# Patient Record
Sex: FEMALE | Race: WHITE | NOT HISPANIC OR LATINO | Employment: OTHER | URBAN - METROPOLITAN AREA
[De-identification: names, ages, dates, MRNs, and addresses within clinical notes are randomized per-mention and may not be internally consistent; named-entity substitution may affect disease eponyms.]

---

## 2017-10-29 ENCOUNTER — HOSPITAL ENCOUNTER (EMERGENCY)
Facility: HOSPITAL | Age: 52
Discharge: HOME | End: 2017-10-29
Attending: EMERGENCY MEDICINE | Admitting: EMERGENCY MEDICINE

## 2017-10-29 VITALS
SYSTOLIC BLOOD PRESSURE: 174 MMHG | HEART RATE: 94 BPM | WEIGHT: 30 LBS | TEMPERATURE: 98.4 F | RESPIRATION RATE: 20 BRPM | BODY MASS INDEX: 5.32 KG/M2 | DIASTOLIC BLOOD PRESSURE: 85 MMHG | OXYGEN SATURATION: 95 % | HEIGHT: 63 IN

## 2017-10-29 DIAGNOSIS — I89.0 LYMPHEDEMA: Primary | ICD-10-CM

## 2017-10-29 DIAGNOSIS — I10 UNCONTROLLED HYPERTENSION: ICD-10-CM

## 2017-10-29 DIAGNOSIS — R23.8 SKIN BULLA: ICD-10-CM

## 2017-10-29 LAB
ALBUMIN SERPL-MCNC: 4.4 G/DL (ref 3.2–4.8)
ALBUMIN/GLOB SERPL: 1.3 G/DL (ref 1.5–2.5)
ALP SERPL-CCNC: 74 U/L (ref 25–100)
ALT SERPL W P-5'-P-CCNC: 27 U/L (ref 7–40)
ANION GAP SERPL CALCULATED.3IONS-SCNC: 4 MMOL/L (ref 3–11)
AST SERPL-CCNC: 24 U/L (ref 0–33)
BASOPHILS # BLD AUTO: 0.03 10*3/MM3 (ref 0–0.2)
BASOPHILS NFR BLD AUTO: 0.4 % (ref 0–1)
BILIRUB SERPL-MCNC: 0.6 MG/DL (ref 0.3–1.2)
BUN BLD-MCNC: 16 MG/DL (ref 9–23)
BUN/CREAT SERPL: 17.8 (ref 7–25)
CALCIUM SPEC-SCNC: 9.1 MG/DL (ref 8.7–10.4)
CHLORIDE SERPL-SCNC: 103 MMOL/L (ref 99–109)
CO2 SERPL-SCNC: 30 MMOL/L (ref 20–31)
CREAT BLD-MCNC: 0.9 MG/DL (ref 0.6–1.3)
D-LACTATE SERPL-SCNC: 1.2 MMOL/L (ref 0.5–2)
DEPRECATED RDW RBC AUTO: 55.5 FL (ref 37–54)
EOSINOPHIL # BLD AUTO: 0.1 10*3/MM3 (ref 0–0.3)
EOSINOPHIL NFR BLD AUTO: 1.3 % (ref 0–3)
ERYTHROCYTE [DISTWIDTH] IN BLOOD BY AUTOMATED COUNT: 16.1 % (ref 11.3–14.5)
GFR SERPL CREATININE-BSD FRML MDRD: 66 ML/MIN/1.73
GLOBULIN UR ELPH-MCNC: 3.5 GM/DL
GLUCOSE BLD-MCNC: 108 MG/DL (ref 70–100)
HCT VFR BLD AUTO: 43.7 % (ref 34.5–44)
HGB BLD-MCNC: 13.7 G/DL (ref 11.5–15.5)
IMM GRANULOCYTES # BLD: 0.01 10*3/MM3 (ref 0–0.03)
IMM GRANULOCYTES NFR BLD: 0.1 % (ref 0–0.6)
LYMPHOCYTES # BLD AUTO: 1.63 10*3/MM3 (ref 0.6–4.8)
LYMPHOCYTES NFR BLD AUTO: 21.9 % (ref 24–44)
MCH RBC QN AUTO: 29.1 PG (ref 27–31)
MCHC RBC AUTO-ENTMCNC: 31.4 G/DL (ref 32–36)
MCV RBC AUTO: 93 FL (ref 80–99)
MONOCYTES # BLD AUTO: 0.66 10*3/MM3 (ref 0–1)
MONOCYTES NFR BLD AUTO: 8.9 % (ref 0–12)
NEUTROPHILS # BLD AUTO: 5.01 10*3/MM3 (ref 1.5–8.3)
NEUTROPHILS NFR BLD AUTO: 67.4 % (ref 41–71)
PLATELET # BLD AUTO: 203 10*3/MM3 (ref 150–450)
PMV BLD AUTO: 12.6 FL (ref 6–12)
POTASSIUM BLD-SCNC: 4 MMOL/L (ref 3.5–5.5)
PROCALCITONIN SERPL-MCNC: <0.05 NG/ML
PROT SERPL-MCNC: 7.9 G/DL (ref 5.7–8.2)
RBC # BLD AUTO: 4.7 10*6/MM3 (ref 3.89–5.14)
SODIUM BLD-SCNC: 137 MMOL/L (ref 132–146)
WBC NRBC COR # BLD: 7.44 10*3/MM3 (ref 3.5–10.8)

## 2017-10-29 PROCEDURE — 83605 ASSAY OF LACTIC ACID: CPT | Performed by: EMERGENCY MEDICINE

## 2017-10-29 PROCEDURE — 87040 BLOOD CULTURE FOR BACTERIA: CPT | Performed by: EMERGENCY MEDICINE

## 2017-10-29 PROCEDURE — 80053 COMPREHEN METABOLIC PANEL: CPT | Performed by: EMERGENCY MEDICINE

## 2017-10-29 PROCEDURE — 84145 PROCALCITONIN (PCT): CPT | Performed by: EMERGENCY MEDICINE

## 2017-10-29 PROCEDURE — 85025 COMPLETE CBC W/AUTO DIFF WBC: CPT | Performed by: EMERGENCY MEDICINE

## 2017-10-29 PROCEDURE — 99283 EMERGENCY DEPT VISIT LOW MDM: CPT

## 2017-10-29 RX ORDER — MUPIROCIN CALCIUM 20 MG/G
CREAM TOPICAL 3 TIMES DAILY
Qty: 30 G | Refills: 0 | Status: SHIPPED | OUTPATIENT
Start: 2017-10-29 | End: 2022-10-22

## 2017-10-29 RX ORDER — ALBUTEROL SULFATE 2.5 MG/3ML
2.5 SOLUTION RESPIRATORY (INHALATION) EVERY 4 HOURS PRN
COMMUNITY

## 2017-10-29 RX ORDER — MUPIROCIN CALCIUM 20 MG/G
CREAM TOPICAL ONCE
Status: COMPLETED | OUTPATIENT
Start: 2017-10-29 | End: 2017-10-29

## 2017-10-29 RX ORDER — HYDROCHLOROTHIAZIDE 25 MG/1
25 TABLET ORAL DAILY
COMMUNITY
End: 2022-10-27 | Stop reason: HOSPADM

## 2017-10-29 RX ORDER — LOSARTAN POTASSIUM 50 MG/1
50 TABLET ORAL DAILY
COMMUNITY
End: 2022-10-22

## 2017-10-29 RX ADMIN — MUPIROCIN: 2 CREAM TOPICAL at 23:00

## 2017-11-03 LAB
BACTERIA SPEC AEROBE CULT: NORMAL
BACTERIA SPEC AEROBE CULT: NORMAL

## 2018-09-01 ENCOUNTER — HOSPITAL ENCOUNTER (EMERGENCY)
Facility: HOSPITAL | Age: 53
Discharge: HOME | End: 2018-09-01
Attending: EMERGENCY MEDICINE | Admitting: NURSE PRACTITIONER

## 2018-09-01 VITALS
TEMPERATURE: 98.9 F | SYSTOLIC BLOOD PRESSURE: 176 MMHG | HEIGHT: 62 IN | BODY MASS INDEX: 53.92 KG/M2 | RESPIRATION RATE: 22 BRPM | OXYGEN SATURATION: 95 % | WEIGHT: 293 LBS | DIASTOLIC BLOOD PRESSURE: 93 MMHG | HEART RATE: 87 BPM

## 2018-09-01 DIAGNOSIS — I10 UNCONTROLLED HYPERTENSION: ICD-10-CM

## 2018-09-01 DIAGNOSIS — I89.0 LYMPHEDEMA OF BOTH LOWER EXTREMITIES: ICD-10-CM

## 2018-09-01 DIAGNOSIS — L03.116 CELLULITIS OF LEFT LOWER EXTREMITY: Primary | ICD-10-CM

## 2018-09-01 LAB
ALBUMIN SERPL-MCNC: 4.38 G/DL (ref 3.2–4.8)
ALBUMIN/GLOB SERPL: 1.4 G/DL (ref 1.5–2.5)
ALP SERPL-CCNC: 69 U/L (ref 25–100)
ALT SERPL W P-5'-P-CCNC: 35 U/L (ref 7–40)
ANION GAP SERPL CALCULATED.3IONS-SCNC: 5 MMOL/L (ref 3–11)
AST SERPL-CCNC: 32 U/L (ref 0–33)
BASOPHILS # BLD AUTO: 0.03 10*3/MM3 (ref 0–0.2)
BASOPHILS NFR BLD AUTO: 0.4 % (ref 0–1)
BILIRUB SERPL-MCNC: 0.6 MG/DL (ref 0.3–1.2)
BILIRUB UR QL STRIP: NEGATIVE
BUN BLD-MCNC: 13 MG/DL (ref 9–23)
BUN/CREAT SERPL: 18.8 (ref 7–25)
CALCIUM SPEC-SCNC: 9.4 MG/DL (ref 8.7–10.4)
CHLORIDE SERPL-SCNC: 103 MMOL/L (ref 99–109)
CLARITY UR: CLEAR
CO2 SERPL-SCNC: 29 MMOL/L (ref 20–31)
COLOR UR: YELLOW
CREAT BLD-MCNC: 0.69 MG/DL (ref 0.6–1.3)
D-LACTATE SERPL-SCNC: 1.9 MMOL/L (ref 0.5–2)
DEPRECATED RDW RBC AUTO: 54.2 FL (ref 37–54)
EOSINOPHIL # BLD AUTO: 0.13 10*3/MM3 (ref 0–0.3)
EOSINOPHIL NFR BLD AUTO: 1.9 % (ref 0–3)
ERYTHROCYTE [DISTWIDTH] IN BLOOD BY AUTOMATED COUNT: 16.1 % (ref 11.3–14.5)
GFR SERPL CREATININE-BSD FRML MDRD: 90 ML/MIN/1.73
GLOBULIN UR ELPH-MCNC: 3.1 GM/DL
GLUCOSE BLD-MCNC: 132 MG/DL (ref 70–100)
GLUCOSE UR STRIP-MCNC: NEGATIVE MG/DL
HCT VFR BLD AUTO: 40.5 % (ref 34.5–44)
HGB BLD-MCNC: 12.8 G/DL (ref 11.5–15.5)
HGB UR QL STRIP.AUTO: NEGATIVE
IMM GRANULOCYTES # BLD: 0.02 10*3/MM3 (ref 0–0.03)
IMM GRANULOCYTES NFR BLD: 0.3 % (ref 0–0.6)
KETONES UR QL STRIP: NEGATIVE
LEUKOCYTE ESTERASE UR QL STRIP.AUTO: NEGATIVE
LYMPHOCYTES # BLD AUTO: 1.4 10*3/MM3 (ref 0.6–4.8)
LYMPHOCYTES NFR BLD AUTO: 20.2 % (ref 24–44)
MCH RBC QN AUTO: 29.3 PG (ref 27–31)
MCHC RBC AUTO-ENTMCNC: 31.6 G/DL (ref 32–36)
MCV RBC AUTO: 92.7 FL (ref 80–99)
MONOCYTES # BLD AUTO: 0.67 10*3/MM3 (ref 0–1)
MONOCYTES NFR BLD AUTO: 9.7 % (ref 0–12)
NEUTROPHILS # BLD AUTO: 4.71 10*3/MM3 (ref 1.5–8.3)
NEUTROPHILS NFR BLD AUTO: 67.8 % (ref 41–71)
NITRITE UR QL STRIP: NEGATIVE
PH UR STRIP.AUTO: <=5 [PH] (ref 5–8)
PLATELET # BLD AUTO: 187 10*3/MM3 (ref 150–450)
PMV BLD AUTO: 12.5 FL (ref 6–12)
POTASSIUM BLD-SCNC: 4.1 MMOL/L (ref 3.5–5.5)
PROCALCITONIN SERPL-MCNC: <0.05 NG/ML
PROT SERPL-MCNC: 7.5 G/DL (ref 5.7–8.2)
PROT UR QL STRIP: NEGATIVE
RBC # BLD AUTO: 4.37 10*6/MM3 (ref 3.89–5.14)
SODIUM BLD-SCNC: 137 MMOL/L (ref 132–146)
SP GR UR STRIP: 1.01 (ref 1–1.03)
UROBILINOGEN UR QL STRIP: NORMAL
WBC NRBC COR # BLD: 6.94 10*3/MM3 (ref 3.5–10.8)

## 2018-09-01 PROCEDURE — 87040 BLOOD CULTURE FOR BACTERIA: CPT | Performed by: NURSE PRACTITIONER

## 2018-09-01 PROCEDURE — 81003 URINALYSIS AUTO W/O SCOPE: CPT | Performed by: EMERGENCY MEDICINE

## 2018-09-01 PROCEDURE — 80053 COMPREHEN METABOLIC PANEL: CPT | Performed by: NURSE PRACTITIONER

## 2018-09-01 PROCEDURE — 85025 COMPLETE CBC W/AUTO DIFF WBC: CPT | Performed by: NURSE PRACTITIONER

## 2018-09-01 PROCEDURE — 83605 ASSAY OF LACTIC ACID: CPT | Performed by: NURSE PRACTITIONER

## 2018-09-01 PROCEDURE — 99283 EMERGENCY DEPT VISIT LOW MDM: CPT

## 2018-09-01 PROCEDURE — 84145 PROCALCITONIN (PCT): CPT | Performed by: NURSE PRACTITIONER

## 2018-09-01 RX ORDER — SODIUM CHLORIDE 0.9 % (FLUSH) 0.9 %
10 SYRINGE (ML) INJECTION AS NEEDED
Status: DISCONTINUED | OUTPATIENT
Start: 2018-09-01 | End: 2018-09-01 | Stop reason: HOSPADM

## 2018-09-01 RX ORDER — CLONIDINE HYDROCHLORIDE 0.1 MG/1
0.1 TABLET ORAL ONCE
Status: COMPLETED | OUTPATIENT
Start: 2018-09-01 | End: 2018-09-01

## 2018-09-01 RX ORDER — CEPHALEXIN 500 MG/1
500 CAPSULE ORAL 4 TIMES DAILY
Qty: 40 CAPSULE | Refills: 0 | Status: ON HOLD | OUTPATIENT
Start: 2018-09-01 | End: 2021-07-05

## 2018-09-01 RX ADMIN — CLONIDINE HYDROCHLORIDE 0.1 MG: 0.1 TABLET ORAL at 19:27

## 2018-09-01 NOTE — ED PROVIDER NOTES
Subjective   Kerry Leal is a 53 y.o.female who presents to the ED with complaints of leg swelling. The patient states that she has had intermittent left leg swelling, pain, and erythema for the past year. She says over the past month her swelling and redness has worsened and she says it is the worse it has ever been currently. She was given antibiotics, Bactrim, 6 months ago for cellulitis and lymphedema, but she says that it has not helped. She says that she has not taken it every day. Whenever her redness goes down she stops. She was told to return to the ED if warmth and redness flares up in her leg. She currently says her left leg She has had shortness of breath, but this is normal for her at baseline secondary to having asthma. She also endorses having numbness in her arms and legs. She denies having any chest pain, fever, or chills. She went to  about a year ago to see infectious disease, they did a CT scan and did not find anything on her leg, but they did find a nodule on her lung. She denies any history of DVT or PE. She has no recent immobilization, travel, or immobilizations. Her last US was done about 4-5 months ago. Her past medical history is significant for anxiety and being a borderline diabetic. There are no other acute complaints at this time.              History provided by:  Patient  Lower Extremity Issue   Location:  Leg  Leg location:  L lower leg  Pain details:     Radiates to:  Does not radiate    Severity:  Mild    Onset quality:  Gradual    Duration:  12 months    Timing:  Intermittent    Progression:  Waxing and waning  Chronicity:  Chronic  Dislocation: no    Foreign body present:  No foreign bodies  Relieved by:  Nothing  Worsened by:  Nothing  Ineffective treatments: Bactrim.  Associated symptoms: numbness and swelling    Associated symptoms: no fever    Risk factors: obesity        Review of Systems   Constitutional: Negative for chills and fever.   Respiratory: Positive for  shortness of breath (chronic).    Cardiovascular: Positive for leg swelling. Negative for chest pain.   Skin: Positive for color change.   Neurological: Positive for numbness.   All other systems reviewed and are negative.      Past Medical History:   Diagnosis Date   • Anxiety    • Asthma    • Heart murmur    • Hypertension        Allergies   Allergen Reactions   • Benadryl [Diphenhydramine Hcl]    • Clindamycin/Lincomycin    • Diclofenac Swelling   • Doxycycline    • Fluticasone    • Fluvoxamine    • Lisinopril    • Montelukast Swelling   • Penicillins    • Smz-Tmp Ds [Sulfamethoxazole-Trimethoprim]    • Sulfa Antibiotics    • Symbicort [Budesonide-Formoterol Fumarate]        Past Surgical History:   Procedure Laterality Date   • APPENDECTOMY     •  SECTION     • HYSTERECTOMY     • NECK SURGERY         History reviewed. No pertinent family history.    Social History     Social History   • Marital status:      Social History Main Topics   • Smoking status: Never Smoker   • Smokeless tobacco: Never Used   • Alcohol use No   • Drug use: No   • Sexual activity: Defer     Other Topics Concern   • Not on file         Objective   Physical Exam   Constitutional: She is oriented to person, place, and time. She appears well-developed and well-nourished. No distress.   HENT:   Head: Normocephalic and atraumatic.   Mouth/Throat: Oropharynx is clear and moist.   Eyes: Conjunctivae and EOM are normal.   Neck: Normal range of motion.   Cardiovascular: Normal rate, regular rhythm and intact distal pulses.    Pulmonary/Chest: Effort normal and breath sounds normal. No respiratory distress.   Musculoskeletal: She exhibits edema and tenderness.   Lt lower extremity:  Known lymphema, swelling is greater on the LLE.  Pt has erythema that extends from ankle to mid shin and extends to the calf.  Extremity is warm to palpation.  Extremity is tender to palpation.  +pulses are intact.    Neurological: She is alert and  oriented to person, place, and time.   Skin: Skin is warm and dry.   Lt lower extremity:  Known lymphema, swelling is greater on the LLE.  Pt has erythema that extends from ankle to mid shin and extends to the calf.  Extremity is warm to palpation.  Extremity is tender to palpation.  +pulses are intact.      Psychiatric: She has a normal mood and affect.   Nursing note and vitals reviewed.      Procedures         ED Course  ED Course as of Sep 03 0515   Sat Sep 01, 2018   1953 Dr. Dickson is at the bedside evaluating the patient. She was treated with Bactrim six months ago for cellulitis which she says has been a problem off and on for about a year. She sees an infectious disease doctor at  but has been unable to schedule an appointment. Advised the importance of elevation for her lymphedema and starting Kefflex for cellulitis. She will call on Tuesday to schedule an appointment with infectious disease and promises to follow up with primary care as soon as possible.  [AS]      ED Course User Index  [AS] Carmela Ovalle     No results found for this or any previous visit (from the past 24 hour(s)).  Note: In addition to lab results from this visit, the labs listed above may include labs taken at another facility or during a different encounter within the last 24 hours. Please correlate lab times with ED admission and discharge times for further clarification of the services performed during this visit.    No orders to display     Vitals:    09/01/18 1900 09/01/18 1915 09/01/18 1945 09/01/18 2014   BP: (!) 179/107 (!) 195/104 176/93    BP Location:       Patient Position:       Pulse:    87   Resp:       Temp:       TempSrc:       SpO2:       Weight:       Height:         Medications   CloNIDine (CATAPRES) tablet 0.1 mg (0.1 mg Oral Given 9/1/18 1927)     ECG/EMG Results (last 24 hours)     ** No results found for the last 24 hours. **                    [facility]    Final diagnoses:   Cellulitis of left lower extremity    Lymphedema of both lower extremities   Uncontrolled hypertension       Documentation assistance provided by jani Bowens.  Information recorded by the killianibgaetano was done at my direction and has been verified and validated by me.     Álvaro Bowens  09/01/18 1654       Yola Sung APRN  09/03/18 0514

## 2018-09-02 NOTE — DISCHARGE INSTRUCTIONS
Add Keflex to your Bactrim dosing.  Elevate lower extremities as much as possible.  Follow up with [facility]- Infectious disease.  Follow up with Primary Care Physician.

## 2018-09-06 LAB
BACTERIA SPEC AEROBE CULT: NORMAL
BACTERIA SPEC AEROBE CULT: NORMAL

## 2019-09-25 ENCOUNTER — APPOINTMENT (OUTPATIENT)
Dept: CARDIOLOGY | Facility: HOSPITAL | Age: 54
End: 2019-09-25

## 2019-09-25 ENCOUNTER — HOSPITAL ENCOUNTER (EMERGENCY)
Facility: HOSPITAL | Age: 54
Discharge: HOME | End: 2019-09-25
Attending: EMERGENCY MEDICINE | Admitting: EMERGENCY MEDICINE

## 2019-09-25 VITALS
TEMPERATURE: 98 F | HEIGHT: 63 IN | OXYGEN SATURATION: 94 % | DIASTOLIC BLOOD PRESSURE: 79 MMHG | WEIGHT: 293 LBS | BODY MASS INDEX: 51.91 KG/M2 | RESPIRATION RATE: 16 BRPM | HEART RATE: 92 BPM | SYSTOLIC BLOOD PRESSURE: 186 MMHG

## 2019-09-25 DIAGNOSIS — I10 ESSENTIAL HYPERTENSION: Primary | ICD-10-CM

## 2019-09-25 DIAGNOSIS — L03.116 LEFT LEG CELLULITIS: ICD-10-CM

## 2019-09-25 DIAGNOSIS — R60.0 BILATERAL LOWER EXTREMITY EDEMA: ICD-10-CM

## 2019-09-25 DIAGNOSIS — R03.0 ELEVATED BLOOD PRESSURE READING: ICD-10-CM

## 2019-09-25 LAB
BH CV LOWER VASCULAR LEFT COMMON FEMORAL AUGMENT: NORMAL
BH CV LOWER VASCULAR LEFT COMMON FEMORAL COMPRESS: NORMAL
BH CV LOWER VASCULAR LEFT COMMON FEMORAL PHASIC: NORMAL
BH CV LOWER VASCULAR LEFT COMMON FEMORAL SPONT: NORMAL
BH CV LOWER VASCULAR LEFT DISTAL FEMORAL AUGMENT: NORMAL
BH CV LOWER VASCULAR LEFT DISTAL FEMORAL COMPRESS: NORMAL
BH CV LOWER VASCULAR LEFT DISTAL FEMORAL PHASIC: NORMAL
BH CV LOWER VASCULAR LEFT DISTAL FEMORAL SPONT: NORMAL
BH CV LOWER VASCULAR LEFT GASTRONEMIUS COMPRESS: NORMAL
BH CV LOWER VASCULAR LEFT GREATER SAPH AK COMPRESS: NORMAL
BH CV LOWER VASCULAR LEFT GREATER SAPH BK COMPRESS: NORMAL
BH CV LOWER VASCULAR LEFT LESSER SAPH COMPRESS: NORMAL
BH CV LOWER VASCULAR LEFT MID FEMORAL AUGMENT: NORMAL
BH CV LOWER VASCULAR LEFT MID FEMORAL COMPRESS: NORMAL
BH CV LOWER VASCULAR LEFT MID FEMORAL PHASIC: NORMAL
BH CV LOWER VASCULAR LEFT MID FEMORAL SPONT: NORMAL
BH CV LOWER VASCULAR LEFT PERONEAL AUGMENT: NORMAL
BH CV LOWER VASCULAR LEFT PERONEAL COMPRESS: NORMAL
BH CV LOWER VASCULAR LEFT POPLITEAL AUGMENT: NORMAL
BH CV LOWER VASCULAR LEFT POPLITEAL COMPRESS: NORMAL
BH CV LOWER VASCULAR LEFT POPLITEAL PHASIC: NORMAL
BH CV LOWER VASCULAR LEFT POPLITEAL SPONT: NORMAL
BH CV LOWER VASCULAR LEFT POSTERIOR TIBIAL AUGMENT: NORMAL
BH CV LOWER VASCULAR LEFT POSTERIOR TIBIAL COMPRESS: NORMAL
BH CV LOWER VASCULAR LEFT PROFUNDA FEMORAL AUGMENT: NORMAL
BH CV LOWER VASCULAR LEFT PROFUNDA FEMORAL PHASIC: NORMAL
BH CV LOWER VASCULAR LEFT PROFUNDA FEMORAL SPONT: NORMAL
BH CV LOWER VASCULAR LEFT PROXIMAL FEMORAL AUGMENT: NORMAL
BH CV LOWER VASCULAR LEFT PROXIMAL FEMORAL COMPRESS: NORMAL
BH CV LOWER VASCULAR LEFT PROXIMAL FEMORAL PHASIC: NORMAL
BH CV LOWER VASCULAR LEFT PROXIMAL FEMORAL SPONT: NORMAL
BH CV LOWER VASCULAR LEFT SAPHENOFEMORAL JUNCTION AUGMENT: NORMAL
BH CV LOWER VASCULAR LEFT SAPHENOFEMORAL JUNCTION COMPRESS: NORMAL
BH CV LOWER VASCULAR LEFT SAPHENOFEMORAL JUNCTION PHASIC: NORMAL
BH CV LOWER VASCULAR LEFT SAPHENOFEMORAL JUNCTION SPONT: NORMAL
BH CV LOWER VASCULAR RIGHT COMMON FEMORAL AUGMENT: NORMAL
BH CV LOWER VASCULAR RIGHT COMMON FEMORAL COMPRESS: NORMAL
BH CV LOWER VASCULAR RIGHT COMMON FEMORAL PHASIC: NORMAL
BH CV LOWER VASCULAR RIGHT COMMON FEMORAL SPONT: NORMAL
HOLD SPECIMEN: NORMAL
HOLD SPECIMEN: NORMAL
WHOLE BLOOD HOLD SPECIMEN: NORMAL
WHOLE BLOOD HOLD SPECIMEN: NORMAL

## 2019-09-25 PROCEDURE — 99283 EMERGENCY DEPT VISIT LOW MDM: CPT

## 2019-09-25 PROCEDURE — 93971 EXTREMITY STUDY: CPT

## 2019-09-25 RX ORDER — SODIUM CHLORIDE 0.9 % (FLUSH) 0.9 %
10 SYRINGE (ML) INJECTION AS NEEDED
Status: DISCONTINUED | OUTPATIENT
Start: 2019-09-25 | End: 2019-09-25 | Stop reason: HOSPADM

## 2019-09-25 NOTE — DISCHARGE INSTRUCTIONS
Please call to schedule a follow-up appointment with your infectious disease physician at  within the next week.    Also call to schedule a follow-up appointment with Dr. Crockett by Friday or Monday.    Continue your Bactrim as prescribed by Dr. Crockett through the full 14 day treatment course.     Immediately return to the emergency department for any new or worsening symptoms.     Please review the medications you are supposed to be taking according to prior physician recommendations. I have not changed your home medications during this visit. If your discharge instructions indicate that I have changed your home medications, this is not the case, and you should disregard. If you have any questions about the medication you should be taking at home, please call your physician.

## 2019-09-25 NOTE — ED PROVIDER NOTES
Subjective   This patient is a very nice 54-year-old female with an unfortunate medical history of morbid obesity and what she describes as 1+ year of left lower extremity infection.  She tells me that she [facility].  She is a company by her significant other who states that over the last year, she has had a waxing and waning redness, swelling and infection in the left lower extremity.  She has a long-standing history of bilateral lower extremity lymphedema and leg swelling.  Approximately 2 weeks ago, she noticed redness in the left lower extremity after injuring her left leg on a car door.  She ultimately went to see Dr. Crockett and was placed on Bactrim.  Patient tells me that she believes the redness may be a little worse than when she started Bactrim but she is unsure.  She was evidently told to come to the emergency department if she had any concerns after seeing Dr. Crockett.  Patient denies any nausea, vomiting, abscess formation, drainage.  She is able to ambulate without great difficulty.  She tells me [facility]  She comes in with her significant other who confirms the aforementioned story.  Patient was also placed on a antifungal medication for what she describes as a toe fungus.  She tells me she has been taking Bactrim twice daily as prescribed without any compliance issues.  Denies chest pain, fevers, rash or other concerns.  She tells me her left lower extremity is always affected in the calf area.  Denies any history of DVT.  Denies any deep laceration.  Her only recent injury involved a car door within the last couple of weeks.  In summary, we have a 54-year-old female with 1 year of left lower extremity redness and infections who comes in with another case who is already on antibiotics and is here more or less for second opinion who has not seen infectious disease and  approximately 1 year.    Past medical history: asthma, heart murmur, and hypertension.    Past family history: diabetes mellitus and heart disease, mom; hypertension, mom and dad        History provided by:  Patient  Leg Swelling   Location:  Bilateral, left worse than right  Severity:  Severe  Onset quality:  Gradual  Timing:  Intermittent  Progression:  Waxing and waning  Chronicity:  Recurrent  Associated symptoms: no chest pain, no diarrhea, no ear pain, no fatigue, no fever, no headaches, no myalgias, no nausea, no shortness of breath and no vomiting        Review of Systems   Constitutional: Negative for chills, fatigue, fever and unexpected weight change.        Redness to the left lower extremity.   HENT: Negative for dental problem, ear pain, hearing loss and sinus pressure.    Eyes: Negative for pain and visual disturbance.   Respiratory: Negative for chest tightness and shortness of breath.    Cardiovascular: Positive for leg swelling. Negative for chest pain and palpitations.   Gastrointestinal: Negative for diarrhea, nausea and vomiting.   Genitourinary: Negative for difficulty urinating, dyspareunia, hematuria and urgency.   Musculoskeletal: Negative for myalgias, neck pain and neck stiffness.   Skin: Positive for color change.   Neurological: Negative for seizures, syncope, speech difficulty, light-headedness and headaches.   Psychiatric/Behavioral: Negative for confusion.   All other systems reviewed and are negative.      Past Medical History:   Diagnosis Date   • Anxiety    • Asthma    • Heart murmur    • Hypertension        Allergies   Allergen Reactions   • Benadryl [Diphenhydramine Hcl]    • Clindamycin/Lincomycin    • Diclofenac Swelling   • Doxycycline    • Fluticasone    • Fluvoxamine    • Lisinopril    • Montelukast Swelling   • Penicillins    • Smz-Tmp Ds [Sulfamethoxazole-Trimethoprim]    • Sulfa Antibiotics    • Symbicort [Budesonide-Formoterol Fumarate]        Past Surgical History:    Procedure Laterality Date   • APPENDECTOMY     •  SECTION     • HYSTERECTOMY     • NECK SURGERY         History reviewed. No pertinent family history.    Social History     Socioeconomic History   • Marital status:      Spouse name: Not on file   • Number of children: Not on file   • Years of education: Not on file   • Highest education level: Not on file   Tobacco Use   • Smoking status: Never Smoker   • Smokeless tobacco: Never Used   Substance and Sexual Activity   • Alcohol use: No   • Drug use: No   • Sexual activity: Defer         Objective   Physical Exam   Constitutional: She is oriented to person, place, and time. She appears well-developed. No distress.   HENT:   Head: Normocephalic and atraumatic.   Right Ear: External ear normal.   Left Ear: External ear normal.   Nose: Nose normal.   Mouth/Throat: Oropharynx is clear and moist.   Poor dentition   Eyes: EOM are normal. Pupils are equal, round, and reactive to light.   Neck: Normal range of motion. Neck supple. No JVD present.   Cardiovascular: Normal rate, regular rhythm and normal heart sounds. Exam reveals no gallop and no friction rub.   Pulmonary/Chest: Effort normal and breath sounds normal. She has no wheezes. She has no rales. She exhibits no tenderness.   Abdominal: Soft. Bowel sounds are normal. She exhibits no distension and no mass. There is no tenderness. There is no rebound and no guarding.   No signs of acute abdomen.  No pain at McBurney's point.  No pulsatile abdominal mass.  Morbid obesity   Musculoskeletal: Normal range of motion. She exhibits edema.   2-3+ edema in bilateral lower extremities.   Lymphadenopathy:     She has no cervical adenopathy.   Neurological: She is alert and oriented to person, place, and time. No cranial nerve deficit or sensory deficit. She exhibits normal muscle tone.   4/5 strength bilaterally with flexion and extension of fingers, wrist, elbows, knees and hips as well as plantar and  dorsiflexion of the foot.   Skin: Skin is warm and dry. No erythema. No pallor.   Redness of the left lower extremity in the area of the calf involving most of the anterior calf and approximately 50 to 60% of the posterior calf.  No fluctuance or signs of abscess.  No laceration or drainage.  No palpable cords.   Psychiatric: She has a normal mood and affect. Her behavior is normal. Judgment and thought content normal.   Nursing note and vitals reviewed.      Procedures         ED Course  ED Course as of Sep 26 2112   Wed Sep 25, 2019   1257 I had a good conversation with the patient and her significant other.  We discussed the plan for left lower extremity duplex.  Patient is pending this study.  If negative, as anticipated, I think the best and most prudent course of care for the patient would be outpatient infectious disease follow-up.  Patient has a history of care at the [facility] infectious disease clinic and is comfortable following back up with them.  She is still in the middle of her Bactrim course.  At this point, it is difficult to ascertain whether or not her left lower extremity is worsening or not.  She has no nausea, vomiting, fevers, or chills.  She has had redness and what she describes as waxing and waning left lower extremity infection for the last year.  Patient is comfortable with the plan as discussed.  Differential diagnosis and medical decision making understood fully.  Final disposition and plan following completion of work-up.  [MICHA]   1440 Blood pressure now down to 149/76 as of 1330 p.m.  [MICHA]   1516 Left lower extremity duplex is unremarkable.  Repeat blood pressure 149/76.  Patient resting comfortably.  We have already discussed, at great length, the plan for outpatient follow-up for this ongoing chronic left lower extremity cellulitis.  She currently has antibiotics in the form of Bactrim and feels comfortable continuing these.  I think that is a reasonable and most  appropriate next step.  She has no signs of sepsis, no crepitance in the lower extremity.  No concern for necrotizing fasciitis.  All this has been discussed with the patient and she knows what to look for.  We will have the nurse Apoorva the areas of redness with a sharpie so that we can track it.  Patient should follow-up with the [facility] infectious disease within the next week.  Continue Bactrim as prescribed.  Follow-up with her primary care physician Friday or Monday.  Return immediately for new or changing concerns.  Patient is agreeable to the plan and without question or complaint will be discharged home accordingly.  [MICHA]   1520 Dr. Lai is at bedside re-examining the patient, discussing all results, and updating them on the plan.   [PK]   1527 I reexamined the patient.  Her and her  are very happy that she does not have a DVT.  We will apoorva out the patient's leg as discussed and patient will be discharged home with impression that includes left lower extremely cellulitis, bilateral lower extremity edema.  [MICHA]      ED Course User Index  [MICHA] Deni Lai MD  [PK] Manuel Boudreaux     No results found for this or any previous visit (from the past 24 hour(s)).  Note: In addition to lab results from this visit, the labs listed above may include labs taken at another facility or during a different encounter within the last 24 hours. Please correlate lab times with ED admission and discharge times for further clarification of the services performed during this visit.    No orders to display     Vitals:    09/25/19 1242 09/25/19 1243 09/25/19 1330 09/25/19 1608   BP: (!) 178/101  149/76 (!) 186/79   BP Location:    Right arm   Patient Position:    Sitting   Pulse:  104 90 92   Resp:    16   Temp:    98 °F (36.7 °C)   TempSrc:    Oral   SpO2:  95% 92% 94%   Weight:       Height:         Medications - No data to display  ECG/EMG Results (last 24 hours)     ** No results found for the  last 24 hours. **        No orders to display                     MDM    Final diagnoses:   Essential hypertension   Elevated blood pressure reading   Left leg cellulitis   Bilateral lower extremity edema       Documentation assistance provided by jani Boudreaux.  Information recorded by the scribe was done at my direction and has been verified and validated by me.     Manuel Boudreaux  09/25/19 5073       Deni Lai MD  09/26/19 6190

## 2020-11-19 ENCOUNTER — TRANSCRIBE ORDERS (OUTPATIENT)
Dept: ADMINISTRATIVE | Facility: HOSPITAL | Age: 55
End: 2020-11-19

## 2020-11-19 DIAGNOSIS — Z12.31 VISIT FOR SCREENING MAMMOGRAM: Primary | ICD-10-CM

## 2021-02-18 ENCOUNTER — APPOINTMENT (OUTPATIENT)
Dept: MAMMOGRAPHY | Facility: HOSPITAL | Age: 56
End: 2021-02-18

## 2021-02-27 ENCOUNTER — APPOINTMENT (OUTPATIENT)
Dept: MAMMOGRAPHY | Facility: HOSPITAL | Age: 56
End: 2021-02-27

## 2021-07-05 ENCOUNTER — APPOINTMENT (OUTPATIENT)
Dept: GENERAL RADIOLOGY | Facility: HOSPITAL | Age: 56
End: 2021-07-05

## 2021-07-05 ENCOUNTER — APPOINTMENT (OUTPATIENT)
Dept: CT IMAGING | Facility: HOSPITAL | Age: 56
End: 2021-07-05

## 2021-07-05 ENCOUNTER — HOSPITAL ENCOUNTER (INPATIENT)
Facility: HOSPITAL | Age: 56
LOS: 1 days | Discharge: HOME | End: 2021-07-06
Attending: EMERGENCY MEDICINE | Admitting: HOSPITALIST

## 2021-07-05 DIAGNOSIS — L03.116 CELLULITIS OF LEFT LOWER EXTREMITY: Primary | ICD-10-CM

## 2021-07-05 DIAGNOSIS — R09.02 HYPOXIA: ICD-10-CM

## 2021-07-05 DIAGNOSIS — Z87.09 HISTORY OF ASTHMA: ICD-10-CM

## 2021-07-05 DIAGNOSIS — E66.01 MORBID OBESITY (HCC): ICD-10-CM

## 2021-07-05 DIAGNOSIS — Z86.79 HISTORY OF HYPERTENSION: ICD-10-CM

## 2021-07-05 DIAGNOSIS — I89.0 CHRONIC ACQUIRED LYMPHEDEMA: ICD-10-CM

## 2021-07-05 PROBLEM — R79.89 ELEVATED D-DIMER: Status: ACTIVE | Noted: 2021-07-05

## 2021-07-05 PROBLEM — R73.03 PREDIABETES: Status: ACTIVE | Noted: 2021-07-05

## 2021-07-05 PROBLEM — I10 ESSENTIAL HYPERTENSION: Status: ACTIVE | Noted: 2021-07-05

## 2021-07-05 LAB
ALBUMIN SERPL-MCNC: 4.1 G/DL (ref 3.5–5.2)
ALBUMIN/GLOB SERPL: 1.1 G/DL
ALP SERPL-CCNC: 76 U/L (ref 39–117)
ALT SERPL W P-5'-P-CCNC: 21 U/L (ref 1–33)
ANION GAP SERPL CALCULATED.3IONS-SCNC: 10 MMOL/L (ref 5–15)
AST SERPL-CCNC: 22 U/L (ref 1–32)
BASOPHILS # BLD AUTO: 0.05 10*3/MM3 (ref 0–0.2)
BASOPHILS NFR BLD AUTO: 0.7 % (ref 0–1.5)
BILIRUB SERPL-MCNC: 0.6 MG/DL (ref 0–1.2)
BUN SERPL-MCNC: 18 MG/DL (ref 6–20)
BUN/CREAT SERPL: 18.6 (ref 7–25)
CALCIUM SPEC-SCNC: 9.5 MG/DL (ref 8.6–10.5)
CHLORIDE SERPL-SCNC: 102 MMOL/L (ref 98–107)
CO2 SERPL-SCNC: 27 MMOL/L (ref 22–29)
CREAT SERPL-MCNC: 0.97 MG/DL (ref 0.57–1)
CRP SERPL-MCNC: 2.79 MG/DL (ref 0–0.5)
D DIMER PPP FEU-MCNC: 1.41 MCGFEU/ML (ref 0–0.56)
D-LACTATE SERPL-SCNC: 2 MMOL/L (ref 0.5–2)
DEPRECATED RDW RBC AUTO: 51.7 FL (ref 37–54)
EOSINOPHIL # BLD AUTO: 0.09 10*3/MM3 (ref 0–0.4)
EOSINOPHIL NFR BLD AUTO: 1.2 % (ref 0.3–6.2)
ERYTHROCYTE [DISTWIDTH] IN BLOOD BY AUTOMATED COUNT: 15.7 % (ref 12.3–15.4)
FLUAV SUBTYP SPEC NAA+PROBE: NOT DETECTED
FLUBV RNA ISLT QL NAA+PROBE: NOT DETECTED
GFR SERPL CREATININE-BSD FRML MDRD: 60 ML/MIN/1.73
GLOBULIN UR ELPH-MCNC: 3.8 GM/DL
GLUCOSE SERPL-MCNC: 147 MG/DL (ref 65–99)
HCT VFR BLD AUTO: 42.7 % (ref 34–46.6)
HGB BLD-MCNC: 13.7 G/DL (ref 12–15.9)
HOLD SPECIMEN: NORMAL
IMM GRANULOCYTES # BLD AUTO: 0.03 10*3/MM3 (ref 0–0.05)
IMM GRANULOCYTES NFR BLD AUTO: 0.4 % (ref 0–0.5)
LDH SERPL-CCNC: 244 U/L (ref 135–214)
LYMPHOCYTES # BLD AUTO: 1.33 10*3/MM3 (ref 0.7–3.1)
LYMPHOCYTES NFR BLD AUTO: 17.7 % (ref 19.6–45.3)
MCH RBC QN AUTO: 29.1 PG (ref 26.6–33)
MCHC RBC AUTO-ENTMCNC: 32.1 G/DL (ref 31.5–35.7)
MCV RBC AUTO: 90.7 FL (ref 79–97)
MONOCYTES # BLD AUTO: 0.71 10*3/MM3 (ref 0.1–0.9)
MONOCYTES NFR BLD AUTO: 9.4 % (ref 5–12)
NEUTROPHILS NFR BLD AUTO: 5.32 10*3/MM3 (ref 1.7–7)
NEUTROPHILS NFR BLD AUTO: 70.6 % (ref 42.7–76)
NRBC BLD AUTO-RTO: 0 /100 WBC (ref 0–0.2)
NT-PROBNP SERPL-MCNC: 200.3 PG/ML (ref 0–900)
PLATELET # BLD AUTO: 211 10*3/MM3 (ref 140–450)
PMV BLD AUTO: 14 FL (ref 6–12)
POTASSIUM SERPL-SCNC: 4.3 MMOL/L (ref 3.5–5.2)
PROT SERPL-MCNC: 7.9 G/DL (ref 6–8.5)
RBC # BLD AUTO: 4.71 10*6/MM3 (ref 3.77–5.28)
SARS-COV-2 RNA PNL SPEC NAA+PROBE: NOT DETECTED
SODIUM SERPL-SCNC: 139 MMOL/L (ref 136–145)
WBC # BLD AUTO: 7.53 10*3/MM3 (ref 3.4–10.8)
WHOLE BLOOD HOLD SPECIMEN: NORMAL

## 2021-07-05 PROCEDURE — 99223 1ST HOSP IP/OBS HIGH 75: CPT | Performed by: INTERNAL MEDICINE

## 2021-07-05 PROCEDURE — C9803 HOPD COVID-19 SPEC COLLECT: HCPCS | Performed by: PHYSICIAN ASSISTANT

## 2021-07-05 PROCEDURE — 87040 BLOOD CULTURE FOR BACTERIA: CPT | Performed by: EMERGENCY MEDICINE

## 2021-07-05 PROCEDURE — 71275 CT ANGIOGRAPHY CHEST: CPT

## 2021-07-05 PROCEDURE — 86140 C-REACTIVE PROTEIN: CPT | Performed by: PHYSICIAN ASSISTANT

## 2021-07-05 PROCEDURE — 0 IOPAMIDOL PER 1 ML: Performed by: EMERGENCY MEDICINE

## 2021-07-05 PROCEDURE — 25010000002 VANCOMYCIN 10 G RECONSTITUTED SOLUTION: Performed by: PHYSICIAN ASSISTANT

## 2021-07-05 PROCEDURE — 73701 CT LOWER EXTREMITY W/DYE: CPT

## 2021-07-05 PROCEDURE — 83615 LACTATE (LD) (LDH) ENZYME: CPT | Performed by: PHYSICIAN ASSISTANT

## 2021-07-05 PROCEDURE — 83880 ASSAY OF NATRIURETIC PEPTIDE: CPT | Performed by: PHYSICIAN ASSISTANT

## 2021-07-05 PROCEDURE — 87636 SARSCOV2 & INF A&B AMP PRB: CPT | Performed by: PHYSICIAN ASSISTANT

## 2021-07-05 PROCEDURE — 80053 COMPREHEN METABOLIC PANEL: CPT | Performed by: EMERGENCY MEDICINE

## 2021-07-05 PROCEDURE — 85025 COMPLETE CBC W/AUTO DIFF WBC: CPT | Performed by: EMERGENCY MEDICINE

## 2021-07-05 PROCEDURE — 99284 EMERGENCY DEPT VISIT MOD MDM: CPT

## 2021-07-05 PROCEDURE — 93005 ELECTROCARDIOGRAM TRACING: CPT | Performed by: PHYSICIAN ASSISTANT

## 2021-07-05 PROCEDURE — 83605 ASSAY OF LACTIC ACID: CPT | Performed by: EMERGENCY MEDICINE

## 2021-07-05 PROCEDURE — 85379 FIBRIN DEGRADATION QUANT: CPT | Performed by: PHYSICIAN ASSISTANT

## 2021-07-05 RX ORDER — ACETAMINOPHEN 650 MG/1
650 SUPPOSITORY RECTAL EVERY 4 HOURS PRN
Status: DISCONTINUED | OUTPATIENT
Start: 2021-07-05 | End: 2021-07-06 | Stop reason: HOSPADM

## 2021-07-05 RX ORDER — LOSARTAN POTASSIUM 50 MG/1
50 TABLET ORAL DAILY
Status: DISCONTINUED | OUTPATIENT
Start: 2021-07-06 | End: 2021-07-06 | Stop reason: HOSPADM

## 2021-07-05 RX ORDER — SODIUM CHLORIDE 0.9 % (FLUSH) 0.9 %
10 SYRINGE (ML) INJECTION AS NEEDED
Status: DISCONTINUED | OUTPATIENT
Start: 2021-07-05 | End: 2021-07-06 | Stop reason: HOSPADM

## 2021-07-05 RX ORDER — CEFTRIAXONE SODIUM 1 G/50ML
1 INJECTION, SOLUTION INTRAVENOUS EVERY 24 HOURS
Status: DISCONTINUED | OUTPATIENT
Start: 2021-07-06 | End: 2021-07-06 | Stop reason: HOSPADM

## 2021-07-05 RX ORDER — ACETAMINOPHEN 325 MG/1
650 TABLET ORAL EVERY 4 HOURS PRN
Status: DISCONTINUED | OUTPATIENT
Start: 2021-07-05 | End: 2021-07-06 | Stop reason: HOSPADM

## 2021-07-05 RX ORDER — CEFTRIAXONE SODIUM 1 G/50ML
1 INJECTION, SOLUTION INTRAVENOUS ONCE
Status: COMPLETED | OUTPATIENT
Start: 2021-07-05 | End: 2021-07-06

## 2021-07-05 RX ORDER — FUROSEMIDE 10 MG/ML
40 INJECTION INTRAMUSCULAR; INTRAVENOUS ONCE
Status: COMPLETED | OUTPATIENT
Start: 2021-07-06 | End: 2021-07-06

## 2021-07-05 RX ORDER — SODIUM CHLORIDE 0.9 % (FLUSH) 0.9 %
10 SYRINGE (ML) INJECTION EVERY 12 HOURS SCHEDULED
Status: DISCONTINUED | OUTPATIENT
Start: 2021-07-06 | End: 2021-07-06 | Stop reason: HOSPADM

## 2021-07-05 RX ORDER — HEPARIN SODIUM 5000 [USP'U]/ML
5000 INJECTION, SOLUTION INTRAVENOUS; SUBCUTANEOUS EVERY 12 HOURS SCHEDULED
Status: DISCONTINUED | OUTPATIENT
Start: 2021-07-06 | End: 2021-07-06 | Stop reason: HOSPADM

## 2021-07-05 RX ORDER — ACETAMINOPHEN 160 MG/5ML
650 SOLUTION ORAL EVERY 4 HOURS PRN
Status: DISCONTINUED | OUTPATIENT
Start: 2021-07-05 | End: 2021-07-06 | Stop reason: HOSPADM

## 2021-07-05 RX ADMIN — VANCOMYCIN HYDROCHLORIDE 3000 MG: 100 INJECTION, POWDER, LYOPHILIZED, FOR SOLUTION INTRAVENOUS at 20:51

## 2021-07-05 RX ADMIN — IOPAMIDOL 115 ML: 755 INJECTION, SOLUTION INTRAVENOUS at 20:47

## 2021-07-05 NOTE — ED PROVIDER NOTES
Subjective   This is a 56-year-old female that presents to the ER with recurrent left lower extremity cellulitis.  She reports symptoms of chronic lymphedema and has had increasing redness, warmth, and lichenification with some oozing from the left leg over the last 6 months.  Symptoms have worsened over the last week.  She reports tightness and heaviness.  Her O2 sat was 91% upon arrival.  She denies any significant shortness of breath, but O2 was placed in triage.  Patient denies any chest pain.  She denies any fever or chills.  She denies any nausea/vomiting.  She reports foul odor coming from the leg and also reports some white drainage from the umbilicus.  There is no induration, erythema, or warmth to the pannus.  Past medical history is significant for hypertension, anxiety, asthma, chronic lymphedema, recurrent left lower extremity cellulitis, morbid obesity with BMI of 59.  Patient's last admission for similar symptoms was in September, 2018.  Patient denies history of diabetes mellitus or previous history of MRSA.  Patient takes HCTZ 25 mg by mouth daily.  She has never had Unna wraps for chronic lymphedema.      History provided by:  Patient  Leg Pain  Location:  Leg  Time since incident:  6 months  Injury: no    Leg location:  L lower leg  Pain details:     Quality: Tightness and heaviness.    Radiates to:  Does not radiate    Timing:  Constant    Progression:  Worsening  Chronicity:  Recurrent  Dislocation: no    Relieved by:  Nothing  Worsened by:  Nothing  Ineffective treatments: Patient's  has been cleaning the legs with Dove.  Associated symptoms: swelling    Associated symptoms: no fatigue and no fever        Review of Systems   Constitutional: Negative.  Negative for chills, diaphoresis, fatigue and fever.   HENT: Negative.    Respiratory: Negative.  Negative for cough and shortness of breath.         O2 sat 91% on arrival.   Cardiovascular: Positive for leg swelling. Negative for chest  pain and palpitations.        Chronic lymphedema to bilateral lower extremities.   Gastrointestinal: Negative.  Negative for abdominal pain, diarrhea and vomiting.   Genitourinary: Negative.    Skin: Positive for color change (confluent erythema to left lower extremity with thickness and lichenification to the skin.) and wound (Open wound to lateral left lower extremity.).   All other systems reviewed and are negative.      Past Medical History:   Diagnosis Date   • Anxiety    • Asthma    • Heart murmur    • Hypertension        Allergies   Allergen Reactions   • Benadryl [Diphenhydramine Hcl] Other (See Comments)     UNSURE   • Clindamycin/Lincomycin Other (See Comments)     UNSURE   • Diclofenac Swelling   • Doxycycline Other (See Comments)     UNSURE   • Fluticasone Other (See Comments)     NOSE BLEEDS   • Fluvoxamine Other (See Comments)     UNSURE   • Lisinopril Dizziness   • Montelukast Swelling   • Penicillins Other (See Comments)     MOTHER TOLD HER SHE WAS ALLERGIC   • Smz-Tmp Ds [Sulfamethoxazole-Trimethoprim] GI Intolerance   • Sulfa Antibiotics GI Intolerance   • Symbicort [Budesonide-Formoterol Fumarate] Other (See Comments)     UNSURE       Past Surgical History:   Procedure Laterality Date   • APPENDECTOMY     •  SECTION     • HYSTERECTOMY     • NECK SURGERY         History reviewed. No pertinent family history.    Social History     Socioeconomic History   • Marital status:      Spouse name: Not on file   • Number of children: Not on file   • Years of education: Not on file   • Highest education level: Not on file   Tobacco Use   • Smoking status: Never Smoker   • Smokeless tobacco: Never Used   Substance and Sexual Activity   • Alcohol use: No   • Drug use: No   • Sexual activity: Defer           Objective   Physical Exam  Vitals and nursing note reviewed.   Constitutional:       Appearance: Normal appearance.   HENT:      Head: Normocephalic and atraumatic.      Right Ear: Tympanic  membrane normal.      Left Ear: Tympanic membrane normal.      Nose: Nose normal.      Mouth/Throat:      Mouth: Mucous membranes are moist.      Pharynx: Oropharynx is clear.   Eyes:      Extraocular Movements: Extraocular movements intact.      Conjunctiva/sclera: Conjunctivae normal.      Pupils: Pupils are equal, round, and reactive to light.   Cardiovascular:      Rate and Rhythm: Normal rate and regular rhythm.  No extrasystoles are present.     Pulses: Normal pulses.           Dorsalis pedis pulses are 2+ on the right side and 2+ on the left side.        Posterior tibial pulses are 2+ on the right side and 2+ on the left side.      Heart sounds: Normal heart sounds.      Comments: Symptoms of chronic lymphedema with lichenification to both lower extremities and scaling with dryness.  Left lower extremity has confluent erythema with warmth.  There is a circular open wound with serous drainage on the left lateral lower extremity.  There are strong bilateral DP and PT pulses.  There is no redness or warmth to the left foot.  Left lower extremity appears consistent with cellulitis.  Pulmonary:      Effort: Pulmonary effort is normal. No tachypnea or accessory muscle usage.      Breath sounds: Normal breath sounds. No decreased air movement. No wheezing, rhonchi or rales.      Comments: No tachypnea or retractions.  No wheezes, rhonchi, or rales.  Abdominal:      General: Bowel sounds are normal.      Palpations: Abdomen is soft.      Tenderness: There is no abdominal tenderness. There is no right CVA tenderness, left CVA tenderness, guarding or rebound.      Comments: Central obesity.  Large pannus but no induration, erythema, or warmth.  Patient does have minimal white discharge in the umbilicus which is most likely consistent with candidiasis.   Musculoskeletal:         General: Normal range of motion.      Cervical back: Normal range of motion and neck supple.      Right lower le+ Edema present.      Left  lower le+ Edema present.   Skin:     General: Skin is warm and dry.      Findings: Erythema present.      Comments: Chronic lymphedema to bilateral lower extremities.  There is lichenification and thickening of the skin with dryness and fissures noted.  There is a small circular open wound to the left lateral lower extremity with serous drainage.  There is confluent erythema with warmth to the left lower extremity.  Exam is consistent with cellulitis.   Neurological:      General: No focal deficit present.      Mental Status: She is alert.      Cranial Nerves: Cranial nerves are intact.      Sensory: Sensation is intact.      Motor: Motor function is intact.      Coordination: Coordination is intact.      Comments: Overall functional decline.  No focal deficits.         Procedures           ED Course  ED Course as of 2140   Mon  EKG shows normal sinus rhythm.  No acute ST-T wave changes consistent with ischemia.  CBC and chemistries were within normal limits.  D-dimer is elevated at 1.41.  BNP is 200.  LDH is 244 and CRP is 2.79.  Lactic acid is 2.0.  Blood cultures x2 sets are in process.  CT angiogram of the chest with contrast reveals arterial opacification somewhat suboptimal.  Given this limitation there is no convincing evidence of PE.  The heart appears enlarged and there may be some pulmonary vascular congestion and potential mild edema.  CT of the left lower extremity with contrast reveals nonspecific superficial and subcutaneous edema.  No focal abscess and no focal osseous irregularity.  COVID-19 and influenza testing are negative.  Initiated vancomycin and Rocephin.  I paged hospitalist to discuss admission for left lower extremity cellulitis and chronic lymphedema.    [FC]      ED Course User Index  [FC] Bonnie Wesley, MARIA G           Recent Results (from the past 24 hour(s))   Comprehensive Metabolic Panel    Collection Time: 21  4:33 PM    Specimen: Blood   Result  Value Ref Range    Glucose 147 (H) 65 - 99 mg/dL    BUN 18 6 - 20 mg/dL    Creatinine 0.97 0.57 - 1.00 mg/dL    Sodium 139 136 - 145 mmol/L    Potassium 4.3 3.5 - 5.2 mmol/L    Chloride 102 98 - 107 mmol/L    CO2 27.0 22.0 - 29.0 mmol/L    Calcium 9.5 8.6 - 10.5 mg/dL    Total Protein 7.9 6.0 - 8.5 g/dL    Albumin 4.10 3.50 - 5.20 g/dL    ALT (SGPT) 21 1 - 33 U/L    AST (SGOT) 22 1 - 32 U/L    Alkaline Phosphatase 76 39 - 117 U/L    Total Bilirubin 0.6 0.0 - 1.2 mg/dL    eGFR Non African Amer 60 (L) >60 mL/min/1.73    Globulin 3.8 gm/dL    A/G Ratio 1.1 g/dL    BUN/Creatinine Ratio 18.6 7.0 - 25.0    Anion Gap 10.0 5.0 - 15.0 mmol/L   Lactic Acid, Plasma    Collection Time: 07/05/21  4:33 PM    Specimen: Blood   Result Value Ref Range    Lactate 2.0 0.5 - 2.0 mmol/L   CBC Auto Differential    Collection Time: 07/05/21  4:33 PM    Specimen: Blood   Result Value Ref Range    WBC 7.53 3.40 - 10.80 10*3/mm3    RBC 4.71 3.77 - 5.28 10*6/mm3    Hemoglobin 13.7 12.0 - 15.9 g/dL    Hematocrit 42.7 34.0 - 46.6 %    MCV 90.7 79.0 - 97.0 fL    MCH 29.1 26.6 - 33.0 pg    MCHC 32.1 31.5 - 35.7 g/dL    RDW 15.7 (H) 12.3 - 15.4 %    RDW-SD 51.7 37.0 - 54.0 fl    MPV 14.0 (H) 6.0 - 12.0 fL    Platelets 211 140 - 450 10*3/mm3    Neutrophil % 70.6 42.7 - 76.0 %    Lymphocyte % 17.7 (L) 19.6 - 45.3 %    Monocyte % 9.4 5.0 - 12.0 %    Eosinophil % 1.2 0.3 - 6.2 %    Basophil % 0.7 0.0 - 1.5 %    Immature Grans % 0.4 0.0 - 0.5 %    Neutrophils, Absolute 5.32 1.70 - 7.00 10*3/mm3    Lymphocytes, Absolute 1.33 0.70 - 3.10 10*3/mm3    Monocytes, Absolute 0.71 0.10 - 0.90 10*3/mm3    Eosinophils, Absolute 0.09 0.00 - 0.40 10*3/mm3    Basophils, Absolute 0.05 0.00 - 0.20 10*3/mm3    Immature Grans, Absolute 0.03 0.00 - 0.05 10*3/mm3    nRBC 0.0 0.0 - 0.2 /100 WBC   Green Top (Gel)    Collection Time: 07/05/21  4:33 PM   Result Value Ref Range    Extra Tube Hold for add-ons.    Lavender Top    Collection Time: 07/05/21  4:33 PM   Result Value  Ref Range    Extra Tube hold for add-on    Gold Top - SST    Collection Time: 07/05/21  4:33 PM   Result Value Ref Range    Extra Tube Hold for add-ons.    Gray Top    Collection Time: 07/05/21  4:33 PM   Result Value Ref Range    Extra Tube Hold for add-ons.    Lactate Dehydrogenase    Collection Time: 07/05/21  4:33 PM    Specimen: Blood   Result Value Ref Range     (H) 135 - 214 U/L   C-reactive Protein    Collection Time: 07/05/21  4:33 PM    Specimen: Blood   Result Value Ref Range    C-Reactive Protein 2.79 (H) 0.00 - 0.50 mg/dL   COVID-19 and FLU A/B PCR - Swab, Nasopharynx    Collection Time: 07/05/21  8:07 PM    Specimen: Nasopharynx; Swab   Result Value Ref Range    COVID19 Not Detected Not Detected - Ref. Range    Influenza A PCR Not Detected Not Detected    Influenza B PCR Not Detected Not Detected   BNP    Collection Time: 07/05/21  8:08 PM    Specimen: Blood   Result Value Ref Range    proBNP 200.3 0.0 - 900.0 pg/mL   D-dimer, Quantitative    Collection Time: 07/05/21  8:08 PM    Specimen: Blood   Result Value Ref Range    D-Dimer, Quantitative 1.41 (H) 0.00 - 0.56 MCGFEU/mL     Note: In addition to lab results from this visit, the labs listed above may include labs taken at another facility or during a different encounter within the last 24 hours. Please correlate lab times with ED admission and discharge times for further clarification of the services performed during this visit.    CT Angiogram Chest   Final Result   1.  Arterial opacification is somewhat suboptimal. Given this limitation there is no convincing evidence of pulmonary embolism.    2. The heart appears enlarged and there may be some pulmonary vascular congestion and potential mild edema. If there is concern for a cardiogenic etiology for the patient's symptoms, consider follow-up echocardiogram.      Signer Name: Anamaria Ruff MD    Signed: 7/5/2021 9:01 PM    Workstation Name: [id number]     [facility]       CT Lower Extremity Left With Contrast   Final Result   There is nonspecific superficial and subcutaneous edema. No focal abscess. No focal osseous irregularity.      Signer Name: Anamaria Ruff MD    Signed: 7/5/2021 9:04 PM    Workstation Name: [id number]     [facility]        Vitals:    07/05/21 1900 07/05/21 1925 07/05/21 2000 07/05/21 2053   BP: 156/75 (!) 191/84 178/87    BP Location:       Patient Position:       Pulse:  101  95   Resp:  20  18   Temp:  98.5 °F (36.9 °C)     TempSrc:  Oral     SpO2: 93% 95%  94%   Weight:       Height:         Medications   sodium chloride 0.9 % flush 10 mL (has no administration in time range)   vancomycin 3000 mg/500 mL 0.9% NS IVPB ([facility]) (3,000 mg Intravenous New Bag 7/5/21 2051)   cefTRIAXone (ROCEPHIN) IVPB 1 g (has no administration in time range)   iopamidol (ISOVUE-370) 76 % injection 150 mL (115 mL Intravenous Given 7/5/21 2047)     ECG/EMG Results (last 24 hours)     ** No results found for the last 24 hours. **        ECG 12 Lead                                             MDM    Final diagnoses:   Cellulitis of left lower extremity   Chronic acquired lymphedema   Hypoxia   Morbid obesity (CMS/HCC)   History of hypertension   History of asthma       ED Disposition  ED Disposition     ED Disposition Condition Comment    Decision to Admit            No follow-up provider specified.       Medication List      No changes were made to your prescriptions during this visit.          Bonnie Wesley PA-C  07/05/21 0344

## 2021-07-06 ENCOUNTER — APPOINTMENT (OUTPATIENT)
Dept: CARDIOLOGY | Facility: HOSPITAL | Age: 56
End: 2021-07-06

## 2021-07-06 VITALS
WEIGHT: 293 LBS | DIASTOLIC BLOOD PRESSURE: 81 MMHG | HEART RATE: 76 BPM | BODY MASS INDEX: 51.91 KG/M2 | OXYGEN SATURATION: 93 % | SYSTOLIC BLOOD PRESSURE: 156 MMHG | TEMPERATURE: 97.7 F | RESPIRATION RATE: 17 BRPM | HEIGHT: 63 IN

## 2021-07-06 LAB
ANION GAP SERPL CALCULATED.3IONS-SCNC: 12 MMOL/L (ref 5–15)
ASCENDING AORTA: 4 CM
BASOPHILS # BLD AUTO: 0.03 10*3/MM3 (ref 0–0.2)
BASOPHILS NFR BLD AUTO: 0.4 % (ref 0–1.5)
BH CV ECHO MEAS - AO MAX PG (FULL): 16.9 MMHG
BH CV ECHO MEAS - AO MAX PG: 22.4 MMHG
BH CV ECHO MEAS - AO MEAN PG (FULL): 9.6 MMHG
BH CV ECHO MEAS - AO MEAN PG: 12.3 MMHG
BH CV ECHO MEAS - AO ROOT AREA (BSA CORRECTED): 1.3
BH CV ECHO MEAS - AO ROOT AREA: 7.8 CM^2
BH CV ECHO MEAS - AO ROOT DIAM: 3.1 CM
BH CV ECHO MEAS - AO V2 MAX: 236.7 CM/SEC
BH CV ECHO MEAS - AO V2 MEAN: 166 CM/SEC
BH CV ECHO MEAS - AO V2 VTI: 43.5 CM
BH CV ECHO MEAS - ASC AORTA: 4 CM
BH CV ECHO MEAS - AVA(I,A): 2 CM^2
BH CV ECHO MEAS - AVA(I,D): 2 CM^2
BH CV ECHO MEAS - AVA(V,A): 2 CM^2
BH CV ECHO MEAS - AVA(V,D): 2 CM^2
BH CV ECHO MEAS - BSA(HAYCOCK): 2.7 M^2
BH CV ECHO MEAS - BSA: 2.4 M^2
BH CV ECHO MEAS - BZI_BMI: 59.3 KILOGRAMS/M^2
BH CV ECHO MEAS - BZI_METRIC_HEIGHT: 160 CM
BH CV ECHO MEAS - BZI_METRIC_WEIGHT: 152 KG
BH CV ECHO MEAS - EDV(CUBED): 189.6 ML
BH CV ECHO MEAS - EDV(TEICH): 163 ML
BH CV ECHO MEAS - EF(CUBED): 66.8 %
BH CV ECHO MEAS - EF(TEICH): 57.6 %
BH CV ECHO MEAS - ESV(CUBED): 63 ML
BH CV ECHO MEAS - ESV(TEICH): 69.1 ML
BH CV ECHO MEAS - FS: 30.7 %
BH CV ECHO MEAS - IVS/LVPW: 1
BH CV ECHO MEAS - IVSD: 1.2 CM
BH CV ECHO MEAS - LA DIMENSION: 3.9 CM
BH CV ECHO MEAS - LA/AO: 1.2
BH CV ECHO MEAS - LAD MAJOR: 5.4 CM
BH CV ECHO MEAS - LAT PEAK E' VEL: 8 CM/SEC
BH CV ECHO MEAS - LATERAL E/E' RATIO: 15.2
BH CV ECHO MEAS - LV MASS(C)D: 306.4 GRAMS
BH CV ECHO MEAS - LV MASS(C)DI: 127.3 GRAMS/M^2
BH CV ECHO MEAS - LV MAX PG: 5.5 MMHG
BH CV ECHO MEAS - LV MEAN PG: 2.7 MMHG
BH CV ECHO MEAS - LV V1 MAX: 117.6 CM/SEC
BH CV ECHO MEAS - LV V1 MEAN: 75.7 CM/SEC
BH CV ECHO MEAS - LV V1 VTI: 22.5 CM
BH CV ECHO MEAS - LVIDD: 5.7 CM
BH CV ECHO MEAS - LVIDS: 4 CM
BH CV ECHO MEAS - LVOT AREA (M): 3.8 CM^2
BH CV ECHO MEAS - LVOT AREA: 3.9 CM^2
BH CV ECHO MEAS - LVOT DIAM: 2.2 CM
BH CV ECHO MEAS - LVPWD: 1.2 CM
BH CV ECHO MEAS - MED PEAK E' VEL: 7 CM/SEC
BH CV ECHO MEAS - MEDIAL E/E' RATIO: 17.3
BH CV ECHO MEAS - MV A MAX VEL: 88.4 CM/SEC
BH CV ECHO MEAS - MV DEC SLOPE: 722.5 CM/SEC^2
BH CV ECHO MEAS - MV DEC TIME: 0.22 SEC
BH CV ECHO MEAS - MV E MAX VEL: 123.4 CM/SEC
BH CV ECHO MEAS - MV E/A: 1.4
BH CV ECHO MEAS - MV P1/2T MAX VEL: 143.3 CM/SEC
BH CV ECHO MEAS - MV P1/2T: 58.1 MSEC
BH CV ECHO MEAS - MVA P1/2T LCG: 1.5 CM^2
BH CV ECHO MEAS - MVA(P1/2T): 3.8 CM^2
BH CV ECHO MEAS - PA ACC SLOPE: 1483 CM/SEC^2
BH CV ECHO MEAS - PA ACC TIME: 0.06 SEC
BH CV ECHO MEAS - PA MAX PG: 7.6 MMHG
BH CV ECHO MEAS - PA PR(ACCEL): 49.9 MMHG
BH CV ECHO MEAS - PA V2 MAX: 137.8 CM/SEC
BH CV ECHO MEAS - RAP SYSTOLE: 3 MMHG
BH CV ECHO MEAS - RVSP: 19 MMHG
BH CV ECHO MEAS - SI(AO): 140.7 ML/M^2
BH CV ECHO MEAS - SI(CUBED): 52.6 ML/M^2
BH CV ECHO MEAS - SI(LVOT): 36.7 ML/M^2
BH CV ECHO MEAS - SI(TEICH): 39 ML/M^2
BH CV ECHO MEAS - SV(AO): 338.8 ML
BH CV ECHO MEAS - SV(CUBED): 126.6 ML
BH CV ECHO MEAS - SV(LVOT): 88.4 ML
BH CV ECHO MEAS - SV(TEICH): 93.8 ML
BH CV ECHO MEAS - TAPSE (>1.6): 2.3 CM
BH CV ECHO MEAS - TR MAX PG: 16 MMHG
BH CV ECHO MEAS - TR MAX VEL: 202 CM/SEC
BH CV ECHO MEASUREMENTS AVERAGE E/E' RATIO: 16.45
BH CV LOWER VASCULAR LEFT COMMON FEMORAL AUGMENT: NORMAL
BH CV LOWER VASCULAR LEFT COMMON FEMORAL COMPETENT: NORMAL
BH CV LOWER VASCULAR LEFT COMMON FEMORAL COMPRESS: NORMAL
BH CV LOWER VASCULAR LEFT COMMON FEMORAL PHASIC: NORMAL
BH CV LOWER VASCULAR LEFT COMMON FEMORAL SPONT: NORMAL
BH CV LOWER VASCULAR LEFT DISTAL FEMORAL AUGMENT: NORMAL
BH CV LOWER VASCULAR LEFT DISTAL FEMORAL COMPETENT: NORMAL
BH CV LOWER VASCULAR LEFT DISTAL FEMORAL COMPRESS: NORMAL
BH CV LOWER VASCULAR LEFT DISTAL FEMORAL PHASIC: NORMAL
BH CV LOWER VASCULAR LEFT DISTAL FEMORAL SPONT: NORMAL
BH CV LOWER VASCULAR LEFT GASTRONEMIUS COMPRESS: NORMAL
BH CV LOWER VASCULAR LEFT GREATER SAPH AK COMPRESS: NORMAL
BH CV LOWER VASCULAR LEFT GREATER SAPH BK COMPRESS: NORMAL
BH CV LOWER VASCULAR LEFT LESSER SAPH COMPRESS: NORMAL
BH CV LOWER VASCULAR LEFT MID FEMORAL AUGMENT: NORMAL
BH CV LOWER VASCULAR LEFT MID FEMORAL COMPETENT: NORMAL
BH CV LOWER VASCULAR LEFT MID FEMORAL COMPRESS: NORMAL
BH CV LOWER VASCULAR LEFT MID FEMORAL PHASIC: NORMAL
BH CV LOWER VASCULAR LEFT MID FEMORAL SPONT: NORMAL
BH CV LOWER VASCULAR LEFT PERONEAL AUGMENT: NORMAL
BH CV LOWER VASCULAR LEFT PERONEAL COMPRESS: NORMAL
BH CV LOWER VASCULAR LEFT POPLITEAL AUGMENT: NORMAL
BH CV LOWER VASCULAR LEFT POPLITEAL COMPETENT: NORMAL
BH CV LOWER VASCULAR LEFT POPLITEAL COMPRESS: NORMAL
BH CV LOWER VASCULAR LEFT POPLITEAL PHASIC: NORMAL
BH CV LOWER VASCULAR LEFT POPLITEAL SPONT: NORMAL
BH CV LOWER VASCULAR LEFT POSTERIOR TIBIAL AUGMENT: NORMAL
BH CV LOWER VASCULAR LEFT POSTERIOR TIBIAL COMPRESS: NORMAL
BH CV LOWER VASCULAR LEFT PROFUNDA FEMORAL AUGMENT: NORMAL
BH CV LOWER VASCULAR LEFT PROFUNDA FEMORAL COMPETENT: NORMAL
BH CV LOWER VASCULAR LEFT PROFUNDA FEMORAL COMPRESS: NORMAL
BH CV LOWER VASCULAR LEFT PROFUNDA FEMORAL PHASIC: NORMAL
BH CV LOWER VASCULAR LEFT PROFUNDA FEMORAL SPONT: NORMAL
BH CV LOWER VASCULAR LEFT PROXIMAL FEMORAL AUGMENT: NORMAL
BH CV LOWER VASCULAR LEFT PROXIMAL FEMORAL COMPETENT: NORMAL
BH CV LOWER VASCULAR LEFT PROXIMAL FEMORAL COMPRESS: NORMAL
BH CV LOWER VASCULAR LEFT PROXIMAL FEMORAL PHASIC: NORMAL
BH CV LOWER VASCULAR LEFT PROXIMAL FEMORAL SPONT: NORMAL
BH CV LOWER VASCULAR LEFT SAPHENOFEMORAL JUNCTION AUGMENT: NORMAL
BH CV LOWER VASCULAR LEFT SAPHENOFEMORAL JUNCTION COMPETENT: NORMAL
BH CV LOWER VASCULAR LEFT SAPHENOFEMORAL JUNCTION COMPRESS: NORMAL
BH CV LOWER VASCULAR LEFT SAPHENOFEMORAL JUNCTION PHASIC: NORMAL
BH CV LOWER VASCULAR LEFT SAPHENOFEMORAL JUNCTION SPONT: NORMAL
BH CV LOWER VASCULAR RIGHT COMMON FEMORAL AUGMENT: NORMAL
BH CV LOWER VASCULAR RIGHT COMMON FEMORAL COMPETENT: NORMAL
BH CV LOWER VASCULAR RIGHT COMMON FEMORAL COMPRESS: NORMAL
BH CV LOWER VASCULAR RIGHT COMMON FEMORAL PHASIC: NORMAL
BH CV LOWER VASCULAR RIGHT COMMON FEMORAL SPONT: NORMAL
BH CV XLRA - RV BASE: 3.6 CM
BH CV XLRA - RV LENGTH: 7.9 CM
BH CV XLRA - RV MID: 2.9 CM
BUN SERPL-MCNC: 17 MG/DL (ref 6–20)
BUN/CREAT SERPL: 24.3 (ref 7–25)
CALCIUM SPEC-SCNC: 9.1 MG/DL (ref 8.6–10.5)
CHLORIDE SERPL-SCNC: 101 MMOL/L (ref 98–107)
CK SERPL-CCNC: 155 U/L (ref 20–180)
CO2 SERPL-SCNC: 26 MMOL/L (ref 22–29)
CREAT SERPL-MCNC: 0.7 MG/DL (ref 0.57–1)
DEPRECATED RDW RBC AUTO: 53.9 FL (ref 37–54)
EOSINOPHIL # BLD AUTO: 0.08 10*3/MM3 (ref 0–0.4)
EOSINOPHIL NFR BLD AUTO: 1.2 % (ref 0.3–6.2)
ERYTHROCYTE [DISTWIDTH] IN BLOOD BY AUTOMATED COUNT: 15.8 % (ref 12.3–15.4)
GFR SERPL CREATININE-BSD FRML MDRD: 87 ML/MIN/1.73
GLUCOSE BLDC GLUCOMTR-MCNC: 142 MG/DL (ref 70–130)
GLUCOSE BLDC GLUCOMTR-MCNC: 145 MG/DL (ref 70–130)
GLUCOSE BLDC GLUCOMTR-MCNC: 153 MG/DL (ref 70–130)
GLUCOSE SERPL-MCNC: 141 MG/DL (ref 65–99)
HBA1C MFR BLD: 6.8 % (ref 4.8–5.6)
HCT VFR BLD AUTO: 41.4 % (ref 34–46.6)
HGB BLD-MCNC: 13.1 G/DL (ref 12–15.9)
IMM GRANULOCYTES # BLD AUTO: 0.03 10*3/MM3 (ref 0–0.05)
IMM GRANULOCYTES NFR BLD AUTO: 0.4 % (ref 0–0.5)
LEFT ATRIUM VOLUME INDEX: 35.3 ML/M^2
LEFT ATRIUM VOLUME: 85 ML
LYMPHOCYTES # BLD AUTO: 1.53 10*3/MM3 (ref 0.7–3.1)
LYMPHOCYTES NFR BLD AUTO: 22.4 % (ref 19.6–45.3)
MAXIMAL PREDICTED HEART RATE: 166 BPM
MAXIMAL PREDICTED HEART RATE: 166 BPM
MCH RBC QN AUTO: 29.4 PG (ref 26.6–33)
MCHC RBC AUTO-ENTMCNC: 31.6 G/DL (ref 31.5–35.7)
MCV RBC AUTO: 93 FL (ref 79–97)
MONOCYTES # BLD AUTO: 0.86 10*3/MM3 (ref 0.1–0.9)
MONOCYTES NFR BLD AUTO: 12.6 % (ref 5–12)
NEUTROPHILS NFR BLD AUTO: 4.3 10*3/MM3 (ref 1.7–7)
NEUTROPHILS NFR BLD AUTO: 63 % (ref 42.7–76)
NRBC BLD AUTO-RTO: 0 /100 WBC (ref 0–0.2)
PLATELET # BLD AUTO: 177 10*3/MM3 (ref 140–450)
PMV BLD AUTO: 13.7 FL (ref 6–12)
POTASSIUM SERPL-SCNC: 3.7 MMOL/L (ref 3.5–5.2)
QT INTERVAL: 362 MS
QTC INTERVAL: 452 MS
RBC # BLD AUTO: 4.45 10*6/MM3 (ref 3.77–5.28)
SODIUM SERPL-SCNC: 139 MMOL/L (ref 136–145)
STRESS TARGET HR: 141 BPM
STRESS TARGET HR: 141 BPM
WBC # BLD AUTO: 6.83 10*3/MM3 (ref 3.4–10.8)

## 2021-07-06 PROCEDURE — 63710000001 INSULIN LISPRO (HUMAN) PER 5 UNITS: Performed by: HOSPITALIST

## 2021-07-06 PROCEDURE — 80048 BASIC METABOLIC PNL TOTAL CA: CPT | Performed by: NURSE PRACTITIONER

## 2021-07-06 PROCEDURE — 25010000002 CEFTRIAXONE PER 250 MG: Performed by: PHYSICIAN ASSISTANT

## 2021-07-06 PROCEDURE — 82550 ASSAY OF CK (CPK): CPT

## 2021-07-06 PROCEDURE — 93971 EXTREMITY STUDY: CPT | Performed by: INTERNAL MEDICINE

## 2021-07-06 PROCEDURE — 25010000002 FUROSEMIDE PER 20 MG: Performed by: NURSE PRACTITIONER

## 2021-07-06 PROCEDURE — 83036 HEMOGLOBIN GLYCOSYLATED A1C: CPT | Performed by: NURSE PRACTITIONER

## 2021-07-06 PROCEDURE — 25010000002 DAPTOMYCIN PER 1 MG

## 2021-07-06 PROCEDURE — 93306 TTE W/DOPPLER COMPLETE: CPT | Performed by: INTERNAL MEDICINE

## 2021-07-06 PROCEDURE — 93306 TTE W/DOPPLER COMPLETE: CPT

## 2021-07-06 PROCEDURE — 85025 COMPLETE CBC W/AUTO DIFF WBC: CPT | Performed by: NURSE PRACTITIONER

## 2021-07-06 PROCEDURE — 99239 HOSP IP/OBS DSCHRG MGMT >30: CPT | Performed by: HOSPITALIST

## 2021-07-06 PROCEDURE — 25010000002 HEPARIN (PORCINE) PER 1000 UNITS: Performed by: NURSE PRACTITIONER

## 2021-07-06 PROCEDURE — 82962 GLUCOSE BLOOD TEST: CPT

## 2021-07-06 PROCEDURE — 93971 EXTREMITY STUDY: CPT

## 2021-07-06 RX ORDER — NICOTINE POLACRILEX 4 MG
15 LOZENGE BUCCAL
Status: DISCONTINUED | OUTPATIENT
Start: 2021-07-06 | End: 2021-07-06 | Stop reason: HOSPADM

## 2021-07-06 RX ORDER — DEXTROSE MONOHYDRATE 25 G/50ML
25 INJECTION, SOLUTION INTRAVENOUS
Status: DISCONTINUED | OUTPATIENT
Start: 2021-07-06 | End: 2021-07-06 | Stop reason: HOSPADM

## 2021-07-06 RX ADMIN — HEPARIN SODIUM 5000 UNITS: 5000 INJECTION INTRAVENOUS; SUBCUTANEOUS at 09:54

## 2021-07-06 RX ADMIN — SODIUM CHLORIDE, PRESERVATIVE FREE 10 ML: 5 INJECTION INTRAVENOUS at 00:15

## 2021-07-06 RX ADMIN — LOSARTAN POTASSIUM 50 MG: 50 TABLET, FILM COATED ORAL at 08:01

## 2021-07-06 RX ADMIN — DAPTOMYCIN 350 MG: 500 INJECTION, POWDER, LYOPHILIZED, FOR SOLUTION INTRAVENOUS at 04:01

## 2021-07-06 RX ADMIN — CEFTRIAXONE SODIUM 1 G: 1 INJECTION, SOLUTION INTRAVENOUS at 00:10

## 2021-07-06 RX ADMIN — Medication 10 ML: at 04:51

## 2021-07-06 RX ADMIN — HEPARIN SODIUM 5000 UNITS: 5000 INJECTION INTRAVENOUS; SUBCUTANEOUS at 00:09

## 2021-07-06 RX ADMIN — METOPROLOL TARTRATE 25 MG: 25 TABLET, FILM COATED ORAL at 08:01

## 2021-07-06 RX ADMIN — SODIUM CHLORIDE, PRESERVATIVE FREE 10 ML: 5 INJECTION INTRAVENOUS at 08:03

## 2021-07-06 RX ADMIN — FUROSEMIDE 40 MG: 10 INJECTION, SOLUTION INTRAMUSCULAR; INTRAVENOUS at 00:09

## 2021-07-06 NOTE — NURSING NOTE
WOC consulted for: LLE compression wrpas    Assessment and Care provided: Patient presents with LLE cellulitis with chronic lymphedema.  A small skin tear observed on her posterior left calf.  Xeroform and an optifoam dressing applied to the wound. Her bilateral LE are both edematous.  LLE>RLE.  Her skin otherwise is intact on both legs.    Recommendation(s): Will consult  PT wound care for evaluation for need of compression wrap therapy.    *Maintain good skin care, keep dry, turn q 2 hr, keep heels elevated and offloaded with heel boots.    *Apply z-guard to bottom and bony prominences daily and as needed with incontinence episodes.  *Follow C.A.R.E protocol if medical devices (Bipap, goodwin, Ng tube, etc) are being used.     All skin interventions in place.  Head to toe assessment completed. Heels and bottom blanchable, intact and offloaded. WOC orders placed.    Discussed plan of care with  RN.    Thank you for consulting [facility]OC[facility].  Will sign off. Please contact with questions or if needs arise.

## 2021-07-06 NOTE — CASE MANAGEMENT/SOCIAL WORK
Discharge Planning Assessment  [facility]     Patient Name: Kerry Leal  MRN: [id number]  Today's Date: 7/6/2021    Admit Date: 7/5/2021    Discharge Needs Assessment     Row Name 07/06/21 1049       Living Environment    Lives With  spouse    Name(s) of Who Lives With Patient      Current Living Arrangements  home/apartment/condo    Primary Care Provided by  self    Provides Primary Care For  no one    Family Caregiver if Needed  spouse    Quality of Family Relationships  helpful    Able to Return to Prior Arrangements  yes       Resource/Environmental Concerns    Resource/Environmental Concerns  none       Transition Planning    Patient/Family Anticipates Transition to  home with family    Patient/Family Anticipated Services at Transition  none    Transportation Anticipated  family or friend will provide       Discharge Needs Assessment    Readmission Within the Last 30 Days  no previous admission in last 30 days    Equipment Currently Used at Home  cane, quad    Concerns to be Addressed  denies needs/concerns at this time    Equipment Needed After Discharge  bath bench Explained private pay item to pt on 7/6    Discharge Coordination/Progress  Pt lives in [place] with her .  She was independent PTA annd didn't require home health services.  The only DME she uses is a quad cane.  She inquired about a bath bench and is aware this is a self-pay item.  Pt denies having any other needs at this time.  Her spouse will provide transportation at discharge.        Discharge Plan     Row Name 07/06/21 1052       Plan    Plan  home    Patient/Family in Agreement with Plan  yes    Plan Comments  SW met with pt at bedside.  No visitors were present.  Pt denies having any discharge needs at this time.  Plan for pt to move to a tele bed today. CM will remain available.    Final Discharge Disposition Code  01 - home or self-care        Continued Care and Services - Admitted Since 7/5/2021    Coordination has  not been started for this encounter.         Demographic Summary     Row Name 07/06/21 1048       General Information    Arrived From  home    Preferred Language  English     Used During This Interaction  no    General Information Comments  PCP is Caro Crockett        Functional Status     Row Name 07/06/21 1048       Functional Status, IADL    Medications  independent    Meal Preparation  independent    Housekeeping  independent    Laundry  independent    Shopping  independent       Employment/    Employment/ Comments  Pt has insurance and prescription coverage through [employer] and [place] Medicaid.        Psychosocial    No documentation.       Abuse/Neglect    No documentation.       Legal    No documentation.       Substance Abuse    No documentation.       Patient Forms    No documentation.           RAN Bee

## 2021-07-06 NOTE — CONSULTS
Order noted for diabetes education. Patient off unit at this time. Educator will see when available. Thank you.

## 2021-07-06 NOTE — PROGRESS NOTES
[facility] Medicine Services  PROGRESS NOTE    Patient Name: Kerry Leal  : 1965  MRN: [id number]    Date of Admission: 2021  Primary Care Physician: Caro Crockett MD    Subjective   Subjective     CC:  F/U LLE cellulitis    HPI:  Patient seen this morning, thinks her leg looks a little better today. No other major complaints. Nursing staff noted elevated HR, patient denies any chest pain, SOA, palpitations.     ROS:  Gen-no fevers, no chills  CV-no chest pain, no palpitations  Resp-no cough, no dyspnea  GI-no N/V/D, no abd pain    All other systems reviewed and negative except any additional pertinent positives and negatives as discussed in HPI.     Objective   Objective     Vital Signs:   Temp:  [98 °F (36.7 °C)-98.5 °F (36.9 °C)] 98.3 °F (36.8 °C)  Heart Rate:  [] 95  Resp:  [17-22] 17  BP: (155-230)/() 183/94        Physical Exam:  Gen-no acute distress  HENT-NCAT, mucous membranes moist  CV-RRR, S1 S2 normal, no m/r/g  Resp-CTAB, no wheezes or rales  Abd-soft, NT, ND, +BS  Ext-chronic lymphedema bilaterally  Neuro-A&Ox3, no focal deficits  Skin-LLE with erythema up to below the knee  Psych-appropriate mood      Results Reviewed:  Results from last 7 days   Lab Units 2118 21  1633   WBC 10*3/mm3 6.83 7.53   HEMOGLOBIN g/dL 13.1 13.7   HEMATOCRIT % 41.4 42.7   PLATELETS 10*3/mm3 177 211     Results from last 7 days   Lab Units 21  1633   SODIUM mmol/L 139  --  139   POTASSIUM mmol/L 3.7  --  4.3   CHLORIDE mmol/L 101  --  102   CO2 mmol/L 26.0  --  27.0   BUN mg/dL 17  --  18   CREATININE mg/dL 0.70  --  0.97   GLUCOSE mg/dL 141*  --  147*   CALCIUM mg/dL 9.1  --  9.5   ALT (SGPT) U/L  --   --  21   AST (SGOT) U/L  --   --  22   PROBNP pg/mL  --  200.3  --      Estimated Creatinine Clearance: 133.7 mL/min (by C-G formula based on SCr of 0.7 mg/dL).    Microbiology Results Abnormal     Procedure Component  Value - Date/Time    COVID PRE-OP / PRE-PROCEDURE SCREENING ORDER (NO ISOLATION) - Swab, Nasopharynx [[id number]]  (Normal) Collected: 07/05/21 2007    Lab Status: Final result Specimen: Swab from Nasopharynx Updated: 07/05/21 2044    Narrative:      The following orders were created for panel order COVID PRE-OP / PRE-PROCEDURE SCREENING ORDER (NO ISOLATION) - Swab, Nasopharynx.  Procedure                               Abnormality         Status                     ---------                               -----------         ------                     COVID-19 and FLU A/B PCR...[[id number]]  Normal              Final result                 Please view results for these tests on the individual orders.    COVID-19 and FLU A/B PCR - Swab, Nasopharynx [[id number]]  (Normal) Collected: 07/05/21 2007    Lab Status: Final result Specimen: Swab from Nasopharynx Updated: 07/05/21 2044     COVID19 Not Detected     Influenza A PCR Not Detected     Influenza B PCR Not Detected    Narrative:      Fact sheet for providers: https://www.fda.gov/media/244248/download    Fact sheet for patients: https://www.fda.gov/media/978162/download    Test performed by PCR.          Imaging Results (Last 24 Hours)     Procedure Component Value Units Date/Time    CT Lower Extremity Left With Contrast [[id number]] Collected: 07/05/21 2104     Updated: 07/05/21 2106    Narrative:      CT OF THE LEFT LEG WITHOUT CONTRAST       HISTORY: Leg swelling, concern for DVT     COMPARISON: None     TECHNIQUE: CT images of the left leg  Radiation dose reduction techniques included automated exposure control. Radiation audit for CT and nuclear cardiology exams in the last 12 months: .      FINDINGS:  There is nonspecific superficial and subcutaneous edema. No focal abscess no acute fracture or subluxation.. No focal osseous irregularity. No foreign body.      Impression:      There is nonspecific superficial and subcutaneous edema. No focal abscess. No focal  osseous irregularity.    Signer Name: Anamaria Ruff MD   Signed: 7/5/2021 9:04 PM   Workstation Name: [email or web address]    [facility]    CT Angiogram Chest [[id number]] Collected: 07/05/21 2101     Updated: 07/05/21 2103    Narrative:      CTA Chest    INDICATION:   Shortness of breath, concern for DVT and PE, leg swelling    TECHNIQUE:   CT angiogram of the chest with IV contrast. 3-D reconstructions were obtained and reviewed.   Radiation dose reduction techniques included automated exposure control or exposure modulation based on body size. Count of known CT and cardiac nuc med studies  performed in previous 12 months: 0.     COMPARISON:   None available.    FINDINGS:   Arterial opacification is somewhat suboptimal. There is no convincing evidence of pulmonary embolism. Thoracic aorta appears unremarkable. There is slightly increased groundglass opacity with some mosaic attenuation involving the lung fields. The heart  may be mildly enlarged. There appears to be very trace pericardial fluid. There is hepatomegaly with hepatic steatosis. Upper abdominal structures appear otherwise unremarkable. No acute osseous abnormality.      Impression:      1.  Arterial opacification is somewhat suboptimal. Given this limitation there is no convincing evidence of pulmonary embolism.   2. The heart appears enlarged and there may be some pulmonary vascular congestion and potential mild edema. If there is concern for a cardiogenic etiology for the patient's symptoms, consider follow-up echocardiogram.    Signer Name: Anamaria Ruff MD   Signed: 7/5/2021 9:01 PM   Workstation Name: [id number]    [facility]              I have reviewed the medications:  Scheduled Meds:cefTRIAXone, 1 g, Intravenous, Q24H  DAPTOmycin, 4 mg/kg (Adjusted), Intravenous, Q24H  heparin (porcine), 5,000 Units, Subcutaneous, Q12H  insulin lispro, 0-7 Units, Subcutaneous, TID AC  losartan, 50 mg, Oral, Daily  metoprolol  tartrate, 25 mg, Oral, Q12H  sodium chloride, 10 mL, Intravenous, Q12H      Continuous Infusions:Pharmacy Consult - Pharmacy to dose,       PRN Meds:.•  acetaminophen **OR** acetaminophen **OR** acetaminophen  •  dextrose  •  dextrose  •  glucagon (human recombinant)  •  Pharmacy Consult - Pharmacy to dose  •  sodium chloride  •  sodium chloride    Assessment/Plan   Assessment & Plan     Active Hospital Problems    Diagnosis  POA   • **Cellulitis of left lower extremity [L03.116]  Yes   • Essential hypertension [I10]  Yes   • Chronic acquired lymphedema [I89.0]  Yes   • Elevated d-dimer [R79.89]  Yes   • Prediabetes [R73.03]  Yes      Resolved Hospital Problems   No resolved problems to display.        Brief Hospital Course to date:  Kerry Leal is a 56 y.o. female with hx of HTN, asthma, lymphedema, prediabetes, and morbid obesity who presents after hitting her left foot on a box, following by 4 days of progressive LLE swelling, redness, and drainage from a laceration. CT LLE showed nonspecific superficial and subcutaneous edema without abscess.     *All problems are new to me today.    LLE cellulitis  Chronic lymphedema  --Continue Daptomycin/Rocephin. F/U blood cultures.  --Check LLE duplex.  --ID consulted.  --PT wound care consulted.    Sinus tachycardia  --EKG reviewed.   --CTA chest no evidence of PE.  --Echo pending.  --Will move to telemetry bed for closer monitoring.     Mild hypoxia  --CTA chest no evidence of PE but does show some potential mild edema. Echo pending.   --Received 1 dose of 40 mg IV Lasix on admission.  --She was on room air this morning.   --Monitor.     HTN  --Uncontrolled.  --Continue home Losartan.  --Add Metoprolol 25 mg BID.    DM2  --Previous hx of prediabetes. HbA1c is 6.8% on admission.  --Low dose SSI and ACHS finger sticks.  --Consult diabetes educator.   --Likely can F/U with PCP after discharge regarding management.    DVT prophylaxis:  Medical DVT prophylaxis orders are  present.       Disposition: I expect the patient to be discharged home ~2 days    CODE STATUS:   Code Status and Medical Interventions:   Ordered at: 07/05/21 2222     Level Of Support Discussed With:    Patient     Code Status:    CPR     Medical Interventions (Level of Support Prior to Arrest):    Full       Charla Espinoza MD  07/06/21

## 2021-07-06 NOTE — H&P
[facility] Medicine Services  HISTORY AND PHYSICAL    Patient Name: Kerry Leal  : 1965  MRN: [id number]  Primary Care Physician: Caro Crockett MD  Date of admission: 2021    Subjective   Subjective     Chief Complaint:  Left lower extremity swelling, redness and drainage     HPI:  Kerry Leal is a 56 y.o. female with a past medical history significant for essential hypertension, asthma and lymphedema presents to the ED with complaints of left lower extremity swelling, redness and drainage.  Patient reports four days ago she hit her left leg on a box.  Since then she has had increased swelling and redness to her left leg.  In addition she has noticed some drainage to her left leg that has a foul odor.  She denies any recent fever, chills, nausea, vomiting, chest pain, cough, shortness of air or palpitations.  She reports a prior history of recurrent left lower extremity cellulitis in which she has been at the [facility] for.  She previously was following with infectious disease there.  Her last admission was several years ago.  She denies any recent antibiotic use.   Patient will be admitted to [facility] for further evaluation and treatment.            COVID Details:    Symptoms:    [x] NONE [] Fever []  Cough [] Shortness of breath [] Change in taste/smell      The patient qualifies to receive the vaccine, but they have not yet received it.      Review of Systems   Constitutional: Negative for activity change, appetite change, chills, diaphoresis, fatigue, fever and unexpected weight change.   HENT: Negative.    Eyes: Negative for photophobia and visual disturbance.   Respiratory: Negative for cough and shortness of breath.    Cardiovascular: Positive for leg swelling. Negative for chest pain and palpitations.   Gastrointestinal: Negative for abdominal distention, abdominal pain, blood in stool, constipation, diarrhea, nausea and  vomiting.   Genitourinary: Negative.    Musculoskeletal: Negative.    Skin: Positive for color change.   Neurological: Negative.    Psychiatric/Behavioral: Negative.         All other systems reviewed and are negative.     Personal History     Past Medical History:   Diagnosis Date   • Anxiety    • Asthma    • Heart murmur    • Hypertension        Past Surgical History:   Procedure Laterality Date   • APPENDECTOMY     •  SECTION     • HYSTERECTOMY     • NECK SURGERY         Family History: family history is not on file. Otherwise pertinent FHx was reviewed and unremarkable.     Social History:  reports that she has never smoked. She has never used smokeless tobacco. She reports that she does not drink alcohol and does not use drugs.  Social History     Social History Narrative   • Not on file       Medications:  albuterol, cephalexin, hydroCHLOROthiazide, losartan, and mupirocin    Allergies   Allergen Reactions   • Benadryl [Diphenhydramine Hcl] Other (See Comments)     UNSURE   • Clindamycin/Lincomycin Other (See Comments)     UNSURE   • Diclofenac Swelling   • Doxycycline Other (See Comments)     UNSURE   • Fluticasone Other (See Comments)     NOSE BLEEDS   • Fluvoxamine Other (See Comments)     UNSURE   • Lisinopril Dizziness   • Montelukast Swelling   • Penicillins Other (See Comments)     MOTHER TOLD HER SHE WAS ALLERGIC   • Smz-Tmp Ds [Sulfamethoxazole-Trimethoprim] GI Intolerance   • Sulfa Antibiotics GI Intolerance   • Symbicort [Budesonide-Formoterol Fumarate] Other (See Comments)     UNSURE       Objective   Objective     Vital Signs:   Temp:  [98.4 °F (36.9 °C)-98.5 °F (36.9 °C)] 98.5 °F (36.9 °C)  Heart Rate:  [] 95  Resp:  [18-22] 18  BP: (155-230)/() 185/89  Flow (L/min):  [2] 2    Physical Exam  Vitals and nursing note reviewed.   Constitutional:       Appearance: Normal appearance. She is obese.   HENT:      Head: Normocephalic and atraumatic.      Nose: Nose normal.       Mouth/Throat:      Mouth: Mucous membranes are moist.      Pharynx: Oropharynx is clear.   Eyes:      Extraocular Movements: Extraocular movements intact.      Conjunctiva/sclera: Conjunctivae normal.      Pupils: Pupils are equal, round, and reactive to light.   Cardiovascular:      Rate and Rhythm: Normal rate and regular rhythm.      Pulses: Normal pulses.      Heart sounds: Normal heart sounds.   Pulmonary:      Effort: Pulmonary effort is normal.      Breath sounds: Normal breath sounds.   Abdominal:      General: Bowel sounds are normal. There is no distension.      Palpations: Abdomen is soft. There is no mass.      Tenderness: There is no abdominal tenderness. There is no right CVA tenderness, left CVA tenderness, guarding or rebound.      Hernia: No hernia is present.   Musculoskeletal:         General: No swelling, tenderness, deformity or signs of injury. Normal range of motion.      Cervical back: Normal range of motion and neck supple.      Right lower leg: No edema.      Left lower leg: Edema present.   Skin:     General: Skin is warm.      Findings: Erythema present.      Comments: Erythema to LLE   Quarter size abrasion to left calf , no drainage noted   Foul odor to pannus region with noted erythema    Neurological:      General: No focal deficit present.      Mental Status: She is alert and oriented to person, place, and time. Mental status is at baseline.   Psychiatric:         Mood and Affect: Mood normal.         Behavior: Behavior normal.         Thought Content: Thought content normal.         Judgment: Judgment normal.            Results Reviewed:  I have personally reviewed most recent indicated data and agree with findings including:  [x]  Laboratory  [x]  Radiology  [x]  EKG/Telemetry  []  Pathology  []  Cardiac/Vascular Studies  []  Old records  []  Other:  Most pertinent findings include:      LAB RESULTS:      Lab 07/05/21 2008 07/05/21  1633   WBC  --  7.53   HEMOGLOBIN  --  13.7    HEMATOCRIT  --  42.7   PLATELETS  --  211   NEUTROS ABS  --  5.32   IMMATURE GRANS (ABS)  --  0.03   LYMPHS ABS  --  1.33   MONOS ABS  --  0.71   EOS ABS  --  0.09   MCV  --  90.7   CRP  --  2.79*   LACTATE  --  2.0   LDH  --  244*   D DIMER QUANT 1.41*  --          Lab 07/05/21  1633   SODIUM 139   POTASSIUM 4.3   CHLORIDE 102   CO2 27.0   ANION GAP 10.0   BUN 18   CREATININE 0.97   GLUCOSE 147*   CALCIUM 9.5         Lab 07/05/21  1633   TOTAL PROTEIN 7.9   ALBUMIN 4.10   GLOBULIN 3.8   ALT (SGPT) 21   AST (SGOT) 22   BILIRUBIN 0.6   ALK PHOS 76         Lab 07/05/21  2008   PROBNP 200.3                 Brief Urine Lab Results     None        Microbiology Results (last 10 days)     Procedure Component Value - Date/Time    COVID PRE-OP / PRE-PROCEDURE SCREENING ORDER (NO ISOLATION) - Swab, Nasopharynx [[id number]]  (Normal) Collected: 07/05/21 2007    Lab Status: Final result Specimen: Swab from Nasopharynx Updated: 07/05/21 2044    Narrative:      The following orders were created for panel order COVID PRE-OP / PRE-PROCEDURE SCREENING ORDER (NO ISOLATION) - Swab, Nasopharynx.  Procedure                               Abnormality         Status                     ---------                               -----------         ------                     COVID-19 and FLU A/B PCR...[[id number]]  Normal              Final result                 Please view results for these tests on the individual orders.    COVID-19 and FLU A/B PCR - Swab, Nasopharynx [[id number]]  (Normal) Collected: 07/05/21 2007    Lab Status: Final result Specimen: Swab from Nasopharynx Updated: 07/05/21 2044     COVID19 Not Detected     Influenza A PCR Not Detected     Influenza B PCR Not Detected    Narrative:      Fact sheet for providers: https://www.fda.gov/media/548392/download    Fact sheet for patients: https://www.fda.gov/media/788339/download    Test performed by PCR.          CT Angiogram Chest    Result Date: 7/5/2021  CTA Chest INDICATION:  Shortness of breath, concern for DVT and PE, leg swelling TECHNIQUE: CT angiogram of the chest with IV contrast. 3-D reconstructions were obtained and reviewed.   Radiation dose reduction techniques included automated exposure control or exposure modulation based on body size. Count of known CT and cardiac nuc med studies performed in previous 12 months: 0. COMPARISON: None available. FINDINGS: Arterial opacification is somewhat suboptimal. There is no convincing evidence of pulmonary embolism. Thoracic aorta appears unremarkable. There is slightly increased groundglass opacity with some mosaic attenuation involving the lung fields. The heart may be mildly enlarged. There appears to be very trace pericardial fluid. There is hepatomegaly with hepatic steatosis. Upper abdominal structures appear otherwise unremarkable. No acute osseous abnormality.     Impression: 1.  Arterial opacification is somewhat suboptimal. Given this limitation there is no convincing evidence of pulmonary embolism. 2. The heart appears enlarged and there may be some pulmonary vascular congestion and potential mild edema. If there is concern for a cardiogenic etiology for the patient's symptoms, consider follow-up echocardiogram. Signer Name: Anamaria Ruff MD  Signed: 7/5/2021 9:01 PM  Workstation Name: [id number]  [facility]    CT Lower Extremity Left With Contrast    Result Date: 7/5/2021  CT OF THE LEFT LEG WITHOUT CONTRAST   HISTORY: Leg swelling, concern for DVT  COMPARISON: None TECHNIQUE: CT images of the left leg Radiation dose reduction techniques included automated exposure control. Radiation audit for CT and nuclear cardiology exams in the last 12 months: .  FINDINGS: There is nonspecific superficial and subcutaneous edema. No focal abscess no acute fracture or subluxation.. No focal osseous irregularity. No foreign body.     Impression: There is nonspecific superficial and subcutaneous edema. No focal abscess.  No focal osseous irregularity. Signer Name: Anamaria Ruff MD  Signed: 7/5/2021 9:04 PM  Workstation Name: [id number]  [facility] [place]          Assessment/Plan   Assessment & Plan       Cellulitis of left lower extremity    Essential hypertension    Chronic acquired lymphedema    Elevated d-dimer    Prediabetes      56 year old female presents to the ED with worsening LLE swelling, erythema and drainage over the past four days.     1) Recurrent Left lower extremity cellulitis w/ underlying chronic lymphedema  -CT of LLE shows nonspecific superficial and subcutaneous edema.  No focal abscess. No focal osseous irregularity.    -blood cultures x2 pending   -started vancomycin and rocephin in the ED, change to Daptomycin and rocephin   -consult PT in am for wound/wraps   -consult ID consult       2) Elevated D-dimer  -RA oxygen saturation on arrival 91%  -D-dimer 1.41  -CTA of chest showed no convincing evidence of pulmonary embolism.  Heart is enlarged and there may be some pulmonary vascular congestion and potential mild edema.    -venous duplex of LLE in am   -will give 40 mg of IV lasix tonight, hold HCTZ     3)mild hypoxia, suspect chronic  -will obtain echocardiogram in am  -EKG unremarkable   -ProBNP 200.3  -continuous pulse ox   -keep o2 sat >90%  -lasix 40mg iv x 1      3) Essential hypertension   -on losartan   -hold HCTZ (gave lasix)    4) Pre-diabetes   -check hgb A1c   -start fingersticks achs  -call for glucose >180          DVT prophylaxis:  Heparin     CODE STATUS:  Full code   Code Status and Medical Interventions:   Ordered at: 07/05/21 2226     Level Of Support Discussed With:    Patient     Code Status:    CPR     Medical Interventions (Level of Support Prior to Arrest):    Full       This note has been completed as part of a split-shared workflow.     Signature: Electronically signed by RAD Sheridan, 07/05/21, 10:22 PM EDT.      Attending   Admission Attestation       I have  seen and examined the patient, performing an independent face-to-face diagnostic evaluation with plan of care reviewed and developed with the advanced practice clinician (APC).      Brief Summary Statement:   Kerry Leal is a 56 y.o. female w/ hx htn, chronic left leg lymphedema presented progressive erythema, warmth, pain, edema after hitting it on a box. Also w/ belly button drainage. No dyspnea. No fever  Remainder of detailed HPI is as noted by APC and has been reviewed and/or edited by me for completeness.    Attending Physical Exam:  Constitutional:Alert, oriented x 3, nontoxic appearing, smiling, no distress  Psych:Normal/appropriate affect  HEENT:Ncat, oroph clear  Neck: neck supple, full range of motion  Neuro: Face symmetric, speech clear, equal , moves all extremities  Cardiac: Rrr; No pretibial pitting edema  Resp: Ctab, normal effort  GI: abd soft, nontender, obese  Skin: No extremity rash, belly button area w/ erythema minimal drainage c/w candida/fungal  Musculoskeletal/extremities: LLE w/ lymphedema, 2+ edema w/ chronic venous changes w/ overlying erythema & warmth consistent             Brief Assessment/Plan :  See detailed assessment and plan developed with APC which I have reviewed and/or edited for completeness.        Admission Status: I believe that this patient meets inpatient status due to need for iv antibiotics, close monitoring for clinical worsening, further diagnostic workup w/ expected stay > 2 midnights      Darion Sánchez MD  07/05/21

## 2021-07-06 NOTE — CONSULTS
Kerry Leal  1965  [id number]    Date of Consult: 2021  Requesting Provider: Hansa LEROY  Evaluating Physician: Shine Rodriguez MD    Chief Complaint: left lower extremity swelling, redness, and drainage    Reason for Consultation: recurrent LLE cellulitis    History of present illness:   Kerry Leal is a 56 y.o.  Yr old female with history of HTN, asthma, and chronic lymphedema who presented to the ER on 21 for worsening left lower extremity swelling, redness, and drainage that started 4 days prior to arrival. She initially hit her left on a box and then started to develop swelling, redness, and pain. Now having drainage from the wound with foul odor. She has a prior h/o of recurrent cellulitis of her left lower extremity and has followed with ID at  for this in the past. She was admitted for IV abx.  Since arrival, the patient has remained afebrile. WBC has been normal and was 6.83 today. Cr is normal. A1C was elevated to 6.8. CRP elevated to 2.79 on arrival. Blood cultures from  are in process. COVID-19 and influenza PCR was negative. CT LLE was with subcutaneous edema but no abscess or osteomyelitis. LLE duplex u/s done but read pending. CTA chest was without PTE. The patient has been on daptomycin and ceftriaxone. ID consulted for abx recs.     Past Medical History:   Diagnosis Date   • Anxiety    • Asthma    • Heart murmur    • Hypertension        Past Surgical History:   Procedure Laterality Date   • APPENDECTOMY     •  SECTION     • HYSTERECTOMY     • NECK SURGERY         Social History:  Never smoker, no ETOH use, no IVDU.      Family History:  none      Allergies   Allergen Reactions   • Benadryl [Diphenhydramine Hcl] Other (See Comments)     UNSURE   • Clindamycin/Lincomycin Other (See Comments)     UNSURE   • Diclofenac Swelling   • Doxycycline Other (See Comments)     UNSURE   • Fluticasone Other (See Comments)     NOSE BLEEDS   • Fluvoxamine Other (See Comments)      UNSURE   • Lisinopril Dizziness   • Montelukast Swelling   • Penicillins Other (See Comments)     MOTHER TOLD HER SHE WAS ALLERGIC   • Smz-Tmp Ds [Sulfamethoxazole-Trimethoprim] GI Intolerance   • Sulfa Antibiotics GI Intolerance   • Symbicort [Budesonide-Formoterol Fumarate] Other (See Comments)     UNSURE       Medication:  Current Facility-Administered Medications   Medication Dose Route Frequency Provider Last Rate Last Admin   • acetaminophen (TYLENOL) tablet 650 mg  650 mg Oral Q4H PRN Raquel, Hansa, APRN        Or   • acetaminophen (TYLENOL) 160 MG/5ML solution 650 mg  650 mg Oral Q4H PRN Raquel, Hansa, APRN        Or   • acetaminophen (TYLENOL) suppository 650 mg  650 mg Rectal Q4H PRN Raquel, Hansa, APRN       • cefTRIAXone (ROCEPHIN) IVPB 1 g  1 g Intravenous Q24H Raquel, Hansa, APRN       • DAPTOmycin (CUBICIN) 350 mg in sodium chloride 0.9 % 50 mL IVPB  4 mg/kg (Adjusted) Intravenous Q24H Markos Wheeler PharmD 100 mL/hr at 07/06/21 0401 350 mg at 07/06/21 0401   • heparin (porcine) 5000 UNIT/ML injection 5,000 Units  5,000 Units Subcutaneous Q12H Raquel, Hansa, APRN   5,000 Units at 07/06/21 0954   • losartan (COZAAR) tablet 50 mg  50 mg Oral Daily Raquel, Hansa, APRN   50 mg at 07/06/21 0801   • metoprolol tartrate (LOPRESSOR) tablet 25 mg  25 mg Oral Q12H Charla Espinoza MD   25 mg at 07/06/21 0801   • Pharmacy to dose daptomycin   Does not apply Continuous PRN Raquel, Hansa, APRN       • sodium chloride 0.9 % flush 10 mL  10 mL Intravenous PRN Panchito Jamison MD       • sodium chloride 0.9 % flush 10 mL  10 mL Intravenous Q12H Raquel, Hansa, APRN   10 mL at 07/06/21 0803   • sodium chloride 0.9 % flush 10 mL  10 mL Intravenous PRN Raquel, Hansa, APRN   10 mL at 07/06/21 0451         Antibiotics:  Anti-Infectives (From admission, onward)    Ordered     Dose/Rate Route Frequency Start Stop    07/05/21 6685  cefTRIAXone (ROCEPHIN) IVPB 1 g     Ordering Provider:  Hansa García, APRN    1 g  100 mL/hr over 30 Minutes Intravenous Every 24 Hours 07/06/21 2100 07/10/21 2059    07/05/21 2333  DAPTOmycin (CUBICIN) 350 mg in sodium chloride 0.9 % 50 mL IVPB     Ordering Provider: Markos Wheeler PharmD    4 mg/kg × 92.2 kg (Adjusted)  100 mL/hr over 30 Minutes Intravenous Every 24 Hours Scheduled 07/06/21 0400 07/11/21 0559    07/05/21 1941  vancomycin 3000 mg/500 mL 0.9% NS IVPB ([facility])     Ordering Provider: Bonnie Wesley PA-C    20 mg/kg × 152 kg  over 3 Hours Intravenous Once 07/05/21 1943 07/05/21 2351    07/05/21 1941  cefTRIAXone (ROCEPHIN) IVPB 1 g     Ordering Provider: Bonnie Wesley PA-C    1 g  100 mL/hr over 30 Minutes Intravenous Once 07/05/21 1943 07/06/21 0040            Review of Systems    Constitutional-- No Fever, chills or sweats.  Appetite good, and no malaise. No fatigue.  Heent-- No new vision, hearing or throat complaints.  No epistaxis or oral sores.  Denies odynophagia or dysphagia.  No flashers, floaters or eye pain. No odynophagia or dysphagia. No headache, photophobia or neck stiffness.  CV-- No chest pain, palpitation or syncope  Resp-- No SOB/cough/Hemoptysis  GI- No nausea, vomiting, or diarrhea.  No hematochezia, melena, or hematemesis. Denies jaundice or chronic liver disease.  -- No dysuria, hematuria, or flank pain.  Denies hesitancy, urgency or flank pain.  Lymph- no swollen lymph nodes in neck/axilla or groin.   Heme- No active bruising or bleeding; no Hx of DVT or PE.  MS-- Other than left lower ext swelling, redness, and pain-->no swelling or pain in the bones or joints of arms/legs.  No new back pain.  Neuro-- No acute focal weakness or numbness in the arms or legs.  No seizures.  Skin-- Other than left lower extremity swelling, redness, and drainage-->No rashes, lesions, ulcers. No skin breakdown    12 systems were reviewed and were negative otherwise for acute complaints, except as above and per HPI    Physical Exam:   Vital  "Signs   BP (!) 183/94 Comment: told nurse  Pulse 95   Temp 98.3 °F (36.8 °C) (Oral)   Resp 17   Ht 160 cm (63\")   Wt (!) 152 kg (335 lb)   SpO2 91%   BMI 59.34 kg/m²     GENERAL: Awake and alert, in no acute distress. Morbidly obese. Non-toxic appearing. Sitting on bedside  HENT: Normocephalic, atraumatic.   Oropharynx clear without evidence of thrush or exudate. No evidence of peridontal disease.    Eyes: Pupils equal and round. No conjunctival injection. No icterus.  NECK: Supple without nuchal rigidity. No mass.  HEART: RRR; No murmur, rubs, gallops.   LUNGS: Clear to auscultation bilaterally without wheezing, rales, rhonchi. Normal respiratory effort. Nonlabored.  ABDOMEN: Soft, nontender, nondistended. Positive bowel sounds. No rebound or guarding. NO mass or HSM.  EXT: bilateral lower ext lymphedema R>L with chronic changes and LLE cellulitis as noted below  :  Without Perdomo catheter.  MSK: L eft lower extremity induration, increased warmth, redness, and pain. Small wound over Left calf area that is through skin layer, no active drainage. No joint effusions noted. FROM throughout  SKIN: Other than LLE-->No rashes noted over exposed skin. No jaundice. No peripheral stigmata of infective endocarditis.  NEURO: Oriented to PPT. Normal speech and cognition  PSYCHIATRIC: Normal insight and judgement. Cooperative with PE    Laboratory Data    Results from last 7 days   Lab Units 07/06/21  0518 07/05/21  1633   WBC 10*3/mm3 6.83 7.53   HEMOGLOBIN g/dL 13.1 13.7   HEMATOCRIT % 41.4 42.7   PLATELETS 10*3/mm3 177 211     Results from last 7 days   Lab Units 07/06/21  0518   SODIUM mmol/L 139   POTASSIUM mmol/L 3.7   CHLORIDE mmol/L 101   CO2 mmol/L 26.0   BUN mg/dL 17   CREATININE mg/dL 0.70   GLUCOSE mg/dL 141*   CALCIUM mg/dL 9.1     Results from last 7 days   Lab Units 07/05/21  1633   ALK PHOS U/L 76   BILIRUBIN mg/dL 0.6   ALT (SGPT) U/L 21   AST (SGOT) U/L 22         Results from last 7 days   Lab Units " 07/05/21  1633   CRP mg/dL 2.79*       Estimated Creatinine Clearance: 133.7 mL/min (by C-G formula based on SCr of 0.7 mg/dL).       Microbiology:  7/5 blood culture x 2: in progress    COVID-19 and Influenza PCR: negative        Radiology:  CT Angiogram Chest    Result Date: 7/5/2021  1.  Arterial opacification is somewhat suboptimal. Given this limitation there is no convincing evidence of pulmonary embolism. 2. The heart appears enlarged and there may be some pulmonary vascular congestion and potential mild edema. If there is concern for a cardiogenic etiology for the patient's symptoms, consider follow-up echocardiogram. Signer Name: Anamaria Ruff MD  Signed: 7/5/2021 9:01 PM  Workstation Name: [id number]  Radiology [facility]    CT Lower Extremity Left With Contrast    Result Date: 7/5/2021  There is nonspecific superficial and subcutaneous edema. No focal abscess. No focal osseous irregularity. Signer Name: Anamaria Ruff MD  Signed: 7/5/2021 9:04 PM  Workstation Name: [id number] [id number]  Radiology [facility]         Impression:   Kerry Leal is a 56 y.o.  Yr old female with history of HTN, asthma, and chronic lymphedema who presented to the ER on 7/5/21 for worsening left lower extremity swelling, redness, and drainage that started 4 days prior to arrival after hitting her leg on a box. No abscess or osteomyelitis on CT LLE but has been noted to have cellulitis. Not overtly septic and blood cx pending. She does have chronic lymphedema and A1C is 6.8 c/w diabetes which both predispose her to cellulitis and poor wound healing.       Problems:  -LLE cellulitis  -Elevated CRP level  -Diabetes mellitus with hyperglycemia- ?new diagnosis with A1C 6.8  -Chronic Lymphedema  -Multiple Antibiotic allergies (Sulfa, PCN, doxycycline, clindamycin)    PLAN: Thank you for asking us to see Kerry Leal, I recommend the following:     -continue to monitor cbc with diff and cmp. Weekly CPK level while on  daptomycin  -Agree with Daptomycin and Ceftriaxone for now  -please have nursing apoorva the area of cellulitis    I am ok with her discharge today with the below antibiotic plan. She can infuse INPAT at [facility] through a PIV. She needs at dose of her antibiotics today prior to discharge. Please apoorva her area of cellulitis prior to discharge. Ok to leave PIV in place and cover with coban if it is functional.    UM/LY:  1. Needs to follow up with PCP for DM  2. Ok to leave PIV in place and cover with coban  3. Daptomycin 350 mg IV q24h  4. Ceftriaxone 2g IV q24h  5. Follow up in my clinic Thursday, 7/8/21. I will re-evaluate her cellulitis then    I discussed with the patient's  at bedside today    I discussed with Dr. Espinoza today    Shine Rodriguez MD  7/6/2021

## 2021-07-06 NOTE — PLAN OF CARE
Goal Outcome Evaluation:  Plan of Care Reviewed With: patient        Progress: no change  Outcome Summary: VSS.  56 year old white female that is morbidly obese.  She has an IV in her right forearm in place and patent.  Her LLE is edematous, red and mottle up to above her knee and is marked and measures 76 CM above mid-calf.  Her RLE is edematous and is marked and measures 66 cm at mid-calf.  Have administered scheduled medication as indicated. Patient has C/O LLE leg cramps.  She has been up frequently voiding large amounts of clear urine.  Placed Oxygen per NC at 2 liters when patient falls asleep her oxygen saturatation dips into high 80's.  She has a continuous pulse ox in placae.  Will continue to monitor throughout the rest of this shift.

## 2021-07-06 NOTE — PLAN OF CARE
Goal Outcome Evaluation:           Progress: no change     Hypertensive during morning part of shift; nightshift nurse made MD aware and medications were added. Left leg swollen, red, flaky, and slightly weeping; area marked. Transferred to  for telemetry monitoring.

## 2021-07-06 NOTE — DISCHARGE SUMMARY
[facility] Medicine Services  DISCHARGE SUMMARY    Patient Name: Kerry Leal  : 1965  MRN: [id number]    Date of Admission: 2021  5:44 PM  Date of Discharge:  21  Primary Care Physician: Caro Crockett MD    Consults     Date and Time Order Name Status Description    2021 12:35 AM Inpatient Infectious Diseases Consult Completed           Hospital Course     Presenting Problem:   Cellulitis of left lower extremity [L03.116]    Active Hospital Problems    Diagnosis  POA   • **Cellulitis of left lower extremity [L03.116]  Yes   • Essential hypertension [I10]  Yes   • Chronic acquired lymphedema [I89.0]  Yes   • Elevated d-dimer [R79.89]  Yes   • Prediabetes [R73.03]  Yes      Resolved Hospital Problems   No resolved problems to display.          Hospital Course:  Kerry Leal is a 56 y.o. female with hx of HTN, asthma, lymphedema, prediabetes, and morbid obesity who presents after hitting her left foot on a box, following by 4 days of progressive LLE swelling, redness, and drainage from a laceration. CT LLE showed nonspecific superficial and subcutaneous edema without abscess.     LLE cellulitis  Chronic lymphedema  --Negative LLE duplex.  --Continue Daptomycin/Rocephin. Dr. Shine Rodriguez has seen the patient. I have discussed the plan of care with him. Recommends transition to outpatient IV ATBx (Daptomycin/Rocephin) via [facility]. Can keep peripheral IV in place at discharge.      Sinus tachycardia  --EKG reviewed.   --CTA chest no evidence of PE.  --Echo pending at this time.  --HR improved currently in the 80's.     Mild hypoxia  --CTA chest no evidence of PE but does show some potential mild edema. Echo pending.   --Received 1 dose of 40 mg IV Lasix on admission.  --She is now on room air.     HTN  --Uncontrolled.  --Continue home Losartan.  --Added Metoprolol 25 mg BID, continue at discharge.     DM2  --Previous hx of prediabetes. HbA1c is 6.8% on  admission.  --Consulted diabetes educator however was unable to see patient prior to discharge.  --She can F/U with PCP after discharge regarding management.      Discharge Follow Up Recommendations for outpatient labs/diagnostics:  F/U with PCP in 1 week  F/U with Dr. Rodriguez 7/8/21    Day of Discharge     HPI:   Patient seen this morning. No complaints. Thinks her cellulitis looks better.    Review of Systems  Gen-no fevers, no chills  CV-no chest pain, no palpitations  Resp-no cough, no dyspnea  GI-no N/V/D, no abd pain    All other systems reviewed and negative except any additional pertinent positives and negatives as discussed in HPI.      Vital Signs:   Temp:  [97.9 °F (36.6 °C)-98.5 °F (36.9 °C)] 97.9 °F (36.6 °C)  Heart Rate:  [] 85  Resp:  [17-22] 17  BP: (155-230)/() 165/80     Physical Exam:  Gen-no acute distress  HENT-NCAT, mucous membranes moist  CV-RRR, S1 S2 normal, no m/r/g  Resp-CTAB, no wheezes or rales  Abd-soft, NT, ND, +BS  Ext-chronic lymphedema bilaterally  Neuro-A&Ox3, no focal deficits  Skin-LLE with erythema up to below the knee  Psych-appropriate mood    Pertinent  and/or Most Recent Results     LAB RESULTS:      Lab 07/06/21 0518 07/05/21 2008 07/05/21  1633   WBC 6.83  --  7.53   HEMOGLOBIN 13.1  --  13.7   HEMATOCRIT 41.4  --  42.7   PLATELETS 177  --  211   NEUTROS ABS 4.30  --  5.32   IMMATURE GRANS (ABS) 0.03  --  0.03   LYMPHS ABS 1.53  --  1.33   MONOS ABS 0.86  --  0.71   EOS ABS 0.08  --  0.09   MCV 93.0  --  90.7   CRP  --   --  2.79*   LACTATE  --   --  2.0   LDH  --   --  244*   D DIMER QUANT  --  1.41*  --          Lab 07/06/21 0518 07/05/21  1633   SODIUM 139 139   POTASSIUM 3.7 4.3   CHLORIDE 101 102   CO2 26.0 27.0   ANION GAP 12.0 10.0   BUN 17 18   CREATININE 0.70 0.97   GLUCOSE 141* 147*   CALCIUM 9.1 9.5   HEMOGLOBIN A1C 6.80*  --          Lab 07/05/21  1633   TOTAL PROTEIN 7.9   ALBUMIN 4.10   GLOBULIN 3.8   ALT (SGPT) 21   AST (SGOT) 22   BILIRUBIN  0.6   ALK PHOS 76         Lab 07/05/21  2008   PROBNP 200.3                 Brief Urine Lab Results     None        Microbiology Results (last 10 days)     Procedure Component Value - Date/Time    COVID PRE-OP / PRE-PROCEDURE SCREENING ORDER (NO ISOLATION) - Swab, Nasopharynx [[id number]]  (Normal) Collected: 07/05/21 2007    Lab Status: Final result Specimen: Swab from Nasopharynx Updated: 07/05/21 2044    Narrative:      The following orders were created for panel order COVID PRE-OP / PRE-PROCEDURE SCREENING ORDER (NO ISOLATION) - Swab, Nasopharynx.  Procedure                               Abnormality         Status                     ---------                               -----------         ------                     COVID-19 and FLU A/B PCR...[[id number]]  Normal              Final result                 Please view results for these tests on the individual orders.    COVID-19 and FLU A/B PCR - Swab, Nasopharynx [[id number]]  (Normal) Collected: 07/05/21 2007    Lab Status: Final result Specimen: Swab from Nasopharynx Updated: 07/05/21 2044     COVID19 Not Detected     Influenza A PCR Not Detected     Influenza B PCR Not Detected    Narrative:      Fact sheet for providers: https://www.fda.gov/media/985364/download    Fact sheet for patients: https://www.fda.gov/media/219815/download    Test performed by PCR.          Duplex Venous Lower Extremity - Left CAR    Result Date: 7/6/2021  · Normal left lower extremity venous duplex scan.      CT Angiogram Chest    Result Date: 7/5/2021  CTA Chest INDICATION: Shortness of breath, concern for DVT and PE, leg swelling TECHNIQUE: CT angiogram of the chest with IV contrast. 3-D reconstructions were obtained and reviewed.   Radiation dose reduction techniques included automated exposure control or exposure modulation based on body size. Count of known CT and cardiac nuc med studies performed in previous 12 months: 0. COMPARISON: None available. FINDINGS: Arterial  opacification is somewhat suboptimal. There is no convincing evidence of pulmonary embolism. Thoracic aorta appears unremarkable. There is slightly increased groundglass opacity with some mosaic attenuation involving the lung fields. The heart may be mildly enlarged. There appears to be very trace pericardial fluid. There is hepatomegaly with hepatic steatosis. Upper abdominal structures appear otherwise unremarkable. No acute osseous abnormality.     1.  Arterial opacification is somewhat suboptimal. Given this limitation there is no convincing evidence of pulmonary embolism. 2. The heart appears enlarged and there may be some pulmonary vascular congestion and potential mild edema. If there is concern for a cardiogenic etiology for the patient's symptoms, consider follow-up echocardiogram. Signer Name: Anamaria Ruff MD  Signed: 7/5/2021 9:01 PM  Workstation Name: [email or web address]  Radiology [facility]    CT Lower Extremity Left With Contrast    Result Date: 7/5/2021  CT OF THE LEFT LEG WITHOUT CONTRAST   HISTORY: Leg swelling, concern for DVT  COMPARISON: None TECHNIQUE: CT images of the left leg Radiation dose reduction techniques included automated exposure control. Radiation audit for CT and nuclear cardiology exams in the last 12 months: .  FINDINGS: There is nonspecific superficial and subcutaneous edema. No focal abscess no acute fracture or subluxation.. No focal osseous irregularity. No foreign body.     There is nonspecific superficial and subcutaneous edema. No focal abscess. No focal osseous irregularity. Signer Name: Anamaria Ruff MD  Signed: 7/5/2021 9:04 PM  Workstation Name: [id number]  Radiology [facility]      Results for orders placed during the hospital encounter of 07/05/21    Duplex Venous Lower Extremity - Left CAR    Interpretation Summary  · Normal left lower extremity venous duplex scan.      Results for orders placed during the hospital encounter of 07/05/21    Duplex  Venous Lower Extremity - Left CAR    Interpretation Summary  · Normal left lower extremity venous duplex scan.        Pending Labs     Order Current Status    Blood Culture - Blood, Arm, Left In process    Blood Culture - Blood, Arm, Right In process        Discharge Details        Discharge Medications      New Medications      Instructions Start Date   cefTRIAXone  Commonly known as: ROCEPHIN   2 g, Intravenous, Every 24 Hours      DAPTOmycin 350 mg in sodium chloride 0.9 % 50 mL   4 mg/kg (350 mg), Intravenous, Every 24 Hours Scheduled   Start Date: July 7, 2021     metoprolol tartrate 25 MG tablet  Commonly known as: LOPRESSOR   25 mg, Oral, Every 12 Hours Scheduled         Continue These Medications      Instructions Start Date   albuterol (2.5 MG/3ML) 0.083% nebulizer solution  Commonly known as: PROVENTIL   2.5 mg, Nebulization, Every 4 Hours PRN      hydroCHLOROthiazide 25 MG tablet  Commonly known as: HYDRODIURIL   25 mg, Oral, Daily      losartan 50 MG tablet  Commonly known as: COZAAR   50 mg, Oral, Daily      mupirocin 2 % cream  Commonly known as: BACTROBAN   Topical, 3 Times Daily             Allergies   Allergen Reactions   • Benadryl [Diphenhydramine Hcl] Other (See Comments)     UNSURE   • Clindamycin/Lincomycin Other (See Comments)     UNSURE   • Diclofenac Swelling   • Doxycycline Other (See Comments)     UNSURE   • Fluticasone Other (See Comments)     NOSE BLEEDS   • Fluvoxamine Other (See Comments)     UNSURE   • Lisinopril Dizziness   • Montelukast Swelling   • Penicillins Other (See Comments)     MOTHER TOLD HER SHE WAS ALLERGIC   • Smz-Tmp Ds [Sulfamethoxazole-Trimethoprim] GI Intolerance   • Sulfa Antibiotics GI Intolerance   • Symbicort [Budesonide-Formoterol Fumarate] Other (See Comments)     UNSURE         Discharge Disposition:  Home or Self Care    Diet:  Hospital:  Diet Order   Procedures   • Diet Regular; Cardiac, Consistent Carbohydrate       Activity:  Activity Instructions      Activity as Tolerated                 CODE STATUS:    Code Status and Medical Interventions:   Ordered at: 07/05/21 2226     Level Of Support Discussed With:    Patient     Code Status:    CPR     Medical Interventions (Level of Support Prior to Arrest):    Full       Future Appointments   Date Time Provider Department Center   7/6/2021  2:25 PM [facility] ECHO[facility]VASC [facility]       Additional Instructions for the Follow-ups that You Need to Schedule     Discharge Follow-up with PCP   As directed       Currently Documented PCP:    Caro Crockett MD    PCP Phone Number:    [phone]     Follow Up Details: 1 week         Discharge Follow-up with Specified Provider: ID Dr. Shine Rodriguez on Thursday 7/8/21 as instructed   As directed      To: MONICA Rodriguez on Thursday 7/8/21 as instructed                     Charla Espinoza MD  07/06/21      Time Spent on Discharge:  I spent  32 minutes on this discharge activity which included: face-to-face encounter with the patient, reviewing the data in the system, coordination of the care with the nursing staff as well as consultants, documentation, and entering orders.

## 2021-07-10 LAB
BACTERIA SPEC AEROBE CULT: NORMAL
BACTERIA SPEC AEROBE CULT: NORMAL

## 2021-08-20 ENCOUNTER — HOSPITAL ENCOUNTER (OUTPATIENT)
Dept: ONCOLOGY | Facility: HOSPITAL | Age: 56
Setting detail: INFUSION SERIES
Discharge: HOME | End: 2021-08-20

## 2021-08-20 VITALS
DIASTOLIC BLOOD PRESSURE: 84 MMHG | RESPIRATION RATE: 18 BRPM | TEMPERATURE: 98.4 F | SYSTOLIC BLOOD PRESSURE: 189 MMHG | HEART RATE: 89 BPM | BODY MASS INDEX: 62.53 KG/M2 | WEIGHT: 293 LBS

## 2021-08-20 DIAGNOSIS — L03.116 CELLULITIS OF LEFT LOWER EXTREMITY: Primary | ICD-10-CM

## 2021-08-20 PROCEDURE — 96365 THER/PROPH/DIAG IV INF INIT: CPT

## 2021-08-20 PROCEDURE — 25010000002 DALBAVANCIN 500 MG RECONSTITUTED SOLUTION 1 EACH VIAL: Performed by: INTERNAL MEDICINE

## 2021-08-20 PROCEDURE — 96366 THER/PROPH/DIAG IV INF ADDON: CPT

## 2021-08-20 RX ADMIN — DALBAVANCIN 1500 MG: 500 INJECTION, POWDER, FOR SOLUTION INTRAVENOUS at 15:03

## 2021-09-02 ENCOUNTER — LAB (OUTPATIENT)
Dept: LAB | Facility: HOSPITAL | Age: 56
End: 2021-09-02

## 2021-09-02 ENCOUNTER — TRANSCRIBE ORDERS (OUTPATIENT)
Dept: LAB | Facility: HOSPITAL | Age: 56
End: 2021-09-02

## 2021-09-02 DIAGNOSIS — E11.649 UNCONTROLLED TYPE 2 DIABETES MELLITUS WITH HYPOGLYCEMIA, UNSPECIFIED HYPOGLYCEMIA COMA STATUS (HCC): ICD-10-CM

## 2021-09-02 DIAGNOSIS — L02.416 ABSCESS OF LEFT LEG: ICD-10-CM

## 2021-09-02 DIAGNOSIS — L03.116 CELLULITIS OF LEFT FOOT: Primary | ICD-10-CM

## 2021-09-02 DIAGNOSIS — L03.116 CELLULITIS OF LEFT FOOT: ICD-10-CM

## 2021-09-02 DIAGNOSIS — L02.416 ABSCESS OF LEFT HIP: ICD-10-CM

## 2021-09-02 DIAGNOSIS — I89.0 OBLITERATION OF LYMPHATIC VESSEL: ICD-10-CM

## 2021-09-02 PROCEDURE — 87070 CULTURE OTHR SPECIMN AEROBIC: CPT

## 2021-09-02 PROCEDURE — 87205 SMEAR GRAM STAIN: CPT

## 2021-09-03 ENCOUNTER — HOSPITAL ENCOUNTER (OUTPATIENT)
Dept: ONCOLOGY | Facility: HOSPITAL | Age: 56
Setting detail: INFUSION SERIES
Discharge: HOME | End: 2021-09-03

## 2021-09-03 VITALS
BODY MASS INDEX: 51.91 KG/M2 | HEIGHT: 63 IN | DIASTOLIC BLOOD PRESSURE: 78 MMHG | WEIGHT: 293 LBS | HEART RATE: 87 BPM | TEMPERATURE: 97.5 F | RESPIRATION RATE: 18 BRPM | SYSTOLIC BLOOD PRESSURE: 167 MMHG

## 2021-09-03 DIAGNOSIS — L03.116 CELLULITIS OF LEFT LOWER EXTREMITY: Primary | ICD-10-CM

## 2021-09-03 PROCEDURE — 96365 THER/PROPH/DIAG IV INF INIT: CPT

## 2021-09-03 PROCEDURE — 25010000002 DALBAVANCIN 500 MG RECONSTITUTED SOLUTION 1 EACH VIAL: Performed by: INTERNAL MEDICINE

## 2021-09-03 RX ADMIN — DALBAVANCIN 1500 MG: 500 INJECTION, POWDER, FOR SOLUTION INTRAVENOUS at 15:22

## 2021-09-05 LAB
BACTERIA SPEC AEROBE CULT: NORMAL
GRAM STN SPEC: NORMAL

## 2022-04-20 ENCOUNTER — TRANSCRIBE ORDERS (OUTPATIENT)
Dept: ADMINISTRATIVE | Facility: HOSPITAL | Age: 57
End: 2022-04-20

## 2022-04-20 DIAGNOSIS — Z12.31 VISIT FOR SCREENING MAMMOGRAM: Primary | ICD-10-CM

## 2022-05-11 ENCOUNTER — APPOINTMENT (OUTPATIENT)
Dept: MAMMOGRAPHY | Facility: HOSPITAL | Age: 57
End: 2022-05-11

## 2022-05-13 ENCOUNTER — APPOINTMENT (OUTPATIENT)
Dept: MAMMOGRAPHY | Facility: HOSPITAL | Age: 57
End: 2022-05-13

## 2022-05-16 ENCOUNTER — TRANSCRIBE ORDERS (OUTPATIENT)
Dept: PHYSICAL THERAPY | Facility: HOSPITAL | Age: 57
End: 2022-05-16

## 2022-05-16 DIAGNOSIS — I83.008: ICD-10-CM

## 2022-05-16 DIAGNOSIS — L30.9 DERMATITIS: ICD-10-CM

## 2022-05-16 DIAGNOSIS — L97.806: ICD-10-CM

## 2022-05-16 DIAGNOSIS — I89.0 LYMPHEDEMA: Primary | ICD-10-CM

## 2022-05-16 DIAGNOSIS — L03.818 CELLULITIS OF OTHER SPECIFIED SITE: ICD-10-CM

## 2022-05-23 ENCOUNTER — APPOINTMENT (OUTPATIENT)
Dept: PHYSICAL THERAPY | Facility: HOSPITAL | Age: 57
End: 2022-05-23

## 2022-06-01 ENCOUNTER — HOSPITAL ENCOUNTER (OUTPATIENT)
Dept: PHYSICAL THERAPY | Facility: HOSPITAL | Age: 57
Setting detail: THERAPIES SERIES
Discharge: HOME | End: 2022-06-01

## 2022-06-01 ENCOUNTER — APPOINTMENT (OUTPATIENT)
Dept: PHYSICAL THERAPY | Facility: HOSPITAL | Age: 57
End: 2022-06-01

## 2022-06-01 DIAGNOSIS — I89.0 CHRONIC ACQUIRED LYMPHEDEMA: ICD-10-CM

## 2022-06-01 DIAGNOSIS — L03.116 CELLULITIS OF LEFT LOWER EXTREMITY: Primary | ICD-10-CM

## 2022-06-01 PROCEDURE — 97162 PT EVAL MOD COMPLEX 30 MIN: CPT

## 2022-06-01 PROCEDURE — 29581 APPL MULTLAYER CMPRN SYS LEG: CPT

## 2022-06-01 NOTE — THERAPY EVALUATION
Outpatient Rehabilitation - Wound/Debridement Initial Eval   [facility]     Patient Name: Kerry Leal  : 1965  MRN: [id number]  Today's Date: 2022                  Admit Date: 2022    Visit Dx:    ICD-10-CM ICD-9-CM   1. Cellulitis of left lower extremity  L03.116 682.6   2. Chronic acquired lymphedema  I89.0 457.1         Patient Active Problem List   Diagnosis   • Cellulitis of left lower extremity   • Essential hypertension   • Chronic acquired lymphedema   • Elevated d-dimer   • Prediabetes        Past Medical History:   Diagnosis Date   • Anxiety    • Asthma    • Heart murmur    • Hypertension         Past Surgical History:   Procedure Laterality Date   • APPENDECTOMY     •  SECTION     • HYSTERECTOMY     • NECK SURGERY          Patient History     Row Name 22 1500             History    Chief Complaint Ulcer, wound or other skin conditions;Swelling;Pain;Difficulty with daily activities  -      Type of Pain Lower Extremity / Leg  -      Brief Description of Current Complaint Pt with h/o lymphedema, prior tx by lymphedema therapist but now with cellulitis of LLE.  Pt reports she was on oral keflex, and had wound culture that was negative, but redness has worsened, and now her leg has been draining for last 4-5 days.  Spouse has been cleaning her leg with dove soap and applying gauze dressings daily.  Pt reports she has previously been seen at [facility] by Dr. Rodriguez in the past.  -      Patient/Caregiver Goals Know what to do to help the symptoms;Heal wound  -              Pain     [place] FACES Pain Rating  4  -              Fall Risk Assessment    Any falls in the past year: Unspecified  -              Services    Are you currently receiving Home Health services No  -              Daily Activities    Teaching needs identified Management of Condition  -      Barriers to learning None  -      Recommended Referrals Physician;Other (comment)  follow-up at [facility]   -JM      Pt Participated in POC and Goals Yes  -JM            User Key  (r) = Recorded By, (t) = Taken By, (c) = Cosigned By    Initials Name Provider Type    Kelin Lozano PT Physical Therapist                EVALUATION   PT Ortho     Row Name 06/01/22 1500       Subjective Comments    Subjective Comments Pt denies fever, but states she does not feel well, and her leg has been weeping for the last few days.  -JM       Subjective Pain    Able to rate subjective pain? yes  -JM    Pre-Treatment Pain Level 4  -JM    Post-Treatment Pain Level 4  -    Subjective Pain Comment FACES  -       Transfers    Comment, (Transfers) remained in transport chair, assisted by spouse  -          User Key  (r) = Recorded By, (t) = Taken By, (c) = Cosigned By    Initials Name Provider Type    Kelin Lozano PT Physical Therapist               LDA Wound     Row Name 06/01/22 1500             Wound 06/01/22 1500 Left posterior calf Venous Ulcer    Wound - Properties Group Placement Date: 06/01/22  - Placement Time: 1500  -JM Side: Left  - Orientation: posterior  - Location: calf  - Primary Wound Type: Venous ulcer  -JM      Wound Image View All Images View Images  -JM      Dressing Appearance intact;moist drainage  4x4 gauze and kerlix  -      Base moist;pink;red;yellow;subcutaneous  -      Periwound moist;redness;swelling  -      Periwound Temperature hot  -      Periwound Skin Turgor firm  -      Edges irregular  -JM      Wound Length (cm) 20 cm  indistinct weeping of posterior calf  -JM      Wound Width (cm) 20 cm  -JM      Wound Depth (cm) 0.1 cm  -JM      Wound Surface Area (cm^2) 400 cm^2  -JM      Wound Volume (cm^3) 40 cm^3  -JM      Drainage Characteristics/Odor serous  -JM      Drainage Amount large  -JM      Care, Wound cleansed with;wound cleanser;debrided  -      Dressing Care dressing applied;multi-layer wrap  -JM      Periwound Care cleansed with pH balanced cleanser;dry periwound  "area maintained  -JM      Retired Wound - Properties Group Placement Date: 06/01/22  - Placement Time: 1500  -JM Side: Left  -JM Orientation: posterior  -JM Location: calf  -JM Primary Wound Type: Venous ulcer  -JM      Retired Wound - Properties Group Date first assessed: 06/01/22  - Time first assessed: 1500  -JM Side: Left  -JM Location: calf  -JM Primary Wound Type: Venous ulcer  -JM            User Key  (r) = Recorded By, (t) = Taken By, (c) = Cosigned By    Initials Name Provider Type    Kelin Lozano, PT Physical Therapist               Lymphedema     Row Name 06/01/22 1500             Lymphedema Edema Assessment    Pitting Edema Very Severe  -              Skin Changes/Observations    Location/Assessment Lower Extremity  -      Lower Extremity Conditions left:;shiny;crust;inflamed;weeping  -      Lower Extremity Color/Pigment left:;erythema  -              Lymphedema Measurements    Measurement Type(s) Quick Girth  -JM      Quick Girth Areas Lower extremities  -              LLE Quick Girth (cm)    Met-heads 27.8 cm  -JM      Mid foot 29.4 cm  -JM      Smallest ankle 39 cm  -JM      Largest calf 83 cm  -JM              Compression/Skin Care    Compression/Skin Care skin care;wrapping location;bandaging  -      Skin Care washed/dried  -JM      Wrapping Location lower extremity  -JM      Wrapping Location LE left:;foot to knee  -      Wrapping Comments ABDx6, kerlix x2, 4\" comprilan MH to ankle, 8\" short-stretch bandage to calf, size 7 spandage  -      Bandage Layers padding/fluff layer;short-stretch bandages (comment size/quantity)  -      Bandaging Technique figure-eight;light compression  -            User Key  (r) = Recorded By, (t) = Taken By, (c) = Cosigned By    Initials Name Provider Type    Kelin Lozano, PT Physical Therapist                   Therapy Education     Row Name 06/01/22 1500             Therapy Education    Education Details Change wrap PRN for " drainage, pt to elevate leg as much as possible.  Recommended pt contact [facility] office for follow-up as soon as possible, or go to ED if symptoms worsen.  -      Given Symptoms/condition management;Bandaging/dressing change;Edema management  -      Program New  -      How Provided Verbal;Demonstration  -      Provided to Patient;Other (comment)  spouse  -      Level of Understanding Verbalized  -            User Key  (r) = Recorded By, (t) = Taken By, (c) = Cosigned By    Initials Name Provider Type    Kelin Lozano, SCOTT Physical Therapist                Recommendation and Plan   PT Assessment/Plan     Row Name 22 1500          PT Assessment    Functional Limitations Performance in self-care ADL  -     Impairments Edema;Integumentary integrity;Pain  -     Assessment Comments Pt presents with very severe edema and erythema of LLE with heavy serous drainage.  Posterior L calf with indistinct weeping area actively draining during tx today.  PT initiated multilayer compression to LLE to reduce edema and manage drainage.  Recommended follow-up with [facility] MD to address potential need for abx.  Pt will require skilled PT for debridement, advanced dressings, and compression to LLE to promote wound healing and return to prior level of function.  -     Rehab Potential Good  -     Patient/caregiver participated in establishment of treatment plan and goals Yes  -     Patient would benefit from skilled therapy intervention Yes  -            PT Plan    PT Frequency 1x/week  -     Predicted Duration of Therapy Intervention (PT) 12 visits  -     Planned CPT's? PT EVAL MOD COMPLELITY: 83517;PT MULTI LAYER COMP SYS LE;PT UNNA BOOT: 23595;PT JOHN DEBRIDE OPEN WOUND UP TO 20 CM: 09912;PT JOHN DEBRIDE OPEN WOUND EA ADD 20 CM: 78749;PT NONSELECT DEBRIDE 15 MIN: 22633;PT SELF CARE/MGMT/TRAIN 15 MIN: 56605  -     Physical Therapy Interventions (Optional Details) patient/family education;wound  care  -     PT Plan Comments MLW, PRN debridement  -           User Key  (r) = Recorded By, (t) = Taken By, (c) = Cosigned By    Initials Name Provider Type    Kelin Lozano, PT Physical Therapist                  Goals   PT OP Goals     Row Name 06/01/22 1500          PT Short Term Goals    STG 1 Pt/spouse to verbalize S&S of infection and when to seek medical attention.  -     STG 1 Progress New  -     STG 2 Reduce LLE edema by 4cm to promote improved skin integrity.  -     STG 2 Progress New  -     STG 3 Reduce LLE drainage to minimal amount to promote decreased dressing change frequency and improve skin integrity.  -     STG 3 Progress New  [facility]            Long Term Goals    LTG Date to Achieve 08/30/22  -     LTG 1 Pt/spouse independent with home dressing changes and compression use for independent home management.  -     LTG 1 Progress New  -     LTG 2 Reduce LLE edema by 6 cm to promote improved skin integrity.  -     LTG 2 Progress New  -     LTG 3 LLE without drainage or open wounds to allow return to lymphedema therapy and home compression pump use.  -     LTG 3 Progress New  [facility]            Time Calculation    PT Goal Re-Cert Due Date 08/30/22  -           User Key  (r) = Recorded By, (t) = Taken By, (c) = Cosigned By    Initials Name Provider Type    Kelin Lozano, PT Physical Therapist                Time Calculation: Start Time: 1500  Untimed Charges  PT Eval/Re-eval Minutes: 45  Wound Care: 93149 Multilayer comp below knee  81958-Ndnpkqgfua comp below knee: 30  Total Minutes  Untimed Charges Total Minutes: 75   Total Minutes: 75  Therapy Charges for Today     Code Description Service Date Service Provider Modifiers Qty    37884110506 HC PT MULTI LAYER COMP SYS BELOW KNEE 6/1/2022 Kelin Segal, PT GP 1    00117477926 HC PT EVAL MOD COMPLEXITY 3 6/1/2022 Kelin Segal, PT GP 1                Kelin Segal, PT  6/1/2022

## 2022-06-03 ENCOUNTER — HOSPITAL ENCOUNTER (OUTPATIENT)
Dept: ONCOLOGY | Facility: HOSPITAL | Age: 57
Setting detail: INFUSION SERIES
Discharge: HOME | End: 2022-06-03

## 2022-06-03 VITALS
DIASTOLIC BLOOD PRESSURE: 81 MMHG | HEIGHT: 63 IN | BODY MASS INDEX: 51.91 KG/M2 | HEART RATE: 95 BPM | WEIGHT: 293 LBS | RESPIRATION RATE: 20 BRPM | SYSTOLIC BLOOD PRESSURE: 191 MMHG | TEMPERATURE: 99 F

## 2022-06-03 DIAGNOSIS — L03.116 CELLULITIS OF LEFT LOWER EXTREMITY: Primary | ICD-10-CM

## 2022-06-03 PROCEDURE — 25010000002 DALBAVANCIN 500 MG RECONSTITUTED SOLUTION 1 EACH VIAL: Performed by: INTERNAL MEDICINE

## 2022-06-03 PROCEDURE — 96365 THER/PROPH/DIAG IV INF INIT: CPT

## 2022-06-03 RX ORDER — DEXTROSE MONOHYDRATE 50 MG/ML
250 INJECTION, SOLUTION INTRAVENOUS ONCE
Status: COMPLETED | OUTPATIENT
Start: 2022-06-03 | End: 2022-06-03

## 2022-06-03 RX ORDER — DEXTROSE MONOHYDRATE 50 MG/ML
250 INJECTION, SOLUTION INTRAVENOUS ONCE
Status: CANCELLED | OUTPATIENT
Start: 2022-06-03

## 2022-06-03 RX ADMIN — DALBAVANCIN 1500 MG: 500 INJECTION, POWDER, FOR SOLUTION INTRAVENOUS at 15:41

## 2022-06-03 RX ADMIN — DEXTROSE MONOHYDRATE 250 ML: 50 INJECTION, SOLUTION INTRAVENOUS at 15:42

## 2022-06-06 ENCOUNTER — HOSPITAL ENCOUNTER (OUTPATIENT)
Dept: PHYSICAL THERAPY | Facility: HOSPITAL | Age: 57
Setting detail: THERAPIES SERIES
Discharge: HOME | End: 2022-06-06

## 2022-06-06 DIAGNOSIS — L03.116 CELLULITIS OF LEFT LOWER EXTREMITY: Primary | ICD-10-CM

## 2022-06-06 DIAGNOSIS — I89.0 CHRONIC ACQUIRED LYMPHEDEMA: ICD-10-CM

## 2022-06-06 PROCEDURE — 29581 APPL MULTLAYER CMPRN SYS LEG: CPT

## 2022-06-06 PROCEDURE — 97597 DBRDMT OPN WND 1ST 20 CM/<: CPT

## 2022-06-06 NOTE — THERAPY WOUND CARE TREATMENT
Outpatient Rehabilitation - Wound/Debridement Treatment Note  [facility]     Patient Name: Kerry Leal  : 1965  MRN: [id number]  Today's Date: 2022                 Admit Date: 2022    Visit Dx:    ICD-10-CM ICD-9-CM   1. Cellulitis of left lower extremity  L03.116 682.6   2. Chronic acquired lymphedema  I89.0 457.1   LLE:    Posterior L calf:      Patient Active Problem List   Diagnosis   • Cellulitis of left lower extremity   • Essential hypertension   • Chronic acquired lymphedema   • Elevated d-dimer   • Prediabetes        Past Medical History:   Diagnosis Date   • Anxiety    • Asthma    • Heart murmur    • Hypertension         Past Surgical History:   Procedure Laterality Date   • APPENDECTOMY     •  SECTION     • HYSTERECTOMY     • NECK SURGERY           EVALUATION   PT Ortho     Row Name 22 1515       Subjective Comments    Subjective Comments Pt states her leg is better, drainage has almost stopped.  She was able to see Dr. Shine Rodriguez at [facility] on Friday last week and received a dose of IV abx.  She has a follow-up at [facility] on Thursday.  Pt states they were able to use the short-stretch wraps without any issues.  -JM       Subjective Pain    Able to rate subjective pain? yes  -JM    Pre-Treatment Pain Level 3  -JM    Post-Treatment Pain Level 3  -JM    Subjective Pain Comment post calf tenderness during tx  -JM       Transfers    Comment, (Transfers) remained in transport chair for tx  -JM          User Key  (r) = Recorded By, (t) = Taken By, (c) = Cosigned By    Initials Name Provider Type    Kelin Lozano, PT Physical Therapist                 KATARZYNA Wound     Row Name 22 1515             Wound 22 1500 Left posterior calf Venous Ulcer    Wound - Properties Group Placement Date: 22  - Placement Time: 1500  -JM Side: Left  - Orientation: posterior  -JM Location: calf  -JM Primary Wound Type: Venous ulcer  -JM      Wound Image View All Images  View Images  -      Dressing Appearance open to air;dried drainage  -      Base moist;pink;red;yellow;other (see comments)  a few small pustule-like yellow areas, scant active weeping posteriorly  -      Periwound moist;redness;swelling  -      Periwound Temperature hot  -      Periwound Skin Turgor firm  -      Edges irregular  -      Drainage Characteristics/Odor serous  -      Drainage Amount small  -      Care, Wound cleansed with;wound cleanser;debrided  -      Dressing Care dressing applied;abdominal pad;multi-layer wrap  -      Periwound Care cleansed with pH balanced cleanser;dry periwound area maintained;moisturizer applied  -      Retired Wound - Properties Group Placement Date: 06/01/22  - Placement Time: 1500  -JM Side: Left  - Orientation: posterior  - Location: calf  - Primary Wound Type: Venous ulcer  -      Retired Wound - Properties Group Date first assessed: 06/01/22  - Time first assessed: 1500  - Side: Left  - Location: calf  - Primary Wound Type: Venous ulcer  -            User Key  (r) = Recorded By, (t) = Taken By, (c) = Cosigned By    Initials Name Provider Type    Kelin Lozano, PT Physical Therapist               Lymphedema     Row Name 06/06/22 2640             Lymphedema Edema Assessment    Pitting Edema Very Severe  -              Skin Changes/Observations    Lower Extremity Conditions left:;shiny;crust;inflamed;weeping  -      Lower Extremity Color/Pigment left:;erythema  -              Compression/Skin Care    Compression/Skin Care skin care;wrapping location;bandaging  -      Skin Care washed/dried;lotion applied  -      Wrapping Location lower extremity  -      Wrapping Location LE left:;foot to knee  -      Wrapping Comments ABD to post L calf weeping, size 10 compressogrip doubled on calf, size 8 to ankle and distal calf, size 5 MH to ankle, layers overlapping for gradient compression  -      Bandage Layers cotton  elastic stocking- double layer (comment size)  -            User Key  (r) = Recorded By, (t) = Taken By, (c) = Cosigned By    Initials Name Provider Type    Kelin Lozano, PT Physical Therapist                WOUND DEBRIDEMENT  Total area of Debridement: 4cmsq  Debridement Site 1  Location- Site 1: medial LLE  Selective Debridement- Site 1: Wound Surface <20cmsq  Instruments- Site 1: tweezers  Excised Tissue Description- Site 1: minimum, other (comment) (hypertrophic crusts)  Bleeding- Site 1: none              Therapy Education     Row Name 06/06/22 1093             Therapy Education    Education Details Trial of compressogrips, use ABDs PRN for drainage, may resume short-stretch wraps if compressogrips not comfortable or staying in place.  Discussed plan to transition to lymphedema clinic once cellulitis resolved for long-term edema management and compression stocking options.  -      Given Symptoms/condition management;Bandaging/dressing change;Edema management  -      Program Modified  -      How Provided Verbal;Demonstration  -      Provided to Patient;Other (comment)  spouse  -      Level of Understanding Verbalized  -            User Key  (r) = Recorded By, (t) = Taken By, (c) = Cosigned By    Initials Name Provider Type    Kelin Lozano, PT Physical Therapist                Recommendation and Plan   PT Assessment/Plan     Row Name 06/06/22 5433          PT Assessment    Functional Limitations Performance in self-care ADL  -     Impairments Edema;Integumentary integrity;Pain  -     Assessment Comments Pt with improvement in erythema and drainage of LLE.  PT lightly debrided loose hypertrohpic crusts and applied MLW with compressogrips this tx vs short-stretch wraps.  Pt will benefit from continued compression to LLE.  -            PT Plan    PT Frequency 1x/week  -     Physical Therapy Interventions (Optional Details) patient/family education;wound care  -     PT Plan  Comments MLW, dressings management/debridement PRN  -MEGHAN           User Key  (r) = Recorded By, (t) = Taken By, (c) = Cosigned By    Initials Name Provider Type    Kelin Lozano, PT Physical Therapist                Goals   PT OP Goals     Row Name 06/06/22 1515          Time Calculation    PT Goal Re-Cert Due Date 08/30/22  -MEGAHN           User Key  (r) = Recorded By, (t) = Taken By, (c) = Cosigned By    Initials Name Provider Type    Kelin Lozano, PT Physical Therapist                PT Goal Re-Cert Due Date: 08/30/22            Time Calculation: Start Time: 1515  Untimed Charges  Wound Care: 35503 Selective debridement  70864-Mxnkkfkfia comp below knee: 30  42935-Kccsklath debridement: 5  Total Minutes  Untimed Charges Total Minutes: 35   Total Minutes: 35  Therapy Charges for Today     Code Description Service Date Service Provider Modifiers Qty    43571837906 HC JOHN DEBRIDE OPEN WOUND UP TO 20CM 6/6/2022 Kelin Segal, PT GP 1    49465405161 HC PT MULTI LAYER COMP SYS BELOW KNEE 6/6/2022 Kelin Segal, PT GP 1                  Kelin Segal, PT  6/6/2022

## 2022-06-07 ENCOUNTER — HOSPITAL ENCOUNTER (OUTPATIENT)
Dept: MAMMOGRAPHY | Facility: HOSPITAL | Age: 57
Discharge: HOME | End: 2022-06-07
Admitting: INTERNAL MEDICINE

## 2022-06-07 DIAGNOSIS — Z12.31 VISIT FOR SCREENING MAMMOGRAM: ICD-10-CM

## 2022-06-07 PROCEDURE — 77067 SCR MAMMO BI INCL CAD: CPT | Performed by: RADIOLOGY

## 2022-06-07 PROCEDURE — 77063 BREAST TOMOSYNTHESIS BI: CPT | Performed by: RADIOLOGY

## 2022-06-07 PROCEDURE — 77067 SCR MAMMO BI INCL CAD: CPT

## 2022-06-07 PROCEDURE — 77063 BREAST TOMOSYNTHESIS BI: CPT

## 2022-06-15 ENCOUNTER — HOSPITAL ENCOUNTER (OUTPATIENT)
Dept: PHYSICAL THERAPY | Facility: HOSPITAL | Age: 57
Setting detail: THERAPIES SERIES
Discharge: HOME | End: 2022-06-15

## 2022-06-15 DIAGNOSIS — L03.116 CELLULITIS OF LEFT LOWER EXTREMITY: Primary | ICD-10-CM

## 2022-06-15 DIAGNOSIS — I89.0 CHRONIC ACQUIRED LYMPHEDEMA: ICD-10-CM

## 2022-06-15 PROCEDURE — 97597 DBRDMT OPN WND 1ST 20 CM/<: CPT

## 2022-06-15 PROCEDURE — 29581 APPL MULTLAYER CMPRN SYS LEG: CPT

## 2022-06-15 NOTE — THERAPY WOUND CARE TREATMENT
Outpatient Rehabilitation - Wound/Debridement Treatment Note  [facility]     Patient Name: Kerry Leal  : 1965  MRN: [id number]  Today's Date: 6/15/2022                 Admit Date: 6/15/2022    Visit Dx:    ICD-10-CM ICD-9-CM   1. Cellulitis of left lower extremity  L03.116 682.6   2. Chronic acquired lymphedema  I89.0 457.1               Patient Active Problem List   Diagnosis   • Cellulitis of left lower extremity   • Essential hypertension   • Chronic acquired lymphedema   • Elevated d-dimer   • Prediabetes        Past Medical History:   Diagnosis Date   • Anxiety    • Asthma    • Heart murmur    • Hypertension         Past Surgical History:   Procedure Laterality Date   • APPENDECTOMY     •  SECTION     • HYSTERECTOMY     • NECK SURGERY           EVALUATION   PT Ortho     Row Name 06/15/22 1530       Subjective Comments    Subjective Comments Pt reports compressogrips worked well, she has decreased drainage, and has a follow-up with SHIRAZ MENON tomorrow.  States she is having difficulty getting wound care supplies ordered thru her PCP.  States she has previously used [facility].  PT stated we can call [facility] and attempt to initiate ordering from our office, but may need to have PCP sign the order form.  PT will call [facility] tomorrow to start ordering process.  -       Subjective Pain    Able to rate subjective pain? yes  -    Pre-Treatment Pain Level 3  -JM    Post-Treatment Pain Level 3  -JM    Subjective Pain Comment posterior calf tenderness  -       Transfers    Comment, (Transfers) remained in transport chair for tx  -          User Key  (r) = Recorded By, (t) = Taken By, (c) = Cosigned By    Initials Name Provider Type    Kelin Lozano, PT Physical Therapist                 [facility] Wound     Row Name 06/15/22 1515             Wound 22 1500 Left posterior calf Venous Ulcer    Wound - Properties Group Placement Date: 22  - Placement Time: 1500  - Side: Left   - Orientation: posterior  - Location: calf  - Primary Wound Type: Venous ulcer  -      Wound Image View All Images View Images  -      Dressing Appearance open to air  did not replace compressogrips today due to coming for tx  -      Base moist;pink;red;yellow;other (see comments)  a few small pustule-like yellow areas, scant active weeping posteriorly  -      Periwound moist;redness;swelling  -      Periwound Temperature warm  -      Periwound Skin Turgor firm  -      Edges irregular  -      Drainage Characteristics/Odor serous  -JM      Drainage Amount small  -JM      Care, Wound cleansed with;wound cleanser;debrided  -      Dressing Care dressing applied;abdominal pad;multi-layer wrap  -      Periwound Care cleansed with pH balanced cleanser;dry periwound area maintained  -      Retired Wound - Properties Group Placement Date: 06/01/22  - Placement Time: 1500  -JM Side: Left  - Orientation: posterior  - Location: calf  - Primary Wound Type: Venous ulcer  -      Retired Wound - Properties Group Date first assessed: 06/01/22  - Time first assessed: 1500  -JM Side: Left  - Location: calf  - Primary Wound Type: Venous ulcer  -            User Key  (r) = Recorded By, (t) = Taken By, (c) = Cosigned By    Initials Name Provider Type    Kelin Lozano, PT Physical Therapist               Lymphedema     Row Name 06/15/22 6970             Lymphedema Edema Assessment    Pitting Edema Very Severe  -              Skin Changes/Observations    Lower Extremity Conditions left:;shiny;crust;inflamed;weeping  -      Lower Extremity Color/Pigment left:;erythema  -              Compression/Skin Care    Compression/Skin Care skin care;wrapping location;bandaging  -      Skin Care washed/dried;lotion applied  -      Wrapping Location lower extremity  -JM      Wrapping Location LE left:;foot to knee  -      Wrapping Comments ABD to post L calf weeping, size 10  compressogrip doubled on calf, size 8 to ankle and distal calf, size 5 MH to ankle, layers overlapping for gradient compression  -MEGHAN      Bandage Layers cotton elastic stocking- double layer (comment size)  -            User Key  (r) = Recorded By, (t) = Taken By, (c) = Cosigned By    Initials Name Provider Type    Kelin Lozano, PT Physical Therapist                WOUND DEBRIDEMENT  Total area of Debridement: 10cmsq  Debridement Site 1  Location- Site 1: L calf  Selective Debridement- Site 1: Wound Surface <20cmsq  Instruments- Site 1: tweezers  Excised Tissue Description- Site 1: moderate, other (comment), minimum, slough (hypertrophic crusts)  Bleeding- Site 1: none              Therapy Education     Row Name 06/15/22 9477             Therapy Education    Education Details Pt with less drainage, able to use compressogrips for daily compression.  Recommended perform 1/4-strength vinegar soak when changing leg wrap to disinfect skin, continue to use eucerin, may add zinc cream to soften crusts.  PT will attempt to initiate home dressing order via [facility].  -      Given Symptoms/condition management;Bandaging/dressing change;Edema management  -      Program Modified  -MEGHAN      How Provided Verbal;Demonstration  -MEGHAN      Provided to Patient;Other (comment)  spouse  -      Level of Understanding Verbalized  -            User Key  (r) = Recorded By, (t) = Taken By, (c) = Cosigned By    Initials Name Provider Type    Kelin Lozano, PT Physical Therapist                Recommendation and Plan   PT Assessment/Plan     Row Name 06/15/22 8675          PT Assessment    Functional Limitations Performance in self-care ADL  -     Impairments Edema;Integumentary integrity;Pain  -     Assessment Comments Pt with less drainage from LLE, still with moderate erythema and severe edema, has follow-up tomorrow at [facility] office.  PT continued with debridement and MLW using compressogrips for  compression.  Family to continue with home dressing changes and pt to follow-up in 2 weeks.  -            PT Plan    PT Frequency 1x/week  -     Physical Therapy Interventions (Optional Details) patient/family education;wound care  -     PT Plan Comments MLW, dressings, debridement PRN  -           User Key  (r) = Recorded By, (t) = Taken By, (c) = Cosigned By    Initials Name Provider Type    Kelin Lozano, PT Physical Therapist                Goals   PT OP Goals     Row Name 06/15/22 1530          Time Calculation    PT Goal Re-Cert Due Date 08/30/22  -           User Key  (r) = Recorded By, (t) = Taken By, (c) = Cosigned By    Initials Name Provider Type    Kelin Lozano, PT Physical Therapist                PT Goal Re-Cert Due Date: 08/30/22            Time Calculation: Start Time: 1530  Untimed Charges  94530-Ashxnvbcro comp below knee: 20  01887-Ljqmumvpg debridement: 15  Total Minutes  Untimed Charges Total Minutes: 35   Total Minutes: 35  Therapy Charges for Today     Code Description Service Date Service Provider Modifiers Qty    46994176683 HC JOHN DEBRIDE OPEN WOUND UP TO 20CM 6/15/2022 Kelin Segal, PT GP 1    43578282228 HC PT MULTI LAYER COMP SYS BELOW KNEE 6/15/2022 Kelin Segal, PT GP 1                  Kelin Segal, PT  6/15/2022

## 2022-06-17 ENCOUNTER — HOSPITAL ENCOUNTER (OUTPATIENT)
Dept: ONCOLOGY | Facility: HOSPITAL | Age: 57
Setting detail: INFUSION SERIES
Discharge: HOME | End: 2022-06-17

## 2022-06-17 DIAGNOSIS — L03.116 CELLULITIS OF LEFT LOWER EXTREMITY: Primary | ICD-10-CM

## 2022-06-17 PROCEDURE — 96365 THER/PROPH/DIAG IV INF INIT: CPT

## 2022-06-17 PROCEDURE — 25010000002 DALBAVANCIN 500 MG RECONSTITUTED SOLUTION 1 EACH VIAL: Performed by: INTERNAL MEDICINE

## 2022-06-17 RX ADMIN — DALBAVANCIN 1500 MG: 500 INJECTION, POWDER, FOR SOLUTION INTRAVENOUS at 08:32

## 2022-06-29 ENCOUNTER — HOSPITAL ENCOUNTER (OUTPATIENT)
Dept: PHYSICAL THERAPY | Facility: HOSPITAL | Age: 57
Setting detail: THERAPIES SERIES
Discharge: HOME | End: 2022-06-29

## 2022-06-29 DIAGNOSIS — I89.0 CHRONIC ACQUIRED LYMPHEDEMA: ICD-10-CM

## 2022-06-29 DIAGNOSIS — L03.116 CELLULITIS OF LEFT LOWER EXTREMITY: Primary | ICD-10-CM

## 2022-06-29 PROCEDURE — 29581 APPL MULTLAYER CMPRN SYS LEG: CPT

## 2022-06-29 PROCEDURE — 97597 DBRDMT OPN WND 1ST 20 CM/<: CPT

## 2022-06-29 NOTE — THERAPY PROGRESS REPORT/RE-CERT
Outpatient Rehabilitation - Wound/Debridement Progress Note  [facility]     Patient Name: Kerry Leal  : 1965  MRN: [id number]  Today's Date: 2022                 Admit Date: 2022    Visit Dx:    ICD-10-CM ICD-9-CM   1. Cellulitis of left lower extremity  L03.116 682.6   2. Chronic acquired lymphedema  I89.0 457.1       Med/post L calf:    Lateral LLE:      Patient Active Problem List   Diagnosis   • Cellulitis of left lower extremity   • Essential hypertension   • Chronic acquired lymphedema   • Elevated d-dimer   • Prediabetes        Past Medical History:   Diagnosis Date   • Anxiety    • Asthma    • Heart murmur    • Hypertension         Past Surgical History:   Procedure Laterality Date   • APPENDECTOMY     •  SECTION     • HYSTERECTOMY     • NECK SURGERY           EVALUATION   PT Ortho     Row Name 22       Subjective Comments    Subjective Comments Pt reports left leg is not as tender and drainage is decreasing.  Concerned about faint redness and bumps on RLE that are new.  Sees SHIRAZ MENON tomorrow.  -       Subjective Pain    Able to rate subjective pain? yes  -    Pre-Treatment Pain Level 3  -    Post-Treatment Pain Level 3  -    Subjective Pain Comment post L calf  -JM       Transfers    Comment, (Transfers) remained seated in transport chair  -          User Key  (r) = Recorded By, (t) = Taken By, (c) = Cosigned By    Initials Name Provider Type    Kelin Lozano, PT Physical Therapist                 [facility] Wound     Row Name 22 1530             Wound 22 1500 Left posterior calf Venous Ulcer    Wound - Properties Group Placement Date: 22  - Placement Time: 1500  -JM Side: Left  - Orientation: posterior  - Location: calf  - Primary Wound Type: Venous ulcer  -      Wound Image View All Images View Images  -JM      Dressing Appearance intact;moist drainage  -JM      Base moist;pink;red;yellow;other (see comments)  moist  macerated area post calf, mixed full- and partial-thickness  -      Periwound moist;redness;swelling  -      Periwound Temperature warm  -      Periwound Skin Turgor firm  -      Edges irregular  -      Wound Length (cm) 13 cm  -JM      Wound Width (cm) 11 cm  -JM      Wound Depth (cm) 0.1 cm  -JM      Wound Surface Area (cm^2) 143 cm^2  -JM      Wound Volume (cm^3) 14.3 cm^3  -JM      Drainage Characteristics/Odor serous  -JM      Drainage Amount small  -JM      Care, Wound cleansed with;wound cleanser;debrided  -JM      Dressing Care dressing applied;abdominal pad;multi-layer wrap  ABD, MLW  -      Periwound Care cleansed with pH balanced cleanser;dry periwound area maintained  -      Retired Wound - Properties Group Placement Date: 06/01/22  - Placement Time: 1500  -JM Side: Left  - Orientation: posterior  - Location: calf  - Primary Wound Type: Venous ulcer  -      Retired Wound - Properties Group Date first assessed: 06/01/22  - Time first assessed: 1500  -JM Side: Left  - Location: calf  - Primary Wound Type: Venous ulcer  -            User Key  (r) = Recorded By, (t) = Taken By, (c) = Cosigned By    Initials Name Provider Type    Kelin Lozano, PT Physical Therapist               Lymphedema     Row Name 06/29/22 4180             Lymphedema Edema Assessment    Pitting Edema Very Severe  -              Skin Changes/Observations    Lower Extremity Conditions left:;shiny;crust;inflamed;weeping  -      Lower Extremity Color/Pigment left:;erythema  -              LLE Quick Girth (cm)    Mid foot 29 cm  -      Smallest ankle 38.3 cm  -      Largest calf 81.5 cm  -      Tib tuberosity 72.5 cm  -JM              Compression/Skin Care    Compression/Skin Care skin care;wrapping location;bandaging  -      Skin Care washed/dried;lotion applied  -      Wrapping Location lower extremity  -      Wrapping Location LE left:;foot to knee  -      Wrapping Comments ABD  to post L calf weeping, size 10 compressogrip doubled on calf, size 6 to ankle and distal calf, size 5 MH to ankle, layers overlapping for gradient compression  -MEGHAN      Bandage Layers cotton elastic stocking- double layer (comment size)  -            User Key  (r) = Recorded By, (t) = Taken By, (c) = Cosigned By    Initials Name Provider Type    Kelin Lozano, PT Physical Therapist                WOUND DEBRIDEMENT  Total area of Debridement: 20cmsq  Debridement Site 1  Location- Site 1: L calf  Selective Debridement- Site 1: Wound Surface <20cmsq  Instruments- Site 1: tweezers  Excised Tissue Description- Site 1: moderate, other (comment), scant, slough (hypertrophic crusts, macerated skin debris)  Bleeding- Site 1: none              Therapy Education     Row Name 06/29/22 1530             Therapy Education    Education Details Recommended 1/4-strength vinegar rinse to LLE, continue ABD to weeping area with compressogrips for MLW.  -      Given Symptoms/condition management;Bandaging/dressing change;Edema management  -      Program Reinforced  -      How Provided Verbal;Demonstration  -      Provided to Patient;Other (comment)  spouse  -      Level of Understanding Verbalized  -            User Key  (r) = Recorded By, (t) = Taken By, (c) = Cosigned By    Initials Name Provider Type    Kelin Lozano, PT Physical Therapist                Recommendation and Plan   PT Assessment/Plan     Row Name 06/29/22 6639          PT Assessment    Functional Limitations Performance in self-care ADL  -     Impairments Edema;Integumentary integrity;Pain  -     Assessment Comments Pt still with erythema of LLE, but decreased drainage and decreased pain.  LLE with hypertrophic crusting, min macerated nonviable tissues requiring debridement.  PT continued with MLW using compressogrips for gradient compression, family able to perform home dressing changes without issues.  Pt has met STGs, will continue  to benefit from skilled PT for debridement and MLW.  Continue current POC.  -     Rehab Potential Good  -     Patient/caregiver participated in establishment of treatment plan and goals Yes  -     Patient would benefit from skilled therapy intervention Yes  -            PT Plan    PT Frequency 1x/week  -     Predicted Duration of Therapy Intervention (PT) 12 visits  -     Planned CPT's? PT MULTI LAYER COMP SYS LE;PT UNNA BOOT: 45122;PT JOHN DEBRIDE OPEN WOUND UP TO 20 CM: 47402;PT JOHN DEBRIDE OPEN WOUND EA ADD 20 CM: 40595;PT NONSELECT DEBRIDE 15 MIN: 38272;PT SELF CARE/MGMT/TRAIN 15 MIN: 35252  -     Physical Therapy Interventions (Optional Details) patient/family education;wound care  -     PT Plan Comments MLW, debridement  -           User Key  (r) = Recorded By, (t) = Taken By, (c) = Cosigned By    Initials Name Provider Type    Kelin Lozano, PT Physical Therapist                Goals   PT OP Goals     Row Name 22 1530          PT Short Term Goals    STG 1 Pt/spouse to verbalize S&S of infection and when to seek medical attention.  -     STG 1 Progress Met  -     STG 2 Reduce LLE edema by 4cm to promote improved skin integrity.  -     STG 2 Progress Ongoing;Progressing  -     STG 3 Reduce LLE drainage to minimal amount to promote decreased dressing change frequency and improve skin integrity.  -     STG 3 Progress Met  -            Long Term Goals    LTG Date to Achieve 22  -     LTG 1 Pt/spouse independent with home dressing changes and compression use for independent home management.  -     LTG 1 Progress Progressing  -     LTG 2 Reduce LLE edema by 6 cm to promote improved skin integrity.  -     LTG 2 Progress Ongoing;Progressing  -     LTG 3 LLE without drainage or open wounds to allow return to lymphedema therapy and home compression pump use.  -     LTG 3 Progress Ongoing  -            Time Calculation    PT Goal Re-Cert Due Date  08/30/22  -MEGHAN           User Key  (r) = Recorded By, (t) = Taken By, (c) = Cosigned By    Initials Name Provider Type    Kelin Lozano, PT Physical Therapist                PT Goal Re-Cert Due Date: 08/30/22  PT Short Term Goals  STG 1: Pt/spouse to verbalize S&S of infection and when to seek medical attention.  STG 1 Progress: Met  STG 2: Reduce LLE edema by 4cm to promote improved skin integrity.  STG 2 Progress: Ongoing, Progressing  STG 3: Reduce LLE drainage to minimal amount to promote decreased dressing change frequency and improve skin integrity.  STG 3 Progress: Met  Long Term Goals  LTG Date to Achieve: 08/30/22  LTG 1: Pt/spouse independent with home dressing changes and compression use for independent home management.  LTG 1 Progress: Progressing  LTG 2: Reduce LLE edema by 6 cm to promote improved skin integrity.  LTG 2 Progress: Ongoing, Progressing  LTG 3: LLE without drainage or open wounds to allow return to lymphedema therapy and home compression pump use.  LTG 3 Progress: Ongoing      Time Calculation: Start Time: 1530  Untimed Charges  75892-Vancglltbo comp below knee: 15  06734-Cmrpqcsye debridement: 20  Total Minutes  Untimed Charges Total Minutes: 35   Total Minutes: 35  Therapy Charges for Today     Code Description Service Date Service Provider Modifiers Qty    46924608274 HC JOHN DEBRIDE OPEN WOUND UP TO 20CM 6/29/2022 Kelin Segal, PT GP 1    70300179234 HC PT MULTI LAYER COMP SYS BELOW KNEE 6/29/2022 Kelin Segal, PT GP 1                  Kelin Segal, PT  6/29/2022

## 2022-07-06 ENCOUNTER — HOSPITAL ENCOUNTER (OUTPATIENT)
Dept: PHYSICAL THERAPY | Facility: HOSPITAL | Age: 57
Setting detail: THERAPIES SERIES
Discharge: HOME | End: 2022-07-06

## 2022-07-06 DIAGNOSIS — L03.116 CELLULITIS OF LEFT LOWER EXTREMITY: Primary | ICD-10-CM

## 2022-07-06 DIAGNOSIS — I89.0 CHRONIC ACQUIRED LYMPHEDEMA: ICD-10-CM

## 2022-07-06 PROCEDURE — 29581 APPL MULTLAYER CMPRN SYS LEG: CPT

## 2022-07-06 PROCEDURE — 97597 DBRDMT OPN WND 1ST 20 CM/<: CPT

## 2022-07-06 NOTE — THERAPY WOUND CARE TREATMENT
Outpatient Rehabilitation - Wound/Debridement Treatment Note  [facility]     Patient Name: Kerry Leal  : 1965  MRN: [id number]  Today's Date: 2022                 Admit Date: 2022    Visit Dx:    ICD-10-CM ICD-9-CM   1. Cellulitis of left lower extremity  L03.116 682.6   2. Chronic acquired lymphedema  I89.0 457.1   LLE:    Lat LLE:    Med/post L calf:      Patient Active Problem List   Diagnosis   • Cellulitis of left lower extremity   • Essential hypertension   • Chronic acquired lymphedema   • Elevated d-dimer   • Prediabetes        Past Medical History:   Diagnosis Date   • Anxiety    • Asthma    • Heart murmur    • Hypertension         Past Surgical History:   Procedure Laterality Date   • APPENDECTOMY     •  SECTION     • HYSTERECTOMY     • NECK SURGERY           EVALUATION   PT Ortho     Row Name 22 1515       Subjective Comments    Subjective Comments Pt states the compressogrip on her foot got too tight, so she had to use a bigger size.  Only has 10cm on calf today due to coming for tx.  Reports very little drainage from L calf.  -JM       Subjective Pain    Able to rate subjective pain? yes  -JM    Pre-Treatment Pain Level 3  -JM    Post-Treatment Pain Level 3  -JM    Subjective Pain Comment L calf  -JM       Transfers    Comment, (Transfers) seated in transport chair for tx  -JM          User Key  (r) = Recorded By, (t) = Taken By, (c) = Cosigned By    Initials Name Provider Type    Kelin Lozano PT Physical Therapist                 [facility] Wound     Row Name 22 1515             Wound 22 1500 Left posterior calf Venous Ulcer    Wound - Properties Group Placement Date: 22  - Placement Time: 1500  -JM Side: Left  - Orientation: posterior  -JM Location: calf  -JM Primary Wound Type: Venous ulcer  -JM      Wound Image View All Images View Images  -JM      Dressing Appearance dry;intact;no drainage  -JM      Base moist;pink;red;yellow;other (see  comments)  moist macerated area post calf, mixed full- and partial-thickness  -      Periwound moist;redness;swelling  -      Periwound Temperature warm  -      Periwound Skin Turgor firm  -      Edges irregular  -      Wound Length (cm) 10 cm  -      Wound Width (cm) 11 cm  -JM      Wound Depth (cm) 0.1 cm  -JM      Wound Surface Area (cm^2) 110 cm^2  -JM      Wound Volume (cm^3) 11 cm^3  -JM      Drainage Characteristics/Odor serous  -      Drainage Amount scant  -      Care, Wound cleansed with;wound cleanser;debrided  -      Dressing Care dressing applied;abdominal pad;multi-layer wrap  -      Periwound Care cleansed with pH balanced cleanser;dry periwound area maintained  -      Retired Wound - Properties Group Placement Date: 06/01/22  - Placement Time: 1500  -JM Side: Left  - Orientation: posterior  - Location: calf  - Primary Wound Type: Venous ulcer  -      Retired Wound - Properties Group Date first assessed: 06/01/22  - Time first assessed: 1500  - Side: Left  - Location: calf  - Primary Wound Type: Venous ulcer  -            User Key  (r) = Recorded By, (t) = Taken By, (c) = Cosigned By    Initials Name Provider Type    Kelin Lozano, PT Physical Therapist               Lymphedema     Row Name 07/06/22 1515             Lymphedema Edema Assessment    Pitting Edema Very Severe  -              Skin Changes/Observations    Lower Extremity Conditions left:;shiny;scaly;crust;inflamed  -      Lower Extremity Color/Pigment left:;erythema  -              LLE Quick Girth (cm)    Met-heads 27 cm  -JM      Mid foot 29.5 cm  -      Smallest ankle 39 cm  -JM      Largest calf 83 cm  -JM      Tib tuberosity 75.5 cm  -JM              Compression/Skin Care    Compression/Skin Care skin care;wrapping location;bandaging  -      Skin Care washed/dried;lotion applied  -      Wrapping Location lower extremity  -      Wrapping Location LE left:;foot to knee  -       Wrapping Comments ABD to post L calf weeping, size 10 compressogrip doubled on calf, size 6 to ankle and distal calf, size 4 single layer MH to ankle, layers overlapping for gradient compression  -MEGHAN      Bandage Layers cotton elastic stocking- double layer (comment size)  -            User Key  (r) = Recorded By, (t) = Taken By, (c) = Cosigned By    Initials Name Provider Type    Kelin Lozano, PT Physical Therapist                WOUND DEBRIDEMENT  Total area of Debridement: 15cmsq  Debridement Site 1  Location- Site 1: L calf  Selective Debridement- Site 1: Wound Surface <20cmsq  Instruments- Site 1: tweezers  Excised Tissue Description- Site 1: minimum, slough, other (comment) (macerated crusts)  Bleeding- Site 1: none              Therapy Education     Row Name 07/06/22 6439             Therapy Education    Education Details Use 1/4-strength vinegar rinse with gauze to posterior calf, lightly scrub off loose crusts, apply moisturizer (eucerin, amlac), ABD to open or weeping areas, compressogrips or short-stretch wraps to LLE as previously instructed.  Notify MD at [facility] if redness worsens or if wounds get larger.  -JM      Given Symptoms/condition management;Bandaging/dressing change;Edema management  -      Program Reinforced  -MEGHAN      How Provided Verbal;Demonstration  -MEGHAN      Provided to Patient;Other (comment)  spouse  -      Level of Understanding Verbalized  -            User Key  (r) = Recorded By, (t) = Taken By, (c) = Cosigned By    Initials Name Provider Type    Kelin Lozano, PT Physical Therapist                Recommendation and Plan   PT Assessment/Plan     Row Name 07/06/22 4755          PT Assessment    Functional Limitations Performance in self-care ADL  -     Impairments Edema;Integumentary integrity;Pain  -     Assessment Comments LLE drainage improved, posterior calf partial-thickness open area with decreased dimensions.  However, edema and erythema slightly  increased today.    Pt to continue with home dressing changes and compression use and notify MD if developing any S&S of infection.  -            PT Plan    PT Frequency 1x/week  -     Physical Therapy Interventions (Optional Details) patient/family education;wound care  -     PT Plan Comments MLW, debridement  -           User Key  (r) = Recorded By, (t) = Taken By, (c) = Cosigned By    Initials Name Provider Type    Kelin Lozano, PT Physical Therapist                Goals   PT OP Goals     Row Name 07/06/22 1515          Time Calculation    PT Goal Re-Cert Due Date 08/30/22  -           User Key  (r) = Recorded By, (t) = Taken By, (c) = Cosigned By    Initials Name Provider Type    Kelin Lozano, PT Physical Therapist                PT Goal Re-Cert Due Date: 08/30/22            Time Calculation: Start Time: 1515  Untimed Charges  16976-Xsjarsgyja comp below knee: 15  39879-Togzajzsj debridement: 15  Total Minutes  Untimed Charges Total Minutes: 30   Total Minutes: 30  Therapy Charges for Today     Code Description Service Date Service Provider Modifiers Qty    52115697954 HC JOHN DEBRIDE OPEN WOUND UP TO 20CM 7/6/2022 Kelin Segal, PT GP 1    [id number] HC PT MULTI LAYER COMP SYS BELOW KNEE 7/6/2022 Kelin Segal, PT GP 1                  Kelin Segal, PT  7/6/2022

## 2022-07-13 ENCOUNTER — HOSPITAL ENCOUNTER (OUTPATIENT)
Dept: PHYSICAL THERAPY | Facility: HOSPITAL | Age: 57
Setting detail: THERAPIES SERIES
Discharge: HOME | End: 2022-07-13

## 2022-07-13 DIAGNOSIS — L03.116 CELLULITIS OF LEFT LOWER EXTREMITY: Primary | ICD-10-CM

## 2022-07-13 DIAGNOSIS — I89.0 CHRONIC ACQUIRED LYMPHEDEMA: ICD-10-CM

## 2022-07-13 PROCEDURE — 29581 APPL MULTLAYER CMPRN SYS LEG: CPT

## 2022-07-13 PROCEDURE — 97597 DBRDMT OPN WND 1ST 20 CM/<: CPT

## 2022-07-13 NOTE — THERAPY WOUND CARE TREATMENT
Outpatient Rehabilitation - Wound/Debridement Treatment Note  [facility]     Patient Name: Kerry Leal  : 1965  MRN: [id number]  Today's Date: 2022                 Admit Date: 2022    Visit Dx:    ICD-10-CM ICD-9-CM   1. Cellulitis of left lower extremity  L03.116 682.6   2. Chronic acquired lymphedema  I89.0 457.1       Post/med L calf:      Patient Active Problem List   Diagnosis   • Cellulitis of left lower extremity   • Essential hypertension   • Chronic acquired lymphedema   • Elevated d-dimer   • Prediabetes        Past Medical History:   Diagnosis Date   • Anxiety    • Asthma    • Heart murmur    • Hypertension         Past Surgical History:   Procedure Laterality Date   • APPENDECTOMY     •  SECTION     • HYSTERECTOMY     • NECK SURGERY           EVALUATION   PT Ortho     Row Name 22 1515       Subjective Comments    Subjective Comments Spouse states he has been helping apply vinegar soak, notices the crusts are starting to come off, wound also is not draining like it was.  -JM       Subjective Pain    Able to rate subjective pain? yes  -    Pre-Treatment Pain Level 3  -    Post-Treatment Pain Level 3  -    Subjective Pain Comment L calf wound  -       Transfers    Comment, (Transfers) seated in transport chair  -          User Key  (r) = Recorded By, (t) = Taken By, (c) = Cosigned By    Initials Name Provider Type    Kelin Lozano, PT Physical Therapist                 [facility] Wound     Row Name 22 1515             Wound 22 1500 Left posterior calf Venous Ulcer    Wound - Properties Group Placement Date: 22  - Placement Time: 1500  -JM Side: Left  - Orientation: posterior  - Location: calf  - Primary Wound Type: Venous ulcer  -JM      Wound Image View All Images View Images  -JM      Dressing Appearance dry;intact;no drainage  -      Base moist;pink;red;yellow;other (see comments)  moist macerated area post calf, mixed full-  and partial-thickness  -JM      Periwound moist;redness;swelling  -      Periwound Temperature warm  -      Periwound Skin Turgor firm  -      Edges irregular  -JM      Wound Length (cm) 7 cm  -JM      Wound Width (cm) 5 cm  -JM      Wound Depth (cm) 0.1 cm  -JM      Wound Surface Area (cm^2) 35 cm^2  -JM      Wound Volume (cm^3) 3.5 cm^3  -JM      Drainage Characteristics/Odor serous  -JM      Drainage Amount scant  -JM      Care, Wound cleansed with;wound cleanser;debrided  -JM      Dressing Care dressing applied;abdominal pad;multi-layer wrap  -      Periwound Care cleansed with pH balanced cleanser;dry periwound area maintained  -JM      Retired Wound - Properties Group Placement Date: 06/01/22  - Placement Time: 1500  -JM Side: Left  - Orientation: posterior  - Location: calf  - Primary Wound Type: Venous ulcer  -JM      Retired Wound - Properties Group Date first assessed: 06/01/22  - Time first assessed: 1500  - Side: Left  - Location: calf  - Primary Wound Type: Venous ulcer  -            User Key  (r) = Recorded By, (t) = Taken By, (c) = Cosigned By    Initials Name Provider Type     Kelin Segal, PT Physical Therapist               Lymphedema     Row Name 07/13/22 4895             Lymphedema Edema Assessment    Pitting Edema Severe  -              Skin Changes/Observations    Lower Extremity Conditions left:;shiny;scaly;crust;inflamed  -      Lower Extremity Color/Pigment left:;erythema  -              Compression/Skin Care    Compression/Skin Care skin care;wrapping location;bandaging  -      Skin Care washed/dried;lotion applied  -      Wrapping Location lower extremity  -JM      Wrapping Location LE left:;foot to knee  -      Wrapping Comments ABD to post calf, size 10cm doubled on prox calf, size 8 doubled on distal calf, 4cm comprilan MH to ankle, layers overlapping for gradient compression  -JM      Bandage Layers cotton elastic stocking- double layer  (comment size);short-stretch bandages (comment size/quantity)  -            User Key  (r) = Recorded By, (t) = Taken By, (c) = Cosigned By    Initials Name Provider Type    Kelin Lozano, PT Physical Therapist                WOUND DEBRIDEMENT  Total area of Debridement: 15cmsq  Debridement Site 1  Location- Site 1: L calf  Selective Debridement- Site 1: Wound Surface <20cmsq  Instruments- Site 1: tweezers  Excised Tissue Description- Site 1: minimum, slough, moderate, other (comment) (macerated crusts, hypertrophic crusts)  Bleeding- Site 1: none              Therapy Education     Row Name 07/13/22 1515             Therapy Education    Education Details Continue vinegar rinse, compression use, change at least every 2-3 days, ABD as needed if draining.  -      Given Symptoms/condition management;Bandaging/dressing change;Edema management  -      Program Reinforced  -      How Provided Verbal;Demonstration  -      Provided to Patient;Other (comment)  spouse  -      Level of Understanding Verbalized  -            User Key  (r) = Recorded By, (t) = Taken By, (c) = Cosigned By    Initials Name Provider Type    Kelin Lozano, PT Physical Therapist                Recommendation and Plan   PT Assessment/Plan     Row Name 07/13/22 1515          PT Assessment    Functional Limitations Performance in self-care ADL  -     Impairments Edema;Integumentary integrity;Pain  -     Assessment Comments Pt with improving skin integrity, with decreased area of weeping post/dist L calf.  LLE edema appears slightly improved, erythema stable to slightly improved.  Pt has follow-up with [facility] in 2 weeks.  Continue current tx.  -            PT Plan    PT Frequency 1x/week  -     Physical Therapy Interventions (Optional Details) patient/family education;wound care  -     PT Plan Comments MLW, debridement  -           User Key  (r) = Recorded By, (t) = Taken By, (c) = Cosigned By    Initials Name  Provider Type    Kelin Lozano, PT Physical Therapist                Goals   PT OP Goals     Row Name 07/13/22 1515          Time Calculation    PT Goal Re-Cert Due Date 08/30/22  -MEGHAN           User Key  (r) = Recorded By, (t) = Taken By, (c) = Cosigned By    Initials Name Provider Type    Kelin Lozano, PT Physical Therapist                PT Goal Re-Cert Due Date: 08/30/22            Time Calculation: Start Time: 1515  Untimed Charges  34650-Eiypqhvegc comp below knee: 10  93614-Henkjnvnd debridement: 10  Total Minutes  Untimed Charges Total Minutes: 20   Total Minutes: 20  Therapy Charges for Today     Code Description Service Date Service Provider Modifiers Qty    97221420837 HC JOHN DEBRIDE OPEN WOUND UP TO 20CM 7/13/2022 Kelin Segal, PT GP 1    [id number] HC PT MULTI LAYER COMP SYS BELOW KNEE 7/13/2022 Kelin Segal, PT GP 1                  Kelin Segal, PT  7/13/2022

## 2022-07-20 ENCOUNTER — HOSPITAL ENCOUNTER (OUTPATIENT)
Dept: PHYSICAL THERAPY | Facility: HOSPITAL | Age: 57
Setting detail: THERAPIES SERIES
Discharge: HOME | End: 2022-07-20

## 2022-07-20 DIAGNOSIS — S81.802D OPEN WOUND OF LEFT LOWER EXTREMITY, SUBSEQUENT ENCOUNTER: ICD-10-CM

## 2022-07-20 DIAGNOSIS — I89.0 CHRONIC ACQUIRED LYMPHEDEMA: ICD-10-CM

## 2022-07-20 DIAGNOSIS — L03.116 CELLULITIS OF LEFT LOWER EXTREMITY: Primary | ICD-10-CM

## 2022-07-20 PROCEDURE — 29581 APPL MULTLAYER CMPRN SYS LEG: CPT

## 2022-07-20 PROCEDURE — 97597 DBRDMT OPN WND 1ST 20 CM/<: CPT

## 2022-07-20 NOTE — THERAPY WOUND CARE TREATMENT
Outpatient Rehabilitation - Wound/Debridement Treatment Note  [facility]     Patient Name: Kerry Leal  : 1965  MRN: [id number]  Today's Date: 2022                 Admit Date: 2022    Visit Dx:    ICD-10-CM ICD-9-CM   1. Cellulitis of left lower extremity  L03.116 682.6   2. Chronic acquired lymphedema  I89.0 457.1   3. Open wound of left lower extremity, subsequent encounter  S81.802D V58.89     891.0         Patient Active Problem List   Diagnosis   • Cellulitis of left lower extremity   • Essential hypertension   • Chronic acquired lymphedema   • Elevated d-dimer   • Prediabetes        Past Medical History:   Diagnosis Date   • Anxiety    • Asthma    • Heart murmur    • Hypertension         Past Surgical History:   Procedure Laterality Date   • APPENDECTOMY     •  SECTION     • HYSTERECTOMY     • NECK SURGERY           EVALUATION   PT Ortho     Row Name 22 1515       Subjective Comments    Subjective Comments No changes, still having a lot of redness but little drainage.  Sees SHIRAZ MENON next week.  -JM       Subjective Pain    Able to rate subjective pain? yes  -JM    Pre-Treatment Pain Level 3  -JM    Post-Treatment Pain Level 3  -JM       Transfers    Comment, (Transfers) seated in transport chair for tx  -          User Key  (r) = Recorded By, (t) = Taken By, (c) = Cosigned By    Initials Name Provider Type    Kelin Lozano, PT Physical Therapist                 [facility] Wound     Row Name 22 1515             Wound 22 1500 Left posterior calf Venous Ulcer    Wound - Properties Group Placement Date: 22  - Placement Time: 1500  -JM Side: Left  - Orientation: posterior  - Location: calf  -JM Primary Wound Type: Venous ulcer  -JM      Wound Image View All Images View Images  -JM      Dressing Appearance dry;intact;dried drainage  -JM      Base moist;pink;red;yellow;other (see comments)  moist partial-thickness area posterior calf  -JM      Periwound  moist;redness;swelling  -      Periwound Temperature warm  -      Periwound Skin Turgor firm  -      Edges irregular  -JM      Wound Length (cm) 6 cm  -JM      Wound Width (cm) 4 cm  -JM      Wound Depth (cm) 0.1 cm  -JM      Wound Surface Area (cm^2) 24 cm^2  -JM      Wound Volume (cm^3) 2.4 cm^3  -JM      Drainage Characteristics/Odor serous  -JM      Drainage Amount scant  -JM      Care, Wound cleansed with;wound cleanser;debrided  -JM      Dressing Care dressing applied;multi-layer wrap  -JM      Periwound Care cleansed with pH balanced cleanser;dry periwound area maintained  -JM      Retired Wound - Properties Group Placement Date: 06/01/22  - Placement Time: 1500  -JM Side: Left  - Orientation: posterior  - Location: calf  - Primary Wound Type: Venous ulcer  -JM      Retired Wound - Properties Group Date first assessed: 06/01/22  - Time first assessed: 1500  - Side: Left  - Location: calf  - Primary Wound Type: Venous ulcer  -            User Key  (r) = Recorded By, (t) = Taken By, (c) = Cosigned By    Initials Name Provider Type    Kelin Lozano, PT Physical Therapist               Lymphedema     Row Name 07/20/22 3783             Lymphedema Edema Assessment    Pitting Edema Severe  -              Skin Changes/Observations    Lower Extremity Conditions left:;shiny;scaly;crust;inflamed  -      Lower Extremity Color/Pigment left:;erythema  -              Compression/Skin Care    Compression/Skin Care skin care;wrapping location;bandaging  -      Skin Care washed/dried;lotion applied  -JM      Wrapping Location lower extremity  -JM      Wrapping Location LE left:;foot to knee  -JM      Wrapping Comments size 10 compressogrip doubled prox calf, size 8 doubled ankle to mid-calf, 5cm comprilan MH to distal calf in figure-8 pattern for light gradient compression  -JM      Bandage Layers cotton elastic stocking- double layer (comment size);short-stretch bandages (comment  size/quantity)  -            User Key  (r) = Recorded By, (t) = Taken By, (c) = Cosigned By    Initials Name Provider Type    Kelin Lozano, PT Physical Therapist                WOUND DEBRIDEMENT  Total area of Debridement: 8cmsq  Debridement Site 1  Location- Site 1: L calf  Selective Debridement- Site 1: Wound Surface <20cmsq  Instruments- Site 1: tweezers  Excised Tissue Description- Site 1: minimum, other (comment) (hypertrophic crusts)  Bleeding- Site 1: none              Therapy Education     Row Name 07/20/22 151             Therapy Education    Education Details Continue daily vinegar soak 5-10min, air dry, apply moisturizer of choice, continue daily compression use, dressings as needed for drainage.  -      Given Symptoms/condition management;Bandaging/dressing change;Edema management  -      Program Reinforced  -      How Provided Verbal;Demonstration  -      Provided to Patient;Other (comment)  spouse  -      Level of Understanding Verbalized  -            User Key  (r) = Recorded By, (t) = Taken By, (c) = Cosigned By    Initials Name Provider Type    Kelin Lozano, PT Physical Therapist                Recommendation and Plan   PT Assessment/Plan     Row Name 07/20/22 6807          PT Assessment    Functional Limitations Performance in self-care ADL  -     Impairments Edema;Integumentary integrity;Pain  -     Assessment Comments Pt still with moderate erythema but improving drainage and decreased area of moist maceration posterior calf.  Pt continues to benefit from debridement and MLW, will benefit from lymphedema referral once cleared by MD of cellulitis.  -            PT Plan    PT Frequency 1x/week  -     Physical Therapy Interventions (Optional Details) patient/family education;wound care  -     PT Plan Comments MLW, debridement, ?reduce frequency if improved and request referral to lymphedema clinic  -           User Key  (r) = Recorded By, (t) = Taken By,  (c) = Cosigned By    Initials Name Provider Type    Kelin Lozano, PT Physical Therapist                Goals   PT OP Goals     Row Name 07/20/22 1515          Time Calculation    PT Goal Re-Cert Due Date 08/30/22  -MEGHAN           User Key  (r) = Recorded By, (t) = Taken By, (c) = Cosigned By    Initials Name Provider Type    Kelin Lozano, PT Physical Therapist                PT Goal Re-Cert Due Date: 08/30/22            Time Calculation: Start Time: 1515  Untimed Charges  12310-Jfdlqvrqye comp below knee: 15  36980-Xpwvfhgyw debridement: 10  Total Minutes  Untimed Charges Total Minutes: 25   Total Minutes: 25  Therapy Charges for Today     Code Description Service Date Service Provider Modifiers Qty    09393909170 HC JOHN DEBRIDE OPEN WOUND UP TO 20CM 7/20/2022 Kelin Segal, PT GP 1    94400224835 HC PT MULTI LAYER COMP SYS BELOW KNEE 7/20/2022 Kelin Segal, PT GP 1                  Kelin Segal, PT  7/20/2022

## 2022-08-04 ENCOUNTER — APPOINTMENT (OUTPATIENT)
Dept: PHYSICAL THERAPY | Facility: HOSPITAL | Age: 57
End: 2022-08-04

## 2022-08-08 ENCOUNTER — APPOINTMENT (OUTPATIENT)
Dept: PHYSICAL THERAPY | Facility: HOSPITAL | Age: 57
End: 2022-08-08

## 2022-08-11 ENCOUNTER — APPOINTMENT (OUTPATIENT)
Dept: PHYSICAL THERAPY | Facility: HOSPITAL | Age: 57
End: 2022-08-11

## 2022-08-22 ENCOUNTER — APPOINTMENT (OUTPATIENT)
Dept: PHYSICAL THERAPY | Facility: HOSPITAL | Age: 57
End: 2022-08-22

## 2022-09-08 ENCOUNTER — HOSPITAL ENCOUNTER (OUTPATIENT)
Dept: PHYSICAL THERAPY | Facility: HOSPITAL | Age: 57
Setting detail: THERAPIES SERIES
Discharge: HOME | End: 2022-09-08

## 2022-09-08 DIAGNOSIS — I89.0 CHRONIC ACQUIRED LYMPHEDEMA: Primary | ICD-10-CM

## 2022-09-08 DIAGNOSIS — L03.116 CELLULITIS OF LEFT LOWER EXTREMITY: ICD-10-CM

## 2022-09-08 PROCEDURE — 29581 APPL MULTLAYER CMPRN SYS LEG: CPT

## 2022-09-08 PROCEDURE — 97597 DBRDMT OPN WND 1ST 20 CM/<: CPT

## 2022-09-08 NOTE — THERAPY PROGRESS REPORT/RE-CERT
Outpatient Rehabilitation - Wound/Debridement Progress Note  [facility]     Patient Name: Kerry Leal  : 1965  MRN: [id number]  Today's Date: 2022                 Admit Date: 2022    Visit Dx:    ICD-10-CM ICD-9-CM   1. Chronic acquired lymphedema  I89.0 457.1   2. Cellulitis of left lower extremity  L03.116 682.6   LLE:    Post L calf:      Patient Active Problem List   Diagnosis   • Cellulitis of left lower extremity   • Essential hypertension   • Chronic acquired lymphedema   • Elevated d-dimer   • Prediabetes        Past Medical History:   Diagnosis Date   • Anxiety    • Asthma    • Heart murmur    • Hypertension         Past Surgical History:   Procedure Laterality Date   • APPENDECTOMY     •  SECTION     • HYSTERECTOMY     • NECK SURGERY           EVALUATION   PT Ortho     Row Name 22 1515       Subjective Comments    Subjective Comments Pt has continued to use compressogrips and comprilan, states leg is no longer draining.  She is still seeing [facility] MD, and is on oral keflex.  -       Subjective Pain    Able to rate subjective pain? yes  -    Pre-Treatment Pain Level 0  -    Post-Treatment Pain Level 0  -       Transfers    Comment, (Transfers) seated in transport chair  -          User Key  (r) = Recorded By, (t) = Taken By, (c) = Cosigned By    Initials Name Provider Type    Kelin Lozano, PT Physical Therapist                 [facility] Wound     Row Name 22 1515             Wound 22 1500 Left posterior calf Venous Ulcer    Wound - Properties Group Placement Date: 22  - Placement Time: 1500  -JM Side: Left  - Orientation: posterior  - Location: calf  - Primary Wound Type: Venous ulcer  -      Wound Image View All Images View Images  -      Dressing Appearance dry;intact;no drainage  -      Base pink;dry;epithelialization;yellow;scab;other (see comments)  intact with yellow crusts  -      Periwound moist;redness;swelling  -       Periwound Temperature warm  -      Periwound Skin Turgor firm  -      Edges irregular  -JM      Drainage Amount none  -JM      Care, Wound cleansed with;soap and water;debrided  -JM      Dressing Care multi-layer wrap  -JM      Periwound Care cleansed with pH balanced cleanser;dry periwound area maintained;moisturizer applied  -JM      Retired Wound - Properties Group Placement Date: 06/01/22  - Placement Time: 1500  -JM Side: Left  - Orientation: posterior  -JM Location: calf  -JM Primary Wound Type: Venous ulcer  -JM      Retired Wound - Properties Group Date first assessed: 06/01/22  - Time first assessed: 1500  -JM Side: Left  - Location: calf  - Primary Wound Type: Venous ulcer  -            User Key  (r) = Recorded By, (t) = Taken By, (c) = Cosigned By    Initials Name Provider Type    Kelin Lozano, PT Physical Therapist               Lymphedema     Row Name 09/08/22 1515             Lymphedema Edema Assessment    Pitting Edema Severe  -              Skin Changes/Observations    Lower Extremity Conditions left:;intact;dry;shiny;scaly;crust;inflamed  -      Lower Extremity Color/Pigment left:;erythema;hyperpigmented;brawny;P'eau d'orange  -              LLE Quick Girth (cm)    Met-heads 27.5 cm  -JM      Mid foot 28.8 cm  -JM      Smallest ankle 39 cm  -JM      Largest calf 81 cm  -JM      Tib tuberosity 72 cm  -JM              Compression/Skin Care    Compression/Skin Care skin care;wrapping location;bandaging  -      Skin Care washed/dried;lotion applied  -      Wrapping Location lower extremity  -      Wrapping Location LE left:;foot to knee  -      Wrapping Comments size 8 and 10 compressogrips doubled/overlapping on calf for gradient compression, 8cm comprilan to foot/ankle  -            User Key  (r) = Recorded By, (t) = Taken By, (c) = Cosigned By    Initials Name Provider Type    Kelin Lozano, PT Physical Therapist                WOUND  DEBRIDEMENT  Total area of Debridement: 12cmsq  Debridement Site 1  Location- Site 1: L calf  Selective Debridement- Site 1: Wound Surface <20cmsq  Instruments- Site 1: tweezers  Excised Tissue Description- Site 1: moderate, other (comment) (hypertrophic crusts)  Bleeding- Site 1: scant              Therapy Education     Row Name 09/08/22 9088             Therapy Education    Education Details Continue with current wraps, continue skin care with weekly vingar soak.  Recommended pt return to lymphedema clinic at [facility] where she has previously been seen, no that wounds are closed.  -      Given Symptoms/condition management;Bandaging/dressing change;Edema management  -      Program Progressed  -MEGHAN      How Provided Verbal;Demonstration  -      Provided to Patient  -      Level of Understanding Verbalized  -            User Key  (r) = Recorded By, (t) = Taken By, (c) = Cosigned By    Initials Name Provider Type    Kelin Lozano, PT Physical Therapist                Recommendation and Plan   PT Assessment/Plan     Row Name 09/08/22 9702          PT Assessment    Functional Limitations Performance in self-care ADL  -     Impairments Edema;Integumentary integrity;Pain  -     Assessment Comments Pt's L calf now without open wounds or drainage, still some hypertrohpic crusts that PT debrided this tx, and MLW applied.  Pt has met STGs and 2 of 3 LTGs, is appropriate to transition to lymphdedema clinic for ongoing edema management now that wounds are resolved.  Pt to contact her previous lymphedema therapist at [facility] for further tx.  PT will tentatively d/c at this time.  -     Rehab Potential Good  -     Patient/caregiver participated in establishment of treatment plan and goals Yes  -     Patient would benefit from skilled therapy intervention Yes  -            PT Plan    PT Frequency Other (comment)  PRN next 30 days until transition to lymphedema clinic  -     PT Plan Comments tentative d/c to  lymphedema clinic  -           User Key  (r) = Recorded By, (t) = Taken By, (c) = Cosigned By    Initials Name Provider Type    Kelin Lozano, PT Physical Therapist                Goals   PT OP Goals     Row Name 09/08/22 1515          PT Short Term Goals    STG 1 Pt/spouse to verbalize S&S of infection and when to seek medical attention.  -     STG 1 Progress Met  -     STG 2 Reduce LLE edema by 4cm to promote improved skin integrity.  -     STG 2 Progress Met  -     STG 3 Reduce LLE drainage to minimal amount to promote decreased dressing change frequency and improve skin integrity.  -     STG 3 Progress Met  [facility]            Long Term Goals    LTG Date to Achieve 12/07/22  -     LTG 1 Pt/spouse independent with home dressing changes and compression use for independent home management.  -     LTG 1 Progress Met  -     LTG 2 Reduce LLE edema by 6 cm to promote improved skin integrity.  -     LTG 2 Progress Progressing;Not Met  -     LTG 3 LLE without drainage or open wounds to allow return to lymphedema therapy and home compression pump use.  -     LTG 3 Progress Met  [facility]            Time Calculation    PT Goal Re-Cert Due Date 12/07/22  -           User Key  (r) = Recorded By, (t) = Taken By, (c) = Cosigned By    Initials Name Provider Type    Kelin Lozano, PT Physical Therapist                PT Goal Re-Cert Due Date: 12/07/22  PT Short Term Goals  STG 1: Pt/spouse to verbalize S&S of infection and when to seek medical attention.  STG 1 Progress: Met  STG 2: Reduce LLE edema by 4cm to promote improved skin integrity.  STG 2 Progress: Met  STG 3: Reduce LLE drainage to minimal amount to promote decreased dressing change frequency and improve skin integrity.  STG 3 Progress: Met  Long Term Goals  LTG Date to Achieve: 12/07/22  LTG 1: Pt/spouse independent with home dressing changes and compression use for independent home management.  LTG 1 Progress: Met  LTG 2: Reduce LLE  edema by 6 cm to promote improved skin integrity.  LTG 2 Progress: Progressing, Not Met  LTG 3: LLE without drainage or open wounds to allow return to lymphedema therapy and home compression pump use.  LTG 3 Progress: Met      Time Calculation: Start Time: 1515  Untimed Charges  11421-Cxdregrxbx comp below knee: 15  02598-Rchzqttmq debridement: 10  Total Minutes  Untimed Charges Total Minutes: 25   Total Minutes: 25  Therapy Charges for Today     Code Description Service Date Service Provider Modifiers Qty    09724008956 HC JOHN DEBRIDE OPEN WOUND UP TO 20CM 9/8/2022 Kelin Segal, PT GP 1    [id number] HC PT MULTI LAYER COMP SYS BELOW KNEE 9/8/2022 Kelin Segal, PT GP 1                  Kelin Segal, PT  9/8/2022

## 2022-10-22 ENCOUNTER — APPOINTMENT (OUTPATIENT)
Dept: GENERAL RADIOLOGY | Facility: HOSPITAL | Age: 57
End: 2022-10-22

## 2022-10-22 ENCOUNTER — APPOINTMENT (OUTPATIENT)
Dept: CT IMAGING | Facility: HOSPITAL | Age: 57
End: 2022-10-22

## 2022-10-22 ENCOUNTER — HOSPITAL ENCOUNTER (INPATIENT)
Facility: HOSPITAL | Age: 57
LOS: 5 days | Discharge: HOME HEALTH | End: 2022-10-27
Attending: EMERGENCY MEDICINE | Admitting: INTERNAL MEDICINE

## 2022-10-22 DIAGNOSIS — I16.0 HYPERTENSIVE URGENCY: ICD-10-CM

## 2022-10-22 DIAGNOSIS — J18.9 COMMUNITY ACQUIRED PNEUMONIA, UNSPECIFIED LATERALITY: ICD-10-CM

## 2022-10-22 DIAGNOSIS — J96.21 ACUTE ON CHRONIC RESPIRATORY FAILURE WITH HYPOXIA AND HYPERCAPNIA: ICD-10-CM

## 2022-10-22 DIAGNOSIS — Z87.09 HISTORY OF ASTHMA: ICD-10-CM

## 2022-10-22 DIAGNOSIS — J96.22 ACUTE ON CHRONIC RESPIRATORY FAILURE WITH HYPOXIA AND HYPERCAPNIA: ICD-10-CM

## 2022-10-22 DIAGNOSIS — I27.21 SECONDARY PULMONARY ARTERIAL HYPERTENSION: ICD-10-CM

## 2022-10-22 DIAGNOSIS — I89.0 LYMPHEDEMA OF LEFT LEG: ICD-10-CM

## 2022-10-22 DIAGNOSIS — J96.01 ACUTE RESPIRATORY FAILURE WITH HYPOXIA: Primary | ICD-10-CM

## 2022-10-22 PROBLEM — E11.9 TYPE 2 DIABETES MELLITUS (HCC): Status: ACTIVE | Noted: 2022-10-22

## 2022-10-22 LAB
ALBUMIN SERPL-MCNC: 3.7 G/DL (ref 3.5–5.2)
ALBUMIN/GLOB SERPL: 1 G/DL
ALP SERPL-CCNC: 77 U/L (ref 39–117)
ALT SERPL W P-5'-P-CCNC: 25 U/L (ref 1–33)
ANION GAP SERPL CALCULATED.3IONS-SCNC: 8 MMOL/L (ref 5–15)
APTT PPP: 34.8 SECONDS (ref 60–90)
ARTERIAL PATENCY WRIST A: ABNORMAL
AST SERPL-CCNC: 22 U/L (ref 1–32)
ATMOSPHERIC PRESS: ABNORMAL MM[HG]
B PARAPERT DNA SPEC QL NAA+PROBE: NOT DETECTED
B PERT DNA SPEC QL NAA+PROBE: NOT DETECTED
BASE EXCESS BLDA CALC-SCNC: 3.8 MMOL/L (ref 0–2)
BASOPHILS # BLD AUTO: 0.03 10*3/MM3 (ref 0–0.2)
BASOPHILS NFR BLD AUTO: 0.4 % (ref 0–1.5)
BDY SITE: ABNORMAL
BILIRUB SERPL-MCNC: 0.6 MG/DL (ref 0–1.2)
BODY TEMPERATURE: 37 C
BUN SERPL-MCNC: 14 MG/DL (ref 6–20)
BUN/CREAT SERPL: 17.9 (ref 7–25)
C PNEUM DNA NPH QL NAA+NON-PROBE: NOT DETECTED
CALCIUM SPEC-SCNC: 8.9 MG/DL (ref 8.6–10.5)
CHLORIDE SERPL-SCNC: 102 MMOL/L (ref 98–107)
CO2 BLDA-SCNC: 32.3 MMOL/L (ref 22–33)
CO2 SERPL-SCNC: 31 MMOL/L (ref 22–29)
COHGB MFR BLD: 1.3 % (ref 0–2)
CREAT SERPL-MCNC: 0.78 MG/DL (ref 0.57–1)
D DIMER PPP FEU-MCNC: 2.08 MCGFEU/ML (ref 0.01–0.5)
D-LACTATE SERPL-SCNC: 1.5 MMOL/L (ref 0.5–2)
DEPRECATED RDW RBC AUTO: 55.3 FL (ref 37–54)
EGFRCR SERPLBLD CKD-EPI 2021: 89.8 ML/MIN/1.73
EOSINOPHIL # BLD AUTO: 0.03 10*3/MM3 (ref 0–0.4)
EOSINOPHIL NFR BLD AUTO: 0.4 % (ref 0.3–6.2)
EPAP: 0
ERYTHROCYTE [DISTWIDTH] IN BLOOD BY AUTOMATED COUNT: 16.8 % (ref 12.3–15.4)
FLUAV RNA RESP QL NAA+PROBE: NOT DETECTED
FLUAV SUBTYP SPEC NAA+PROBE: NOT DETECTED
FLUBV RNA ISLT QL NAA+PROBE: NOT DETECTED
FLUBV RNA RESP QL NAA+PROBE: NOT DETECTED
GLOBULIN UR ELPH-MCNC: 3.7 GM/DL
GLUCOSE SERPL-MCNC: 158 MG/DL (ref 65–99)
HADV DNA SPEC NAA+PROBE: NOT DETECTED
HCO3 BLDA-SCNC: 30.6 MMOL/L (ref 20–26)
HCOV 229E RNA SPEC QL NAA+PROBE: NOT DETECTED
HCOV HKU1 RNA SPEC QL NAA+PROBE: NOT DETECTED
HCOV NL63 RNA SPEC QL NAA+PROBE: NOT DETECTED
HCOV OC43 RNA SPEC QL NAA+PROBE: NOT DETECTED
HCT VFR BLD AUTO: 41.2 % (ref 34–46.6)
HCT VFR BLD CALC: 43.1 % (ref 38–51)
HGB BLD-MCNC: 13.1 G/DL (ref 12–15.9)
HGB BLDA-MCNC: 14.1 G/DL (ref 14–18)
HMPV RNA NPH QL NAA+NON-PROBE: NOT DETECTED
HOLD SPECIMEN: NORMAL
HOLD SPECIMEN: NORMAL
HPIV1 RNA ISLT QL NAA+PROBE: NOT DETECTED
HPIV2 RNA SPEC QL NAA+PROBE: NOT DETECTED
HPIV3 RNA NPH QL NAA+PROBE: NOT DETECTED
HPIV4 P GENE NPH QL NAA+PROBE: NOT DETECTED
IMM GRANULOCYTES # BLD AUTO: 0.02 10*3/MM3 (ref 0–0.05)
IMM GRANULOCYTES NFR BLD AUTO: 0.3 % (ref 0–0.5)
INHALED O2 CONCENTRATION: 44 %
INR PPP: 1.06 (ref 0.84–1.13)
IPAP: 0
LYMPHOCYTES # BLD AUTO: 0.79 10*3/MM3 (ref 0.7–3.1)
LYMPHOCYTES NFR BLD AUTO: 11.3 % (ref 19.6–45.3)
M PNEUMO IGG SER IA-ACNC: NOT DETECTED
MCH RBC QN AUTO: 28.9 PG (ref 26.6–33)
MCHC RBC AUTO-ENTMCNC: 31.8 G/DL (ref 31.5–35.7)
MCV RBC AUTO: 90.9 FL (ref 79–97)
METHGB BLD QL: 0.4 % (ref 0–1.5)
MODALITY: ABNORMAL
MONOCYTES # BLD AUTO: 0.49 10*3/MM3 (ref 0.1–0.9)
MONOCYTES NFR BLD AUTO: 7 % (ref 5–12)
NEUTROPHILS NFR BLD AUTO: 5.63 10*3/MM3 (ref 1.7–7)
NEUTROPHILS NFR BLD AUTO: 80.6 % (ref 42.7–76)
NOTE: ABNORMAL
NRBC BLD AUTO-RTO: 0 /100 WBC (ref 0–0.2)
NT-PROBNP SERPL-MCNC: 683.2 PG/ML (ref 0–900)
OXYHGB MFR BLDV: 90.4 % (ref 94–99)
PAW @ PEAK INSP FLOW SETTING VENT: 0 CMH2O
PCO2 BLDA: 53.9 MM HG (ref 35–45)
PCO2 TEMP ADJ BLD: 53.9 MM HG (ref 35–45)
PH BLDA: 7.36 PH UNITS (ref 7.35–7.45)
PH, TEMP CORRECTED: 7.36 PH UNITS
PLATELET # BLD AUTO: 203 10*3/MM3 (ref 140–450)
PMV BLD AUTO: 12.9 FL (ref 6–12)
PO2 BLDA: 66.5 MM HG (ref 83–108)
PO2 TEMP ADJ BLD: 66.5 MM HG (ref 83–108)
POTASSIUM SERPL-SCNC: 4.3 MMOL/L (ref 3.5–5.2)
PROCALCITONIN SERPL-MCNC: 0.05 NG/ML (ref 0–0.25)
PROT SERPL-MCNC: 7.4 G/DL (ref 6–8.5)
PROTHROMBIN TIME: 13.7 SECONDS (ref 11.4–14.4)
RBC # BLD AUTO: 4.53 10*6/MM3 (ref 3.77–5.28)
RHINOVIRUS RNA SPEC NAA+PROBE: NOT DETECTED
RSV RNA NPH QL NAA+NON-PROBE: NOT DETECTED
SARS-COV-2 RNA NPH QL NAA+NON-PROBE: NOT DETECTED
SARS-COV-2 RNA RESP QL NAA+PROBE: NOT DETECTED
SODIUM SERPL-SCNC: 141 MMOL/L (ref 136–145)
TOTAL RATE: 0 BREATHS/MINUTE
TROPONIN T SERPL-MCNC: 0.01 NG/ML (ref 0–0.03)
UFH PPP CHRO-ACNC: 0.1 IU/ML (ref 0.3–0.7)
WBC NRBC COR # BLD: 6.99 10*3/MM3 (ref 3.4–10.8)
WHOLE BLOOD HOLD COAG: NORMAL
WHOLE BLOOD HOLD SPECIMEN: NORMAL

## 2022-10-22 PROCEDURE — 83880 ASSAY OF NATRIURETIC PEPTIDE: CPT | Performed by: EMERGENCY MEDICINE

## 2022-10-22 PROCEDURE — 87040 BLOOD CULTURE FOR BACTERIA: CPT | Performed by: EMERGENCY MEDICINE

## 2022-10-22 PROCEDURE — 82805 BLOOD GASES W/O2 SATURATION: CPT

## 2022-10-22 PROCEDURE — 0202U NFCT DS 22 TRGT SARS-COV-2: CPT

## 2022-10-22 PROCEDURE — 84145 PROCALCITONIN (PCT): CPT | Performed by: EMERGENCY MEDICINE

## 2022-10-22 PROCEDURE — 36415 COLL VENOUS BLD VENIPUNCTURE: CPT

## 2022-10-22 PROCEDURE — 25010000002 CEFTRIAXONE PER 250 MG: Performed by: EMERGENCY MEDICINE

## 2022-10-22 PROCEDURE — 85379 FIBRIN DEGRADATION QUANT: CPT | Performed by: EMERGENCY MEDICINE

## 2022-10-22 PROCEDURE — 25010000002 AZITHROMYCIN PER 500 MG: Performed by: EMERGENCY MEDICINE

## 2022-10-22 PROCEDURE — 80053 COMPREHEN METABOLIC PANEL: CPT | Performed by: EMERGENCY MEDICINE

## 2022-10-22 PROCEDURE — 83605 ASSAY OF LACTIC ACID: CPT | Performed by: EMERGENCY MEDICINE

## 2022-10-22 PROCEDURE — 83050 HGB METHEMOGLOBIN QUAN: CPT

## 2022-10-22 PROCEDURE — 25010000002 HEPARIN (PORCINE) PER 1000 UNITS: Performed by: INTERNAL MEDICINE

## 2022-10-22 PROCEDURE — 94799 UNLISTED PULMONARY SVC/PX: CPT

## 2022-10-22 PROCEDURE — 99285 EMERGENCY DEPT VISIT HI MDM: CPT

## 2022-10-22 PROCEDURE — 36600 WITHDRAWAL OF ARTERIAL BLOOD: CPT

## 2022-10-22 PROCEDURE — 71045 X-RAY EXAM CHEST 1 VIEW: CPT

## 2022-10-22 PROCEDURE — 85520 HEPARIN ASSAY: CPT | Performed by: INTERNAL MEDICINE

## 2022-10-22 PROCEDURE — 99291 CRITICAL CARE FIRST HOUR: CPT | Performed by: INTERNAL MEDICINE

## 2022-10-22 PROCEDURE — 84484 ASSAY OF TROPONIN QUANT: CPT | Performed by: EMERGENCY MEDICINE

## 2022-10-22 PROCEDURE — 99223 1ST HOSP IP/OBS HIGH 75: CPT | Performed by: INTERNAL MEDICINE

## 2022-10-22 PROCEDURE — 85025 COMPLETE CBC W/AUTO DIFF WBC: CPT | Performed by: EMERGENCY MEDICINE

## 2022-10-22 PROCEDURE — 94660 CPAP INITIATION&MGMT: CPT

## 2022-10-22 PROCEDURE — 85610 PROTHROMBIN TIME: CPT | Performed by: INTERNAL MEDICINE

## 2022-10-22 PROCEDURE — 93005 ELECTROCARDIOGRAM TRACING: CPT | Performed by: EMERGENCY MEDICINE

## 2022-10-22 PROCEDURE — 85730 THROMBOPLASTIN TIME PARTIAL: CPT | Performed by: INTERNAL MEDICINE

## 2022-10-22 PROCEDURE — 25010000002 METHYLPREDNISOLONE PER 125 MG: Performed by: EMERGENCY MEDICINE

## 2022-10-22 PROCEDURE — 94640 AIRWAY INHALATION TREATMENT: CPT

## 2022-10-22 PROCEDURE — 82375 ASSAY CARBOXYHB QUANT: CPT

## 2022-10-22 PROCEDURE — 87636 SARSCOV2 & INF A&B AMP PRB: CPT | Performed by: EMERGENCY MEDICINE

## 2022-10-22 PROCEDURE — 25010000002 HEPARIN (PORCINE) 25000-0.45 UT/250ML-% SOLUTION: Performed by: INTERNAL MEDICINE

## 2022-10-22 RX ORDER — BUMETANIDE 0.25 MG/ML
1 INJECTION INTRAMUSCULAR; INTRAVENOUS ONCE
Status: COMPLETED | OUTPATIENT
Start: 2022-10-22 | End: 2022-10-22

## 2022-10-22 RX ORDER — IPRATROPIUM BROMIDE AND ALBUTEROL SULFATE 2.5; .5 MG/3ML; MG/3ML
3 SOLUTION RESPIRATORY (INHALATION) ONCE
Status: COMPLETED | OUTPATIENT
Start: 2022-10-22 | End: 2022-10-22

## 2022-10-22 RX ORDER — LEVOCETIRIZINE DIHYDROCHLORIDE 5 MG/1
1 TABLET, FILM COATED ORAL EVERY EVENING
COMMUNITY
End: 2023-03-30

## 2022-10-22 RX ORDER — ALBUTEROL SULFATE 90 UG/1
2 AEROSOL, METERED RESPIRATORY (INHALATION) EVERY 4 HOURS PRN
COMMUNITY
Start: 2022-08-04 | End: 2023-03-30

## 2022-10-22 RX ORDER — LORAZEPAM 2 MG/ML
1 INJECTION INTRAMUSCULAR ONCE
Status: DISCONTINUED | OUTPATIENT
Start: 2022-10-22 | End: 2022-10-23

## 2022-10-22 RX ORDER — HEPARIN SODIUM 10000 [USP'U]/100ML
9 INJECTION, SOLUTION INTRAVENOUS
Status: DISCONTINUED | OUTPATIENT
Start: 2022-10-22 | End: 2022-10-23

## 2022-10-22 RX ORDER — METHYLPREDNISOLONE SODIUM SUCCINATE 125 MG/2ML
125 INJECTION, POWDER, LYOPHILIZED, FOR SOLUTION INTRAMUSCULAR; INTRAVENOUS ONCE
Status: COMPLETED | OUTPATIENT
Start: 2022-10-22 | End: 2022-10-22

## 2022-10-22 RX ORDER — HEPARIN SODIUM 1000 [USP'U]/ML
2000 INJECTION, SOLUTION INTRAVENOUS; SUBCUTANEOUS AS NEEDED
Status: DISCONTINUED | OUTPATIENT
Start: 2022-10-22 | End: 2022-10-22

## 2022-10-22 RX ORDER — HEPARIN SODIUM 1000 [USP'U]/ML
10000 INJECTION, SOLUTION INTRAVENOUS; SUBCUTANEOUS ONCE
Status: COMPLETED | OUTPATIENT
Start: 2022-10-22 | End: 2022-10-22

## 2022-10-22 RX ORDER — ACETAMINOPHEN 325 MG/1
2 TABLET ORAL EVERY 6 HOURS PRN
COMMUNITY

## 2022-10-22 RX ORDER — METOPROLOL SUCCINATE 25 MG/1
25 TABLET, EXTENDED RELEASE ORAL DAILY
COMMUNITY
End: 2022-10-27 | Stop reason: HOSPADM

## 2022-10-22 RX ORDER — NITROGLYCERIN 20 MG/100ML
5-200 INJECTION INTRAVENOUS
Status: DISCONTINUED | OUTPATIENT
Start: 2022-10-22 | End: 2022-10-23

## 2022-10-22 RX ORDER — HEPARIN SODIUM 1000 [USP'U]/ML
4000 INJECTION, SOLUTION INTRAVENOUS; SUBCUTANEOUS AS NEEDED
Status: DISCONTINUED | OUTPATIENT
Start: 2022-10-22 | End: 2022-10-22

## 2022-10-22 RX ORDER — SODIUM CHLORIDE 0.9 % (FLUSH) 0.9 %
10 SYRINGE (ML) INJECTION AS NEEDED
Status: DISCONTINUED | OUTPATIENT
Start: 2022-10-22 | End: 2022-10-23

## 2022-10-22 RX ADMIN — SODIUM CHLORIDE 1 G: 900 INJECTION INTRAVENOUS at 20:58

## 2022-10-22 RX ADMIN — BUMETANIDE 1 MG: 0.25 INJECTION, SOLUTION INTRAMUSCULAR; INTRAVENOUS at 23:17

## 2022-10-22 RX ADMIN — IPRATROPIUM BROMIDE AND ALBUTEROL SULFATE 3 ML: .5; 2.5 SOLUTION RESPIRATORY (INHALATION) at 19:26

## 2022-10-22 RX ADMIN — HEPARIN SODIUM 10000 UNITS: 1000 INJECTION INTRAVENOUS; SUBCUTANEOUS at 23:21

## 2022-10-22 RX ADMIN — HEPARIN SODIUM 9 UNITS/KG/HR: 10000 INJECTION, SOLUTION INTRAVENOUS at 23:21

## 2022-10-22 RX ADMIN — NITROGLYCERIN 5 MCG/MIN: 20 INJECTION INTRAVENOUS at 22:28

## 2022-10-22 RX ADMIN — METHYLPREDNISOLONE SODIUM SUCCINATE 125 MG: 125 INJECTION, POWDER, FOR SOLUTION INTRAMUSCULAR; INTRAVENOUS at 19:59

## 2022-10-22 RX ADMIN — AZITHROMYCIN 500 MG: 500 INJECTION, POWDER, LYOPHILIZED, FOR SOLUTION INTRAVENOUS at 23:51

## 2022-10-23 ENCOUNTER — APPOINTMENT (OUTPATIENT)
Dept: GENERAL RADIOLOGY | Facility: HOSPITAL | Age: 57
End: 2022-10-23

## 2022-10-23 ENCOUNTER — APPOINTMENT (OUTPATIENT)
Dept: CARDIOLOGY | Facility: HOSPITAL | Age: 57
End: 2022-10-23

## 2022-10-23 PROBLEM — E66.01 MORBID OBESITY WITH BMI OF 60.0-69.9, ADULT (HCC): Status: ACTIVE | Noted: 2022-10-23

## 2022-10-23 PROBLEM — J96.22 ACUTE ON CHRONIC RESPIRATORY FAILURE WITH HYPOXIA AND HYPERCAPNIA (HCC): Status: ACTIVE | Noted: 2022-10-22

## 2022-10-23 PROBLEM — J96.21 ACUTE ON CHRONIC RESPIRATORY FAILURE WITH HYPOXIA AND HYPERCAPNIA: Status: ACTIVE | Noted: 2022-10-22

## 2022-10-23 LAB
ALBUMIN SERPL-MCNC: 3.6 G/DL (ref 3.5–5.2)
ALBUMIN/GLOB SERPL: 1 G/DL
ALP SERPL-CCNC: 74 U/L (ref 39–117)
ALT SERPL W P-5'-P-CCNC: 24 U/L (ref 1–33)
ANION GAP SERPL CALCULATED.3IONS-SCNC: 9 MMOL/L (ref 5–15)
ARTERIAL PATENCY WRIST A: ABNORMAL
ASCENDING AORTA: 3.1 CM
AST SERPL-CCNC: 22 U/L (ref 1–32)
ATMOSPHERIC PRESS: ABNORMAL MM[HG]
AV VENA CONTRACTA: 0.33 CM
BASE EXCESS BLDA CALC-SCNC: 7 MMOL/L (ref 0–2)
BDY SITE: ABNORMAL
BH CV ECHO MEAS - AI P1/2T: 553.8 MSEC
BH CV ECHO MEAS - AO MAX PG: 20.1 MMHG
BH CV ECHO MEAS - AO MEAN PG: 10 MMHG
BH CV ECHO MEAS - AO ROOT DIAM: 2.8 CM
BH CV ECHO MEAS - AO V2 MAX: 224 CM/SEC
BH CV ECHO MEAS - AO V2 VTI: 46.3 CM
BH CV ECHO MEAS - AVA(I,D): 1.99 CM2
BH CV ECHO MEAS - EDV(CUBED): 85.2 ML
BH CV ECHO MEAS - EDV(MOD-SP2): 96.9 ML
BH CV ECHO MEAS - EDV(MOD-SP4): 108 ML
BH CV ECHO MEAS - EF(MOD-BP): 53.9 %
BH CV ECHO MEAS - EF(MOD-SP2): 55.5 %
BH CV ECHO MEAS - EF(MOD-SP4): 54.8 %
BH CV ECHO MEAS - ESV(CUBED): 32.8 ML
BH CV ECHO MEAS - ESV(MOD-SP2): 43.1 ML
BH CV ECHO MEAS - ESV(MOD-SP4): 48.8 ML
BH CV ECHO MEAS - FS: 27.3 %
BH CV ECHO MEAS - IVS/LVPW: 0.93 CM
BH CV ECHO MEAS - IVSD: 1.3 CM
BH CV ECHO MEAS - LA DIMENSION: 4.3 CM
BH CV ECHO MEAS - LAT PEAK E' VEL: 11.2 CM/SEC
BH CV ECHO MEAS - LV DIASTOLIC VOL/BSA (35-75): 44 CM2
BH CV ECHO MEAS - LV MASS(C)D: 227.5 GRAMS
BH CV ECHO MEAS - LV MAX PG: 6.5 MMHG
BH CV ECHO MEAS - LV MEAN PG: 4 MMHG
BH CV ECHO MEAS - LV SYSTOLIC VOL/BSA (12-30): 19.9 CM2
BH CV ECHO MEAS - LV V1 MAX: 127 CM/SEC
BH CV ECHO MEAS - LV V1 VTI: 29.4 CM
BH CV ECHO MEAS - LVIDD: 4.4 CM
BH CV ECHO MEAS - LVIDS: 3.2 CM
BH CV ECHO MEAS - LVOT AREA: 3.1 CM2
BH CV ECHO MEAS - LVOT DIAM: 2 CM
BH CV ECHO MEAS - LVPWD: 1.4 CM
BH CV ECHO MEAS - MED PEAK E' VEL: 8.2 CM/SEC
BH CV ECHO MEAS - MV A MAX VEL: 103 CM/SEC
BH CV ECHO MEAS - MV DEC SLOPE: 747 CM/SEC2
BH CV ECHO MEAS - MV DEC TIME: 0.2 MSEC
BH CV ECHO MEAS - MV E MAX VEL: 136 CM/SEC
BH CV ECHO MEAS - MV E/A: 1.32
BH CV ECHO MEAS - MV MAX PG: 10.4 MMHG
BH CV ECHO MEAS - MV MEAN PG: 5 MMHG
BH CV ECHO MEAS - MV P1/2T: 63.1 MSEC
BH CV ECHO MEAS - MV V2 VTI: 35.1 CM
BH CV ECHO MEAS - MVA(P1/2T): 3.5 CM2
BH CV ECHO MEAS - MVA(VTI): 2.6 CM2
BH CV ECHO MEAS - PA ACC TIME: 0.12 SEC
BH CV ECHO MEAS - PA PR(ACCEL): 25 MMHG
BH CV ECHO MEAS - PA V2 MAX: 120 CM/SEC
BH CV ECHO MEAS - RAP SYSTOLE: 8 MMHG
BH CV ECHO MEAS - RVSP: 49.7 MMHG
BH CV ECHO MEAS - SI(MOD-SP2): 21.9 ML/M2
BH CV ECHO MEAS - SI(MOD-SP4): 24.1 ML/M2
BH CV ECHO MEAS - SV(LVOT): 92.4 ML
BH CV ECHO MEAS - SV(MOD-SP2): 53.8 ML
BH CV ECHO MEAS - SV(MOD-SP4): 59.2 ML
BH CV ECHO MEAS - TAPSE (>1.6): 2.7 CM
BH CV ECHO MEAS - TR MAX PG: 41.7 MMHG
BH CV ECHO MEAS - TR MAX VEL: 323 CM/SEC
BH CV ECHO MEASUREMENTS AVERAGE E/E' RATIO: 14.02
BH CV LOWER VASCULAR LEFT COMMON FEMORAL AUGMENT: NORMAL
BH CV LOWER VASCULAR LEFT COMMON FEMORAL COMPRESS: NORMAL
BH CV LOWER VASCULAR LEFT COMMON FEMORAL PHASIC: NORMAL
BH CV LOWER VASCULAR LEFT COMMON FEMORAL SPONT: NORMAL
BH CV LOWER VASCULAR LEFT GREATER SAPH AK COMPRESS: NORMAL
BH CV LOWER VASCULAR LEFT GREATER SAPH BK COMPRESS: NORMAL
BH CV LOWER VASCULAR LEFT MID FEMORAL AUGMENT: NORMAL
BH CV LOWER VASCULAR LEFT MID FEMORAL PHASIC: NORMAL
BH CV LOWER VASCULAR LEFT MID FEMORAL SPONT: NORMAL
BH CV LOWER VASCULAR LEFT POSTERIOR TIBIAL COMPRESS: NORMAL
BH CV LOWER VASCULAR LEFT PROFUNDA FEMORAL AUGMENT: NORMAL
BH CV LOWER VASCULAR LEFT PROFUNDA FEMORAL COMPRESS: NORMAL
BH CV LOWER VASCULAR LEFT PROFUNDA FEMORAL PHASIC: NORMAL
BH CV LOWER VASCULAR LEFT PROFUNDA FEMORAL SPONT: NORMAL
BH CV LOWER VASCULAR LEFT PROXIMAL FEMORAL AUGMENT: NORMAL
BH CV LOWER VASCULAR LEFT PROXIMAL FEMORAL COMPRESS: NORMAL
BH CV LOWER VASCULAR LEFT PROXIMAL FEMORAL PHASIC: NORMAL
BH CV LOWER VASCULAR LEFT PROXIMAL FEMORAL SPONT: NORMAL
BH CV LOWER VASCULAR LEFT SAPHENOFEMORAL JUNCTION AUGMENT: NORMAL
BH CV LOWER VASCULAR LEFT SAPHENOFEMORAL JUNCTION COMPRESS: NORMAL
BH CV LOWER VASCULAR LEFT SAPHENOFEMORAL JUNCTION PHASIC: NORMAL
BH CV LOWER VASCULAR LEFT SAPHENOFEMORAL JUNCTION SPONT: NORMAL
BH CV LOWER VASCULAR RIGHT COMMON FEMORAL AUGMENT: NORMAL
BH CV LOWER VASCULAR RIGHT COMMON FEMORAL COMPRESS: NORMAL
BH CV LOWER VASCULAR RIGHT COMMON FEMORAL PHASIC: NORMAL
BH CV LOWER VASCULAR RIGHT COMMON FEMORAL SPONT: NORMAL
BH CV LOWER VASCULAR RIGHT DISTAL FEMORAL AUGMENT: NORMAL
BH CV LOWER VASCULAR RIGHT DISTAL FEMORAL COMPRESS: NORMAL
BH CV LOWER VASCULAR RIGHT DISTAL FEMORAL PHASIC: NORMAL
BH CV LOWER VASCULAR RIGHT DISTAL FEMORAL SPONT: NORMAL
BH CV LOWER VASCULAR RIGHT GASTRONEMIUS COMPRESS: NORMAL
BH CV LOWER VASCULAR RIGHT GREATER SAPH AK COMPRESS: NORMAL
BH CV LOWER VASCULAR RIGHT GREATER SAPH BK COMPRESS: NORMAL
BH CV LOWER VASCULAR RIGHT LESSER SAPH COMPRESS: NORMAL
BH CV LOWER VASCULAR RIGHT MID FEMORAL AUGMENT: NORMAL
BH CV LOWER VASCULAR RIGHT MID FEMORAL COMPRESS: NORMAL
BH CV LOWER VASCULAR RIGHT MID FEMORAL PHASIC: NORMAL
BH CV LOWER VASCULAR RIGHT MID FEMORAL SPONT: NORMAL
BH CV LOWER VASCULAR RIGHT POPLITEAL AUGMENT: NORMAL
BH CV LOWER VASCULAR RIGHT POPLITEAL COMPRESS: NORMAL
BH CV LOWER VASCULAR RIGHT POPLITEAL PHASIC: NORMAL
BH CV LOWER VASCULAR RIGHT POPLITEAL SPONT: NORMAL
BH CV LOWER VASCULAR RIGHT POSTERIOR TIBIAL COMPRESS: NORMAL
BH CV LOWER VASCULAR RIGHT PROFUNDA FEMORAL AUGMENT: NORMAL
BH CV LOWER VASCULAR RIGHT PROFUNDA FEMORAL COMPRESS: NORMAL
BH CV LOWER VASCULAR RIGHT PROFUNDA FEMORAL PHASIC: NORMAL
BH CV LOWER VASCULAR RIGHT PROFUNDA FEMORAL SPONT: NORMAL
BH CV LOWER VASCULAR RIGHT PROXIMAL FEMORAL AUGMENT: NORMAL
BH CV LOWER VASCULAR RIGHT PROXIMAL FEMORAL COMPRESS: NORMAL
BH CV LOWER VASCULAR RIGHT PROXIMAL FEMORAL PHASIC: NORMAL
BH CV LOWER VASCULAR RIGHT PROXIMAL FEMORAL SPONT: NORMAL
BH CV LOWER VASCULAR RIGHT SAPHENOFEMORAL JUNCTION AUGMENT: NORMAL
BH CV LOWER VASCULAR RIGHT SAPHENOFEMORAL JUNCTION COMPRESS: NORMAL
BH CV LOWER VASCULAR RIGHT SAPHENOFEMORAL JUNCTION PHASIC: NORMAL
BH CV LOWER VASCULAR RIGHT SAPHENOFEMORAL JUNCTION SPONT: NORMAL
BH CV VAS BP LEFT ARM: NORMAL MMHG
BH CV XLRA - RV BASE: 4 CM
BH CV XLRA - RV LENGTH: 7.6 CM
BH CV XLRA - RV MID: 3.8 CM
BH CV XLRA - TDI S': 14.1 CM/SEC
BILIRUB SERPL-MCNC: 0.6 MG/DL (ref 0–1.2)
BODY TEMPERATURE: 37 C
BUN SERPL-MCNC: 12 MG/DL (ref 6–20)
BUN/CREAT SERPL: 21.1 (ref 7–25)
CALCIUM SPEC-SCNC: 8.6 MG/DL (ref 8.6–10.5)
CHLORIDE SERPL-SCNC: 99 MMOL/L (ref 98–107)
CO2 BLDA-SCNC: 37.3 MMOL/L (ref 22–33)
CO2 SERPL-SCNC: 28 MMOL/L (ref 22–29)
COHGB MFR BLD: 0.8 % (ref 0–2)
CREAT SERPL-MCNC: 0.57 MG/DL (ref 0.57–1)
DEPRECATED RDW RBC AUTO: 54.3 FL (ref 37–54)
EGFRCR SERPLBLD CKD-EPI 2021: 107.5 ML/MIN/1.73
EPAP: 0
ERYTHROCYTE [DISTWIDTH] IN BLOOD BY AUTOMATED COUNT: 16.5 % (ref 12.3–15.4)
GLOBULIN UR ELPH-MCNC: 3.7 GM/DL
GLUCOSE BLDC GLUCOMTR-MCNC: 133 MG/DL (ref 70–130)
GLUCOSE BLDC GLUCOMTR-MCNC: 175 MG/DL (ref 70–130)
GLUCOSE BLDC GLUCOMTR-MCNC: 176 MG/DL (ref 70–130)
GLUCOSE SERPL-MCNC: 178 MG/DL (ref 65–99)
HBA1C MFR BLD: 6.7 % (ref 4.8–5.6)
HCO3 BLDA-SCNC: 35.2 MMOL/L (ref 20–26)
HCT VFR BLD AUTO: 41 % (ref 34–46.6)
HCT VFR BLD CALC: 40.3 % (ref 38–51)
HGB BLD-MCNC: 13.1 G/DL (ref 12–15.9)
HGB BLDA-MCNC: 13.1 G/DL (ref 14–18)
HOLD SPECIMEN: NORMAL
INHALED O2 CONCENTRATION: 100 %
IPAP: 0
IVRT: 77 MSEC
LEFT ATRIUM VOLUME INDEX: 28.7 ML/M2
Lab: ABNORMAL
MAGNESIUM SERPL-MCNC: 2 MG/DL (ref 1.6–2.6)
MAXIMAL PREDICTED HEART RATE: 165 BPM
MAXIMAL PREDICTED HEART RATE: 165 BPM
MCH RBC QN AUTO: 28.9 PG (ref 26.6–33)
MCHC RBC AUTO-ENTMCNC: 32 G/DL (ref 31.5–35.7)
MCV RBC AUTO: 90.3 FL (ref 79–97)
METHGB BLD QL: -0.1 % (ref 0–1.5)
MODALITY: ABNORMAL
NOTE: ABNORMAL
NOTIFIED BY: ABNORMAL
NOTIFIED WHO: ABNORMAL
OXYHGB MFR BLDV: 97.1 % (ref 94–99)
PAW @ PEAK INSP FLOW SETTING VENT: 0 CMH2O
PCO2 BLDA: 67 MM HG (ref 35–45)
PCO2 TEMP ADJ BLD: 67 MM HG (ref 35–45)
PEEP RESPIRATORY: 10 CM[H2O]
PH BLDA: 7.33 PH UNITS (ref 7.35–7.45)
PH, TEMP CORRECTED: 7.33 PH UNITS
PHOSPHATE SERPL-MCNC: 3.5 MG/DL (ref 2.5–4.5)
PLATELET # BLD AUTO: 225 10*3/MM3 (ref 140–450)
PMV BLD AUTO: 13.8 FL (ref 6–12)
PO2 BLDA: 100 MM HG (ref 83–108)
PO2 TEMP ADJ BLD: 100 MM HG (ref 83–108)
POTASSIUM SERPL-SCNC: 4.1 MMOL/L (ref 3.5–5.2)
PROT SERPL-MCNC: 7.3 G/DL (ref 6–8.5)
QT INTERVAL: 360 MS
QTC INTERVAL: 459 MS
RBC # BLD AUTO: 4.54 10*6/MM3 (ref 3.77–5.28)
SODIUM SERPL-SCNC: 136 MMOL/L (ref 136–145)
STRESS TARGET HR: 140 BPM
STRESS TARGET HR: 140 BPM
TOTAL RATE: 0 BREATHS/MINUTE
VENTILATOR MODE: ABNORMAL
WBC NRBC COR # BLD: 7.25 10*3/MM3 (ref 3.4–10.8)

## 2022-10-23 PROCEDURE — 63710000001 INSULIN LISPRO (HUMAN) PER 5 UNITS

## 2022-10-23 PROCEDURE — 82375 ASSAY CARBOXYHB QUANT: CPT

## 2022-10-23 PROCEDURE — 94660 CPAP INITIATION&MGMT: CPT

## 2022-10-23 PROCEDURE — 87070 CULTURE OTHR SPECIMN AEROBIC: CPT

## 2022-10-23 PROCEDURE — 25010000002 AZITHROMYCIN PER 500 MG: Performed by: INTERNAL MEDICINE

## 2022-10-23 PROCEDURE — 82805 BLOOD GASES W/O2 SATURATION: CPT

## 2022-10-23 PROCEDURE — 25010000002 HALOPERIDOL LACTATE PER 5 MG: Performed by: NURSE PRACTITIONER

## 2022-10-23 PROCEDURE — 94799 UNLISTED PULMONARY SVC/PX: CPT

## 2022-10-23 PROCEDURE — 93306 TTE W/DOPPLER COMPLETE: CPT

## 2022-10-23 PROCEDURE — 83036 HEMOGLOBIN GLYCOSYLATED A1C: CPT | Performed by: NURSE PRACTITIONER

## 2022-10-23 PROCEDURE — 93970 EXTREMITY STUDY: CPT | Performed by: INTERNAL MEDICINE

## 2022-10-23 PROCEDURE — 87205 SMEAR GRAM STAIN: CPT

## 2022-10-23 PROCEDURE — 71045 X-RAY EXAM CHEST 1 VIEW: CPT

## 2022-10-23 PROCEDURE — 83735 ASSAY OF MAGNESIUM: CPT | Performed by: NURSE PRACTITIONER

## 2022-10-23 PROCEDURE — 99233 SBSQ HOSP IP/OBS HIGH 50: CPT | Performed by: INTERNAL MEDICINE

## 2022-10-23 PROCEDURE — 25010000002 CHLOROTHIAZIDE PER 500 MG: Performed by: INTERNAL MEDICINE

## 2022-10-23 PROCEDURE — 25010000002 ENOXAPARIN PER 10 MG: Performed by: NURSE PRACTITIONER

## 2022-10-23 PROCEDURE — 93970 EXTREMITY STUDY: CPT

## 2022-10-23 PROCEDURE — 25010000002 CEFTRIAXONE PER 250 MG

## 2022-10-23 PROCEDURE — 80053 COMPREHEN METABOLIC PANEL: CPT | Performed by: NURSE PRACTITIONER

## 2022-10-23 PROCEDURE — 25010000002 ENOXAPARIN PER 10 MG: Performed by: INTERNAL MEDICINE

## 2022-10-23 PROCEDURE — 82962 GLUCOSE BLOOD TEST: CPT

## 2022-10-23 PROCEDURE — 25010000002 LORAZEPAM PER 2 MG: Performed by: NURSE PRACTITIONER

## 2022-10-23 PROCEDURE — 84100 ASSAY OF PHOSPHORUS: CPT | Performed by: NURSE PRACTITIONER

## 2022-10-23 PROCEDURE — 94761 N-INVAS EAR/PLS OXIMETRY MLT: CPT

## 2022-10-23 PROCEDURE — 0 POTASSIUM CHLORIDE 10 MEQ/100ML SOLUTION: Performed by: INTERNAL MEDICINE

## 2022-10-23 PROCEDURE — 93306 TTE W/DOPPLER COMPLETE: CPT | Performed by: INTERNAL MEDICINE

## 2022-10-23 PROCEDURE — 36600 WITHDRAWAL OF ARTERIAL BLOOD: CPT

## 2022-10-23 PROCEDURE — 85027 COMPLETE CBC AUTOMATED: CPT | Performed by: NURSE PRACTITIONER

## 2022-10-23 PROCEDURE — 94664 DEMO&/EVAL PT USE INHALER: CPT

## 2022-10-23 PROCEDURE — 25010000002 METHYLPREDNISOLONE PER 40 MG

## 2022-10-23 PROCEDURE — 83050 HGB METHEMOGLOBIN QUAN: CPT

## 2022-10-23 RX ORDER — HYDRALAZINE HYDROCHLORIDE 50 MG/1
50 TABLET, FILM COATED ORAL EVERY 8 HOURS PRN
Status: DISCONTINUED | OUTPATIENT
Start: 2022-10-23 | End: 2022-10-27 | Stop reason: HOSPADM

## 2022-10-23 RX ORDER — LOSARTAN POTASSIUM 50 MG/1
50 TABLET ORAL
Status: DISCONTINUED | OUTPATIENT
Start: 2022-10-23 | End: 2022-10-23

## 2022-10-23 RX ORDER — LORAZEPAM 2 MG/ML
0.25 INJECTION INTRAMUSCULAR EVERY 4 HOURS PRN
Status: DISCONTINUED | OUTPATIENT
Start: 2022-10-23 | End: 2022-10-27 | Stop reason: HOSPADM

## 2022-10-23 RX ORDER — ACETAMINOPHEN 325 MG/1
650 TABLET ORAL EVERY 6 HOURS PRN
Status: DISCONTINUED | OUTPATIENT
Start: 2022-10-23 | End: 2022-10-23

## 2022-10-23 RX ORDER — METOPROLOL SUCCINATE 25 MG/1
25 TABLET, EXTENDED RELEASE ORAL DAILY
Status: DISCONTINUED | OUTPATIENT
Start: 2022-10-23 | End: 2022-10-26

## 2022-10-23 RX ORDER — NICOTINE POLACRILEX 4 MG
15 LOZENGE BUCCAL
Status: DISCONTINUED | OUTPATIENT
Start: 2022-10-23 | End: 2022-10-27 | Stop reason: HOSPADM

## 2022-10-23 RX ORDER — BUMETANIDE 0.25 MG/ML
1 INJECTION INTRAMUSCULAR; INTRAVENOUS EVERY 12 HOURS SCHEDULED
Status: DISCONTINUED | OUTPATIENT
Start: 2022-10-23 | End: 2022-10-23

## 2022-10-23 RX ORDER — ALBUTEROL SULFATE 2.5 MG/3ML
2.5 SOLUTION RESPIRATORY (INHALATION) EVERY 6 HOURS PRN
Status: DISCONTINUED | OUTPATIENT
Start: 2022-10-23 | End: 2022-10-23

## 2022-10-23 RX ORDER — ENOXAPARIN SODIUM 100 MG/ML
60 INJECTION SUBCUTANEOUS EVERY 12 HOURS SCHEDULED
Status: DISCONTINUED | OUTPATIENT
Start: 2022-10-23 | End: 2022-10-27 | Stop reason: HOSPADM

## 2022-10-23 RX ORDER — DEXMEDETOMIDINE HYDROCHLORIDE 4 UG/ML
.2-1.5 INJECTION, SOLUTION INTRAVENOUS
Status: DISCONTINUED | OUTPATIENT
Start: 2022-10-23 | End: 2022-10-25

## 2022-10-23 RX ORDER — METHYLPREDNISOLONE SODIUM SUCCINATE 40 MG/ML
40 INJECTION, POWDER, LYOPHILIZED, FOR SOLUTION INTRAMUSCULAR; INTRAVENOUS DAILY
Status: DISCONTINUED | OUTPATIENT
Start: 2022-10-23 | End: 2022-10-23

## 2022-10-23 RX ORDER — POTASSIUM CHLORIDE 7.45 MG/ML
10 INJECTION INTRAVENOUS
Status: COMPLETED | OUTPATIENT
Start: 2022-10-23 | End: 2022-10-23

## 2022-10-23 RX ORDER — ACETAMINOPHEN 160 MG/5ML
650 SOLUTION ORAL EVERY 4 HOURS PRN
Status: DISCONTINUED | OUTPATIENT
Start: 2022-10-23 | End: 2022-10-27 | Stop reason: HOSPADM

## 2022-10-23 RX ORDER — LORAZEPAM 2 MG/ML
1 INJECTION INTRAMUSCULAR EVERY 4 HOURS PRN
Status: DISCONTINUED | OUTPATIENT
Start: 2022-10-23 | End: 2022-10-23

## 2022-10-23 RX ORDER — DEXTROSE MONOHYDRATE 25 G/50ML
25 INJECTION, SOLUTION INTRAVENOUS
Status: DISCONTINUED | OUTPATIENT
Start: 2022-10-23 | End: 2022-10-27 | Stop reason: HOSPADM

## 2022-10-23 RX ORDER — HALOPERIDOL 5 MG/ML
5 INJECTION INTRAMUSCULAR ONCE
Status: COMPLETED | OUTPATIENT
Start: 2022-10-23 | End: 2022-10-23

## 2022-10-23 RX ORDER — BUMETANIDE 0.25 MG/ML
1 INJECTION INTRAMUSCULAR; INTRAVENOUS ONCE
Status: COMPLETED | OUTPATIENT
Start: 2022-10-23 | End: 2022-10-23

## 2022-10-23 RX ORDER — HALOPERIDOL 5 MG/ML
5 INJECTION INTRAMUSCULAR ONCE
Status: DISCONTINUED | OUTPATIENT
Start: 2022-10-23 | End: 2022-10-23

## 2022-10-23 RX ORDER — INSULIN LISPRO 100 [IU]/ML
0-7 INJECTION, SOLUTION INTRAVENOUS; SUBCUTANEOUS
Status: DISCONTINUED | OUTPATIENT
Start: 2022-10-23 | End: 2022-10-23

## 2022-10-23 RX ORDER — ACETAMINOPHEN 650 MG/1
650 SUPPOSITORY RECTAL EVERY 4 HOURS PRN
Status: DISCONTINUED | OUTPATIENT
Start: 2022-10-23 | End: 2022-10-27 | Stop reason: HOSPADM

## 2022-10-23 RX ORDER — LOSARTAN POTASSIUM 50 MG/1
50 TABLET ORAL
Status: DISCONTINUED | OUTPATIENT
Start: 2022-10-23 | End: 2022-10-27 | Stop reason: HOSPADM

## 2022-10-23 RX ORDER — IPRATROPIUM BROMIDE AND ALBUTEROL SULFATE 2.5; .5 MG/3ML; MG/3ML
3 SOLUTION RESPIRATORY (INHALATION)
Status: DISCONTINUED | OUTPATIENT
Start: 2022-10-23 | End: 2022-10-27 | Stop reason: HOSPADM

## 2022-10-23 RX ORDER — NITROGLYCERIN 20 MG/100ML
5-200 INJECTION INTRAVENOUS
Status: DISCONTINUED | OUTPATIENT
Start: 2022-10-23 | End: 2022-10-26

## 2022-10-23 RX ORDER — SODIUM CHLORIDE 0.9 % (FLUSH) 0.9 %
10 SYRINGE (ML) INJECTION AS NEEDED
Status: DISCONTINUED | OUTPATIENT
Start: 2022-10-23 | End: 2022-10-27 | Stop reason: HOSPADM

## 2022-10-23 RX ORDER — SODIUM CHLORIDE 0.9 % (FLUSH) 0.9 %
10 SYRINGE (ML) INJECTION EVERY 12 HOURS SCHEDULED
Status: DISCONTINUED | OUTPATIENT
Start: 2022-10-23 | End: 2022-10-27 | Stop reason: HOSPADM

## 2022-10-23 RX ORDER — ACETAMINOPHEN 325 MG/1
650 TABLET ORAL EVERY 4 HOURS PRN
Status: DISCONTINUED | OUTPATIENT
Start: 2022-10-23 | End: 2022-10-27 | Stop reason: HOSPADM

## 2022-10-23 RX ORDER — METOPROLOL TARTRATE 5 MG/5ML
5 INJECTION INTRAVENOUS ONCE
Status: COMPLETED | OUTPATIENT
Start: 2022-10-23 | End: 2022-10-23

## 2022-10-23 RX ORDER — HYDROCHLOROTHIAZIDE 25 MG/1
25 TABLET ORAL DAILY
Status: DISCONTINUED | OUTPATIENT
Start: 2022-10-23 | End: 2022-10-27

## 2022-10-23 RX ORDER — INSULIN LISPRO 100 [IU]/ML
0-7 INJECTION, SOLUTION INTRAVENOUS; SUBCUTANEOUS
Status: DISCONTINUED | OUTPATIENT
Start: 2022-10-23 | End: 2022-10-26

## 2022-10-23 RX ORDER — ENOXAPARIN SODIUM 100 MG/ML
60 INJECTION SUBCUTANEOUS EVERY 12 HOURS SCHEDULED
Status: DISCONTINUED | OUTPATIENT
Start: 2022-10-23 | End: 2022-10-23

## 2022-10-23 RX ORDER — ALBUTEROL SULFATE 2.5 MG/3ML
2.5 SOLUTION RESPIRATORY (INHALATION) EVERY 6 HOURS PRN
Status: DISCONTINUED | OUTPATIENT
Start: 2022-10-23 | End: 2022-10-27 | Stop reason: HOSPADM

## 2022-10-23 RX ADMIN — IPRATROPIUM BROMIDE AND ALBUTEROL SULFATE 3 ML: .5; 3 SOLUTION RESPIRATORY (INHALATION) at 11:27

## 2022-10-23 RX ADMIN — Medication 10 ML: at 13:36

## 2022-10-23 RX ADMIN — METOPROLOL SUCCINATE 25 MG: 25 TABLET, FILM COATED, EXTENDED RELEASE ORAL at 10:09

## 2022-10-23 RX ADMIN — HALOPERIDOL LACTATE 5 MG: 5 INJECTION, SOLUTION INTRAMUSCULAR at 12:45

## 2022-10-23 RX ADMIN — POTASSIUM CHLORIDE 10 MEQ: 7.46 INJECTION, SOLUTION INTRAVENOUS at 22:01

## 2022-10-23 RX ADMIN — ENOXAPARIN SODIUM 60 MG: 60 INJECTION SUBCUTANEOUS at 05:44

## 2022-10-23 RX ADMIN — DEXMEDETOMIDINE HYDROCHLORIDE 0.2 MCG/KG/HR: 4 INJECTION, SOLUTION INTRAVENOUS at 14:00

## 2022-10-23 RX ADMIN — LOSARTAN POTASSIUM 50 MG: 50 TABLET, FILM COATED ORAL at 11:18

## 2022-10-23 RX ADMIN — INSULIN LISPRO 2 UNITS: 100 INJECTION, SOLUTION INTRAVENOUS; SUBCUTANEOUS at 17:39

## 2022-10-23 RX ADMIN — AZITHROMYCIN 500 MG: 500 INJECTION, POWDER, LYOPHILIZED, FOR SOLUTION INTRAVENOUS at 22:01

## 2022-10-23 RX ADMIN — IPRATROPIUM BROMIDE AND ALBUTEROL SULFATE 3 ML: .5; 3 SOLUTION RESPIRATORY (INHALATION) at 20:51

## 2022-10-23 RX ADMIN — POTASSIUM CHLORIDE 10 MEQ: 7.46 INJECTION, SOLUTION INTRAVENOUS at 19:46

## 2022-10-23 RX ADMIN — SODIUM CHLORIDE 2 G: 900 INJECTION INTRAVENOUS at 08:18

## 2022-10-23 RX ADMIN — IPRATROPIUM BROMIDE AND ALBUTEROL SULFATE 3 ML: .5; 3 SOLUTION RESPIRATORY (INHALATION) at 15:29

## 2022-10-23 RX ADMIN — CHLOROTHIAZIDE SODIUM 500 MG: 500 INJECTION, POWDER, LYOPHILIZED, FOR SOLUTION INTRAVENOUS at 17:39

## 2022-10-23 RX ADMIN — LORAZEPAM 0.25 MG: 2 INJECTION INTRAMUSCULAR; INTRAVENOUS at 13:32

## 2022-10-23 RX ADMIN — LORAZEPAM 0.25 MG: 2 INJECTION INTRAMUSCULAR; INTRAVENOUS at 15:32

## 2022-10-23 RX ADMIN — INSULIN LISPRO 2 UNITS: 100 INJECTION, SOLUTION INTRAVENOUS; SUBCUTANEOUS at 12:37

## 2022-10-23 RX ADMIN — ENOXAPARIN SODIUM 60 MG: 60 INJECTION SUBCUTANEOUS at 17:12

## 2022-10-23 RX ADMIN — POTASSIUM CHLORIDE 10 MEQ: 7.46 INJECTION, SOLUTION INTRAVENOUS at 17:41

## 2022-10-23 RX ADMIN — Medication 10 ML: at 21:02

## 2022-10-23 RX ADMIN — BUMETANIDE 1 MG: 0.25 INJECTION INTRAMUSCULAR; INTRAVENOUS at 15:11

## 2022-10-23 RX ADMIN — BUMETANIDE 1 MG/HR: 0.25 INJECTION, SOLUTION INTRAMUSCULAR; INTRAVENOUS at 18:37

## 2022-10-23 RX ADMIN — METHYLPREDNISOLONE SODIUM SUCCINATE 40 MG: 40 INJECTION, POWDER, FOR SOLUTION INTRAMUSCULAR; INTRAVENOUS at 10:09

## 2022-10-23 RX ADMIN — METOPROLOL TARTRATE 5 MG: 5 INJECTION INTRAVENOUS at 13:23

## 2022-10-23 RX ADMIN — BUMETANIDE 1 MG: 0.25 INJECTION INTRAMUSCULAR; INTRAVENOUS at 10:09

## 2022-10-23 RX ADMIN — HYDROCHLOROTHIAZIDE 25 MG: 25 TABLET ORAL at 10:09

## 2022-10-23 RX ADMIN — ACETAMINOPHEN 650 MG: 325 TABLET, FILM COATED ORAL at 10:10

## 2022-10-23 RX ADMIN — POTASSIUM CHLORIDE 10 MEQ: 7.46 INJECTION, SOLUTION INTRAVENOUS at 21:01

## 2022-10-24 ENCOUNTER — APPOINTMENT (OUTPATIENT)
Dept: GENERAL RADIOLOGY | Facility: HOSPITAL | Age: 57
End: 2022-10-24

## 2022-10-24 LAB
ANION GAP SERPL CALCULATED.3IONS-SCNC: 10 MMOL/L (ref 5–15)
BUN SERPL-MCNC: 19 MG/DL (ref 6–20)
BUN/CREAT SERPL: 27.5 (ref 7–25)
CALCIUM SPEC-SCNC: 8.9 MG/DL (ref 8.6–10.5)
CHLORIDE SERPL-SCNC: 94 MMOL/L (ref 98–107)
CO2 SERPL-SCNC: 39 MMOL/L (ref 22–29)
CREAT SERPL-MCNC: 0.69 MG/DL (ref 0.57–1)
DEPRECATED RDW RBC AUTO: 54.5 FL (ref 37–54)
EGFRCR SERPLBLD CKD-EPI 2021: 102.6 ML/MIN/1.73
ERYTHROCYTE [DISTWIDTH] IN BLOOD BY AUTOMATED COUNT: 16.6 % (ref 12.3–15.4)
GLUCOSE BLDC GLUCOMTR-MCNC: 130 MG/DL (ref 70–130)
GLUCOSE BLDC GLUCOMTR-MCNC: 132 MG/DL (ref 70–130)
GLUCOSE BLDC GLUCOMTR-MCNC: 140 MG/DL (ref 70–130)
GLUCOSE BLDC GLUCOMTR-MCNC: 241 MG/DL (ref 70–130)
GLUCOSE SERPL-MCNC: 164 MG/DL (ref 65–99)
HCT VFR BLD AUTO: 42.9 % (ref 34–46.6)
HGB BLD-MCNC: 13.6 G/DL (ref 12–15.9)
MAGNESIUM SERPL-MCNC: 2.2 MG/DL (ref 1.6–2.6)
MCH RBC QN AUTO: 28.6 PG (ref 26.6–33)
MCHC RBC AUTO-ENTMCNC: 31.7 G/DL (ref 31.5–35.7)
MCV RBC AUTO: 90.3 FL (ref 79–97)
PHOSPHATE SERPL-MCNC: 5.3 MG/DL (ref 2.5–4.5)
PLATELET # BLD AUTO: 241 10*3/MM3 (ref 140–450)
PMV BLD AUTO: 13.6 FL (ref 6–12)
POTASSIUM SERPL-SCNC: 4.2 MMOL/L (ref 3.5–5.2)
RBC # BLD AUTO: 4.75 10*6/MM3 (ref 3.77–5.28)
SODIUM SERPL-SCNC: 143 MMOL/L (ref 136–145)
WBC NRBC COR # BLD: 13.27 10*3/MM3 (ref 3.4–10.8)

## 2022-10-24 PROCEDURE — 83735 ASSAY OF MAGNESIUM: CPT | Performed by: INTERNAL MEDICINE

## 2022-10-24 PROCEDURE — 25010000002 ENOXAPARIN PER 10 MG: Performed by: INTERNAL MEDICINE

## 2022-10-24 PROCEDURE — 94799 UNLISTED PULMONARY SVC/PX: CPT

## 2022-10-24 PROCEDURE — 99233 SBSQ HOSP IP/OBS HIGH 50: CPT | Performed by: INTERNAL MEDICINE

## 2022-10-24 PROCEDURE — 97165 OT EVAL LOW COMPLEX 30 MIN: CPT

## 2022-10-24 PROCEDURE — 94761 N-INVAS EAR/PLS OXIMETRY MLT: CPT

## 2022-10-24 PROCEDURE — 85027 COMPLETE CBC AUTOMATED: CPT | Performed by: INTERNAL MEDICINE

## 2022-10-24 PROCEDURE — 71045 X-RAY EXAM CHEST 1 VIEW: CPT

## 2022-10-24 PROCEDURE — 82962 GLUCOSE BLOOD TEST: CPT

## 2022-10-24 PROCEDURE — 25010000002 CEFTRIAXONE PER 250 MG: Performed by: INTERNAL MEDICINE

## 2022-10-24 PROCEDURE — 80048 BASIC METABOLIC PNL TOTAL CA: CPT | Performed by: INTERNAL MEDICINE

## 2022-10-24 PROCEDURE — 63710000001 INSULIN LISPRO (HUMAN) PER 5 UNITS

## 2022-10-24 PROCEDURE — 25010000002 AZITHROMYCIN PER 500 MG: Performed by: INTERNAL MEDICINE

## 2022-10-24 PROCEDURE — 97162 PT EVAL MOD COMPLEX 30 MIN: CPT

## 2022-10-24 PROCEDURE — 94664 DEMO&/EVAL PT USE INHALER: CPT

## 2022-10-24 PROCEDURE — 84100 ASSAY OF PHOSPHORUS: CPT | Performed by: INTERNAL MEDICINE

## 2022-10-24 RX ORDER — BUMETANIDE 0.25 MG/ML
2 INJECTION INTRAMUSCULAR; INTRAVENOUS ONCE
Status: COMPLETED | OUTPATIENT
Start: 2022-10-24 | End: 2022-10-24

## 2022-10-24 RX ADMIN — IPRATROPIUM BROMIDE AND ALBUTEROL SULFATE 3 ML: .5; 3 SOLUTION RESPIRATORY (INHALATION) at 08:07

## 2022-10-24 RX ADMIN — ENOXAPARIN SODIUM 60 MG: 60 INJECTION SUBCUTANEOUS at 08:47

## 2022-10-24 RX ADMIN — INSULIN LISPRO 3 UNITS: 100 INJECTION, SOLUTION INTRAVENOUS; SUBCUTANEOUS at 11:57

## 2022-10-24 RX ADMIN — ALBUTEROL SULFATE 2.5 MG: 2.5 SOLUTION RESPIRATORY (INHALATION) at 19:24

## 2022-10-24 RX ADMIN — AZITHROMYCIN 500 MG: 500 INJECTION, POWDER, LYOPHILIZED, FOR SOLUTION INTRAVENOUS at 20:14

## 2022-10-24 RX ADMIN — Medication 10 ML: at 20:13

## 2022-10-24 RX ADMIN — LOSARTAN POTASSIUM 50 MG: 50 TABLET, FILM COATED ORAL at 08:46

## 2022-10-24 RX ADMIN — BUMETANIDE 2 MG: 0.25 INJECTION, SOLUTION INTRAMUSCULAR; INTRAVENOUS at 10:17

## 2022-10-24 RX ADMIN — METOPROLOL SUCCINATE 25 MG: 25 TABLET, FILM COATED, EXTENDED RELEASE ORAL at 08:46

## 2022-10-24 RX ADMIN — Medication 10 ML: at 08:47

## 2022-10-24 RX ADMIN — ENOXAPARIN SODIUM 60 MG: 60 INJECTION SUBCUTANEOUS at 20:13

## 2022-10-24 RX ADMIN — IPRATROPIUM BROMIDE AND ALBUTEROL SULFATE 3 ML: .5; 3 SOLUTION RESPIRATORY (INHALATION) at 12:34

## 2022-10-24 RX ADMIN — SODIUM CHLORIDE 2 G: 900 INJECTION INTRAVENOUS at 08:46

## 2022-10-24 RX ADMIN — ALBUTEROL SULFATE 2.5 MG: 2.5 SOLUTION RESPIRATORY (INHALATION) at 05:12

## 2022-10-24 RX ADMIN — HYDROCHLOROTHIAZIDE 25 MG: 25 TABLET ORAL at 08:46

## 2022-10-25 PROBLEM — J18.9 CAP (COMMUNITY ACQUIRED PNEUMONIA): Status: RESOLVED | Noted: 2022-10-22 | Resolved: 2022-10-25

## 2022-10-25 LAB
ANION GAP SERPL CALCULATED.3IONS-SCNC: 8 MMOL/L (ref 5–15)
BACTERIA SPEC RESP CULT: NORMAL
BUN SERPL-MCNC: 26 MG/DL (ref 6–20)
BUN/CREAT SERPL: 35.6 (ref 7–25)
CALCIUM SPEC-SCNC: 8.8 MG/DL (ref 8.6–10.5)
CHLORIDE SERPL-SCNC: 91 MMOL/L (ref 98–107)
CO2 SERPL-SCNC: 37 MMOL/L (ref 22–29)
CREAT SERPL-MCNC: 0.73 MG/DL (ref 0.57–1)
DEPRECATED RDW RBC AUTO: 57.3 FL (ref 37–54)
EGFRCR SERPLBLD CKD-EPI 2021: 97.3 ML/MIN/1.73
ERYTHROCYTE [DISTWIDTH] IN BLOOD BY AUTOMATED COUNT: 16.6 % (ref 12.3–15.4)
GLUCOSE BLDC GLUCOMTR-MCNC: 129 MG/DL (ref 70–130)
GLUCOSE BLDC GLUCOMTR-MCNC: 130 MG/DL (ref 70–130)
GLUCOSE BLDC GLUCOMTR-MCNC: 143 MG/DL (ref 70–130)
GLUCOSE BLDC GLUCOMTR-MCNC: 146 MG/DL (ref 70–130)
GLUCOSE SERPL-MCNC: 130 MG/DL (ref 65–99)
GRAM STN SPEC: NORMAL
HCT VFR BLD AUTO: 44.2 % (ref 34–46.6)
HGB BLD-MCNC: 13.5 G/DL (ref 12–15.9)
MAGNESIUM SERPL-MCNC: 2.7 MG/DL (ref 1.6–2.6)
MCH RBC QN AUTO: 28.5 PG (ref 26.6–33)
MCHC RBC AUTO-ENTMCNC: 30.5 G/DL (ref 31.5–35.7)
MCV RBC AUTO: 93.4 FL (ref 79–97)
PHOSPHATE SERPL-MCNC: 3.9 MG/DL (ref 2.5–4.5)
PLATELET # BLD AUTO: 191 10*3/MM3 (ref 140–450)
PMV BLD AUTO: 13.5 FL (ref 6–12)
POTASSIUM SERPL-SCNC: 3.7 MMOL/L (ref 3.5–5.2)
RBC # BLD AUTO: 4.73 10*6/MM3 (ref 3.77–5.28)
SODIUM SERPL-SCNC: 136 MMOL/L (ref 136–145)
WBC NRBC COR # BLD: 7.41 10*3/MM3 (ref 3.4–10.8)

## 2022-10-25 PROCEDURE — 29581 APPL MULTLAYER CMPRN SYS LEG: CPT

## 2022-10-25 PROCEDURE — 80048 BASIC METABOLIC PNL TOTAL CA: CPT | Performed by: NURSE PRACTITIONER

## 2022-10-25 PROCEDURE — 25010000002 ENOXAPARIN PER 10 MG: Performed by: INTERNAL MEDICINE

## 2022-10-25 PROCEDURE — 99232 SBSQ HOSP IP/OBS MODERATE 35: CPT | Performed by: INTERNAL MEDICINE

## 2022-10-25 PROCEDURE — 94761 N-INVAS EAR/PLS OXIMETRY MLT: CPT

## 2022-10-25 PROCEDURE — 85027 COMPLETE CBC AUTOMATED: CPT | Performed by: NURSE PRACTITIONER

## 2022-10-25 PROCEDURE — 94799 UNLISTED PULMONARY SVC/PX: CPT

## 2022-10-25 PROCEDURE — 82962 GLUCOSE BLOOD TEST: CPT

## 2022-10-25 PROCEDURE — 84100 ASSAY OF PHOSPHORUS: CPT | Performed by: NURSE PRACTITIONER

## 2022-10-25 PROCEDURE — 97164 PT RE-EVAL EST PLAN CARE: CPT

## 2022-10-25 PROCEDURE — 83735 ASSAY OF MAGNESIUM: CPT | Performed by: NURSE PRACTITIONER

## 2022-10-25 PROCEDURE — 97530 THERAPEUTIC ACTIVITIES: CPT

## 2022-10-25 PROCEDURE — 25010000002 CEFTRIAXONE PER 250 MG: Performed by: INTERNAL MEDICINE

## 2022-10-25 RX ORDER — BUMETANIDE 0.25 MG/ML
2 INJECTION INTRAMUSCULAR; INTRAVENOUS ONCE
Status: COMPLETED | OUTPATIENT
Start: 2022-10-25 | End: 2022-10-25

## 2022-10-25 RX ADMIN — SODIUM CHLORIDE 2 G: 900 INJECTION INTRAVENOUS at 08:45

## 2022-10-25 RX ADMIN — ENOXAPARIN SODIUM 60 MG: 60 INJECTION SUBCUTANEOUS at 20:57

## 2022-10-25 RX ADMIN — LOSARTAN POTASSIUM 50 MG: 50 TABLET, FILM COATED ORAL at 08:45

## 2022-10-25 RX ADMIN — HYDROCHLOROTHIAZIDE 25 MG: 25 TABLET ORAL at 08:45

## 2022-10-25 RX ADMIN — Medication 10 ML: at 08:46

## 2022-10-25 RX ADMIN — Medication 10 ML: at 20:56

## 2022-10-25 RX ADMIN — BUMETANIDE 2 MG: 0.25 INJECTION, SOLUTION INTRAMUSCULAR; INTRAVENOUS at 11:04

## 2022-10-25 RX ADMIN — IPRATROPIUM BROMIDE AND ALBUTEROL SULFATE 3 ML: .5; 3 SOLUTION RESPIRATORY (INHALATION) at 12:52

## 2022-10-25 RX ADMIN — ALBUTEROL SULFATE 2.5 MG: 2.5 SOLUTION RESPIRATORY (INHALATION) at 00:50

## 2022-10-25 RX ADMIN — ENOXAPARIN SODIUM 60 MG: 60 INJECTION SUBCUTANEOUS at 08:45

## 2022-10-25 RX ADMIN — IPRATROPIUM BROMIDE AND ALBUTEROL SULFATE 3 ML: .5; 3 SOLUTION RESPIRATORY (INHALATION) at 19:25

## 2022-10-25 RX ADMIN — METOPROLOL SUCCINATE 25 MG: 25 TABLET, FILM COATED, EXTENDED RELEASE ORAL at 08:45

## 2022-10-26 PROBLEM — E66.2 OBESITY HYPOVENTILATION SYNDROME (HCC): Status: ACTIVE | Noted: 2022-10-26

## 2022-10-26 PROBLEM — I35.0 AORTIC STENOSIS: Status: ACTIVE | Noted: 2022-10-26

## 2022-10-26 PROBLEM — I27.21 SECONDARY PULMONARY ARTERIAL HYPERTENSION (HCC): Status: ACTIVE | Noted: 2022-10-26

## 2022-10-26 PROBLEM — J18.9 CAP (COMMUNITY ACQUIRED PNEUMONIA): Status: RESOLVED | Noted: 2022-10-22 | Resolved: 2022-10-26

## 2022-10-26 PROBLEM — I35.1 AORTIC REGURGITATION: Status: ACTIVE | Noted: 2022-10-26

## 2022-10-26 PROBLEM — J96.02 ACUTE RESPIRATORY FAILURE WITH HYPOXIA AND HYPERCAPNIA: Status: ACTIVE | Noted: 2022-10-22

## 2022-10-26 LAB
ANION GAP SERPL CALCULATED.3IONS-SCNC: 6 MMOL/L (ref 5–15)
BASOPHILS # BLD AUTO: 0.05 10*3/MM3 (ref 0–0.2)
BASOPHILS NFR BLD AUTO: 0.7 % (ref 0–1.5)
BUN SERPL-MCNC: 19 MG/DL (ref 6–20)
BUN/CREAT SERPL: 28.8 (ref 7–25)
CALCIUM SPEC-SCNC: 8.9 MG/DL (ref 8.6–10.5)
CHLORIDE SERPL-SCNC: 91 MMOL/L (ref 98–107)
CO2 SERPL-SCNC: 38 MMOL/L (ref 22–29)
CREAT SERPL-MCNC: 0.66 MG/DL (ref 0.57–1)
DEPRECATED RDW RBC AUTO: 55.6 FL (ref 37–54)
EGFRCR SERPLBLD CKD-EPI 2021: 103.7 ML/MIN/1.73
EOSINOPHIL # BLD AUTO: 0.2 10*3/MM3 (ref 0–0.4)
EOSINOPHIL NFR BLD AUTO: 2.6 % (ref 0.3–6.2)
ERYTHROCYTE [DISTWIDTH] IN BLOOD BY AUTOMATED COUNT: 16.5 % (ref 12.3–15.4)
GLUCOSE BLDC GLUCOMTR-MCNC: 118 MG/DL (ref 70–130)
GLUCOSE BLDC GLUCOMTR-MCNC: 129 MG/DL (ref 70–130)
GLUCOSE BLDC GLUCOMTR-MCNC: 169 MG/DL (ref 70–130)
GLUCOSE BLDC GLUCOMTR-MCNC: 182 MG/DL (ref 70–130)
GLUCOSE SERPL-MCNC: 149 MG/DL (ref 65–99)
HCT VFR BLD AUTO: 43.5 % (ref 34–46.6)
HGB BLD-MCNC: 14 G/DL (ref 12–15.9)
IMM GRANULOCYTES # BLD AUTO: 0.03 10*3/MM3 (ref 0–0.05)
IMM GRANULOCYTES NFR BLD AUTO: 0.4 % (ref 0–0.5)
LYMPHOCYTES # BLD AUTO: 1.97 10*3/MM3 (ref 0.7–3.1)
LYMPHOCYTES NFR BLD AUTO: 25.9 % (ref 19.6–45.3)
MAGNESIUM SERPL-MCNC: 2.8 MG/DL (ref 1.6–2.6)
MCH RBC QN AUTO: 29.3 PG (ref 26.6–33)
MCHC RBC AUTO-ENTMCNC: 32.2 G/DL (ref 31.5–35.7)
MCV RBC AUTO: 91 FL (ref 79–97)
MONOCYTES # BLD AUTO: 0.96 10*3/MM3 (ref 0.1–0.9)
MONOCYTES NFR BLD AUTO: 12.6 % (ref 5–12)
NEUTROPHILS NFR BLD AUTO: 4.4 10*3/MM3 (ref 1.7–7)
NEUTROPHILS NFR BLD AUTO: 57.8 % (ref 42.7–76)
NRBC BLD AUTO-RTO: 0 /100 WBC (ref 0–0.2)
PLATELET # BLD AUTO: 197 10*3/MM3 (ref 140–450)
PMV BLD AUTO: 12.6 FL (ref 6–12)
POTASSIUM SERPL-SCNC: 3.7 MMOL/L (ref 3.5–5.2)
QT INTERVAL: 342 MS
QTC INTERVAL: 485 MS
RBC # BLD AUTO: 4.78 10*6/MM3 (ref 3.77–5.28)
SODIUM SERPL-SCNC: 135 MMOL/L (ref 136–145)
WBC NRBC COR # BLD: 7.61 10*3/MM3 (ref 3.4–10.8)

## 2022-10-26 PROCEDURE — 25010000002 ENOXAPARIN PER 10 MG: Performed by: INTERNAL MEDICINE

## 2022-10-26 PROCEDURE — 82962 GLUCOSE BLOOD TEST: CPT

## 2022-10-26 PROCEDURE — 93005 ELECTROCARDIOGRAM TRACING: CPT | Performed by: NURSE PRACTITIONER

## 2022-10-26 PROCEDURE — 85025 COMPLETE CBC W/AUTO DIFF WBC: CPT | Performed by: INTERNAL MEDICINE

## 2022-10-26 PROCEDURE — 97530 THERAPEUTIC ACTIVITIES: CPT

## 2022-10-26 PROCEDURE — 99232 SBSQ HOSP IP/OBS MODERATE 35: CPT | Performed by: NURSE PRACTITIONER

## 2022-10-26 PROCEDURE — 80048 BASIC METABOLIC PNL TOTAL CA: CPT | Performed by: INTERNAL MEDICINE

## 2022-10-26 PROCEDURE — 83735 ASSAY OF MAGNESIUM: CPT | Performed by: INTERNAL MEDICINE

## 2022-10-26 PROCEDURE — 97116 GAIT TRAINING THERAPY: CPT

## 2022-10-26 PROCEDURE — 63710000001 INSULIN LISPRO (HUMAN) PER 5 UNITS: Performed by: NURSE PRACTITIONER

## 2022-10-26 PROCEDURE — 94799 UNLISTED PULMONARY SVC/PX: CPT

## 2022-10-26 PROCEDURE — 94664 DEMO&/EVAL PT USE INHALER: CPT

## 2022-10-26 PROCEDURE — 93010 ELECTROCARDIOGRAM REPORT: CPT | Performed by: INTERNAL MEDICINE

## 2022-10-26 PROCEDURE — 94761 N-INVAS EAR/PLS OXIMETRY MLT: CPT

## 2022-10-26 RX ORDER — BUMETANIDE 0.25 MG/ML
2 INJECTION INTRAMUSCULAR; INTRAVENOUS ONCE
Status: COMPLETED | OUTPATIENT
Start: 2022-10-26 | End: 2022-10-26

## 2022-10-26 RX ORDER — METOPROLOL SUCCINATE 50 MG/1
50 TABLET, EXTENDED RELEASE ORAL DAILY
Status: DISCONTINUED | OUTPATIENT
Start: 2022-10-27 | End: 2022-10-27 | Stop reason: HOSPADM

## 2022-10-26 RX ORDER — INSULIN LISPRO 100 [IU]/ML
0-7 INJECTION, SOLUTION INTRAVENOUS; SUBCUTANEOUS
Status: DISCONTINUED | OUTPATIENT
Start: 2022-10-26 | End: 2022-10-27 | Stop reason: HOSPADM

## 2022-10-26 RX ORDER — PANTOPRAZOLE SODIUM 40 MG/1
40 TABLET, DELAYED RELEASE ORAL
Status: DISCONTINUED | OUTPATIENT
Start: 2022-10-27 | End: 2022-10-27 | Stop reason: HOSPADM

## 2022-10-26 RX ADMIN — METOPROLOL SUCCINATE 25 MG: 25 TABLET, FILM COATED, EXTENDED RELEASE ORAL at 09:43

## 2022-10-26 RX ADMIN — IPRATROPIUM BROMIDE AND ALBUTEROL SULFATE 3 ML: .5; 3 SOLUTION RESPIRATORY (INHALATION) at 08:02

## 2022-10-26 RX ADMIN — Medication 10 ML: at 09:43

## 2022-10-26 RX ADMIN — IPRATROPIUM BROMIDE AND ALBUTEROL SULFATE 3 ML: .5; 3 SOLUTION RESPIRATORY (INHALATION) at 19:12

## 2022-10-26 RX ADMIN — ENOXAPARIN SODIUM 60 MG: 60 INJECTION SUBCUTANEOUS at 21:33

## 2022-10-26 RX ADMIN — Medication 10 ML: at 21:36

## 2022-10-26 RX ADMIN — ALBUTEROL SULFATE 2.5 MG: 2.5 SOLUTION RESPIRATORY (INHALATION) at 01:17

## 2022-10-26 RX ADMIN — ENOXAPARIN SODIUM 60 MG: 60 INJECTION SUBCUTANEOUS at 09:43

## 2022-10-26 RX ADMIN — HYDROCHLOROTHIAZIDE 25 MG: 25 TABLET ORAL at 09:43

## 2022-10-26 RX ADMIN — INSULIN LISPRO 2 UNITS: 100 INJECTION, SOLUTION INTRAVENOUS; SUBCUTANEOUS at 21:33

## 2022-10-26 RX ADMIN — LOSARTAN POTASSIUM 50 MG: 50 TABLET, FILM COATED ORAL at 09:43

## 2022-10-26 RX ADMIN — IPRATROPIUM BROMIDE AND ALBUTEROL SULFATE 3 ML: .5; 3 SOLUTION RESPIRATORY (INHALATION) at 15:16

## 2022-10-26 RX ADMIN — BUMETANIDE 2 MG: 0.25 INJECTION, SOLUTION INTRAMUSCULAR; INTRAVENOUS at 11:19

## 2022-10-26 RX ADMIN — INSULIN LISPRO 2 UNITS: 100 INJECTION, SOLUTION INTRAVENOUS; SUBCUTANEOUS at 18:06

## 2022-10-27 ENCOUNTER — READMISSION MANAGEMENT (OUTPATIENT)
Dept: CALL CENTER | Facility: HOSPITAL | Age: 57
End: 2022-10-27

## 2022-10-27 ENCOUNTER — HOME HEALTH ADMISSION (OUTPATIENT)
Dept: HOME HEALTH SERVICES | Facility: HOME HEALTHCARE | Age: 57
End: 2022-10-27

## 2022-10-27 VITALS
OXYGEN SATURATION: 93 % | BODY MASS INDEX: 51.91 KG/M2 | RESPIRATION RATE: 18 BRPM | HEART RATE: 95 BPM | WEIGHT: 293 LBS | DIASTOLIC BLOOD PRESSURE: 85 MMHG | TEMPERATURE: 98.5 F | SYSTOLIC BLOOD PRESSURE: 155 MMHG | HEIGHT: 63 IN

## 2022-10-27 LAB
ALBUMIN SERPL-MCNC: 3.3 G/DL (ref 3.5–5.2)
ALBUMIN/GLOB SERPL: 0.8 G/DL
ALP SERPL-CCNC: 69 U/L (ref 39–117)
ALT SERPL W P-5'-P-CCNC: 22 U/L (ref 1–33)
ANION GAP SERPL CALCULATED.3IONS-SCNC: 7 MMOL/L (ref 5–15)
AST SERPL-CCNC: 20 U/L (ref 1–32)
BACTERIA SPEC AEROBE CULT: NORMAL
BACTERIA SPEC AEROBE CULT: NORMAL
BILIRUB SERPL-MCNC: 0.9 MG/DL (ref 0–1.2)
BUN SERPL-MCNC: 25 MG/DL (ref 6–20)
BUN/CREAT SERPL: 34.7 (ref 7–25)
CALCIUM SPEC-SCNC: 9 MG/DL (ref 8.6–10.5)
CHLORIDE SERPL-SCNC: 93 MMOL/L (ref 98–107)
CO2 SERPL-SCNC: 34 MMOL/L (ref 22–29)
CREAT SERPL-MCNC: 0.72 MG/DL (ref 0.57–1)
DEPRECATED RDW RBC AUTO: 56 FL (ref 37–54)
EGFRCR SERPLBLD CKD-EPI 2021: 98.9 ML/MIN/1.73
ERYTHROCYTE [DISTWIDTH] IN BLOOD BY AUTOMATED COUNT: 16.4 % (ref 12.3–15.4)
GLOBULIN UR ELPH-MCNC: 3.9 GM/DL
GLUCOSE BLDC GLUCOMTR-MCNC: 122 MG/DL (ref 70–130)
GLUCOSE BLDC GLUCOMTR-MCNC: 148 MG/DL (ref 70–130)
GLUCOSE SERPL-MCNC: 144 MG/DL (ref 65–99)
HCT VFR BLD AUTO: 44.5 % (ref 34–46.6)
HGB BLD-MCNC: 13.6 G/DL (ref 12–15.9)
MAGNESIUM SERPL-MCNC: 2.2 MG/DL (ref 1.6–2.6)
MCH RBC QN AUTO: 28.5 PG (ref 26.6–33)
MCHC RBC AUTO-ENTMCNC: 30.6 G/DL (ref 31.5–35.7)
MCV RBC AUTO: 93.1 FL (ref 79–97)
PHOSPHATE SERPL-MCNC: 4 MG/DL (ref 2.5–4.5)
PLATELET # BLD AUTO: 182 10*3/MM3 (ref 140–450)
PMV BLD AUTO: 12.9 FL (ref 6–12)
POTASSIUM SERPL-SCNC: 3.8 MMOL/L (ref 3.5–5.2)
PROT SERPL-MCNC: 7.2 G/DL (ref 6–8.5)
RBC # BLD AUTO: 4.78 10*6/MM3 (ref 3.77–5.28)
SODIUM SERPL-SCNC: 134 MMOL/L (ref 136–145)
WBC NRBC COR # BLD: 7.83 10*3/MM3 (ref 3.4–10.8)

## 2022-10-27 PROCEDURE — 94799 UNLISTED PULMONARY SVC/PX: CPT

## 2022-10-27 PROCEDURE — 80053 COMPREHEN METABOLIC PANEL: CPT | Performed by: NURSE PRACTITIONER

## 2022-10-27 PROCEDURE — 99239 HOSP IP/OBS DSCHRG MGMT >30: CPT | Performed by: NURSE PRACTITIONER

## 2022-10-27 PROCEDURE — 82962 GLUCOSE BLOOD TEST: CPT

## 2022-10-27 PROCEDURE — 94664 DEMO&/EVAL PT USE INHALER: CPT

## 2022-10-27 PROCEDURE — 83735 ASSAY OF MAGNESIUM: CPT | Performed by: NURSE PRACTITIONER

## 2022-10-27 PROCEDURE — 29581 APPL MULTLAYER CMPRN SYS LEG: CPT

## 2022-10-27 PROCEDURE — 85027 COMPLETE CBC AUTOMATED: CPT | Performed by: NURSE PRACTITIONER

## 2022-10-27 PROCEDURE — 84100 ASSAY OF PHOSPHORUS: CPT | Performed by: NURSE PRACTITIONER

## 2022-10-27 PROCEDURE — 25010000002 ENOXAPARIN PER 10 MG: Performed by: INTERNAL MEDICINE

## 2022-10-27 RX ORDER — FUROSEMIDE 40 MG/1
40 TABLET ORAL DAILY
Qty: 30 TABLET | Refills: 1 | Status: SHIPPED | OUTPATIENT
Start: 2022-10-28 | End: 2023-04-03 | Stop reason: HOSPADM

## 2022-10-27 RX ORDER — FUROSEMIDE 40 MG/1
40 TABLET ORAL DAILY
Status: DISCONTINUED | OUTPATIENT
Start: 2022-10-27 | End: 2022-10-27 | Stop reason: HOSPADM

## 2022-10-27 RX ORDER — METOPROLOL SUCCINATE 50 MG/1
50 TABLET, EXTENDED RELEASE ORAL DAILY
Qty: 30 TABLET | Refills: 1 | Status: SHIPPED | OUTPATIENT
Start: 2022-10-28 | End: 2023-03-30

## 2022-10-27 RX ORDER — LOSARTAN POTASSIUM 50 MG/1
50 TABLET ORAL
Qty: 30 TABLET | Refills: 1 | Status: SHIPPED | OUTPATIENT
Start: 2022-10-28 | End: 2023-03-30

## 2022-10-27 RX ADMIN — ENOXAPARIN SODIUM 60 MG: 60 INJECTION SUBCUTANEOUS at 09:28

## 2022-10-27 RX ADMIN — FUROSEMIDE 40 MG: 40 TABLET ORAL at 10:21

## 2022-10-27 RX ADMIN — IPRATROPIUM BROMIDE AND ALBUTEROL SULFATE 3 ML: .5; 3 SOLUTION RESPIRATORY (INHALATION) at 13:01

## 2022-10-27 RX ADMIN — Medication 10 ML: at 09:30

## 2022-10-27 RX ADMIN — LOSARTAN POTASSIUM 50 MG: 50 TABLET, FILM COATED ORAL at 09:28

## 2022-10-27 RX ADMIN — IPRATROPIUM BROMIDE AND ALBUTEROL SULFATE 3 ML: .5; 3 SOLUTION RESPIRATORY (INHALATION) at 07:46

## 2022-10-27 RX ADMIN — PANTOPRAZOLE SODIUM 40 MG: 40 TABLET, DELAYED RELEASE ORAL at 05:21

## 2022-10-27 RX ADMIN — HYDROCHLOROTHIAZIDE 25 MG: 25 TABLET ORAL at 09:29

## 2022-10-27 RX ADMIN — METOPROLOL SUCCINATE 50 MG: 50 TABLET, EXTENDED RELEASE ORAL at 09:29

## 2022-10-28 NOTE — OUTREACH NOTE
Prep Survey    Flowsheet Row Responses   [facility] facility patient discharged from? [facility]   Is LACE score < 7 ? No   Emergency Room discharge w/ pulse ox? No   Eligibility Readm Mgmt   Discharge diagnosis Acute on chronic respiratory failure   Does the patient have one of the following disease processes/diagnoses(primary or secondary)? Other   Does the patient have Home health ordered? Yes   What is the Home health agency?  [facility] ,   Is there a DME ordered? No   Prep survey completed? Yes          JULIEN JO - Registered Nurse

## 2022-10-30 ENCOUNTER — DOCUMENTATION (OUTPATIENT)
Dept: PHYSICAL THERAPY | Facility: HOSPITAL | Age: 57
End: 2022-10-30

## 2022-10-30 ENCOUNTER — HOME CARE VISIT (OUTPATIENT)
Dept: HOME HEALTH SERVICES | Facility: HOME HEALTHCARE | Age: 57
End: 2022-10-30

## 2022-10-30 VITALS
HEIGHT: 63 IN | OXYGEN SATURATION: 92 % | BODY MASS INDEX: 51.91 KG/M2 | RESPIRATION RATE: 18 BRPM | WEIGHT: 293 LBS | TEMPERATURE: 98.4 F | SYSTOLIC BLOOD PRESSURE: 126 MMHG | DIASTOLIC BLOOD PRESSURE: 70 MMHG | HEART RATE: 88 BPM

## 2022-10-30 PROCEDURE — G0299 HHS/HOSPICE OF RN EA 15 MIN: HCPCS

## 2022-10-30 NOTE — THERAPY DISCHARGE NOTE
Outpatient Rehabilitation - Wound/Debridement D/C Summary        Patient Name: Kerry Leal  : 1965  MRN: [id number]  Today's Date: 10/30/2022                  Admit Date: (Not on file)    Visit Dx:  No diagnosis found.    Patient Active Problem List   Diagnosis   • Cellulitis of left lower extremity   • Essential hypertension   • Lymphedema of left leg   • Elevated d-dimer   • Prediabetes   • Acute on chronic respiratory failure with hypoxia and hypercapnia (HCC)   • Type 2 diabetes mellitus (HCC)   • Possible CAP (community acquired pneumonia)   • Morbid obesity with BMI of 60.0-69.9, adult (HCC)   • Secondary pulmonary arterial hypertension (HCC)   • Mild Aortic stenosis   • Mild Aortic regurgitation   • Probable Obesity hypoventilation syndrome (HCC)        Past Medical History:   Diagnosis Date   • Anxiety    • Asthma    • Heart murmur    • Hypertension         Past Surgical History:   Procedure Laterality Date   • APPENDECTOMY     •  SECTION     • HYSTERECTOMY     • NECK SURGERY           EVALUATION                WOUND DEBRIDEMENT                            Recommendation and Plan      Goals   PT OP Goals     Row Name 10/30/22 1400          PT Short Term Goals    STG 1 Pt/spouse to verbalize S&S of infection and when to seek medical attention.  -     STG 1 Progress Met  -     STG 2 Reduce LLE edema by 4cm to promote improved skin integrity.  -     STG 2 Progress Met  -     STG 3 Reduce LLE drainage to minimal amount to promote decreased dressing change frequency and improve skin integrity.  -     STG 3 Progress Met  -        Long Term Goals    LTG Date to Achieve 22  -     LTG 1 Pt/spouse independent with home dressing changes and compression use for independent home management.  -     LTG 1 Progress Met  -     LTG 2 Reduce LLE edema by 6 cm to promote improved skin integrity.  -     LTG 2 Progress Not Met  -     LTG 3 LLE without drainage or open wounds to  allow return to lymphedema therapy and home compression pump use.  -     LTG 3 Progress Met  -           User Key  (r) = Recorded By, (t) = Taken By, (c) = Cosigned By    Initials Name Provider Type    Sharon Mena, PT Physical Therapist                Time Calculation:               OP Discharge Summary     Row Name 10/30/22 1411             OP PT Discharge Summary    Date of Discharge 10/30/22  -      Reason for Discharge Transfer to other facility/level of care  s/p IP admission  -      Outcomes Achieved Patient able to partially acheive established goals  -      Discharge Destination Unknown  -            User Key  (r) = Recorded By, (t) = Taken By, (c) = Cosigned By    Initials Name Provider Type    Sharon Mena, PT Physical Therapist                Sharon Melgar PT  10/30/2022

## 2022-10-31 ENCOUNTER — HOME CARE VISIT (OUTPATIENT)
Dept: HOME HEALTH SERVICES | Facility: HOME HEALTHCARE | Age: 57
End: 2022-10-31

## 2022-10-31 PROCEDURE — G0151 HHCP-SERV OF PT,EA 15 MIN: HCPCS

## 2022-10-31 NOTE — HOME HEALTH
SOC Note:Patient with recent hospitalization related to lymphedema. Pneumonia detected while inpatient. Patient with long history of lymphedema and has received outpatient treatment at the [facility] lymphedema clinic and will be returning to the clinic mid November. Patient home with compressogrip wraps that are to be applied each am and taken off each evening.  lives in home and is able to assist patient with wraps when nursing is not available. New to home oxygen use. O2 use at HS and PRN for SOB. Patient currently using pulsox to monitor O2 level and keeping a log to take to MD for further review and determination of ongoing need. All VS wnl this visit.     Home Health ordered for: disciplines SN/PT/OT    Reason for Hosp/Primary Dx/Co-morbidities: Lymphedema    Focus of Care: Leg wraps, O2 teaching related to lymphedema    Current Functional status/mobility/DME: up with cane and human assistance    HB status/Living Arrangements: lives with spouse    Skin Integrity/wound status: Left leg lymphedema    Code Status: Full    Fall Risk: Yes    POC confirmed with patient and spouse on 10/30/22

## 2022-11-02 ENCOUNTER — HOME CARE VISIT (OUTPATIENT)
Dept: HOME HEALTH SERVICES | Facility: HOME HEALTHCARE | Age: 57
End: 2022-11-02

## 2022-11-02 VITALS
TEMPERATURE: 97.5 F | OXYGEN SATURATION: 96 % | HEART RATE: 75 BPM | SYSTOLIC BLOOD PRESSURE: 133 MMHG | RESPIRATION RATE: 17 BRPM | DIASTOLIC BLOOD PRESSURE: 77 MMHG

## 2022-11-02 VITALS
TEMPERATURE: 96.3 F | OXYGEN SATURATION: 94 % | SYSTOLIC BLOOD PRESSURE: 143 MMHG | DIASTOLIC BLOOD PRESSURE: 64 MMHG | HEART RATE: 73 BPM | RESPIRATION RATE: 18 BRPM

## 2022-11-02 PROCEDURE — G0300 HHS/HOSPICE OF LPN EA 15 MIN: HCPCS

## 2022-11-02 NOTE — HOME HEALTH
Patient has dealt with bilateral LE lymphedema x 10+ years per her report.  She lives with her spouse in 1 story home with 4 MARITZA. She is attempting to return to outpatient PT when able to also manage her lyphedema wrapping on her bilateral LE.

## 2022-11-02 NOTE — HOME HEALTH
"Acute hospital stay x 5 days secondary to lymphedema, now returns home, PLOF: amb without AD, indep with performance of all functional mobility tasks, wears glasses, indep/modified indep with performance of functional mobility tasks, one story home with 4 MARITZA, patient denies any falls/near falls. New to usage of oxygen concentrator x 2 LPM, \"mainly just at night\"."

## 2022-11-04 ENCOUNTER — HOME CARE VISIT (OUTPATIENT)
Dept: HOME HEALTH SERVICES | Facility: HOME HEALTHCARE | Age: 57
End: 2022-11-04

## 2022-11-04 VITALS — OXYGEN SATURATION: 95 % | HEART RATE: 83 BPM | DIASTOLIC BLOOD PRESSURE: 69 MMHG | SYSTOLIC BLOOD PRESSURE: 142 MMHG

## 2022-11-04 PROCEDURE — G0152 HHCP-SERV OF OT,EA 15 MIN: HCPCS

## 2022-11-04 PROCEDURE — G0300 HHS/HOSPICE OF LPN EA 15 MIN: HCPCS

## 2022-11-04 NOTE — HOME HEALTH
56 y/o F s/p recent hospitalization with b/l LE lymphedema. Pt. seen today for OT evaluation and presents with  decreased strength/endurance limiting safety and function with ADLs. Pt. will benefit from continued OT services to increase strength, endurance, safety, and overall independence with ADLs at home.  PMH: HTN, asthma, DM2, and lymphedema

## 2022-11-06 VITALS
RESPIRATION RATE: 18 BRPM | OXYGEN SATURATION: 95 % | HEART RATE: 83 BPM | DIASTOLIC BLOOD PRESSURE: 69 MMHG | TEMPERATURE: 96.9 F | SYSTOLIC BLOOD PRESSURE: 142 MMHG

## 2022-11-06 NOTE — CASE COMMUNICATION
Patient missed a PT visit from [facility] 11/4/2022.     Reason: No response from patient to schedule visit, muiltiple attempts.       For your records only.   As per home health protocol, MD must be notified of missed/cancelled visits; therefore the prescribed frequency was not met.

## 2022-11-07 ENCOUNTER — READMISSION MANAGEMENT (OUTPATIENT)
Dept: CALL CENTER | Facility: HOSPITAL | Age: 57
End: 2022-11-07

## 2022-11-07 ENCOUNTER — HOME CARE VISIT (OUTPATIENT)
Dept: HOME HEALTH SERVICES | Facility: HOME HEALTHCARE | Age: 57
End: 2022-11-07

## 2022-11-07 VITALS
SYSTOLIC BLOOD PRESSURE: 128 MMHG | OXYGEN SATURATION: 96 % | RESPIRATION RATE: 17 BRPM | HEART RATE: 70 BPM | DIASTOLIC BLOOD PRESSURE: 71 MMHG | TEMPERATURE: 98.3 F

## 2022-11-07 PROCEDURE — G0151 HHCP-SERV OF PT,EA 15 MIN: HCPCS

## 2022-11-07 NOTE — HOME HEALTH
"Routine Visit Note:    Skill/education provided:Patient states she is feeling better, still unable to get in to outpatient for edema mgmt until next month. She states she feels like her bilateral LE edema has increased since starting her other BP medication.  She states her bilaterall LE's \"feel weak\".    Patient/caregiver response: Patient verbalizes understanding of HEPprogram for bilateral LE's.     Plan for next visit: HEP education and training"

## 2022-11-07 NOTE — OUTREACH NOTE
Medical Week 2 Survey    Flowsheet Row Responses   [facility], [place], operated by [facility] patient discharged from? [facility]   Does the patient have one of the following disease processes/diagnoses(primary or secondary)? Other   Week 2 attempt successful? Yes   Call start time 1242   Discharge diagnosis Acute on chronic respiratory failure   Call end time 1245   Meds reviewed with patient/caregiver? Yes   Is the patient taking all medications as directed (includes completed medication regime)? Yes   Does the patient have a primary care provider?  Yes   Has the patient kept scheduled appointments due by today? Yes   Comments Referred PT to pulmo dr   What is the Home health agency?  [facility] ,   Has home health visited the patient within 72 hours of discharge? Yes   Psychosocial issues? No   Did the patient receive a copy of their discharge instructions? Yes   Nursing interventions Reviewed instructions with patient   What is the patient's perception of their health status since discharge? Improving   Is the patient/caregiver able to teach back signs and symptoms related to disease process for when to call PCP? Yes   Is the patient/caregiver able to teach back signs and symptoms related to disease process for when to call 911? Yes   Is the patient/caregiver able to teach back the hierarchy of who to call/visit for symptoms/problems? PCP, Specialist, Home health nurse, Urgent Care, ED, 911 Yes   Week 2 Call Completed? Yes   Graduated Yes   Graduated/Revoked comments Pt reports she is improving. Has therapy coming in and her Dr is referring her to Pulmo as well. pt states she is aware of when to call the dr for issues.           HAILEY LEYVA - Registered Nurse

## 2022-11-08 ENCOUNTER — HOME CARE VISIT (OUTPATIENT)
Dept: HOME HEALTH SERVICES | Facility: HOME HEALTHCARE | Age: 57
End: 2022-11-08

## 2022-11-08 VITALS
OXYGEN SATURATION: 95 % | SYSTOLIC BLOOD PRESSURE: 172 MMHG | HEART RATE: 83 BPM | DIASTOLIC BLOOD PRESSURE: 78 MMHG | TEMPERATURE: 98.4 F | RESPIRATION RATE: 16 BRPM

## 2022-11-08 PROCEDURE — G0299 HHS/HOSPICE OF RN EA 15 MIN: HCPCS

## 2022-11-09 ENCOUNTER — HOME CARE VISIT (OUTPATIENT)
Dept: HOME HEALTH SERVICES | Facility: HOME HEALTHCARE | Age: 57
End: 2022-11-09

## 2022-11-09 VITALS — OXYGEN SATURATION: 94 % | DIASTOLIC BLOOD PRESSURE: 70 MMHG | SYSTOLIC BLOOD PRESSURE: 149 MMHG | HEART RATE: 87 BPM

## 2022-11-09 PROCEDURE — G0152 HHCP-SERV OF OT,EA 15 MIN: HCPCS

## 2022-11-09 NOTE — HOME HEALTH
[facility] for wound care BLE's and cardiopulmonary assessment.  Patient has appointment with lymphedema clinic on 11.16.22.  Patient did not take Losartan today d/t it making her legs feel heavy.  BP elevated today.  Instructed to take Losartan and that SN would contact MD regarding medication.

## 2022-11-10 NOTE — HOME HEALTH
Pt. tolerated OT session fair. Pt participated in 45 mins of ADLs/IADLs and theraputic exercises: Pt. demonstrated increased activity tolerance for all OOB ADLs today with understanding of energy conservation techniques and importance of HEP. Continue OT per POC to increase independence and safety with ADLs/IADLs within the home.

## 2022-11-11 ENCOUNTER — HOME CARE VISIT (OUTPATIENT)
Dept: HOME HEALTH SERVICES | Facility: HOME HEALTHCARE | Age: 57
End: 2022-11-11

## 2022-11-11 VITALS
TEMPERATURE: 98.4 F | RESPIRATION RATE: 16 BRPM | SYSTOLIC BLOOD PRESSURE: 137 MMHG | HEART RATE: 82 BPM | OXYGEN SATURATION: 95 % | DIASTOLIC BLOOD PRESSURE: 80 MMHG

## 2022-11-11 PROCEDURE — G0299 HHS/HOSPICE OF RN EA 15 MIN: HCPCS

## 2022-11-11 NOTE — HOME HEALTH
SNV for wound care BLE's.  Patient's  is independent with the care.  SN discharged patient from nursing today d/t insurance approval only for 5 visits.  Patient has appt with lymphedema clinic next week.  PT/OT still working with patient.  Educated on continued use of Losartan until told to discontinue by MD.

## 2022-11-14 ENCOUNTER — HOME CARE VISIT (OUTPATIENT)
Dept: HOME HEALTH SERVICES | Facility: HOME HEALTHCARE | Age: 57
End: 2022-11-14

## 2022-11-14 VITALS
SYSTOLIC BLOOD PRESSURE: 162 MMHG | HEART RATE: 83 BPM | DIASTOLIC BLOOD PRESSURE: 83 MMHG | RESPIRATION RATE: 17 BRPM | OXYGEN SATURATION: 94 %

## 2022-11-14 VITALS — HEART RATE: 83 BPM | OXYGEN SATURATION: 94 % | DIASTOLIC BLOOD PRESSURE: 83 MMHG | SYSTOLIC BLOOD PRESSURE: 162 MMHG

## 2022-11-14 PROCEDURE — G0151 HHCP-SERV OF PT,EA 15 MIN: HCPCS

## 2022-11-14 PROCEDURE — G0152 HHCP-SERV OF OT,EA 15 MIN: HCPCS

## 2022-11-14 NOTE — HOME HEALTH
[facility] d/c today secondary to all OT goals met and pt. is going to outpatient therapy for lyphedema this Wednesday.

## 2022-11-14 NOTE — HOME HEALTH
Patient discharged from home care PT services as she will attend outpatient PT services to focus on lymphedema mgmt of bilateral LE's.

## 2023-03-30 ENCOUNTER — HOSPITAL ENCOUNTER (OUTPATIENT)
Facility: HOSPITAL | Age: 58
Setting detail: OBSERVATION
Discharge: HOME | End: 2023-04-03
Attending: EMERGENCY MEDICINE | Admitting: HOSPITALIST
Payer: MEDICARE

## 2023-03-30 ENCOUNTER — APPOINTMENT (OUTPATIENT)
Dept: CARDIOLOGY | Facility: HOSPITAL | Age: 58
End: 2023-03-30
Payer: MEDICARE

## 2023-03-30 DIAGNOSIS — L03.116 BILATERAL CELLULITIS OF LOWER LEG: ICD-10-CM

## 2023-03-30 DIAGNOSIS — I50.9 CONGESTIVE HEART FAILURE, UNSPECIFIED HF CHRONICITY, UNSPECIFIED HEART FAILURE TYPE: ICD-10-CM

## 2023-03-30 DIAGNOSIS — E66.01 MORBID OBESITY: ICD-10-CM

## 2023-03-30 DIAGNOSIS — I89.0 LYMPHEDEMA OF BOTH LOWER EXTREMITIES: ICD-10-CM

## 2023-03-30 DIAGNOSIS — L03.115 BILATERAL CELLULITIS OF LOWER LEG: ICD-10-CM

## 2023-03-30 DIAGNOSIS — L03.116 CELLULITIS OF LEFT LOWER LEG: Primary | ICD-10-CM

## 2023-03-30 LAB
ALBUMIN SERPL-MCNC: 3.5 G/DL (ref 3.5–5.2)
ALBUMIN/GLOB SERPL: 0.8 G/DL
ALP SERPL-CCNC: 78 U/L (ref 39–117)
ALT SERPL W P-5'-P-CCNC: 13 U/L (ref 1–33)
ANION GAP SERPL CALCULATED.3IONS-SCNC: 12 MMOL/L (ref 5–15)
AST SERPL-CCNC: 16 U/L (ref 1–32)
BASOPHILS # BLD AUTO: 0.05 10*3/MM3 (ref 0–0.2)
BASOPHILS NFR BLD AUTO: 0.6 % (ref 0–1.5)
BH CV LOWER VASCULAR LEFT COMMON FEMORAL AUGMENT: NORMAL
BH CV LOWER VASCULAR LEFT COMMON FEMORAL COMPRESS: NORMAL
BH CV LOWER VASCULAR LEFT COMMON FEMORAL PHASIC: NORMAL
BH CV LOWER VASCULAR LEFT COMMON FEMORAL SPONT: NORMAL
BH CV LOWER VASCULAR LEFT DISTAL FEMORAL COMPRESS: NORMAL
BH CV LOWER VASCULAR LEFT GREATER SAPH AK COMPRESS: NORMAL
BH CV LOWER VASCULAR LEFT GREATER SAPH BK COMPRESS: NORMAL
BH CV LOWER VASCULAR LEFT LESSER SAPH COMPRESS: NORMAL
BH CV LOWER VASCULAR LEFT MID FEMORAL AUGMENT: NORMAL
BH CV LOWER VASCULAR LEFT MID FEMORAL COMPRESS: NORMAL
BH CV LOWER VASCULAR LEFT MID FEMORAL PHASIC: NORMAL
BH CV LOWER VASCULAR LEFT MID FEMORAL SPONT: NORMAL
BH CV LOWER VASCULAR LEFT PROFUNDA FEMORAL AUGMENT: NORMAL
BH CV LOWER VASCULAR LEFT PROFUNDA FEMORAL COMPRESS: NORMAL
BH CV LOWER VASCULAR LEFT PROFUNDA FEMORAL PHASIC: NORMAL
BH CV LOWER VASCULAR LEFT PROFUNDA FEMORAL SPONT: NORMAL
BH CV LOWER VASCULAR LEFT PROXIMAL FEMORAL AUGMENT: NORMAL
BH CV LOWER VASCULAR LEFT PROXIMAL FEMORAL COMPRESS: NORMAL
BH CV LOWER VASCULAR LEFT PROXIMAL FEMORAL PHASIC: NORMAL
BH CV LOWER VASCULAR LEFT PROXIMAL FEMORAL SPONT: NORMAL
BH CV LOWER VASCULAR LEFT SAPHENOFEMORAL JUNCTION AUGMENT: NORMAL
BH CV LOWER VASCULAR LEFT SAPHENOFEMORAL JUNCTION COMPRESS: NORMAL
BH CV LOWER VASCULAR LEFT SAPHENOFEMORAL JUNCTION PHASIC: NORMAL
BH CV LOWER VASCULAR LEFT SAPHENOFEMORAL JUNCTION SPONT: NORMAL
BH CV LOWER VASCULAR RIGHT COMMON FEMORAL AUGMENT: NORMAL
BH CV LOWER VASCULAR RIGHT COMMON FEMORAL COMPRESS: NORMAL
BH CV LOWER VASCULAR RIGHT COMMON FEMORAL PHASIC: NORMAL
BH CV LOWER VASCULAR RIGHT COMMON FEMORAL SPONT: NORMAL
BH CV VAS PRELIMINARY FINDINGS SCRIPTING: 1
BILIRUB SERPL-MCNC: 0.6 MG/DL (ref 0–1.2)
BILIRUB UR QL STRIP: NEGATIVE
BUN SERPL-MCNC: 16 MG/DL (ref 6–20)
BUN/CREAT SERPL: 24.6 (ref 7–25)
CALCIUM SPEC-SCNC: 8.9 MG/DL (ref 8.6–10.5)
CHLORIDE SERPL-SCNC: 98 MMOL/L (ref 98–107)
CLARITY UR: CLEAR
CO2 SERPL-SCNC: 27 MMOL/L (ref 22–29)
COLOR UR: YELLOW
CREAT SERPL-MCNC: 0.65 MG/DL (ref 0.57–1)
D-LACTATE SERPL-SCNC: 0.8 MMOL/L (ref 0.5–2)
D-LACTATE SERPL-SCNC: 2.3 MMOL/L (ref 0.5–2)
DEPRECATED RDW RBC AUTO: 56.8 FL (ref 37–54)
EGFRCR SERPLBLD CKD-EPI 2021: 103.5 ML/MIN/1.73
EOSINOPHIL # BLD AUTO: 0.1 10*3/MM3 (ref 0–0.4)
EOSINOPHIL NFR BLD AUTO: 1.2 % (ref 0.3–6.2)
ERYTHROCYTE [DISTWIDTH] IN BLOOD BY AUTOMATED COUNT: 17.5 % (ref 12.3–15.4)
GLOBULIN UR ELPH-MCNC: 4.2 GM/DL
GLUCOSE SERPL-MCNC: 158 MG/DL (ref 65–99)
GLUCOSE UR STRIP-MCNC: NEGATIVE MG/DL
HBA1C MFR BLD: 6.9 % (ref 4.8–5.6)
HCT VFR BLD AUTO: 41.5 % (ref 34–46.6)
HGB BLD-MCNC: 12.4 G/DL (ref 12–15.9)
HGB UR QL STRIP.AUTO: NEGATIVE
IMM GRANULOCYTES # BLD AUTO: 0.03 10*3/MM3 (ref 0–0.05)
IMM GRANULOCYTES NFR BLD AUTO: 0.4 % (ref 0–0.5)
KETONES UR QL STRIP: NEGATIVE
LEUKOCYTE ESTERASE UR QL STRIP.AUTO: NEGATIVE
LYMPHOCYTES # BLD AUTO: 1.14 10*3/MM3 (ref 0.7–3.1)
LYMPHOCYTES NFR BLD AUTO: 13.8 % (ref 19.6–45.3)
MAGNESIUM SERPL-MCNC: 2 MG/DL (ref 1.6–2.6)
MAXIMAL PREDICTED HEART RATE: 164 BPM
MCH RBC QN AUTO: 26.8 PG (ref 26.6–33)
MCHC RBC AUTO-ENTMCNC: 29.9 G/DL (ref 31.5–35.7)
MCV RBC AUTO: 89.6 FL (ref 79–97)
MONOCYTES # BLD AUTO: 0.81 10*3/MM3 (ref 0.1–0.9)
MONOCYTES NFR BLD AUTO: 9.8 % (ref 5–12)
NEUTROPHILS NFR BLD AUTO: 6.13 10*3/MM3 (ref 1.7–7)
NEUTROPHILS NFR BLD AUTO: 74.2 % (ref 42.7–76)
NITRITE UR QL STRIP: NEGATIVE
NRBC BLD AUTO-RTO: 0 /100 WBC (ref 0–0.2)
PH UR STRIP.AUTO: 5.5 [PH] (ref 5–8)
PLATELET # BLD AUTO: 279 10*3/MM3 (ref 140–450)
PMV BLD AUTO: 12.4 FL (ref 6–12)
POTASSIUM SERPL-SCNC: 4.3 MMOL/L (ref 3.5–5.2)
PROCALCITONIN SERPL-MCNC: 0.06 NG/ML (ref 0–0.25)
PROT SERPL-MCNC: 7.7 G/DL (ref 6–8.5)
PROT UR QL STRIP: NEGATIVE
RBC # BLD AUTO: 4.63 10*6/MM3 (ref 3.77–5.28)
SODIUM SERPL-SCNC: 137 MMOL/L (ref 136–145)
SP GR UR STRIP: 1.03 (ref 1–1.03)
STRESS TARGET HR: 139 BPM
UROBILINOGEN UR QL STRIP: NORMAL
WBC NRBC COR # BLD: 8.26 10*3/MM3 (ref 3.4–10.8)

## 2023-03-30 PROCEDURE — 85025 COMPLETE CBC W/AUTO DIFF WBC: CPT | Performed by: EMERGENCY MEDICINE

## 2023-03-30 PROCEDURE — 80053 COMPREHEN METABOLIC PANEL: CPT | Performed by: EMERGENCY MEDICINE

## 2023-03-30 PROCEDURE — 93971 EXTREMITY STUDY: CPT | Performed by: INTERNAL MEDICINE

## 2023-03-30 PROCEDURE — 96372 THER/PROPH/DIAG INJ SC/IM: CPT

## 2023-03-30 PROCEDURE — 25010000002 DAPTOMYCIN PER 1 MG: Performed by: HOSPITALIST

## 2023-03-30 PROCEDURE — 96375 TX/PRO/DX INJ NEW DRUG ADDON: CPT

## 2023-03-30 PROCEDURE — 81003 URINALYSIS AUTO W/O SCOPE: CPT | Performed by: EMERGENCY MEDICINE

## 2023-03-30 PROCEDURE — 25010000002 VANCOMYCIN 10 G RECONSTITUTED SOLUTION: Performed by: EMERGENCY MEDICINE

## 2023-03-30 PROCEDURE — 93971 EXTREMITY STUDY: CPT

## 2023-03-30 PROCEDURE — 25010000002 CEFTRIAXONE PER 250 MG: Performed by: EMERGENCY MEDICINE

## 2023-03-30 PROCEDURE — 84145 PROCALCITONIN (PCT): CPT | Performed by: EMERGENCY MEDICINE

## 2023-03-30 PROCEDURE — 96365 THER/PROPH/DIAG IV INF INIT: CPT

## 2023-03-30 PROCEDURE — 87040 BLOOD CULTURE FOR BACTERIA: CPT | Performed by: EMERGENCY MEDICINE

## 2023-03-30 PROCEDURE — 83036 HEMOGLOBIN GLYCOSYLATED A1C: CPT | Performed by: HOSPITALIST

## 2023-03-30 PROCEDURE — G0378 HOSPITAL OBSERVATION PER HR: HCPCS

## 2023-03-30 PROCEDURE — 83605 ASSAY OF LACTIC ACID: CPT | Performed by: EMERGENCY MEDICINE

## 2023-03-30 PROCEDURE — 25010000002 HEPARIN (PORCINE) PER 1000 UNITS: Performed by: HOSPITALIST

## 2023-03-30 PROCEDURE — 83735 ASSAY OF MAGNESIUM: CPT | Performed by: EMERGENCY MEDICINE

## 2023-03-30 PROCEDURE — 36415 COLL VENOUS BLD VENIPUNCTURE: CPT

## 2023-03-30 PROCEDURE — 25010000002 FUROSEMIDE PER 20 MG: Performed by: HOSPITALIST

## 2023-03-30 PROCEDURE — 99285 EMERGENCY DEPT VISIT HI MDM: CPT

## 2023-03-30 PROCEDURE — 99223 1ST HOSP IP/OBS HIGH 75: CPT | Performed by: HOSPITALIST

## 2023-03-30 RX ORDER — ONDANSETRON 2 MG/ML
4 INJECTION INTRAMUSCULAR; INTRAVENOUS EVERY 6 HOURS PRN
Status: DISCONTINUED | OUTPATIENT
Start: 2023-03-30 | End: 2023-04-03 | Stop reason: HOSPADM

## 2023-03-30 RX ORDER — HYDROCODONE BITARTRATE AND ACETAMINOPHEN 7.5; 325 MG/1; MG/1
1 TABLET ORAL EVERY 4 HOURS PRN
Status: DISCONTINUED | OUTPATIENT
Start: 2023-03-30 | End: 2023-04-03 | Stop reason: HOSPADM

## 2023-03-30 RX ORDER — ACETAMINOPHEN 325 MG/1
650 TABLET ORAL EVERY 4 HOURS PRN
Status: DISCONTINUED | OUTPATIENT
Start: 2023-03-30 | End: 2023-04-03 | Stop reason: HOSPADM

## 2023-03-30 RX ORDER — ONDANSETRON 4 MG/1
4 TABLET, FILM COATED ORAL EVERY 6 HOURS PRN
Status: DISCONTINUED | OUTPATIENT
Start: 2023-03-30 | End: 2023-04-03 | Stop reason: HOSPADM

## 2023-03-30 RX ORDER — SODIUM CHLORIDE 0.9 % (FLUSH) 0.9 %
10 SYRINGE (ML) INJECTION EVERY 12 HOURS SCHEDULED
Status: DISCONTINUED | OUTPATIENT
Start: 2023-03-30 | End: 2023-04-03 | Stop reason: HOSPADM

## 2023-03-30 RX ORDER — ACETAMINOPHEN 650 MG/1
650 SUPPOSITORY RECTAL EVERY 4 HOURS PRN
Status: DISCONTINUED | OUTPATIENT
Start: 2023-03-30 | End: 2023-04-03 | Stop reason: HOSPADM

## 2023-03-30 RX ORDER — METOPROLOL SUCCINATE 100 MG/1
100 TABLET, EXTENDED RELEASE ORAL DAILY
COMMUNITY

## 2023-03-30 RX ORDER — SODIUM CHLORIDE 9 MG/ML
40 INJECTION, SOLUTION INTRAVENOUS AS NEEDED
Status: DISCONTINUED | OUTPATIENT
Start: 2023-03-30 | End: 2023-04-03 | Stop reason: HOSPADM

## 2023-03-30 RX ORDER — SODIUM CHLORIDE 0.9 % (FLUSH) 0.9 %
10 SYRINGE (ML) INJECTION AS NEEDED
Status: DISCONTINUED | OUTPATIENT
Start: 2023-03-30 | End: 2023-04-03 | Stop reason: HOSPADM

## 2023-03-30 RX ORDER — METOPROLOL SUCCINATE 50 MG/1
100 TABLET, EXTENDED RELEASE ORAL DAILY
Status: DISCONTINUED | OUTPATIENT
Start: 2023-03-30 | End: 2023-04-03 | Stop reason: HOSPADM

## 2023-03-30 RX ORDER — FUROSEMIDE 10 MG/ML
80 INJECTION INTRAMUSCULAR; INTRAVENOUS EVERY 12 HOURS
Status: DISCONTINUED | OUTPATIENT
Start: 2023-03-30 | End: 2023-04-03 | Stop reason: HOSPADM

## 2023-03-30 RX ORDER — HEPARIN SODIUM 5000 [USP'U]/ML
5000 INJECTION, SOLUTION INTRAVENOUS; SUBCUTANEOUS EVERY 8 HOURS SCHEDULED
Status: DISCONTINUED | OUTPATIENT
Start: 2023-03-30 | End: 2023-04-03 | Stop reason: HOSPADM

## 2023-03-30 RX ORDER — LOSARTAN POTASSIUM 50 MG/1
100 TABLET ORAL DAILY
Status: DISCONTINUED | OUTPATIENT
Start: 2023-03-30 | End: 2023-04-03 | Stop reason: HOSPADM

## 2023-03-30 RX ORDER — FUROSEMIDE 40 MG/1
40 TABLET ORAL DAILY
Status: CANCELLED | OUTPATIENT
Start: 2023-03-30

## 2023-03-30 RX ORDER — ACETAMINOPHEN 160 MG/5ML
650 SOLUTION ORAL EVERY 4 HOURS PRN
Status: DISCONTINUED | OUTPATIENT
Start: 2023-03-30 | End: 2023-04-03 | Stop reason: HOSPADM

## 2023-03-30 RX ORDER — LOSARTAN POTASSIUM 100 MG/1
100 TABLET ORAL DAILY
COMMUNITY

## 2023-03-30 RX ADMIN — HEPARIN SODIUM 5000 UNITS: 5000 INJECTION INTRAVENOUS; SUBCUTANEOUS at 21:41

## 2023-03-30 RX ADMIN — VANCOMYCIN HYDROCHLORIDE 3000 MG: 10 INJECTION, POWDER, LYOPHILIZED, FOR SOLUTION INTRAVENOUS at 13:13

## 2023-03-30 RX ADMIN — DAPTOMYCIN 550 MG: 500 INJECTION, POWDER, LYOPHILIZED, FOR SOLUTION INTRAVENOUS at 20:38

## 2023-03-30 RX ADMIN — Medication 10 ML: at 20:38

## 2023-03-30 RX ADMIN — FUROSEMIDE 80 MG: 10 INJECTION, SOLUTION INTRAMUSCULAR; INTRAVENOUS at 14:35

## 2023-03-30 RX ADMIN — METOPROLOL SUCCINATE 100 MG: 50 TABLET, EXTENDED RELEASE ORAL at 20:38

## 2023-03-30 RX ADMIN — LOSARTAN POTASSIUM 100 MG: 50 TABLET, FILM COATED ORAL at 19:48

## 2023-03-30 RX ADMIN — SODIUM CHLORIDE 2 G: 900 INJECTION INTRAVENOUS at 11:03

## 2023-03-30 NOTE — H&P
[facility] Medicine Services  HISTORY AND PHYSICAL    Patient Name: Kerry Leal  : 1965  MRN: [id number]  Primary Care Physician: Gopal Kong MD  Date of admission: 3/30/2023      Subjective   Subjective     Chief Complaint:  LE swelling and redness    HPI:  Kerry Leal is a 58 y.o. female with a past medical history of HTN presented to the ED complaining of redness, weeping and increased swelling of her bilateral calves. She states she noticed worsened redness with weeping of her b/l LE about 2 weeks ago. She states they aren't painful but feel very heavy and make it harder to walk. After reviewing records and discussing with Dr. Rodriguez, the patient saw Dr. JD Rodriguez in clinic last October and was started on Keflex suppression antibiotics for chronic cellulitis and lymphedema. The patient states she could not tolerate the Keflex and stopped it but never let Dr. Rodriguez know. She went to her PCP 1 month ago, who prescribed Bactrim. Patient has a sulfa allergy and could not tolerate the Bactrim, stating she broke out into a rash. She states she has chronic lymphedema and intermittently deals with cellulitis. The patient's labs are stable. LE duplex is negative. Patient will be admitted to the hospitalist service for further evalaution and treatment.    Review of Systems   Gen- No fevers, chills  CV- No chest pain, palpitations  Resp- No cough, dyspnea  GI- No N/V/D, abd pain  +b/l LE erythema and edema    Personal History     Past Medical History:   Diagnosis Date   • Anxiety    • Asthma    • Heart murmur    • Hypertension              Past Surgical History:   Procedure Laterality Date   • APPENDECTOMY     •  SECTION     • HYSTERECTOMY     • NECK SURGERY         Family History: family history includes Diabetes in her mother; Heart disease in her mother.     Social History:  reports that she has never smoked. She has never used smokeless tobacco. She  reports that she does not drink alcohol and does not use drugs.  Social History     Social History Narrative   • Not on file       Medications:  Available home medication information reviewed.  (Not in a hospital admission)      Allergies   Allergen Reactions   • Diclofenac Swelling   • Benadryl [Diphenhydramine Hcl] Other (See Comments)     UNSURE   • Clindamycin/Lincomycin Other (See Comments)     UNSURE   • Doxycycline Other (See Comments)     UNSURE   • Fluticasone Other (See Comments)     NOSE BLEEDS   • Fluvoxamine Other (See Comments)     UNSURE   • Montelukast Swelling   • Symbicort [Budesonide-Formoterol Fumarate] Other (See Comments)     UNSURE   • Lisinopril Dizziness   • Penicillins Other (See Comments)     MOTHER TOLD HER SHE WAS ALLERGIC   • Smz-Tmp Ds [Sulfamethoxazole-Trimethoprim] GI Intolerance   • Sulfa Antibiotics GI Intolerance       Objective   Objective     Vital Signs:   Temp:  [98.5 °F (36.9 °C)] 98.5 °F (36.9 °C)  Heart Rate:  [] 93  Resp:  [19] 19  BP: (163-195)/(89-97) 163/96  Flow (L/min):  [2] 2       Physical Exam   Constitutional: Awake, alert  Eyes: PERRLA, sclerae anicteric, no conjunctival injection  HENT: NCAT, mucous membranes moist  Neck: Supple, no thyromegaly, no lymphadenopathy, trachea midline  Respiratory: Clear to auscultation bilaterally, nonlabored respirations   Cardiovascular: tachycardic, RR, no murmurs, rubs, or gallops, palpable pedal pulses bilaterally  Gastrointestinal: Positive bowel sounds, soft, nontender, nondistended  Musculoskeletal: +b/l LE 2+ nonpitting edema  Psychiatric: Appropriate affect, cooperative  Neurologic: Oriented x 3, strength symmetric in all extremities, Cranial Nerves grossly intact to confrontation, speech clear  Skin: significant lymphedema with crusting, erythema of b/l calves    Result Review:  I have personally reviewed the results from the time of this admission to 3/30/2023 13:46 EDT and agree with these findings:  [x]   Laboratory list / accordion  []  Microbiology  [x]  Radiology  []  EKG/Telemetry   []  Cardiology/Vascular   []  Pathology  [x]  Old records  []  Other:      LAB RESULTS:      Lab 03/30/23  1047   WBC 8.26   HEMOGLOBIN 12.4   HEMATOCRIT 41.5   PLATELETS 279   NEUTROS ABS 6.13   IMMATURE GRANS (ABS) 0.03   LYMPHS ABS 1.14   MONOS ABS 0.81   EOS ABS 0.10   MCV 89.6   PROCALCITONIN 0.06   LACTATE 2.3*         Lab 03/30/23  1047   SODIUM 137   POTASSIUM 4.3   CHLORIDE 98   CO2 27.0   ANION GAP 12.0   BUN 16   CREATININE 0.65   EGFR 103.5   GLUCOSE 158*   CALCIUM 8.9   MAGNESIUM 2.0         Lab 03/30/23  1047   TOTAL PROTEIN 7.7   ALBUMIN 3.5   GLOBULIN 4.2   ALT (SGPT) 13   AST (SGOT) 16   BILIRUBIN 0.6   ALK PHOS 78                     UA    Urinalysis 3/30/23   Specific [place], UA 1.026   Ketones, UA Negative   Blood, UA Negative   Leukocytes, UA Negative   Nitrite, UA Negative             Microbiology Results (last 10 days)     ** No results found for the last 240 hours. **          No radiology results from the last 24 hrs    Results for orders placed during the hospital encounter of 10/22/22    Adult Transthoracic Echo Complete W/ Cont if Necessary Per Protocol    Interpretation Summary  •  Left ventricular ejection fraction appears to be 51 - 55%.  •  Left ventricular wall thickness is consistent with mild concentric hypertrophy.  •  Mild aortic valve regurgitation is present. Mild aortic valve stenosis is present.  •  Mild tricuspid valve regurgitation is present. Estimated right ventricular systolic pressure from tricuspid regurgitation is moderately elevated (45-55 mmHg).      Assessment & Plan   Assessment & Plan     Active Hospital Problems    Diagnosis  POA   • **Bilateral cellulitis of lower leg [L03.116, L03.115]  Yes   • Morbid obesity with BMI of 60.0-69.9, adult (HCC) [E66.01, Z68.44]  Not Applicable   • Essential hypertension [I10]  Yes   • Lymphedema of both lower extremities [I89.0]  Yes     [place]  CHRISTEL Leal is a 58 y.o. female with a past medical history of HTN presented to the ED complaining of redness, weeping and increased swelling of her bilateral calves.    B/l chronic cellulitis of LE   Lymphedema of b/l LE  - patient saw Dr. JD Rodriguez in clinic last October and was started on Keflex suppression antibiotics for chronic cellulitis and lymphedema but patient states she could not tolerate it and stopped it. Patient did not let Dr. Rodriguez know and went to her PCP 1 month ago, who prescribed Bactrim. Patient has a sulfa allergy and could not tolerate the Bactrim  - b/l LE redness with weeping and increased swelling x 2 weeks  - discussed with Dr. Rodriguez and he will see the patient in consult   - start Daptomycin and Rocephin IV  - WOC  - hold home Lasix- start 80mg IV BID  - LE duplex is negative    HTN  - continue home Losartan  - hold Lasix po- IV lasix continued    DM2?  - no home PO meds  - HgA1c pending    DVT prophylaxis:  Heparin SQ      CODE STATUS:  Full/CPR  Code Status and Medical Interventions:   Ordered at: 03/30/23 1341     Level Of Support Discussed With:    Patient     Code Status (Patient has no pulse and is not breathing):    CPR (Attempt to Resuscitate)     Medical Interventions (Patient has pulse or is breathing):    Full Support       Expected Discharge   Expected Discharge Date and Time     Expected Discharge Date Expected Discharge Time    Apr 3, 2023            Christine Crandall DO  03/30/23

## 2023-03-30 NOTE — CASE MANAGEMENT/SOCIAL WORK
Discharge Planning Assessment  [facility]     Patient Name: Kerry Leal  MRN: [id number]  Today's Date: 3/30/2023    Admit Date: 3/30/2023    Plan: IDP   Discharge Needs Assessment     Row Name 03/30/23 1247       Living Environment    People in Home spouse    Name(s) of People in Home Jordi Leal    Current Living Arrangements home    Potentially Unsafe Housing Conditions unable to assess    Primary Care Provided by self    Provides Primary Care For no one    Family Caregiver if Needed spouse    Family Caregiver Names Jordi Leal    Quality of Family Relationships unable to assess    Able to Return to Prior Arrangements yes       Resource/Environmental Concerns    Resource/Environmental Concerns none    Transportation Concerns none       Transition Planning    Patient/Family Anticipates Transition to home with family    Transportation Anticipated family or friend will provide       Discharge Needs Assessment    Readmission Within the Last 30 Days no previous admission in last 30 days    Equipment Currently Used at Home cane, straight; oxygen    Equipment Needed After Discharge other (see comments)  TBD    Discharge Facility/Level of Care Needs other (see comments)  TBD               Discharge Plan     Row Name 03/30/23 1249       Plan    Plan IDP    Plan Comments MSW met with pt. at bedside. Pt. lives with her spouse Jordi Leal in [place]. Pt.’s PCP is Gopal Kong.  Pt.’s pharmacy is [facility]. Pt.’s insurance is [facility] Medicare Replacement and [place] Medicaid. Pt. reports that she is independent at baseline. Pt. reports she has a cane. Pt. uses 2L of O2 serviced by [facility]. Pt. has transportation back home when she is medically ready to d/c. Pt. doesn’t have an advanced directive or ACP documentation on file. CM will continue to follow pt. throughout her stay. [facility] reviewed and completed.    Final Discharge Disposition Code 30 - still a patient              Continued Care and Services - Admitted  Since 3/30/2023    Coordination has not been started for this encounter.          Demographic Summary     Row Name 03/30/23 1247       General Information    Admission Type observation    Arrived From home    Referral Source admission list;emergency department    Reason for Consult discharge planning    Preferred Language English               Functional Status     Row Name 03/30/23 1247       Functional Status, IADL    Medications independent    Meal Preparation independent    Housekeeping independent    Laundry independent    Shopping independent       Mental Status Summary    Recent Changes in Mental Status/Cognitive Functioning unable to assess       Employment/    Employment Status disabled               Psychosocial    No documentation.                Abuse/Neglect    No documentation.                Legal    No documentation.                Substance Abuse    No documentation.                Patient Forms    No documentation.                   RAN Umana

## 2023-03-30 NOTE — ED PROVIDER NOTES
"Subjective   History of Present Illness  58-year-old female presents for evaluation of \"leg infection.\"  Of note, the patient has multiple comorbidities.  She is morbidly obese and has chronic lymphedema of both lower legs.  She notes that over the past 3 weeks or so she has been experiencing progressively worsening pain, redness, and foul-smelling drainage to her left lower leg.  She was concerned about infection and came to the ED to be evaluated today.  She denies any fevers, chills, or other systemic symptoms.  She is unsure as to what may have caused her worsening symptoms and denies any trauma or injury to her left lower leg.  She currently rates her pain at 5 out of 10 in severity and notes that the pain is worse with palpation.        Review of Systems   Skin:        Redness and swelling to left lower leg   All other systems reviewed and are negative.      Past Medical History:   Diagnosis Date   • Anxiety    • Asthma    • Heart murmur    • Hypertension        Allergies   Allergen Reactions   • Diclofenac Swelling   • Benadryl [Diphenhydramine Hcl] Other (See Comments)     UNSURE   • Clindamycin/Lincomycin Other (See Comments)     UNSURE   • Doxycycline Other (See Comments)     UNSURE   • Fluticasone Other (See Comments)     NOSE BLEEDS   • Fluvoxamine Other (See Comments)     UNSURE   • Montelukast Swelling   • Symbicort [Budesonide-Formoterol Fumarate] Other (See Comments)     UNSURE   • Lisinopril Dizziness   • Penicillins Other (See Comments)     MOTHER TOLD HER SHE WAS ALLERGIC   • Smz-Tmp Ds [Sulfamethoxazole-Trimethoprim] GI Intolerance   • Sulfa Antibiotics GI Intolerance       Past Surgical History:   Procedure Laterality Date   • APPENDECTOMY     •  SECTION     • HYSTERECTOMY     • NECK SURGERY         Family History   Problem Relation Age of Onset   • Diabetes Mother    • Heart disease Mother    • Breast cancer Neg Hx    • Ovarian cancer Neg Hx        Social History     Socioeconomic " History   • Marital status:    Tobacco Use   • Smoking status: Never     Passive exposure: Past   • Smokeless tobacco: Never   Vaping Use   • Vaping Use: Never used   Substance and Sexual Activity   • Alcohol use: No   • Drug use: No   • Sexual activity: Defer           Objective   Physical Exam  Vitals and nursing note reviewed.   Constitutional:       General: She is not in acute distress.     Appearance: She is well-developed. She is not diaphoretic.      Comments: Chronically ill-appearing female   HENT:      Head: Normocephalic and atraumatic.      Comments: No mucous membrane lesions present  Cardiovascular:      Rate and Rhythm: Normal rate and regular rhythm.      Heart sounds: Normal heart sounds. No murmur heard.    No friction rub. No gallop.   Pulmonary:      Effort: Pulmonary effort is normal. No respiratory distress.      Breath sounds: Normal breath sounds. No wheezing or rales.   Abdominal:      General: Bowel sounds are normal. There is no distension.      Palpations: Abdomen is soft. There is no mass.      Tenderness: There is no abdominal tenderness. There is no guarding or rebound.   Musculoskeletal:         General: Normal range of motion.      Comments: Range of motion of left knee is within normal limits   Skin:     General: Skin is warm.      Comments: Chronic lymphedema noted to both lower legs, soft tissue swelling noted to both lower legs, left greater than right, warm, tender, macular erythema noted to left lower leg with foul-smelling drainage noted to posterior mid left lower leg, no palpable crepitus, no fluctuance or induration noted, brawny plaques noted to left lower leg   Neurological:      Mental Status: She is alert.      Comments: Left lower extremity is neurovascularly intact distally with bounding distal pulses and normal sensation   Psychiatric:         Mood and Affect: Mood normal.         Thought Content: Thought content normal.         Judgment: Judgment normal.  "        Procedures           ED Course  ED Course as of 03/30/23 1827   Thu Mar 30, 2023   1625 58-year-old female presents for evaluation of \"leg infection.\"  Of note, the patient has multiple comorbidities.  She is morbidly obese.  She has chronic lymphedema.  She notes that over the past 3 weeks or so she has had progressively worsening pain, redness, and foul-smelling drainage to her left lower leg.  She was concerned about infection and came to the ED for evaluation today.  She denies any fevers, chills, or systemic symptoms.  On arrival, the patient has marked lymphedema to both lower extremities, left greater than right.  Her left lower leg is warm  and tender with macular erythema noted.  She has foul-smelling drainage noted posteriorly but no palpable crepitus, induration, or fluctuance noted.  Left lower extremity is neurovascularly intact distally with bounding distal pulses and normal sensation.  Labs are unrevealing.  Doppler ultrasound somewhat limited secondary to body habitus but negative for DVT.  Given the patient's comorbidities, morbid obesity, and overall clinical presentation, I feel that admission for IV antibiotics is warranted at this point given her high likelihood to fail outpatient oral antibiotic therapy.  I discussed the patient's case with Dr. Jacinto, the patient will be admitted under her care for further evaluation and treatment.  The patient is aware/agreeable with the plan at this time. [DD]      ED Course User Index  [DD] Shine Zuluaga MD                                          Recent Results (from the past 24 hour(s))   Comprehensive Metabolic Panel    Collection Time: 03/30/23 10:47 AM    Specimen: Blood   Result Value Ref Range    Glucose 158 (H) 65 - 99 mg/dL    BUN 16 6 - 20 mg/dL    Creatinine 0.65 0.57 - 1.00 mg/dL    Sodium 137 136 - 145 mmol/L    Potassium 4.3 3.5 - 5.2 mmol/L    Chloride 98 98 - 107 mmol/L    CO2 27.0 22.0 - 29.0 mmol/L    Calcium 8.9 8.6 - 10.5 " mg/dL    Total Protein 7.7 6.0 - 8.5 g/dL    Albumin 3.5 3.5 - 5.2 g/dL    ALT (SGPT) 13 1 - 33 U/L    AST (SGOT) 16 1 - 32 U/L    Alkaline Phosphatase 78 39 - 117 U/L    Total Bilirubin 0.6 0.0 - 1.2 mg/dL    Globulin 4.2 gm/dL    A/G Ratio 0.8 g/dL    BUN/Creatinine Ratio 24.6 7.0 - 25.0    Anion Gap 12.0 5.0 - 15.0 mmol/L    eGFR 103.5 >60.0 mL/min/1.73   Lactic Acid, Plasma    Collection Time: 03/30/23 10:47 AM    Specimen: Blood   Result Value Ref Range    Lactate 2.3 (C) 0.5 - 2.0 mmol/L   Procalcitonin    Collection Time: 03/30/23 10:47 AM    Specimen: Blood   Result Value Ref Range    Procalcitonin 0.06 0.00 - 0.25 ng/mL   Magnesium    Collection Time: 03/30/23 10:47 AM    Specimen: Blood   Result Value Ref Range    Magnesium 2.0 1.6 - 2.6 mg/dL   CBC Auto Differential    Collection Time: 03/30/23 10:47 AM    Specimen: Blood   Result Value Ref Range    WBC 8.26 3.40 - 10.80 10*3/mm3    RBC 4.63 3.77 - 5.28 10*6/mm3    Hemoglobin 12.4 12.0 - 15.9 g/dL    Hematocrit 41.5 34.0 - 46.6 %    MCV 89.6 79.0 - 97.0 fL    MCH 26.8 26.6 - 33.0 pg    MCHC 29.9 (L) 31.5 - 35.7 g/dL    RDW 17.5 (H) 12.3 - 15.4 %    RDW-SD 56.8 (H) 37.0 - 54.0 fl    MPV 12.4 (H) 6.0 - 12.0 fL    Platelets 279 140 - 450 10*3/mm3    Neutrophil % 74.2 42.7 - 76.0 %    Lymphocyte % 13.8 (L) 19.6 - 45.3 %    Monocyte % 9.8 5.0 - 12.0 %    Eosinophil % 1.2 0.3 - 6.2 %    Basophil % 0.6 0.0 - 1.5 %    Immature Grans % 0.4 0.0 - 0.5 %    Neutrophils, Absolute 6.13 1.70 - 7.00 10*3/mm3    Lymphocytes, Absolute 1.14 0.70 - 3.10 10*3/mm3    Monocytes, Absolute 0.81 0.10 - 0.90 10*3/mm3    Eosinophils, Absolute 0.10 0.00 - 0.40 10*3/mm3    Basophils, Absolute 0.05 0.00 - 0.20 10*3/mm3    Immature Grans, Absolute 0.03 0.00 - 0.05 10*3/mm3    nRBC 0.0 0.0 - 0.2 /100 WBC   Hemoglobin A1c    Collection Time: 03/30/23 10:47 AM    Specimen: Blood   Result Value Ref Range    Hemoglobin A1C 6.90 (H) 4.80 - 5.60 %   Duplex Venous Lower Extremity - Left     Collection Time: 03/30/23 12:46 PM   Result Value Ref Range    Target HR (85%) 139 bpm    Max. Pred. HR (100%) 164 bpm    Right Common Femoral Spont Y     Right Common Femoral Phasic Y     Right Common Femoral Compress C     Right Common Femoral Augment Y     Left Common Femoral Spont Y     Left Common Femoral Phasic Y     Left Common Femoral Compress C     Left Common Femoral Augment Y     Left Saphenofemoral Junction Spont Y     Left Saphenofemoral Junction Phasic Y     Left Saphenofemoral Junction Compress C     Left Saphenofemoral Junction Augment Y     Left Profunda Femoral Spont Y     Left Profunda Femoral Phasic Y     Left Profunda Femoral Compress C     Left Profunda Femoral Augment Y     Left Proximal Femoral Spont Y     Left Proximal Femoral Phasic Y     Left Proximal Femoral Compress C     Left Proximal Femoral Augment Y     Left Mid Femoral Spont Y     Left Mid Femoral Phasic Y     Left Mid Femoral Compress C     Left Mid Femoral Augment Y     Left Distal Femoral Compress C     Left Greater Saph AK Compress C     Left Greater Saph BK Compress C     Left Lesser Saph Compress C     BH CV VAS PRELIMINARY FINDINGS SCRIPTING 1.0    Urinalysis With Culture If Indicated - Urine, Clean Catch    Collection Time: 03/30/23  1:13 PM    Specimen: Urine, Clean Catch   Result Value Ref Range    Color, UA Yellow Yellow, Straw    Appearance, UA Clear Clear    pH, UA 5.5 5.0 - 8.0    Specific Gravity, UA 1.026 1.001 - 1.030    Glucose, UA Negative Negative    Ketones, UA Negative Negative    Bilirubin, UA Negative Negative    Blood, UA Negative Negative    Protein, UA Negative Negative    Leuk Esterase, UA Negative Negative    Nitrite, UA Negative Negative    Urobilinogen, UA 1.0 E.U./dL 0.2 - 1.0 E.U./dL   STAT Lactic Acid, Reflex    Collection Time: 03/30/23  2:40 PM    Specimen: Blood   Result Value Ref Range    Lactate 0.8 0.5 - 2.0 mmol/L     Note: In addition to lab results from this visit, the labs listed above may  include labs taken at another facility or during a different encounter within the last 24 hours. Please correlate lab times with ED admission and discharge times for further clarification of the services performed during this visit.    No orders to display     Vitals:    03/30/23 1240 03/30/23 1340 03/30/23 1440 03/30/23 1703   BP: 163/96 (!) 192/94  152/79   BP Location:    Right arm   Patient Position:    Sitting   Pulse: 93 92 90 92   Resp:    18   Temp:    97.6 °F (36.4 °C)   TempSrc:    Oral   SpO2: 90% 92% 92% 95%   Weight:       Height:         Medications   losartan (COZAAR) tablet 100 mg (has no administration in time range)   metoprolol succinate XL (TOPROL-XL) 24 hr tablet 100 mg (has no administration in time range)   sodium chloride 0.9 % flush 10 mL (has no administration in time range)   sodium chloride 0.9 % flush 10 mL (has no administration in time range)   sodium chloride 0.9 % infusion 40 mL (has no administration in time range)   heparin (porcine) 5000 UNIT/ML injection 5,000 Units (has no administration in time range)   acetaminophen (TYLENOL) tablet 650 mg (has no administration in time range)     Or   acetaminophen (TYLENOL) 160 MG/5ML solution 650 mg (has no administration in time range)     Or   acetaminophen (TYLENOL) suppository 650 mg (has no administration in time range)   HYDROcodone-acetaminophen (NORCO) 7.5-325 MG per tablet 1 tablet (has no administration in time range)   ondansetron (ZOFRAN) tablet 4 mg (has no administration in time range)     Or   ondansetron (ZOFRAN) injection 4 mg (has no administration in time range)   DAPTOmycin (CUBICIN) 550 mg in sodium chloride 0.9 % 50 mL IVPB (has no administration in time range)   cefTRIAXone (ROCEPHIN) 1 g/100 mL 0.9% NS (MBP) (has no administration in time range)   furosemide (LASIX) injection 80 mg (80 mg Intravenous Given 3/30/23 1435)   vancomycin 3000 mg/500 mL 0.9% NS IVPB ([facility]) ( Intravenous Currently Infusing 3/30/23 1523)    cefTRIAXone (ROCEPHIN) 2 g/100 mL 0.9% NS IVPB (MBP) (0 g Intravenous Stopped 3/30/23 1237)     ECG/EMG Results (last 24 hours)     ** No results found for the last 24 hours. **        No orders to display           MDM    Final diagnoses:   Cellulitis of left lower leg   Morbid obesity (HCC)   Lymphedema of both lower extremities       ED Disposition  ED Disposition     ED Disposition   Decision to Admit    Condition   --    Comment   Level of Care: Telemetry [5]   Diagnosis: Bilateral cellulitis of lower leg [217413]   Admitting Physician: MALLORIE ARITA [[id number]]   Attending Physician: MALLORIE ARITA [[id number]]               No follow-up provider specified.       Medication List      ASK your doctor about these medications    albuterol (2.5 MG/3ML) 0.083% nebulizer solution  Commonly known as: PROVENTIL  Ask about: Which instructions should I use?     losartan 100 MG tablet  Commonly known as: COZAAR  Ask about: Which instructions should I use?     metoprolol succinate  MG 24 hr tablet  Commonly known as: TOPROL-XL  Ask about: Which instructions should I use?             Shine Zuluaga MD  03/30/23 8550

## 2023-03-31 ENCOUNTER — APPOINTMENT (OUTPATIENT)
Dept: GENERAL RADIOLOGY | Facility: HOSPITAL | Age: 58
End: 2023-03-31
Payer: MEDICARE

## 2023-03-31 LAB
ALBUMIN SERPL-MCNC: 3.5 G/DL (ref 3.5–5.2)
ALBUMIN/GLOB SERPL: 1 G/DL
ALP SERPL-CCNC: 79 U/L (ref 39–117)
ALT SERPL W P-5'-P-CCNC: 12 U/L (ref 1–33)
ANION GAP SERPL CALCULATED.3IONS-SCNC: 10 MMOL/L (ref 5–15)
AST SERPL-CCNC: 16 U/L (ref 1–32)
B PARAPERT DNA SPEC QL NAA+PROBE: NOT DETECTED
B PERT DNA SPEC QL NAA+PROBE: NOT DETECTED
BILIRUB SERPL-MCNC: 0.9 MG/DL (ref 0–1.2)
BUN SERPL-MCNC: 12 MG/DL (ref 6–20)
BUN/CREAT SERPL: 17.4 (ref 7–25)
C PNEUM DNA NPH QL NAA+NON-PROBE: NOT DETECTED
CALCIUM SPEC-SCNC: 8.7 MG/DL (ref 8.6–10.5)
CHLORIDE SERPL-SCNC: 96 MMOL/L (ref 98–107)
CHOLEST SERPL-MCNC: 134 MG/DL (ref 0–200)
CK SERPL-CCNC: 161 U/L (ref 20–180)
CO2 SERPL-SCNC: 31 MMOL/L (ref 22–29)
CREAT SERPL-MCNC: 0.69 MG/DL (ref 0.57–1)
D-LACTATE SERPL-SCNC: 1 MMOL/L (ref 0.5–2)
DEPRECATED RDW RBC AUTO: 58.3 FL (ref 37–54)
EGFRCR SERPLBLD CKD-EPI 2021: 102 ML/MIN/1.73
ERYTHROCYTE [DISTWIDTH] IN BLOOD BY AUTOMATED COUNT: 17.8 % (ref 12.3–15.4)
FLUAV SUBTYP SPEC NAA+PROBE: NOT DETECTED
FLUBV RNA ISLT QL NAA+PROBE: NOT DETECTED
GLOBULIN UR ELPH-MCNC: 3.5 GM/DL
GLUCOSE BLDC GLUCOMTR-MCNC: 128 MG/DL (ref 70–130)
GLUCOSE BLDC GLUCOMTR-MCNC: 144 MG/DL (ref 70–130)
GLUCOSE SERPL-MCNC: 155 MG/DL (ref 65–99)
HADV DNA SPEC NAA+PROBE: NOT DETECTED
HCOV 229E RNA SPEC QL NAA+PROBE: NOT DETECTED
HCOV HKU1 RNA SPEC QL NAA+PROBE: NOT DETECTED
HCOV NL63 RNA SPEC QL NAA+PROBE: NOT DETECTED
HCOV OC43 RNA SPEC QL NAA+PROBE: NOT DETECTED
HCT VFR BLD AUTO: 40.5 % (ref 34–46.6)
HDLC SERPL-MCNC: 33 MG/DL (ref 40–60)
HGB BLD-MCNC: 12.1 G/DL (ref 12–15.9)
HMPV RNA NPH QL NAA+NON-PROBE: NOT DETECTED
HPIV1 RNA ISLT QL NAA+PROBE: NOT DETECTED
HPIV2 RNA SPEC QL NAA+PROBE: NOT DETECTED
HPIV3 RNA NPH QL NAA+PROBE: NOT DETECTED
HPIV4 P GENE NPH QL NAA+PROBE: NOT DETECTED
LDLC SERPL CALC-MCNC: 84 MG/DL (ref 0–100)
LDLC/HDLC SERPL: 2.52 {RATIO}
M PNEUMO IGG SER IA-ACNC: NOT DETECTED
MAGNESIUM SERPL-MCNC: 2 MG/DL (ref 1.6–2.6)
MCH RBC QN AUTO: 26.7 PG (ref 26.6–33)
MCHC RBC AUTO-ENTMCNC: 29.9 G/DL (ref 31.5–35.7)
MCV RBC AUTO: 89.2 FL (ref 79–97)
MRSA DNA SPEC QL NAA+PROBE: NEGATIVE
NT-PROBNP SERPL-MCNC: 505.1 PG/ML (ref 0–900)
PLATELET # BLD AUTO: 279 10*3/MM3 (ref 140–450)
PMV BLD AUTO: 12.1 FL (ref 6–12)
POTASSIUM SERPL-SCNC: 4.1 MMOL/L (ref 3.5–5.2)
PROCALCITONIN SERPL-MCNC: 0.06 NG/ML (ref 0–0.25)
PROT SERPL-MCNC: 7 G/DL (ref 6–8.5)
RBC # BLD AUTO: 4.54 10*6/MM3 (ref 3.77–5.28)
RHINOVIRUS RNA SPEC NAA+PROBE: NOT DETECTED
RSV RNA NPH QL NAA+NON-PROBE: NOT DETECTED
SARS-COV-2 RNA NPH QL NAA+NON-PROBE: NOT DETECTED
SODIUM SERPL-SCNC: 137 MMOL/L (ref 136–145)
TRIGL SERPL-MCNC: 89 MG/DL (ref 0–150)
TSH SERPL DL<=0.05 MIU/L-ACNC: 1.27 UIU/ML (ref 0.27–4.2)
VLDLC SERPL-MCNC: 17 MG/DL (ref 5–40)
WBC NRBC COR # BLD: 7.55 10*3/MM3 (ref 3.4–10.8)

## 2023-03-31 PROCEDURE — 25010000002 HEPARIN (PORCINE) PER 1000 UNITS: Performed by: HOSPITALIST

## 2023-03-31 PROCEDURE — 84443 ASSAY THYROID STIM HORMONE: CPT | Performed by: HOSPITALIST

## 2023-03-31 PROCEDURE — C1894 INTRO/SHEATH, NON-LASER: HCPCS

## 2023-03-31 PROCEDURE — 80053 COMPREHEN METABOLIC PANEL: CPT | Performed by: HOSPITALIST

## 2023-03-31 PROCEDURE — 82962 GLUCOSE BLOOD TEST: CPT

## 2023-03-31 PROCEDURE — G0378 HOSPITAL OBSERVATION PER HR: HCPCS

## 2023-03-31 PROCEDURE — 87641 MR-STAPH DNA AMP PROBE: CPT | Performed by: INTERNAL MEDICINE

## 2023-03-31 PROCEDURE — 96366 THER/PROPH/DIAG IV INF ADDON: CPT

## 2023-03-31 PROCEDURE — 25010000002 CEFTRIAXONE PER 250 MG: Performed by: HOSPITALIST

## 2023-03-31 PROCEDURE — 96372 THER/PROPH/DIAG INJ SC/IM: CPT

## 2023-03-31 PROCEDURE — 80061 LIPID PANEL: CPT | Performed by: HOSPITALIST

## 2023-03-31 PROCEDURE — 84145 PROCALCITONIN (PCT): CPT | Performed by: NURSE PRACTITIONER

## 2023-03-31 PROCEDURE — 0202U NFCT DS 22 TRGT SARS-COV-2: CPT | Performed by: NURSE PRACTITIONER

## 2023-03-31 PROCEDURE — 83735 ASSAY OF MAGNESIUM: CPT | Performed by: HOSPITALIST

## 2023-03-31 PROCEDURE — 85027 COMPLETE CBC AUTOMATED: CPT | Performed by: HOSPITALIST

## 2023-03-31 PROCEDURE — C1751 CATH, INF, PER/CENT/MIDLINE: HCPCS

## 2023-03-31 PROCEDURE — 25010000002 FUROSEMIDE PER 20 MG: Performed by: HOSPITALIST

## 2023-03-31 PROCEDURE — 96376 TX/PRO/DX INJ SAME DRUG ADON: CPT

## 2023-03-31 PROCEDURE — 83605 ASSAY OF LACTIC ACID: CPT | Performed by: NURSE PRACTITIONER

## 2023-03-31 PROCEDURE — 99232 SBSQ HOSP IP/OBS MODERATE 35: CPT | Performed by: INTERNAL MEDICINE

## 2023-03-31 PROCEDURE — 83880 ASSAY OF NATRIURETIC PEPTIDE: CPT | Performed by: INTERNAL MEDICINE

## 2023-03-31 PROCEDURE — 71045 X-RAY EXAM CHEST 1 VIEW: CPT

## 2023-03-31 PROCEDURE — 25010000002 DAPTOMYCIN PER 1 MG: Performed by: HOSPITALIST

## 2023-03-31 PROCEDURE — 82550 ASSAY OF CK (CPK): CPT

## 2023-03-31 RX ORDER — IBUPROFEN 600 MG/1
1 TABLET ORAL
Status: DISCONTINUED | OUTPATIENT
Start: 2023-03-31 | End: 2023-04-03 | Stop reason: HOSPADM

## 2023-03-31 RX ORDER — DEXTROSE MONOHYDRATE 25 G/50ML
25 INJECTION, SOLUTION INTRAVENOUS
Status: DISCONTINUED | OUTPATIENT
Start: 2023-03-31 | End: 2023-04-03 | Stop reason: HOSPADM

## 2023-03-31 RX ORDER — BENZONATATE 100 MG/1
100 CAPSULE ORAL 3 TIMES DAILY PRN
Status: DISCONTINUED | OUTPATIENT
Start: 2023-03-31 | End: 2023-04-03 | Stop reason: HOSPADM

## 2023-03-31 RX ORDER — SODIUM CHLORIDE 0.9 % (FLUSH) 0.9 %
10 SYRINGE (ML) INJECTION AS NEEDED
Status: DISCONTINUED | OUTPATIENT
Start: 2023-03-31 | End: 2023-04-03 | Stop reason: HOSPADM

## 2023-03-31 RX ORDER — SODIUM CHLORIDE 0.9 % (FLUSH) 0.9 %
10 SYRINGE (ML) INJECTION EVERY 12 HOURS SCHEDULED
Status: DISCONTINUED | OUTPATIENT
Start: 2023-03-31 | End: 2023-04-03 | Stop reason: HOSPADM

## 2023-03-31 RX ORDER — NICOTINE POLACRILEX 4 MG
15 LOZENGE BUCCAL
Status: DISCONTINUED | OUTPATIENT
Start: 2023-03-31 | End: 2023-04-03 | Stop reason: HOSPADM

## 2023-03-31 RX ORDER — INSULIN LISPRO 100 [IU]/ML
0-7 INJECTION, SOLUTION INTRAVENOUS; SUBCUTANEOUS
Status: DISCONTINUED | OUTPATIENT
Start: 2023-03-31 | End: 2023-04-03 | Stop reason: HOSPADM

## 2023-03-31 RX ADMIN — LOSARTAN POTASSIUM 100 MG: 50 TABLET, FILM COATED ORAL at 08:10

## 2023-03-31 RX ADMIN — Medication 10 ML: at 21:58

## 2023-03-31 RX ADMIN — Medication 10 ML: at 08:10

## 2023-03-31 RX ADMIN — DAPTOMYCIN 550 MG: 500 INJECTION, POWDER, LYOPHILIZED, FOR SOLUTION INTRAVENOUS at 21:58

## 2023-03-31 RX ADMIN — FUROSEMIDE 80 MG: 10 INJECTION, SOLUTION INTRAMUSCULAR; INTRAVENOUS at 01:31

## 2023-03-31 RX ADMIN — Medication 10 ML: at 21:57

## 2023-03-31 RX ADMIN — FUROSEMIDE 80 MG: 10 INJECTION, SOLUTION INTRAMUSCULAR; INTRAVENOUS at 14:59

## 2023-03-31 RX ADMIN — METOPROLOL SUCCINATE 100 MG: 50 TABLET, EXTENDED RELEASE ORAL at 08:10

## 2023-03-31 RX ADMIN — HEPARIN SODIUM 5000 UNITS: 5000 INJECTION INTRAVENOUS; SUBCUTANEOUS at 05:28

## 2023-03-31 RX ADMIN — HEPARIN SODIUM 5000 UNITS: 5000 INJECTION INTRAVENOUS; SUBCUTANEOUS at 21:58

## 2023-03-31 RX ADMIN — BENZONATATE 100 MG: 100 CAPSULE ORAL at 02:28

## 2023-03-31 RX ADMIN — SODIUM CHLORIDE 1 G: 900 INJECTION INTRAVENOUS at 09:23

## 2023-03-31 RX ADMIN — HEPARIN SODIUM 5000 UNITS: 5000 INJECTION INTRAVENOUS; SUBCUTANEOUS at 14:59

## 2023-03-31 RX ADMIN — BENZONATATE 100 MG: 100 CAPSULE ORAL at 23:27

## 2023-03-31 NOTE — CASE MANAGEMENT/SOCIAL WORK
Continued Stay Note  [facility]     Patient Name: Kerry Leal  MRN: [id number]  Today's Date: 3/31/2023    Admit Date: 3/30/2023    Plan: Home   Discharge Plan     Row Name 03/31/23 1335       Plan    Plan Home    Patient/Family in Agreement with Plan yes    Plan Comments CM met w/patient in room. Pt sitting up in chair. Pt goal is to return home @ d/c. [facility] will transport. ID consulted. PT/OT consults in as well. CM will continue to follow for any d/c needs.    Final Discharge Disposition Code 01 - home or self-care               Discharge Codes    No documentation.               Expected Discharge Date and Time     Expected Discharge Date Expected Discharge Time    Apr 3, 2023             Leonidas Villa RN

## 2023-03-31 NOTE — SIGNIFICANT NOTE
Pt c/o a dry cough. New onset tonight. Denies shortness of breath. VS stable. Pt remains on 2 liters NC. STAT CXR, respiratory panel PCR. PRN tessalon pearls ordered for cough.     Addendum:   CXR 1. Cardiomegaly with vascular congestion and interstitial edema. Correlate for congestive heart failure.  2. Airspace disease in the left lower lobe could represent atelectasis or left lower lobe pneumonia. A short-term follow-up 2 views of the chest is suggested for better characterization.  Pt continues to have cough. Per RN, pt w/ rhonchi/course breath sounds. Remains on 2 liters. Respiratory panel negative.   -procal, lactic, proBNP added to am labs   -STAT CTA Chest ordered to evaluate possible PNA vs CHF

## 2023-03-31 NOTE — PROGRESS NOTES
"                    Clinical Nutrition       Patient Name: Kerry Leal  YOB: 1965  MRN: [id number]  Date of Encounter: 03/31/23 15:08 EDT  Admission date: 3/30/2023      Reason for Visit   Identified at risk by screening criteria \"large or non-healing wound\"    EMR Reviewed     EMR Reviewed: yes     Admission Diagnosis:  Bilateral cellulitis of lower leg [L03.116, L03.115]    Problem List:    Bilateral cellulitis of lower leg    Essential hypertension    Lymphedema of both lower extremities    Type 2 diabetes mellitus (HCC)    Morbid obesity with BMI of 60.0-69.9, adult ([facility])      Anthropometric      Flowsheet Rows    Flowsheet Row First Filed Value   Admission Height 160 cm (63\") Documented at 03/30/2023 1027   Admission Weight 151 kg (333 lb) Documented at 03/30/2023 1027            Height: 160 cm (62.99\")  Last Filed Weight: Weight: (!) 151 kg (332 lb 14.3 oz) (03/30/23 1149)  Weight Method: Stated  BMI: BMI (Calculated): 59  BMI classification: Obese Class III extreme obesity: > or equal to 40kg/m2     UBW: 330-350lbs per EMR  Weight change:   Last 15 Recorded Weights  View Complete Flowsheet  Weight Weight (kg) Weight (lbs) Weight Method   3/30/2023 151 kg 332 lb 14.3 oz -   3/30/2023 151.048 kg 333 lb Stated   10/30/2022 150.594 kg 332 lb -   10/23/2022 158.759 kg 350 lb -   10/23/2022 158.759 kg 350 lb -   10/23/2022 159 kg 350 lb 8.5 oz -   10/22/2022 160.12 kg 353 lb Stated   6/3/2022 160.12 kg  353 lb  -   9/3/2021 150.141 kg  331 lb  -   8/20/2021 160.12 kg 353 lb -   7/5/2021 151.955 kg 335 lb Stated   7/5/2021 158.759 kg 350 lb -   9/25/2019 145.151 kg 320 lb Stated   9/1/2018 150.594 kg 332 lb Stated   10/29/2017 13.608 kg 30 lb Stated        Reported/Observed/Food/Nutrition Related - Comments     Pt screened for non-healing wound-pt w/LE cellulitis. Pt denies changes in po intake/appetite PTA, pt also denies unintended wt loss. Dinner preferences obtained and ordered. NKFA.        " Current Nutrition Prescription     Diet: Cardiac Diets, Fluid Restriction (240 mL/tray) Diets; Low Sodium (2g); 1500 mL/day; Texture: Regular Texture (IDDSI 7); Fluid Consistency: Thin (IDDSI 0)  Orders Placed This Encounter      DIET MESSAGE Pt would like roast beef sandwich, cottage cheese, steamed broccoli, pudding and a milk for dinner please      Average Intake from [facility]: 100% x breakfast and snack     Nutrition Diagnosis     Problem No nutrition diagnosis at this time   Etiology    Signs/Symptoms    Status:    Actions     Follow treatment progress, Care plan reviewed    Monitor Per Protocol      Vivi Salazar RD,   Time Spent: 15

## 2023-03-31 NOTE — NURSING NOTE
"Patient educated on CT order, patient refusing at this time. Educated on importance of test, patient states \"she cannot lay flat\"   Notified APRN  "

## 2023-03-31 NOTE — PROGRESS NOTES
"    [facility] Medicine Services  PROGRESS NOTE    Patient Name: Kerry Leal  : 1965  MRN: [id number]    Date of Admission: 3/30/2023  Primary Care Physician: Gopal Kong MD    Subjective   Subjective     CC:  Leg redness x 3 weeks    HPI:  Patient reports bilateral leg redness x 3 weeks, some watery drainage progressive to purulent drainage per patient report [place] behind left knee  On bactrim per PCP but had right-sided neck rash so stopped  Reports trouble breathing w previous keflex  H/o PNA with \"fluid on her lungs,\" is on daily lasix 40 mg with compliance. Denies h/o heart failure but wheezes overnight and now requiring 2 liters n/c. Chronic leg swelling  Worries about CT chest    ROS:  Gen- No fevers, chills  CV- No chest pain, palpitations  GI- No N/V/D, abd pain     Objective   Objective     Vital Signs:   Temp:  [97.6 °F (36.4 °C)-98.7 °F (37.1 °C)] 97.8 °F (36.6 °C)  Heart Rate:  [] 75  Resp:  [18-22] 18  BP: (128-195)/(73-97) 138/75  Flow (L/min):  [2] 2     Physical Exam:  Constitutional: No acute distress, awake, alert female sitting up on edge of bed  HENT: NCAT, mucous membranes moist, n/c in place  Respiratory: decreased breath sounds bilateral bases w crackles in lower lobes, respiratory effort normal on 2 liters n/c  Cardiovascular: RRR, no murmurs, rubs, or gallops  Gastrointestinal: Soft, nontender, nondistended  Musculoskeletal: Muscle tone within normal limits, no joint effusions appreciated  Lower extremity exam/Skin: Significant scaling LLE>RLE with erythema and warmth of left leg knee down. Smaller patch of erythema of distal RLE. Swelling present, no purulence appreciated  Psychiatric: Appropriate affect, cooperative  Neurologic: Alert and oriented, facial movements symmetric and spontaneous movement of all 4 extremities grossly equal bilaterally, speech clear      Results Reviewed:   CXR personally reviewed and interpreted: most c/w pulm " edema bilaterally  ECHO prior results reviewed, used for HFpEF as below    LAB RESULTS:      Lab 03/31/23  0904 03/30/23  1440 03/30/23  1047   WBC 7.55  --  8.26   HEMOGLOBIN 12.1  --  12.4   HEMATOCRIT 40.5  --  41.5   PLATELETS 279  --  279   NEUTROS ABS  --   --  6.13   IMMATURE GRANS (ABS)  --   --  0.03   LYMPHS ABS  --   --  1.14   MONOS ABS  --   --  0.81   EOS ABS  --   --  0.10   MCV 89.2  --  89.6   PROCALCITONIN  --   --  0.06   LACTATE  --  0.8 2.3*         Lab 03/30/23  1047   SODIUM 137   POTASSIUM 4.3   CHLORIDE 98   CO2 27.0   ANION GAP 12.0   BUN 16   CREATININE 0.65   EGFR 103.5   GLUCOSE 158*   CALCIUM 8.9   MAGNESIUM 2.0   HEMOGLOBIN A1C 6.90*         Lab 03/30/23  1047   TOTAL PROTEIN 7.7   ALBUMIN 3.5   GLOBULIN 4.2   ALT (SGPT) 13   AST (SGOT) 16   BILIRUBIN 0.6   ALK PHOS 78                     Brief Urine Lab Results  (Last result in the past 365 days)      Color   Clarity   Blood   Leuk Est   Nitrite   Protein   CREAT   Urine HCG        03/30/23 1313 Yellow   Clear   Negative   Negative   Negative   Negative                 Microbiology Results Abnormal     Procedure Component Value - Date/Time    Respiratory Panel PCR w/COVID-19(SARS-CoV-2) VIVIEN/ANGIE/PATRIC/PAD/COR/MAD/JEEVAN In-House, NP Swab in UTM/VTM, 3-4 HR TAT - Swab, Nasopharynx [[id number]]  (Normal) Collected: 03/31/23 0228    Lab Status: Final result Specimen: Swab from Nasopharynx Updated: 03/31/23 0341     ADENOVIRUS, PCR Not Detected     Coronavirus 229E Not Detected     Coronavirus HKU1 Not Detected     Coronavirus NL63 Not Detected     Coronavirus OC43 Not Detected     COVID19 Not Detected     Human Metapneumovirus Not Detected     Human Rhinovirus/Enterovirus Not Detected     Influenza A PCR Not Detected     Influenza B PCR Not Detected     Parainfluenza Virus 1 Not Detected     Parainfluenza Virus 2 Not Detected     Parainfluenza Virus 3 Not Detected     Parainfluenza Virus 4 Not Detected     RSV, PCR Not Detected     Bordetella  pertussis pcr Not Detected     Bordetella parapertussis PCR Not Detected     Chlamydophila pneumoniae PCR Not Detected     Mycoplasma pneumo by PCR Not Detected    Narrative:      In the setting of a positive respiratory panel with a viral infection PLUS a negative procalcitonin without other underlying concern for bacterial infection, consider observing off antibiotics or discontinuation of antibiotics and continue supportive care. If the respiratory panel is positive for atypical bacterial infection (Bordetella pertussis, Chlamydophila pneumoniae, or Mycoplasma pneumoniae), consider antibiotic de-escalation to target atypical bacterial infection.          XR Chest 1 View    Result Date: 3/31/2023  Exam:  Single view chest Date: March 31, 2023 History: Shortness of breath, cough Comparison: October 24, 2022 Technique: Single frontal radiograph of the chest was obtained Findings: The heart is enlarged. There is central vascular congestion and an element of interstitial edema. There is airspace disease in the left lower lobe. There is no pneumothorax.     Impression: 1. Cardiomegaly with vascular congestion and interstitial edema. Correlate for congestive heart failure. 2. Airspace disease in the left lower lobe could represent atelectasis or left lower lobe pneumonia. A short-term follow-up 2 views of the chest is suggested for better characterization. Slot 63 Electronically signed by:  Diego Vang M.D.  3/31/2023 12:56 AM Mountain Time    Duplex Venous Lower Extremity - Left    Result Date: 3/30/2023  •  Normal left lower extremity venous duplex scan.       Results for orders placed during the hospital encounter of 10/22/22    Adult Transthoracic Echo Complete W/ Cont if Necessary Per Protocol    Interpretation Summary  •  Left ventricular ejection fraction appears to be 51 - 55%.  •  Left ventricular wall thickness is consistent with mild concentric hypertrophy.  •  Mild aortic valve regurgitation is present.  Mild aortic valve stenosis is present.  •  Mild tricuspid valve regurgitation is present. Estimated right ventricular systolic pressure from tricuspid regurgitation is moderately elevated (45-55 mmHg).      Current medications:  Scheduled Meds:cefTRIAXone, 1 g, Intravenous, Q24H  DAPTOmycin, 6 mg/kg (Adjusted), Intravenous, Q24H  furosemide, 80 mg, Intravenous, Q12H  heparin (porcine), 5,000 Units, Subcutaneous, Q8H  losartan, 100 mg, Oral, Daily  metoprolol succinate XL, 100 mg, Oral, Daily  sodium chloride, 10 mL, Intravenous, Q12H      Continuous Infusions:   PRN Meds:.•  acetaminophen **OR** acetaminophen **OR** acetaminophen  •  benzonatate  •  HYDROcodone-acetaminophen  •  ondansetron **OR** ondansetron  •  sodium chloride  •  sodium chloride    Assessment & Plan   Assessment & Plan     Active Hospital Problems    Diagnosis  POA   • **Bilateral cellulitis of lower leg [L03.116, L03.115]  Yes   • Morbid obesity with BMI of 60.0-69.9, adult ([facility]) [E66.01, Z68.44]  Not Applicable   • Essential hypertension [I10]  Yes   • Lymphedema of both lower extremities [I89.0]  Yes      Resolved Hospital Problems   No resolved problems to display.        Brief Hospital Course to date:  Kerry Leal is a 58 y.o. female w well controlled DMII, BMI >55, chronic bilateral lymphedema w recurrent cellulitis who presents with 3 weeks of worsening left>right leg redness w drainage. Unable to tolerate keflex or bactrim in past.    Recurrent cellulitis, left>right leg  -daptomycin + ceftriaxone for now (of note, tolerating ceftriaxone w keflex s/e of soa in past)  -MRSA swab this AM to hopefully further de-escalate abx  -Dr Shine Rodriguez to see (has followed in past)  -consult OT lymphedema team  -fear recurrence is highly likely given significant skin breakdown and pt risk factors    Acute hypoxic resp failure likely 2/2 CHFpEF exacerbation  -patient H2FPEF score>90% probability of CHFpEF based on 10/2022 ECHO, on chronic  lasix  -proBNP pending, EKG ordered  -agree with IV lasix BID, strict I&Os and fluid restriction for now  -given CXR review/patient sx, less likely PNA but will monitor. patient would like to defer CTA which I feel is reasonable for now    BMI >55  DMII, well controlled  - SSI for now  HTN - home meds    Expected Discharge Location and Transportation: home  Expected Discharge per ID timing, likely Monday  Expected Discharge Date and Time     Expected Discharge Date Expected Discharge Time    Apr 3, 2023            DVT prophylaxis:  Medical DVT prophylaxis orders are present.     AM-PAC 6 Clicks Score (PT): 12 (03/31/23 0810)    CODE STATUS:   Code Status and Medical Interventions:   Ordered at: 03/30/23 1341     Level Of Support Discussed With:    Patient     Code Status (Patient has no pulse and is not breathing):    CPR (Attempt to Resuscitate)     Medical Interventions (Patient has pulse or is breathing):    Full Support       Caroline Adkins MD  03/31/23    1) Problem  One or more chronic illnesses with exacerbation, progression or side effect of treatment: recurrent cellulitis, + CHFpEF exacerbation    2) Data  (moderate complexity 1 out of 3 categories; high complexity 2 out of 3)  3 of the following:  Prior external notes reviewed:  Test results reviewed: BMP, CBC, lactate, ECHO prior, CXR  Tests ordered: EKG, CK, CMP, MRSA PCR  History obtained from independent historian:    Test reviewed:  Tests independently interpreted, see above read: CXR    Outside group discussion:     3) Risk  Drug therapy requiring intensive monitoring for toxicity: monitoring ceftraixone (patient w previous allergy to keflex), + daptomycin monitoring w CK/CMP

## 2023-03-31 NOTE — PLAN OF CARE
Goal Outcome Evaluation:   Pt a/o x4, vs stable, and on 2L NC. Pt resting in bed, single lumen PICC line placed in KATHY. Pt educated on use of call bell before getting up to ambulate.

## 2023-03-31 NOTE — CONSULTS
INFECTIOUS DISEASE CONSULT/INITIAL HOSPITAL VISIT    Kerry Leal  1965  [id number]    Date of consult: 3/31/2023    Admit date: 3/30/2023    Requesting Provider: Dr. Crandall  Evaluating physician: Shine Rodriguez MD  Reason for Consultation: lower extremity cellulitis  Chief Complaint: lower extremity pain and redness      Subjective   History of present illness:  Kerry Leal is a  58 y.o.  Yr old female with pmh of HTN, asthma, and chronic lymphedema who I have evaluated in the past for recurrent left lower extremity cellulitis.  The patient has been treated with multiple courses of IV antibiotics in the past, most recently with a couple doses of Dalvance in 06/2022.  She was subsequently placed on chronic suppressive oral Keflex 250 mg by mouth twice a day at the end of her IV antibiotic course in June 2022.  I followed her up last in October 2022 in clinic and she had chronic discoloration of her left lower extremity but no signs of recurrent cellulitis at that time.  She had been tolerating her Keflex at that time with no complaints. Plan was for her to follow-up in my clinic in 6 months.    The patient states that she thought that she had some shortness of breath with the Keflex and stopped this antibiotic several months ago herself.  She denies any lip swelling or tongue swelling. She additionally did not have any rash while on Keflex.    Patient called our clinic earlier this month with some increased drainage from the back of her leg for 2 weeks and worsening discoloration and was advised to go to the ER. She states that she saw her primary care provider and her primary care provider prescribed her Bactrim in the setting of her prior sulfa allergy and she subsequently had a bad rash to the Bactrim. She had worsening left lower extremity swelling, pain, and redness.  She has additionally developed some redness and tenderness over her right lower extremity.  She presented to the ER yesterday due  to her worsening symptoms.    Since arrival, the patient has remained afebrile. Blood cell count was okay at 8.26.  Hemoglobin A1c was 6.9. lactic acid was 2.3.  Pro-calcitonin was 0.06.  Creatinine was okay at 0.65.  LFTs were within normal limits.  Blood cultures ×2 were collected on 3/30 are no growth to date. Tory PCR panel is negative. MRSA PCR of the nares was negative. Venous duplex of the left lower extremity was negative for DVT. Patient did have increased shortness of breath and cough over the night and so a chest x-ray was obtained that showed cardiomegaly with vascular congestion and interstitial edema.  There was some mild airspace disease in the left lower lobe that could represent atelectasis or left lower lobe pneumonia.    Past Medical History:   Diagnosis Date   • Anxiety    • Asthma    • Heart murmur    • Hypertension        Past Surgical History:   Procedure Laterality Date   • APPENDECTOMY     •  SECTION     • HYSTERECTOMY     • NECK SURGERY         Social history:  The patient lives in [facility]. She is  and has a son. She is a never smoker.  No alcohol or IV drug abuse.      family history includes Diabetes in her mother; Heart disease in her mother.    Allergies   Allergen Reactions   • Diclofenac Swelling   • Benadryl [Diphenhydramine Hcl] Other (See Comments)     UNSURE   • Clindamycin/Lincomycin Other (See Comments)     UNSURE   • Doxycycline Other (See Comments)     UNSURE   • Fluticasone Other (See Comments)     NOSE BLEEDS   • Fluvoxamine Other (See Comments)     UNSURE   • Montelukast Swelling   • Symbicort [Budesonide-Formoterol Fumarate] Other (See Comments)     UNSURE   • Lisinopril Dizziness   • Penicillins Other (See Comments)     MOTHER TOLD HER SHE WAS ALLERGIC   • Smz-Tmp Ds [Sulfamethoxazole-Trimethoprim] GI Intolerance   • Sulfa Antibiotics GI Intolerance         There is no immunization history on file for this patient.    Medication:    Current  Facility-Administered Medications:   •  acetaminophen (TYLENOL) tablet 650 mg, 650 mg, Oral, Q4H PRN **OR** acetaminophen (TYLENOL) 160 MG/5ML solution 650 mg, 650 mg, Oral, Q4H PRN **OR** acetaminophen (TYLENOL) suppository 650 mg, 650 mg, Rectal, Q4H PRN, Christine Crandall DO  •  benzonatate (TESSALON) capsule 100 mg, 100 mg, Oral, TID PRN, Anamaria Castillo, APRN, 100 mg at 03/31/23 0228  •  cefTRIAXone (ROCEPHIN) 1 g/100 mL 0.9% NS (MBP), 1 g, Intravenous, Q24H, Christine Crandall DO  •  DAPTOmycin (CUBICIN) 550 mg in sodium chloride 0.9 % 50 mL IVPB, 6 mg/kg (Adjusted), Intravenous, Q24H, Christine Crandall DO, Last Rate: 100 mL/hr at 03/30/23 2038, 550 mg at 03/30/23 2038  •  furosemide (LASIX) injection 80 mg, 80 mg, Intravenous, Q12H, Christine Crandall DO, 80 mg at 03/31/23 0131  •  heparin (porcine) 5000 UNIT/ML injection 5,000 Units, 5,000 Units, Subcutaneous, Q8H, Christine Crandall DO, 5,000 Units at 03/31/23 0528  •  HYDROcodone-acetaminophen (NORCO) 7.5-325 MG per tablet 1 tablet, 1 tablet, Oral, Q4H PRN, Christine Crandall DO  •  losartan (COZAAR) tablet 100 mg, 100 mg, Oral, Daily, Christine Crandall DO, 100 mg at 03/31/23 0810  •  metoprolol succinate XL (TOPROL-XL) 24 hr tablet 100 mg, 100 mg, Oral, Daily, Christine Crandall DO, 100 mg at 03/31/23 0810  •  ondansetron (ZOFRAN) tablet 4 mg, 4 mg, Oral, Q6H PRN **OR** ondansetron (ZOFRAN) injection 4 mg, 4 mg, Intravenous, Q6H PRN, Christine Crandall DO  •  sodium chloride 0.9 % flush 10 mL, 10 mL, Intravenous, Q12H, Christine Crandall, , 10 mL at 03/31/23 0810  •  sodium chloride 0.9 % flush 10 mL, 10 mL, Intravenous, PRN, Crissy Crandallin, DO  •  sodium chloride 0.9 % infusion 40 mL, 40 mL, Intravenous, PRN, Christine Crandall, DO    Please refer to the medical record for a full medication list    Review of Systems:    Constitutional-- No Fever, chills or sweats.  Appetite good, and no malaise. No fatigue.  HEENT-- No new vision, hearing or throat complaints.  No  "epistaxis or oral sores.  Denies odynophagia or dysphagia.  No odynophagia or dysphagia. No headache, photophobia or neck stiffness.  CV-- No chest pain, palpitation or syncope  Resp-- + SOB. +cough. No Hemoptysis  GI- No nausea, vomiting, or diarrhea.  No hematochezia, melena, or hematemesis. Denies jaundice or chronic liver disease.  -- No dysuria, hematuria, or flank pain.  Denies hesitancy, urgency or flank pain.  Lymph- chronic bilateral lower extremity lymphedema.    Heme- No active bruising or bleeding; no Hx of DVT or PE.  MS-- no swelling or pain in the bones or joints of arms/legs.  No new back pain.  Neuro-- No acute focal weakness or numbness in the arms or legs.  No seizures.  Skin--other than bilateral lower extremity cellulitis as noted per HPI-->No rashes or lesions    Physical Exam:   Vital Signs   Temp:  [97.6 °F (36.4 °C)-98.7 °F (37.1 °C)] 97.8 °F (36.6 °C)  Heart Rate:  [] 75  Resp:  [18-22] 18  BP: (128-195)/(73-97) 138/75    Temp  Min: 97.6 °F (36.4 °C)  Max: 98.7 °F (37.1 °C)  BP  Min: 128/78  Max: 195/96  Pulse  Min: 75  Max: 104  Resp  Min: 18  Max: 22  SpO2  Min: 89 %  Max: 95 %    Blood pressure 138/75, pulse 75, temperature 97.8 °F (36.6 °C), temperature source Oral, resp. rate 18, height 160 cm (62.99\"), weight (!) 151 kg (332 lb 14.3 oz), SpO2 90 %.  GENERAL: Awake and alert, in no acute distress. Morbidly obese.  HEENT:  Normocephalic, atraumatic.  Oropharynx without thrush. No external or lesions noted.  Ears externally normal, Nose externally normal.  EYES: pupils equal and round.  Anicteric.  No conjunctival injection  HEART: RRR, no murmur  LUNGS: clear to auscultation bilaterally.  Nonlabored breathing on 2 L nasal cannula.  ABDOMEN: obese abdomen. Soft, nontender, nondistended. No appreciable HSM.  Bowel sounds normal.  GENITAL: no Perdomo catheter  SKIN: no generalized rashes.  No peripheral stigmata of infective endocarditis noted.  PSYCHIATRIC: cooperative.  Appropriate " mood and affect  EXT:  Severe bilateral lower extremity lymphedema.  Significant erythema over her left lower extremity from her ankle region up to just below her knee with increased induration and tenderness from her baseline.  No fluctuance or crepitus.  No active drainage noted.  She has a smaller area of erythema and tenderness over her right lower extremity shin region without any fluctuance or drainage noted.  NEURO: awake alert and oriented ×4.  Normal speech and cognition    Results Review:   I reviewed the patient's new clinical results.    Results from last 7 days   Lab Units 03/30/23  1047   WBC 10*3/mm3 8.26   HEMOGLOBIN g/dL 12.4   HEMATOCRIT % 41.5   PLATELETS 10*3/mm3 279     Results from last 7 days   Lab Units 03/30/23  1047   SODIUM mmol/L 137   POTASSIUM mmol/L 4.3   CHLORIDE mmol/L 98   CO2 mmol/L 27.0   BUN mg/dL 16   CREATININE mg/dL 0.65   GLUCOSE mg/dL 158*   CALCIUM mg/dL 8.9     Results from last 7 days   Lab Units 03/30/23  1047   ALK PHOS U/L 78   BILIRUBIN mg/dL 0.6   ALT (SGPT) U/L 13   AST (SGOT) U/L 16                 Results from last 7 days   Lab Units 03/30/23  1440   LACTATE mmol/L 0.8     Estimated Creatinine Clearance: 140.1 mL/min (by C-G formula based on SCr of 0.65 mg/dL).     Procalitonin Results:      Lab 03/30/23  1047   PROCALCITONIN 0.06      Brief Urine Lab Results  (Last result in the past 365 days)      Color   Clarity   Blood   Leuk Est   Nitrite   Protein   CREAT   Urine HCG        03/30/23 1313 Yellow   Clear   Negative   Negative   Negative   Negative                No results found for: SITE, ALLENTEST, PHART, ILO2YWX, PO2ART, KQK3BLL, BASEEXCESS, T5AIKHFM, HGBBG, HCTABG, OXYHEMOGLOBI, METHHGBN, CARBOXYHGB, CO2CT, BAROMETRIC, MODALITY, FIO2     Microbiology:  Blood culture ×2: No growth to date    Radiology:  Imaging Results (Last 72 Hours)     Procedure Component Value Units Date/Time    XR Chest 1 View [[id number]] Collected: 03/31/23 0055     Updated: 03/31/23  0257    Narrative:      Exam:  Single view chest    Date: March 31, 2023    History: Shortness of breath, cough    Comparison: October 24, 2022    Technique: Single frontal radiograph of the chest was obtained    Findings:    The heart is enlarged. There is central vascular congestion and an element of interstitial edema. There is airspace disease in the left lower lobe. There is no pneumothorax.      Impression:      1. Cardiomegaly with vascular congestion and interstitial edema. Correlate for congestive heart failure.  2. Airspace disease in the left lower lobe could represent atelectasis or left lower lobe pneumonia. A short-term follow-up 2 views of the chest is suggested for better characterization.    Slot 63      Electronically signed by:  Diego Vang M.D.    3/31/2023 12:56 AM Mountain Time      I independently read the patient's chest x-ray from today-she does have some cardiomegaly with pulmonary vascular congestion and interstitial edema.    IMPRESSION:     Problems:    1.  Bilateral lower extremity cellulitis, left greater than right-her cellulitis is fairly severe on the left side. Most likely culprits are Staphylococcus and Streptococcus.  She has experienced some wounds over her left lower extremity in the past but no large wounds noted at this time. She has a smaller area of cellulitis on her right lower extremity.  She does have chronic changes over her left lower extremity including some erythema baseline but the tenderness, swelling, and erythema is definitely worse then her baseline. Currently without significant fluctuance but has significant induration.  Will need to monitor closely for signs of abscess development with a low threshold for CT of her lower externally.  2.  Acute hypoxic respiratory failure likely secondary to CHFpEF exacerbation  3.  Morbid obesity  4.  Chronic bilateral lower extremity lymphedema-this is a complicated factors contribute into her recurrent lower extremity  cellulitis.  5.  hypertension    RECOMMENDATIONS:  -continue to follow her blood cultures-no growth to date  -note that MRSA PCR of the nares was negative and although this is helpful with MRSA pneumonia, it does not rule out MRSA colonization elsewhere on the body. Given the severity of her cellulitis, it is difficult to stop MRSA coverage.  -diuresis per the primary team  -continue ceftriaxone but increase the dose to 2 g IV every 24 hours  -continue daptomycin 6 mg/kg IV every 24 hours.  -could consider PICC line placement soon and discharge with IV antibiotics if she starts improving.    Thank you for asking me to see Kerry Leal.  Our group would be pleased to follow this patient over the course of their hospitalization and assist with outpatient antimicrobial therapy, as indicated.  Further recommendations depend on the results of the cultures and clinical course.    Shine Rodriguez MD  3/31/2023

## 2023-04-01 LAB
ANION GAP SERPL CALCULATED.3IONS-SCNC: 8 MMOL/L (ref 5–15)
BUN SERPL-MCNC: 17 MG/DL (ref 6–20)
BUN/CREAT SERPL: 24.6 (ref 7–25)
CALCIUM SPEC-SCNC: 8.7 MG/DL (ref 8.6–10.5)
CHLORIDE SERPL-SCNC: 96 MMOL/L (ref 98–107)
CO2 SERPL-SCNC: 34 MMOL/L (ref 22–29)
CREAT SERPL-MCNC: 0.69 MG/DL (ref 0.57–1)
EGFRCR SERPLBLD CKD-EPI 2021: 102 ML/MIN/1.73
GLUCOSE BLDC GLUCOMTR-MCNC: 134 MG/DL (ref 70–130)
GLUCOSE BLDC GLUCOMTR-MCNC: 143 MG/DL (ref 70–130)
GLUCOSE BLDC GLUCOMTR-MCNC: 154 MG/DL (ref 70–130)
GLUCOSE SERPL-MCNC: 132 MG/DL (ref 65–99)
POTASSIUM SERPL-SCNC: 3.8 MMOL/L (ref 3.5–5.2)
SODIUM SERPL-SCNC: 138 MMOL/L (ref 136–145)

## 2023-04-01 PROCEDURE — 25010000002 DAPTOMYCIN PER 1 MG: Performed by: HOSPITALIST

## 2023-04-01 PROCEDURE — 25010000002 HEPARIN (PORCINE) PER 1000 UNITS: Performed by: HOSPITALIST

## 2023-04-01 PROCEDURE — 96366 THER/PROPH/DIAG IV INF ADDON: CPT

## 2023-04-01 PROCEDURE — 25010000002 CEFTRIAXONE PER 250 MG: Performed by: INTERNAL MEDICINE

## 2023-04-01 PROCEDURE — 96376 TX/PRO/DX INJ SAME DRUG ADON: CPT

## 2023-04-01 PROCEDURE — 97597 DBRDMT OPN WND 1ST 20 CM/<: CPT

## 2023-04-01 PROCEDURE — 80048 BASIC METABOLIC PNL TOTAL CA: CPT | Performed by: INTERNAL MEDICINE

## 2023-04-01 PROCEDURE — G0378 HOSPITAL OBSERVATION PER HR: HCPCS

## 2023-04-01 PROCEDURE — 93005 ELECTROCARDIOGRAM TRACING: CPT | Performed by: INTERNAL MEDICINE

## 2023-04-01 PROCEDURE — 82962 GLUCOSE BLOOD TEST: CPT

## 2023-04-01 PROCEDURE — 96372 THER/PROPH/DIAG INJ SC/IM: CPT

## 2023-04-01 PROCEDURE — 25010000002 FUROSEMIDE PER 20 MG: Performed by: HOSPITALIST

## 2023-04-01 PROCEDURE — 99232 SBSQ HOSP IP/OBS MODERATE 35: CPT | Performed by: INTERNAL MEDICINE

## 2023-04-01 PROCEDURE — 97166 OT EVAL MOD COMPLEX 45 MIN: CPT

## 2023-04-01 PROCEDURE — 29580 STRAPPING UNNA BOOT: CPT

## 2023-04-01 PROCEDURE — 93010 ELECTROCARDIOGRAM REPORT: CPT | Performed by: STUDENT IN AN ORGANIZED HEALTH CARE EDUCATION/TRAINING PROGRAM

## 2023-04-01 PROCEDURE — 97162 PT EVAL MOD COMPLEX 30 MIN: CPT

## 2023-04-01 RX ADMIN — LOSARTAN POTASSIUM 100 MG: 50 TABLET, FILM COATED ORAL at 08:21

## 2023-04-01 RX ADMIN — HEPARIN SODIUM 5000 UNITS: 5000 INJECTION INTRAVENOUS; SUBCUTANEOUS at 21:19

## 2023-04-01 RX ADMIN — Medication 10 ML: at 08:24

## 2023-04-01 RX ADMIN — Medication 10 ML: at 21:21

## 2023-04-01 RX ADMIN — FUROSEMIDE 80 MG: 10 INJECTION, SOLUTION INTRAMUSCULAR; INTRAVENOUS at 13:19

## 2023-04-01 RX ADMIN — HEPARIN SODIUM 5000 UNITS: 5000 INJECTION INTRAVENOUS; SUBCUTANEOUS at 05:08

## 2023-04-01 RX ADMIN — CEFTRIAXONE 2 G: 2 INJECTION, POWDER, FOR SOLUTION INTRAMUSCULAR; INTRAVENOUS at 08:26

## 2023-04-01 RX ADMIN — HEPARIN SODIUM 5000 UNITS: 5000 INJECTION INTRAVENOUS; SUBCUTANEOUS at 13:18

## 2023-04-01 RX ADMIN — DAPTOMYCIN 550 MG: 500 INJECTION, POWDER, LYOPHILIZED, FOR SOLUTION INTRAVENOUS at 19:24

## 2023-04-01 RX ADMIN — METOPROLOL SUCCINATE 100 MG: 50 TABLET, EXTENDED RELEASE ORAL at 08:21

## 2023-04-01 RX ADMIN — Medication 10 ML: at 21:19

## 2023-04-01 RX ADMIN — FUROSEMIDE 80 MG: 10 INJECTION, SOLUTION INTRAMUSCULAR; INTRAVENOUS at 03:06

## 2023-04-01 NOTE — THERAPY EVALUATION
Patient Name: Kerry Leal  : 1965    MRN: [id number]                              Today's Date: 2023       Admit Date: 3/30/2023    Visit Dx:     ICD-10-CM ICD-9-CM   1. Cellulitis of left lower leg  L03.116 682.6   2. Morbid obesity  E66.01 278.01   3. Lymphedema of both lower extremities  I89.0 457.1     Patient Active Problem List   Diagnosis   • Cellulitis of left lower extremity   • Essential hypertension   • Lymphedema of both lower extremities   • Elevated d-dimer   • Prediabetes   • Acute on chronic respiratory failure with hypoxia and hypercapnia   • Type 2 diabetes mellitus   • Possible CAP (community acquired pneumonia)   • Morbid obesity with BMI of 60.0-69.9, adult   • Secondary pulmonary arterial hypertension   • Mild Aortic stenosis   • Mild Aortic regurgitation   • Probable Obesity hypoventilation syndrome (HCC)   • Bilateral cellulitis of lower leg     Past Medical History:   Diagnosis Date   • Anxiety    • Asthma    • Heart murmur    • Hypertension      Past Surgical History:   Procedure Laterality Date   • APPENDECTOMY     •  SECTION     • HYSTERECTOMY     • NECK SURGERY        General Information     Row Name 23 1105          Physical Therapy Time and Intention    Document Type evaluation  -CD     Mode of Treatment physical therapy  -CD     Row Name 23 1105          General Information    Patient Profile Reviewed yes  -CD     Prior Level of Function independent:;all household mobility;community mobility;gait;transfer;bed mobility;mod assist:;ADL's;max assist:;edema care;wound care  USED QC AT ALL TIMES BUT HAS R WALKER AT HOME IF NEEDED. USED ELECTRIC SCOOTER AT STORE.  -CD     Existing Precautions/Restrictions fall;oxygen therapy device and L/min  CHRONIC LYMPHEDEMA/CELLULITIS BLE'S , PICC LINE R UE.  -CD     Barriers to Rehab previous functional deficit;medically complex  -CD     Row Name 23 1105          Living Environment    People in Home spouse  -CD      Row Name 04/01/23 1105          Home Main Entrance    Number of Stairs, Main Entrance one  -CD     Row Name 04/01/23 1105          Stairs Within Home, Primary    Number of Stairs, Within Home, Primary none  -CD     Row Name 04/01/23 1105          Cognition    Orientation Status (Cognition) oriented x 3  -CD     Row Name 04/01/23 1105          Safety Issues, Functional Mobility    Safety Issues Affecting Function (Mobility) awareness of need for assistance;insight into deficits/self-awareness  -CD     Impairments Affecting Function (Mobility) balance;endurance/activity tolerance;strength  -CD     Comment, Safety Issues/Impairments (Mobility) B LE WEEPING - USE DRY FLOW PADS, ELEVATE. REPORTS SOA IMPROVED S/P DIURESIS.  -CD           User Key  (r) = Recorded By, (t) = Taken By, (c) = Cosigned By    Initials Name Provider Type    CD Sheila Ward, PT Physical Therapist               Mobility     Row Name 04/01/23 1109          Bed Mobility    Comment, (Bed Mobility) PT SITTING EOB UPON ARRIVAL ON O2.  -CD     Row Name 04/01/23 1109          Sit-Stand Transfer    Sit-Stand [place] (Transfers) supervision  -CD     Assistive Device (Sit-Stand Transfers) cane, quad  -CD     Row Name 04/01/23 1109          Gait/Stairs (Locomotion)    [place] Level (Gait) standby assist  -CD     Assistive Device (Gait) cane, quad  -CD     Distance in Feet (Gait) 150 FEET  -CD     Deviations/Abnormal Patterns (Gait) base of support, wide  -CD     Bilateral Gait Deviations lateral trunk flexion;heel strike decreased  -CD     Comment, (Gait/Stairs) JALEN WIDE DUE TO BODY HABITUS. NO OVERT LOB WITH USE OF QC. HAS R WALKER AT HOME IF NEEDED. GAIT DISTANCE LIMITED BY WEAKNESS AND FATIGUE. O2 SATS STABLE ON 2L.  -CD           User Key  (r) = Recorded By, (t) = Taken By, (c) = Cosigned By    Initials Name Provider Type    Sheila Carlson, PT Physical Therapist               Obj/Interventions     Row Name 04/01/23 1116          Range  of Motion Comprehensive    General Range of Motion lower extremity range of motion deficits identified  -CD     Comment, General Range of Motion B HIP FLEX LIMITED DUE TO BODY HABITUS. OTHERWISE B LE GROSSLY WFL'S  -CD     Row Name 04/01/23 1116          Strength Comprehensive (MMT)    General Manual Muscle Testing (MMT) Assessment lower extremity strength deficits identified  -CD     Comment, General Manual Muscle Testing (MMT) Assessment B HIP FLEX 3/5, B KNEE EXT AND DF 4+/5.  -CD     Row Name 04/01/23 1116          Motor Skills    Motor Skills functional endurance  -CD     Functional Endurance O2 SATS STABLE WITH GAIT SHORT DISTANCE ON 2L O2. PT FATIGUES QUICKLY.  -CD     Row Name 04/01/23 1116          Balance    Static Sitting Balance independent  -CD     Dynamic Sitting Balance independent  -CD     Position, Sitting Balance unsupported;sitting in chair;sitting edge of bed  -CD     Static Standing Balance supervision  -CD     Dynamic Standing Balance standby assist  -CD     Position/Device Used, Standing Balance cane, quad  -CD     Balance Interventions sitting;standing;sit to stand;supported;static;dynamic;weight shifting activity  -CD     Comment, Balance MILDLY UNSTEADY COMPARED TO BASELINE BUT NO OVERT LOB WITH GAIT X 150 FEET USING QC FOR SUPPORT.  -CD           User Key  (r) = Recorded By, (t) = Taken By, (c) = Cosigned By    Initials Name Provider Type    CD Sheila Ward, PT Physical Therapist               Goals/Plan     Row Name 04/01/23 1125          Bed Mobility Goal 1 (PT)    Activity/Assistive Device (Bed Mobility Goal 1, PT) sit to supine/supine to sit  -CD     [place] Level/Cues Needed (Bed Mobility Goal 1, PT) independent  -CD     Time Frame (Bed Mobility Goal 1, PT) long term goal (LTG);2 weeks  -CD     Row Name 04/01/23 1125          Transfer Goal 1 (PT)    Activity/Assistive Device (Transfer Goal 1, PT) sit-to-stand/stand-to-sit;bed-to-chair/chair-to-bed;cane, quad  -CD      [place] Level/Cues Needed (Transfer Goal 1, PT) independent  -CD     Time Frame (Transfer Goal 1, PT) long term goal (LTG);2 weeks  -CD     Row Name 04/01/23 1125          Gait Training Goal 1 (PT)    Activity/Assistive Device (Gait Training Goal 1, PT) gait (walking locomotion);cane, quad  -CD     [place] Level (Gait Training Goal 1, PT) independent  -CD     Distance (Gait Training Goal 1, PT) 300 FEET  -CD     Time Frame (Gait Training Goal 1, PT) long term goal (LTG);2 weeks  -CD     Row Name 04/01/23 1125          Therapy Assessment/Plan (PT)    Planned Therapy Interventions (PT) balance training;bed mobility training;gait training;transfer training;strengthening;patient/family education;home exercise program  -CD           User Key  (r) = Recorded By, (t) = Taken By, (c) = Cosigned By    Initials Name Provider Type    CD Sheila Ward, PT Physical Therapist               Clinical Impression     Row Name 04/01/23 1118          Pain    Pretreatment Pain Rating 0/10 - no pain  -CD     Posttreatment Pain Rating 0/10 - no pain  -CD     Row Name 04/01/23 1118          Plan of Care Review    Plan of Care Reviewed With patient  -CD     Outcome Evaluation PT PRESENTS WITH EVOLVING SYMPTOMS TO INCLUDE IMPAIRED BALANCE, GENERALIZED WEAKNESS, DECREASED ENDURANCE AND DECLINE IN FUNCTIONAL MOBILITY FROM BASELINE. PT HAS CHRONIC B LE LYMPHEDEMA/CELLULITIS. NO OVERT LOB WITH USE OF QC FOR GAIT X 150 FEET. MILDLY SOA BUT O2 SATS STABLE ON 2L. RECOMMEND HOME WITH CONTINUED FAMILY ASSIST AND LIKELY OPPT FOR LYMPHEDEMA MANAGEMENT.  -CD     Row Name 04/01/23 1118          Therapy Assessment/Plan (PT)    Patient/Family Therapy Goals Statement (PT) TO GO HOME.  -CD     Rehab Potential (PT) good, to achieve stated therapy goals  -CD     Criteria for Skilled Interventions Met (PT) yes  -CD     Therapy Frequency (PT) daily  -CD     Predicted Duration of Therapy Intervention (PT) 2 WEEKS  -CD     Row Name 04/01/23 1114           Vital Signs    Pre Systolic BP Rehab 116  -CD     Pre Treatment Diastolic BP 71  -CD     Post Systolic BP Rehab 123  -CD     Post Treatment Diastolic BP 62  -CD     Posttreatment Heart Rate (beats/min) 74  -CD     Pre SpO2 (%) 94  -CD     O2 Delivery Pre Treatment supplemental O2  -CD     O2 Delivery Intra Treatment supplemental O2  -CD     Post SpO2 (%) 94  -CD     O2 Delivery Post Treatment supplemental O2  -CD     Pre Patient Position Sitting  -CD     Intra Patient Position Standing  -CD     Post Patient Position Sitting  -CD     Row Name 04/01/23 1118          Positioning and Restraints    Pre-Treatment Position in bed  -CD     Post Treatment Position chair  -CD     In Chair reclined;call light within reach;encouraged to call for assist;exit alarm on;legs elevated;notified nsg;waffle cushion  PILLOWS WITH DRY FLOW PADS UNDER LE'S  -CD           User Key  (r) = Recorded By, (t) = Taken By, (c) = Cosigned By    Initials Name Provider Type    CD Sheila Ward, PT Physical Therapist               Outcome Measures     Row Name 04/01/23 1128          How much help from another person do you currently need...    Turning from your back to your side while in flat bed without using bedrails? 2  -CD     Moving from lying on back to sitting on the side of a flat bed without bedrails? 2  -CD     Moving to and from a bed to a chair (including a wheelchair)? 3  -CD     Standing up from a chair using your arms (e.g., wheelchair, bedside chair)? 3  -CD     Climbing 3-5 steps with a railing? 3  -CD     To walk in hospital room? 3  -CD     AM-PAC 6 Clicks Score (PT) 16  -CD     Highest level of mobility 5 --> Static standing  -CD     Row Name 04/01/23 1128 04/01/23 0955       Functional Assessment    Outcome Measure Options AM-PAC 6 Clicks Basic Mobility (PT)  -CD AM-PAC 6 Clicks Daily Activity (OT)  -KF          User Key  (r) = Recorded By, (t) = Taken By, (c) = Cosigned By    Initials Name Provider Type    Sheila Carlson,  PT Physical Therapist    Betty Kim, OT Occupational Therapist                             Physical Therapy Education     Title: PT OT SLP Therapies (In Progress)     Topic: Physical Therapy (Done)     Point: Mobility training (Done)     Learning Progress Summary           Patient Acceptance, E, VU,NR by CD at 4/1/2023 1129    Comment: BENEFITS OF OOB ACTIVITY, SAFETY WITH MOBILITY, PROGRESSION OF POC, D/C PLANNING,                   Point: Home exercise program (Done)     Learning Progress Summary           Patient Acceptance, E, VU,NR by CD at 4/1/2023 1129    Comment: BENEFITS OF OOB ACTIVITY, SAFETY WITH MOBILITY, PROGRESSION OF POC, D/C PLANNING,                   Point: Body mechanics (Done)     Learning Progress Summary           Patient Acceptance, E, VU,NR by CD at 4/1/2023 1129    Comment: BENEFITS OF OOB ACTIVITY, SAFETY WITH MOBILITY, PROGRESSION OF POC, D/C PLANNING,                   Point: Precautions (Done)     Learning Progress Summary           Patient Acceptance, E, VU,NR by CD at 4/1/2023 1129    Comment: BENEFITS OF OOB ACTIVITY, SAFETY WITH MOBILITY, PROGRESSION OF POC, D/C PLANNING,                               User Key     Initials Effective Dates Name Provider Type Discipline    CD 02/03/23 -  Sheila Ward, PT Physical Therapist PT              PT Recommendation and Plan  Planned Therapy Interventions (PT): balance training, bed mobility training, gait training, transfer training, strengthening, patient/family education, home exercise program  Plan of Care Reviewed With: patient  Outcome Evaluation: PT PRESENTS WITH EVOLVING SYMPTOMS TO INCLUDE IMPAIRED BALANCE, GENERALIZED WEAKNESS, DECREASED ENDURANCE AND DECLINE IN FUNCTIONAL MOBILITY FROM BASELINE. PT HAS CHRONIC B LE LYMPHEDEMA/CELLULITIS. NO OVERT LOB WITH USE OF QC FOR GAIT X 150 FEET. MILDLY SOA BUT O2 SATS STABLE ON 2L. RECOMMEND HOME WITH CONTINUED FAMILY ASSIST AND LIKELY OPPT FOR LYMPHEDEMA MANAGEMENT.     Time  Calculation:    PT Charges     Row Name 04/01/23 1131             Time Calculation    Start Time 0926  -CD      PT Received On 04/01/23  -CD      PT Goal Re-Cert Due Date 04/11/23  -CD         Untimed Charges    PT Eval/Re-eval Minutes 49  -CD         Total Minutes    Untimed Charges Total Minutes 49  -CD       Total Minutes 49  -CD            User Key  (r) = Recorded By, (t) = Taken By, (c) = Cosigned By    Initials Name Provider Type    CD Sheila Ward, PT Physical Therapist              Therapy Charges for Today     Code Description Service Date Service Provider Modifiers Qty    02878221227 HC PT EVAL MOD COMPLEXITY 4 4/1/2023 Sheila Ward, PT GP 1          PT G-Codes  Outcome Measure Options: AM-PAC 6 Clicks Basic Mobility (PT)  AM-PAC 6 Clicks Score (PT): 16  AM-PAC 6 Clicks Score (OT): 17  PT Discharge Summary  Anticipated Discharge Disposition (PT): home with assist, home with outpatient therapy services    Sheila Ward, PT  4/1/2023

## 2023-04-01 NOTE — PROGRESS NOTES
[facility] Medicine Services  PROGRESS NOTE    Patient Name: Kerry Leal  : 1965  MRN: [id number]    Date of Admission: 3/30/2023  Primary Care Physician: Gopal Kong MD    Subjective   Subjective     CC:  Leg redness x 3 weeks    HPI:  Believes leg redness may be a little better, definitely not worse  Lost to wound care outpatient f/u, last seen ~6 months ago here.  doing wraps  Breathing slightly better, diuresed well per patient report yesterday    ROS:  Gen- No fevers, chills  CV- No chest pain, palpitations  GI- No N/V/D, abd pain     Objective   Objective     Vital Signs:   Temp:  [98.2 °F (36.8 °C)-98.6 °F (37 °C)] 98.3 °F (36.8 °C)  Heart Rate:  [71-77] 77  Resp:  [16-18] 16  BP: (111-130)/(61-67) 130/62  Flow (L/min):  [2] 2     Physical Exam:  Constitutional: No acute distress, awake, alert female sitting up in recliner  HENT: NCAT, mucous membranes moist, n/c in place  Respiratory: decreased breath sounds bilateral bases w crackles in lower lobes, respiratory effort normal on 2 liters n/c (sats drops to 85% on r/a)  Cardiovascular: RRR, no murmurs, rubs, or gallops  Gastrointestinal: Soft, nontender, nondistended  Musculoskeletal: Muscle tone within normal limits, no joint effusions appreciated  Lower extremity exam/Skin: Significant scaling LLE>RLE with erythema and warmth of left leg knee down (less deep red today). Smaller patch of erythema of distal RLE. Swelling present, no purulence appreciated  Psychiatric: Appropriate affect, cooperative  Neurologic: Alert and oriented, facial movements symmetric and spontaneous movement of all 4 extremities grossly equal bilaterally, speech clear      Results Reviewed:   LAB RESULTS:      Lab 23  0904 23  1440 23  1047   WBC 7.55  --  8.26   HEMOGLOBIN 12.1  --  12.4   HEMATOCRIT 40.5  --  41.5   PLATELETS 279  --  279   NEUTROS ABS  --   --  6.13   IMMATURE GRANS (ABS)  --   --  0.03    LYMPHS ABS  --   --  1.14   MONOS ABS  --   --  0.81   EOS ABS  --   --  0.10   MCV 89.2  --  89.6   PROCALCITONIN 0.06  --  0.06   LACTATE 1.0 0.8 2.3*         Lab 04/01/23  0557 03/31/23  0904 03/30/23  1047   SODIUM 138 137 137   POTASSIUM 3.8 4.1 4.3   CHLORIDE 96* 96* 98   CO2 34.0* 31.0* 27.0   ANION GAP 8.0 10.0 12.0   BUN 17 12 16   CREATININE 0.69 0.69 0.65   EGFR 102.0 102.0 103.5   GLUCOSE 132* 155* 158*   CALCIUM 8.7 8.7 8.9   MAGNESIUM  --  2.0 2.0   HEMOGLOBIN A1C  --   --  6.90*   TSH  --  1.270  --          Lab 03/31/23  0904 03/30/23  1047   TOTAL PROTEIN 7.0 7.7   ALBUMIN 3.5 3.5   GLOBULIN 3.5 4.2   ALT (SGPT) 12 13   AST (SGOT) 16 16   BILIRUBIN 0.9 0.6   ALK PHOS 79 78         Lab 03/31/23  0904   PROBNP 505.1         Lab 03/31/23  0904   CHOLESTEROL 134   LDL CHOL 84   HDL CHOL 33*   TRIGLYCERIDES 89             Brief Urine Lab Results  (Last result in the past 365 days)      Color   Clarity   Blood   Leuk Est   Nitrite   Protein   CREAT   Urine HCG        03/30/23 1313 Yellow   Clear   Negative   Negative   Negative   Negative                 Microbiology Results Abnormal     Procedure Component Value - Date/Time    Blood Culture - Blood, Arm, Left [[id number]]  (Normal) Collected: 03/30/23 1103    Lab Status: Preliminary result Specimen: Blood from Arm, Left Updated: 04/01/23 1301     Blood Culture No growth at 2 days    Blood Culture - Blood, Arm, Right [[id number]]  (Normal) Collected: 03/30/23 1103    Lab Status: Preliminary result Specimen: Blood from Arm, Right Updated: 04/01/23 1301     Blood Culture No growth at 2 days    MRSA Screen, PCR (Inpatient) - Swab, Nares [[id number]]  (Normal) Collected: 03/31/23 0911    Lab Status: Final result Specimen: Swab from Nares Updated: 03/31/23 1105     MRSA PCR Negative    Narrative:      The negative predictive value of this diagnostic test is high and should only be used to consider de-escalating anti-MRSA therapy. A positive result may indicate  colonization with MRSA and must be correlated clinically.  MRSA Negative    Respiratory Panel PCR w/COVID-19(SARS-CoV-2) VIVIEN/ANGIE/PATRIC/PAD/COR/MAD/JEEVAN In-House, NP Swab in UTM/VTM, 3-4 HR TAT - Swab, Nasopharynx [[id number]]  (Normal) Collected: 03/31/23 0228    Lab Status: Final result Specimen: Swab from Nasopharynx Updated: 03/31/23 0341     ADENOVIRUS, PCR Not Detected     Coronavirus 229E Not Detected     Coronavirus HKU1 Not Detected     Coronavirus NL63 Not Detected     Coronavirus OC43 Not Detected     COVID19 Not Detected     Human Metapneumovirus Not Detected     Human Rhinovirus/Enterovirus Not Detected     Influenza A PCR Not Detected     Influenza B PCR Not Detected     Parainfluenza Virus 1 Not Detected     Parainfluenza Virus 2 Not Detected     Parainfluenza Virus 3 Not Detected     Parainfluenza Virus 4 Not Detected     RSV, PCR Not Detected     Bordetella pertussis pcr Not Detected     Bordetella parapertussis PCR Not Detected     Chlamydophila pneumoniae PCR Not Detected     Mycoplasma pneumo by PCR Not Detected    Narrative:      In the setting of a positive respiratory panel with a viral infection PLUS a negative procalcitonin without other underlying concern for bacterial infection, consider observing off antibiotics or discontinuation of antibiotics and continue supportive care. If the respiratory panel is positive for atypical bacterial infection (Bordetella pertussis, Chlamydophila pneumoniae, or Mycoplasma pneumoniae), consider antibiotic de-escalation to target atypical bacterial infection.          XR Chest 1 View    Result Date: 3/31/2023  Exam:  Single view chest Date: March 31, 2023 History: Shortness of breath, cough Comparison: October 24, 2022 Technique: Single frontal radiograph of the chest was obtained Findings: The heart is enlarged. There is central vascular congestion and an element of interstitial edema. There is airspace disease in the left lower lobe. There is no pneumothorax.      Impression: 1. Cardiomegaly with vascular congestion and interstitial edema. Correlate for congestive heart failure. 2. Airspace disease in the left lower lobe could represent atelectasis or left lower lobe pneumonia. A short-term follow-up 2 views of the chest is suggested for better characterization. Slot 63 Electronically signed by:  Diego Vang M.D.  3/31/2023 12:56 AM Mountain Time      Results for orders placed during the hospital encounter of 10/22/22    Adult Transthoracic Echo Complete W/ Cont if Necessary Per Protocol    Interpretation Summary  •  Left ventricular ejection fraction appears to be 51 - 55%.  •  Left ventricular wall thickness is consistent with mild concentric hypertrophy.  •  Mild aortic valve regurgitation is present. Mild aortic valve stenosis is present.  •  Mild tricuspid valve regurgitation is present. Estimated right ventricular systolic pressure from tricuspid regurgitation is moderately elevated (45-55 mmHg).      Current medications:  Scheduled Meds:cefTRIAXone, 2 g, Intravenous, Q24H  DAPTOmycin, 6 mg/kg (Adjusted), Intravenous, Q24H  furosemide, 80 mg, Intravenous, Q12H  heparin (porcine), 5,000 Units, Subcutaneous, Q8H  insulin lispro, 0-7 Units, Subcutaneous, TID AC  losartan, 100 mg, Oral, Daily  metoprolol succinate XL, 100 mg, Oral, Daily  sodium chloride, 10 mL, Intravenous, Q12H  sodium chloride, 10 mL, Intravenous, Q12H      Continuous Infusions:   PRN Meds:.•  acetaminophen **OR** acetaminophen **OR** acetaminophen  •  benzonatate  •  dextrose  •  dextrose  •  glucagon (human recombinant)  •  HYDROcodone-acetaminophen  •  ondansetron **OR** ondansetron  •  sodium chloride  •  sodium chloride  •  sodium chloride    Assessment & Plan   Assessment & Plan     Active Hospital Problems    Diagnosis  POA   • **Bilateral cellulitis of lower leg [L03.116, L03.115]  Yes   • Morbid obesity with BMI of 60.0-69.9, adult [E66.01, Z68.44]  Not Applicable   • Type 2 diabetes  mellitus [E11.9]  Yes   • Essential hypertension [I10]  Yes   • Lymphedema of both lower extremities [I89.0]  Yes      Resolved Hospital Problems   No resolved problems to display.        Brief Hospital Course to date:  Kerry Leal is a 58 y.o. female w well controlled DMII, BMI >55, chronic bilateral lymphedema w recurrent cellulitis who presents with 3 weeks of worsening left>right leg redness w drainage. Unable to tolerate keflex or bactrim in past.    Recurrent cellulitis, left>right leg  -daptomycin + ceftriaxone per ID, plan for PICC and OP ID f/u Monday  -consult lymphedema team for wound care  -fear recurrence is highly likely given significant skin breakdown and pt risk factors    Acute hypoxic resp failure likely 2/2 CHFpEF exacerbation - improving  -patient H2FPEF score>90% probability of CHFpEF based on 10/2022 ECHO, on chronic lasix  -EKG still pending (d/w nurse)  -continue IV lasix BID for now, will need home diuretic  -already on ARB + metoprolol    BMI >55  DMII, well controlled  - SSI for now  HTN - home meds    Expected Discharge Location and Transportation: home  Expected Discharge 4/2 if off O2  Expected Discharge Date and Time     Expected Discharge Date Expected Discharge Time    Apr 3, 2023            DVT prophylaxis:  Medical DVT prophylaxis orders are present.     AM-PAC 6 Clicks Score (PT): 16 (04/01/23 1128)    CODE STATUS:   Code Status and Medical Interventions:   Ordered at: 03/30/23 1341     Level Of Support Discussed With:    Patient     Code Status (Patient has no pulse and is not breathing):    CPR (Attempt to Resuscitate)     Medical Interventions (Patient has pulse or is breathing):    Full Support       Caroline Adkins MD  04/01/23

## 2023-04-01 NOTE — PLAN OF CARE
Goal Outcome Evaluation:  Plan of Care Reviewed With: patient           Outcome Evaluation: PT PRESENTS WITH EVOLVING SYMPTOMS TO INCLUDE IMPAIRED BALANCE, GENERALIZED WEAKNESS, DECREASED ENDURANCE AND DECLINE IN FUNCTIONAL MOBILITY FROM BASELINE. PT HAS CHRONIC B LE LYMPHEDEMA/CELLULITIS. NO OVERT LOB WITH USE OF QC FOR GAIT X 150 FEET. MILDLY SOA BUT O2 SATS STABLE ON 2L. RECOMMEND HOME WITH CONTINUED FAMILY ASSIST AND LIKELY OPPT FOR LYMPHEDEMA MANAGEMENT.

## 2023-04-01 NOTE — PLAN OF CARE
Goal Outcome Evaluation:               A&Ox4. VSS. EKG done showing NSR. Tele discontinued. Checked O2 while pt was resting in bed on RA and she dropped to 85%. 2L NC all day. Voiding well. Ambulated with PT/OT. OOB to chair. Unable to send hanging wound culture lab bc both legs had no drainage or openings. New dressings placed on bilat legs via OT. Safety maintained. BM x1.

## 2023-04-01 NOTE — PLAN OF CARE
Goal Outcome Evaluation:  Plan of Care Reviewed With: patient        Progress: improving  Outcome Evaluation: OT eval completed, Pt demonstrates deficits in strength, balance, and functional activity tolerance compared to baseline for ADL and functional transfer completion, tolerated bed to chair well with CGA and SBA STS, recom IPOT DC home with assist and outpatient therapy services

## 2023-04-01 NOTE — PLAN OF CARE
Goal Outcome Evaluation:  Plan of Care Reviewed With: patient        Progress: no change  Outcome Evaluation: PT wound care initial evaluation complete. Pt presents with chronic BLE lymphedema along with recurrent LLE cellulits. Pt's LLE with severe edema and erythema, scattered areas of dry hypertrophic plaque build up and no open wounds at this time. Pt's RLE with moderate edema and mild to moderate area of blanchable erythema localized to the pretibial area along with scattered superficial open ulcerations. PT applied BLE Unna boots to help promote wound healing, improve venous return, improve skin integrity, and improve BLE function. Pt would continue to benefit from skilled wound care for further debridement and Unna boot application until wounds are resurfaced to allow for transition to lymphedema therapy at [place] where she is previously treated.

## 2023-04-01 NOTE — THERAPY TREATMENT NOTE
Acute Care - Wound/Debridement Initial Evaluation  [facility]     Patient Name: Kerry Leal  : 1965  MRN: [id number]  Today's Date: 2023                Admit Date: 3/30/2023    Visit Dx:    ICD-10-CM ICD-9-CM   1. Cellulitis of left lower leg  L03.116 682.6   2. Morbid obesity  E66.01 278.01   3. Lymphedema of both lower extremities  I89.0 457.1     BLE      L lateral leg      L medial leg      R anterior leg        Patient Active Problem List   Diagnosis   • Cellulitis of left lower extremity   • Essential hypertension   • Lymphedema of both lower extremities   • Elevated d-dimer   • Prediabetes   • Acute on chronic respiratory failure with hypoxia and hypercapnia   • Type 2 diabetes mellitus   • Possible CAP (community acquired pneumonia)   • Morbid obesity with BMI of 60.0-69.9, adult   • Secondary pulmonary arterial hypertension   • Mild Aortic stenosis   • Mild Aortic regurgitation   • Probable Obesity hypoventilation syndrome (HCC)   • Bilateral cellulitis of lower leg        Past Medical History:   Diagnosis Date   • Anxiety    • Asthma    • Heart murmur    • Hypertension         Past Surgical History:   Procedure Laterality Date   • APPENDECTOMY     •  SECTION     • HYSTERECTOMY     • NECK SURGERY             Wound Left medial gluteal Pressure Injury (Active)   Dressing Appearance open to air 23 0800   Closure Open to air 23 0800   Base dry;red;blanchable 23 0800   Periwound dry;pink;blanchable 23 0800   Periwound Temperature warm 23 0800   Periwound Skin Turgor soft 23 0800   Drainage Amount none 23 0800   Care, Wound cleansed with 23 0800   Dressing Care open to air 23 0800       Wound 23 0810 Right anterior leg Other (comment) (Active)   Wound Image   23 1405   Dressing Appearance open to air 23 1405   Closure Open to air 23 0800   Base moist;pink;red 23 1405   Periwound  intact;dry;redness;swelling;warm 04/01/23 1405   Periwound Temperature warm 04/01/23 1405   Periwound Skin Turgor soft 04/01/23 1405   Edges open 04/01/23 1405   Drainage Amount none 04/01/23 1405   Care, Wound cleansed with;soap and water;cynthia boot 04/01/23 1405   Dressing Care dressing applied 04/01/23 1405   Periwound Care cleansed with pH balanced cleanser;dry periwound area maintained 04/01/23 1405       Wound 03/31/23 0810 Left anterior leg Other (comment) (Active)   Dressing Appearance open to air 04/01/23 0800   Closure Open to air 04/01/23 0800   Base red;pink;dry;scab 04/01/23 0800   Periwound swelling;redness;pink;non-blanchable 04/01/23 0800   Periwound Temperature warm 04/01/23 0800   Periwound Skin Turgor firm 04/01/23 0800   Drainage Amount none 04/01/23 0800      Lymphedema     Row Name 04/01/23 1500             Lymphedema Edema Assessment    Ptting Edema Category By severity  -      Pitting Edema Severe;Very Severe;Other (comment)  very severe LLE  -         Skin Changes/Observations    Location/Assessment Lower Extremity  -      Lower Extremity Conditions bilateral:;dry;shiny;scaly;crust;scab(s);inflamed;fragile  -      Lower Extremity Color/Pigment bilateral:;erythema;red;left:;hyperpigmented;brawny;woody;fibrosis  -         Lymphedema Pulses/Capillary Refill    Lymphedema Pulses/Capillary Refill lower extremity pulses;capillary refill  -      Dorsalis Pedis Pulse right:;left:  Unable to palpate through edema.  -      Posterior Tibialis Pulse right:;left:  Unable to palpate through edema.  -      Capillary Refill lower extremity capillary refill  -      Lower Extremity Capillary Refill right:;left:;less than 3 seconds  -         Lymphedema Measurements    Measurement Type(s) Quick Girth  -      Quick Girth Areas Lower extremities  -         LLE Quick Girth (cm)    Mid foot 27.8 cm  -LH      Smallest ankle 36.5 cm  -LH      Largest calf 77 cm  -LH         RLE Quick Girth (cm)  "   Mid foot 27.5 cm  -      Smallest ankle 33.5 cm  -      Largest calf 67.4 cm  -      RLE Quick Girth Total 128.4  -         Compression/Skin Care    Compression/Skin Care skin care;wrapping location  -      Skin Care washed/dried;lotion applied  -      Wrapping Location lower extremity  -      Wrapping Location LE bilateral:;foot to knee  -      Wrapping Comments BLE Unna boot applied in clamshell pattern, 4\" coban, spandage to secure.  -            User Key  (r) = Recorded By, (t) = Taken By, (c) = Cosigned By    Initials Name Provider Type     Ariel Lai, PT Physical Therapist                WOUND DEBRIDEMENT  Total area of Debridement: 6cm2  Debridement Site 1  Location- Site 1: LLE  Selective Debridement- Site 1: Wound Surface <20cmsq  Instruments- Site 1: tweezers  Excised Tissue Description- Site 1: minimum, other (comment) (hypertrophic plaques)  Bleeding- Site 1: none               PT Assessment (last 12 hours)     PT Evaluation and Treatment     Row Name 04/01/23 1407          Physical Therapy Time and Intention    Subjective Information complains of;weakness;fatigue;pain;swelling  -     Document Type evaluation;wound care  -     Mode of Treatment physical therapy;individual therapy  -     Row Name 04/01/23 1404          General Information    Patient Profile Reviewed yes  -     Patient Observations alert;cooperative;agree to therapy  -     Risks Reviewed patient:;increased discomfort  -     Benefits Reviewed patient:;increase knowledge;improve skin integrity;improve function;decrease risk of DVT;decrease pain  -     Barriers to Rehab medically complex  -     Row Name 04/01/23 1409          Pain    Pretreatment Pain Rating 0/10 - no pain  -     Posttreatment Pain Rating 0/10 - no pain  -     Row Name 04/01/23 1406          Cognition    Affect/Mental Status (Cognition) WNL  -     Orientation Status (Cognition) oriented x 3  -     Row Name             Wound " Left medial gluteal Pressure Injury    Wound - Properties Group Present on Hospital Admission: Y  -TH Side: Left  -TH Orientation: medial  -TH Location: gluteal  -TH Primary Wound Type: Pressure inj  -TH    Retired Wound - Properties Group Present on Hospital Admission: Y  -TH Side: Left  -TH Orientation: medial  -TH Location: gluteal  -TH Primary Wound Type: Pressure inj  -TH    Retired Wound - Properties Group Present on Hospital Admission: Y  -TH Side: Left  -TH Location: gluteal  -TH Primary Wound Type: Pressure inj  -TH    Row Name 04/01/23 1405          Wound 03/31/23 0810 Right anterior leg Other (comment)    Wound - Properties Group Placement Date: 03/31/23  -JS Placement Time: 0810  -JS Present on Hospital Admission: Y  -JS Side: Right  -JS Orientation: anterior  -JS Location: leg  -JS Primary Wound Type: Other  -JS, rash     Wound Image Images linked: 1  -     Dressing Appearance open to air  -     Base moist;pink;red  -     Periwound intact;dry;redness;swelling;warm  -     Periwound Temperature warm  -     Periwound Skin Turgor soft  -     Edges open  -     Wound Length (cm) --  scattered open areas  -     Drainage Amount none  -     Care, Wound cleansed with;soap and water;cynthia boot  -LH     Dressing Care dressing applied  Unna boot only  -LH     Periwound Care cleansed with pH balanced cleanser;dry periwound area maintained  -     Retired Wound - Properties Group Placement Date: 03/31/23  -JS Placement Time: 0810  -JS Present on Hospital Admission: Y  -JS Side: Right  -JS Orientation: anterior  -JS Location: leg  -JS Primary Wound Type: Other  -JS, rash     Retired Wound - Properties Group Date first assessed: 03/31/23  -JS Time first assessed: 0810  -JS Present on Hospital Admission: Y  -JS Side: Right  -JS Location: leg  -JS Primary Wound Type: Other  -JS, rash     Row Name             Wound 03/31/23 0810 Left anterior leg Other (comment)    Wound - Properties Group Placement Date:  03/31/23  -JS Placement Time: 0810 -JS Side: Left  -JS Orientation: anterior  -JS Location: leg  -JS Primary Wound Type: Other  -JS, cellulitis/rash     Retired Wound - Properties Group Placement Date: 03/31/23  -JS Placement Time: 0810  -JS Side: Left  -JS Orientation: anterior  -JS Location: leg  -JS Primary Wound Type: Other  -JS, cellulitis/rash     Retired Wound - Properties Group Date first assessed: 03/31/23  -JS Time first assessed: 0810 -JS Side: Left  -JS Location: leg  -JS Primary Wound Type: Other  -JS, cellulitis/rash     Row Name 04/01/23 1405          Coping    Observed Emotional State calm;cooperative;anxious  -     Verbalized Emotional State acceptance;anxiety  -     Trust Relationship/Rapport care explained;questions answered  -     Row Name 04/01/23 1408          Plan of Care Review    Plan of Care Reviewed With patient  -     Progress no change  -     Outcome Evaluation PT wound care initial evaluation complete. Pt presents with chronic BLE lymphedema along with recurrent LLE cellulits. Pt's LLE with severe edema and erythema, scattered areas of dry hypertrophic plaque build up and no open wounds at this time. Pt's RLE with moderate edema and mild to moderate area of blanchable erythema localized to the pretibial area along with scattered superficial open ulcerations. PT applied BLE Unna boots to help promote wound healing, improve venous return, improve skin integrity, and improve BLE function. Pt would continue to benefit from skilled wound care for further debridement and Unna boot application until wounds are resurfaced to allow for transition to lymphedema therapy at [place] where she is previously treated.  -     Row Name 04/01/23 1405          Positioning and Restraints    Pre-Treatment Position sitting in chair/recliner  -     Post Treatment Position chair  -     In Chair reclined;call light within reach;encouraged to call for assist;legs elevated  -     Row Name  04/01/23 1405          Therapy Assessment/Plan (PT)    Patient/Family Therapy Goals Statement (PT) Reduce edema and wounds  -     PT Diagnosis (PT) Severe BLE edema, LLE erythema/cellulitis, scattered venous ulcerations.  -     Row Name 04/01/23 1405          Therapy Plan Review/Discharge Plan (PT)    Therapy Plan Review (PT) evaluation/treatment results reviewed;care plan/treatment goals reviewed;risks/benefits reviewed;current/potential barriers reviewed;participants voiced agreement with care plan;participants included;patient  -     Row Name 04/01/23 1405          Physical Therapy Goals    Wound Care Goal Selection (PT) wound care, PT goal 1;wound care, PT goal 2  -     Row Name 04/01/23 1405          Wound Care Goal 1 (PT)    Wound Care Goal 1 (PT) Decrease largest LLE calf girth by 3cm to improve skin integrity and wound healing.  -     Time Frame (Wound Care Goal 1, PT) long term goal (LTG);10 days  -     Row Name 04/01/23 1405          Wound Care Goal 2 (PT)    Wound Care Goal 2 (PT) Demonstrate no remaining open wounds to allow for transition to lymphedema therapy.  -     Time Frame (Wound Care Goal 2, PT) long term goal (LTG);10 days  -           User Key  (r) = Recorded By, (t) = Taken By, (c) = Cosigned By    Initials Name Provider Type     Ariel Lai, PT Physical Therapist    Malu Pickens, RN Registered Nurse    Lizeth Burk RN Registered Nurse              Physical Therapy Education     Title: PT OT SLP Therapies (In Progress)     Topic: Physical Therapy (Done)     Point: Mobility training (Done)     Learning Progress Summary           Patient Acceptance, E, VU,NR by CD at 4/1/2023 1129    Comment: BENEFITS OF OOB ACTIVITY, SAFETY WITH MOBILITY, PROGRESSION OF POC, D/C PLANNING,                   Point: Home exercise program (Done)     Learning Progress Summary           Patient Acceptance, E, VU,NR by CD at 4/1/2023 1129    Comment: BENEFITS OF OOB ACTIVITY, SAFETY WITH  MOBILITY, PROGRESSION OF POC, D/C PLANNING,                   Point: Body mechanics (Done)     Learning Progress Summary           Patient Acceptance, E, VU,NR by CD at 4/1/2023 1129    Comment: BENEFITS OF OOB ACTIVITY, SAFETY WITH MOBILITY, PROGRESSION OF POC, D/C PLANNING,                   Point: Precautions (Done)     Learning Progress Summary           Patient Acceptance, E, VU,NR by CD at 4/1/2023 1129    Comment: BENEFITS OF OOB ACTIVITY, SAFETY WITH MOBILITY, PROGRESSION OF POC, D/C PLANNING,                               User Key     Initials Effective Dates Name Provider Type Discipline    CD 02/03/23 -  Sheila Ward, PT Physical Therapist PT                Recommendation and Plan  Anticipated Equipment Needs at Discharge (PT): quad cane  Anticipated Discharge Disposition (PT): home with assist, home with outpatient therapy services  Planned Therapy Interventions (PT): balance training, bed mobility training, gait training, transfer training, strengthening, patient/family education, home exercise program  Therapy Frequency (PT): daily  Plan of Care Reviewed With: patient   Progress: no change       Progress: no change  Outcome Evaluation: PT wound care initial evaluation complete. Pt presents with chronic BLE lymphedema along with recurrent LLE cellulits. Pt's LLE with severe edema and erythema, scattered areas of dry hypertrophic plaque build up and no open wounds at this time. Pt's RLE with moderate edema and mild to moderate area of blanchable erythema localized to the pretibial area along with scattered superficial open ulcerations. PT applied BLE Unna boots to help promote wound healing, improve venous return, improve skin integrity, and improve BLE function. Pt would continue to benefit from skilled wound care for further debridement and Unna boot application until wounds are resurfaced to allow for transition to lymphedema therapy at [place] where she is previously treated.  Plan of Care Reviewed  With: patient            Time Calculation   PT Charges     Row Name 04/01/23 1508 04/01/23 1131          Time Calculation    Start Time 1405  - 0926  -     PT Received On -- 04/01/23  -     PT Goal Re-Cert Due Date 04/11/23  - 04/11/23  -        Untimed Charges    PT Eval/Re-eval Minutes -- 49  -CD     Wound Care 88534 Selective debridement;16843 Unna boot  - --     29580-Unna Boot 30  - --     15744-Pryzyjibj debridement 10  -LH --        Total Minutes    Untimed Charges Total Minutes 40  - 49  -CD      Total Minutes 40  - 49  -CD           User Key  (r) = Recorded By, (t) = Taken By, (c) = Cosigned By    Initials Name Provider Type    CD Sheila Ward, PT Physical Therapist     Ariel Lai, PT Physical Therapist                  Therapy Charges for Today     Code Description Service Date Service Provider Modifiers Qty    95671963938 HC JOHN DEBRIDE OPEN WOUND UP TO 20CM 4/1/2023 Ariel Lai, PT GP 1    88906204360 HC PT STAPPING UNNA BOOT 4/1/2023 Ariel Lai, PT GP 1            PT G-Codes  Outcome Measure Options: AM-PAC 6 Clicks Basic Mobility (PT)  AM-PAC 6 Clicks Score (PT): 16  AM-PAC 6 Clicks Score (OT): 17       Ariel Lai PT  4/1/2023

## 2023-04-01 NOTE — PROGRESS NOTES
INFECTIOUS DISEASE PROGRESS NOTE    Kerry Leal  1965  [id number]    Date of consult: 3/31/23  Admit date: 3/30/2023    Requesting Provider: Dr. Crandall  Evaluating physician: Shine Rodriguez MD  Reason for Consultation: lower extremity cellulitis  Chief Complaint: lower extremity pain and redness      Subjective   History of present illness:  Kerry Leal is a  58 y.o.  Yr old female with pmh of HTN, asthma, and chronic lymphedema who I have evaluated in the past for recurrent left lower extremity cellulitis.  The patient has been treated with multiple courses of IV antibiotics in the past, most recently with a couple doses of Dalvance in 06/2022.  She was subsequently placed on chronic suppressive oral Keflex 250 mg by mouth twice a day at the end of her IV antibiotic course in June 2022.  I followed her up last in October 2022 in clinic and she had chronic discoloration of her left lower extremity but no signs of recurrent cellulitis at that time.  She had been tolerating her Keflex at that time with no complaints. Plan was for her to follow-up in my clinic in 6 months.    The patient states that she thought that she had some shortness of breath with the Keflex and stopped this antibiotic several months ago herself.  She denies any lip swelling or tongue swelling. She additionally did not have any rash while on Keflex.    Patient called our clinic earlier this month with some increased drainage from the back of her leg for 2 weeks and worsening discoloration and was advised to go to the ER. She states that she saw her primary care provider and her primary care provider prescribed her Bactrim in the setting of her prior sulfa allergy and she subsequently had a bad rash to the Bactrim. She had worsening left lower extremity swelling, pain, and redness.  She has additionally developed some redness and tenderness over her right lower extremity.  She presented to the ER yesterday due to her worsening  symptoms.    Since arrival, the patient has remained afebrile. Blood cell count was okay at 8.26.  Hemoglobin A1c was 6.9. lactic acid was 2.3.  Pro-calcitonin was 0.06.  Creatinine was okay at 0.65.  LFTs were within normal limits.  Blood cultures ×2 were collected on 3/30 are no growth to date. Tory PCR panel is negative. MRSA PCR of the nares was negative. Venous duplex of the left lower extremity was negative for DVT. Patient did have increased shortness of breath and cough over the night and so a chest x-ray was obtained that showed cardiomegaly with vascular congestion and interstitial edema.  There was some mild airspace disease in the left lower lobe that could represent atelectasis or left lower lobe pneumonia.    Subjective:    23: PICC placed. No fevers. Still with significant erythema and tenderness over her BLE, L>R that is about the same as yesterday. No drainage from her lower extremities. No n/v/d. No rashes, lip swelling, or throat swelling. Breathing has improved with diuresis.     Past Medical History:   Diagnosis Date   • Anxiety    • Asthma    • Heart murmur    • Hypertension        Past Surgical History:   Procedure Laterality Date   • APPENDECTOMY     •  SECTION     • HYSTERECTOMY     • NECK SURGERY         Social history:  The patient lives in [facility]. She is  and has a son. She is a never smoker.  No alcohol or IV drug abuse.      family history includes Diabetes in her mother; Heart disease in her mother.    Allergies   Allergen Reactions   • Diclofenac Swelling   • Benadryl [Diphenhydramine Hcl] Other (See Comments)     UNSURE   • Clindamycin/Lincomycin Other (See Comments)     UNSURE   • Doxycycline Other (See Comments)     UNSURE   • Fluticasone Other (See Comments)     NOSE BLEEDS   • Fluvoxamine Other (See Comments)     UNSURE   • Montelukast Swelling   • Symbicort [Budesonide-Formoterol Fumarate] Other (See Comments)     UNSURE   • Lisinopril Dizziness    • Penicillins Other (See Comments)     MOTHER TOLD HER SHE WAS ALLERGIC   • Smz-Tmp Ds [Sulfamethoxazole-Trimethoprim] GI Intolerance   • Sulfa Antibiotics GI Intolerance         There is no immunization history on file for this patient.    Medication:    Current Facility-Administered Medications:   •  acetaminophen (TYLENOL) tablet 650 mg, 650 mg, Oral, Q4H PRN **OR** acetaminophen (TYLENOL) 160 MG/5ML solution 650 mg, 650 mg, Oral, Q4H PRN **OR** acetaminophen (TYLENOL) suppository 650 mg, 650 mg, Rectal, Q4H PRN, Christine Crandall DO  •  benzonatate (TESSALON) capsule 100 mg, 100 mg, Oral, TID PRN, Anamaria Castillo APRN, 100 mg at 03/31/23 2327  •  cefTRIAXone (ROCEPHIN) 2 g/100 mL 0.9% NS IVPB (MBP), 2 g, Intravenous, Q24H, Shine Rodriguez MD  •  DAPTOmycin (CUBICIN) 550 mg in sodium chloride 0.9 % 50 mL IVPB, 6 mg/kg (Adjusted), Intravenous, Q24H, Christine Crandall DO, Last Rate: 100 mL/hr at 03/31/23 2158, 550 mg at 03/31/23 2158  •  dextrose (D50W) (25 g/50 mL) IV injection 25 g, 25 g, Intravenous, Q15 Min PRN, Caroline Adkins MD  •  dextrose (GLUTOSE) oral gel 15 g, 15 g, Oral, Q15 Min PRN, Caroline Adkins MD  •  furosemide (LASIX) injection 80 mg, 80 mg, Intravenous, Q12H, Christine Crandall DO, 80 mg at 04/01/23 0306  •  glucagon (GLUCAGEN) injection 1 mg, 1 mg, Intramuscular, Q15 Min PRN, Caroline Adkins MD  •  heparin (porcine) 5000 UNIT/ML injection 5,000 Units, 5,000 Units, Subcutaneous, Q8H, Christine Crandall DO, 5,000 Units at 04/01/23 0508  •  HYDROcodone-acetaminophen (NORCO) 7.5-325 MG per tablet 1 tablet, 1 tablet, Oral, Q4H PRN, Christine Crandall DO  •  Insulin Lispro (humaLOG) injection 0-7 Units, 0-7 Units, Subcutaneous, TID AC, Caroline Adkins MD  •  losartan (COZAAR) tablet 100 mg, 100 mg, Oral, Daily, Christine Crandall DO, 100 mg at 03/31/23 0810  •  metoprolol succinate XL (TOPROL-XL) 24 hr tablet 100 mg, 100 mg, Oral, Daily, Christine Crandall DO, 100 mg at 03/31/23 0810  •  ondansetron (ZOFRAN)  "tablet 4 mg, 4 mg, Oral, Q6H PRN **OR** ondansetron (ZOFRAN) injection 4 mg, 4 mg, Intravenous, Q6H PRN, Crissy Crandallin, DO  •  sodium chloride 0.9 % flush 10 mL, 10 mL, Intravenous, Q12H, Crissy Crandallin, DO, 10 mL at 03/31/23 0810  •  sodium chloride 0.9 % flush 10 mL, 10 mL, Intravenous, PRN, Eric Crandalltlin, DO  •  sodium chloride 0.9 % flush 10 mL, 10 mL, Intravenous, Q12H, Shine Rodriguez MD, 10 mL at 03/31/23 2158  •  sodium chloride 0.9 % flush 10 mL, 10 mL, Intravenous, PRN, Shine Rodriguez MD  •  sodium chloride 0.9 % infusion 40 mL, 40 mL, Intravenous, PRN, Christine Crandall, DO    Please refer to the medical record for a full medication list    Review of Systems:  As above    Physical Exam:   Vital Signs   Temp:  [98.4 °F (36.9 °C)-98.6 °F (37 °C)] 98.4 °F (36.9 °C)  Heart Rate:  [69-72] 72  Resp:  [15-18] 16  BP: (117-124)/(64-68) 117/67    Temp  Min: 98.4 °F (36.9 °C)  Max: 98.6 °F (37 °C)  BP  Min: 117/67  Max: 124/68  Pulse  Min: 69  Max: 72  Resp  Min: 15  Max: 18  SpO2  Min: 92 %  Max: 92 %    Blood pressure 117/67, pulse 72, temperature 98.4 °F (36.9 °C), temperature source Oral, resp. rate 16, height 160 cm (62.99\"), weight (!) 151 kg (332 lb 14.3 oz), SpO2 92 %.  GENERAL: Awake and alert, in no acute distress. Morbidly obese. Sitting on bedside  HEENT:  Normocephalic, atraumatic. No external oral lesions noted  EYES:   Anicteric.  No conjunctival injection  HEART: RRR, no murmur  LUNGS: clear to auscultation bilaterally.  Nonlabored breathing on 2 L nasal cannula.  ABDOMEN: obese abdomen. Non-distended  GENITAL: no Perdomo catheter  SKIN: no generalized rashes.  No peripheral stigmata of infective endocarditis noted.  PSYCHIATRIC: cooperative.  Appropriate mood and affect  EXT:  Severe bilateral lower extremity lymphedema.  Significant erythema over her left lower extremity from her ankle region up to just below her knee with increased induration and tenderness from her baseline- stable " from yesterday.  No fluctuance or crepitus.  No active drainage noted.  She has a smaller area of erythema and tenderness over her right lower extremity shin region without any fluctuance or drainage noted.  NEURO: awake alert and oriented ×4.  Normal speech and cognition    Results Review:   I reviewed the patient's new clinical results.    Results from last 7 days   Lab Units 03/31/23  0904 03/30/23  1047   WBC 10*3/mm3 7.55 8.26   HEMOGLOBIN g/dL 12.1 12.4   HEMATOCRIT % 40.5 41.5   PLATELETS 10*3/mm3 279 279     Results from last 7 days   Lab Units 04/01/23  0557   SODIUM mmol/L 138   POTASSIUM mmol/L 3.8   CHLORIDE mmol/L 96*   CO2 mmol/L 34.0*   BUN mg/dL 17   CREATININE mg/dL 0.69   GLUCOSE mg/dL 132*   CALCIUM mg/dL 8.7     Results from last 7 days   Lab Units 03/31/23  0904   ALK PHOS U/L 79   BILIRUBIN mg/dL 0.9   ALT (SGPT) U/L 12   AST (SGOT) U/L 16                 Results from last 7 days   Lab Units 03/31/23  0904   LACTATE mmol/L 1.0     Estimated Creatinine Clearance: 131.9 mL/min (by C-G formula based on SCr of 0.69 mg/dL).  CPK    Common Labsle 3/31/23   Creatine Kinase 161            Procalitonin Results:      Lab 03/31/23  0904 03/30/23  1047   PROCALCITONIN 0.06 0.06      Brief Urine Lab Results  (Last result in the past 365 days)      Color   Clarity   Blood   Leuk Est   Nitrite   Protein   CREAT   Urine HCG        03/30/23 1313 Yellow   Clear   Negative   Negative   Negative   Negative                No results found for: SITE, ALLENTEST, PHART, YPV0KAD, PO2ART, PHC0MHH, BASEEXCESS, L2WKIJPV, HGBBG, HCTABG, OXYHEMOGLOBI, METHHGBN, CARBOXYHGB, CO2CT, BAROMETRIC, MODALITY, FIO2     Microbiology:  Blood culture ×2: No growth to date    Radiology:  Imaging Results (Last 72 Hours)     Procedure Component Value Units Date/Time    XR Chest 1 View [[id number]] Collected: 03/31/23 0055     Updated: 03/31/23 0257    Narrative:      Exam:  Single view chest    Date: March 31, 2023    History: Shortness of  breath, cough    Comparison: October 24, 2022    Technique: Single frontal radiograph of the chest was obtained    Findings:    The heart is enlarged. There is central vascular congestion and an element of interstitial edema. There is airspace disease in the left lower lobe. There is no pneumothorax.      Impression:      1. Cardiomegaly with vascular congestion and interstitial edema. Correlate for congestive heart failure.  2. Airspace disease in the left lower lobe could represent atelectasis or left lower lobe pneumonia. A short-term follow-up 2 views of the chest is suggested for better characterization.    Slot 63      Electronically signed by:  Diego Vang M.D.    3/31/2023 12:56 AM Mountain Time          IMPRESSION:     Problems:    1.  Bilateral lower extremity cellulitis, left greater than right-her cellulitis is fairly severe on the left side. Most likely culprits are Staphylococcus and Streptococcus.  She has experienced some wounds over her left lower extremity in the past but no large wounds noted at this time. She has a smaller area of cellulitis on her right lower extremity.  She does have chronic changes over her left lower extremity including some erythema baseline but the tenderness, swelling, and erythema is definitely worse then her baseline. Currently without significant fluctuance but has significant induration.  Will need to monitor closely for signs of abscess development with a low threshold for CT of her lower externally.  2.  Acute hypoxic respiratory failure likely secondary to CHFpEF exacerbation  3.  Morbid obesity  4.  Chronic bilateral lower extremity lymphedema-this is a complicated factors contribute into her recurrent lower extremity cellulitis.  5.  hypertension    RECOMMENDATIONS:  -continue to follow her blood cultures-no growth to date  -diuresis per the primary team  -continue ceftriaxone but increase the dose to 2 g IV every 24 hours  -continue daptomycin 6 mg/kg IV every  24 hours.  -picc placed    I would be ok with discharge home today after a dose of her IV antibiotics with the below plan    UM/LY:  1. Ceftriaxone 2g IV q24  2. Daptomycin 500 mg IV q24h  3. Weekly cbc with diff, cmp, esr, crp, and cpk level  4. Follow up in my clinic Monday, 4/3/23  5. Please fax a copy of my orders to [facility] at [phone] and call [phone] with final discharge plans    I discussed with Dr. Adkins today    Thank you for asking me to see Kerry Leal.    Shine Rodriguez MD  4/1/2023

## 2023-04-01 NOTE — THERAPY EVALUATION
Patient Name: Kerry Leal  : 1965    MRN: [id number]                              Today's Date: 2023       Admit Date: 3/30/2023    Visit Dx:     ICD-10-CM ICD-9-CM   1. Cellulitis of left lower leg  L03.116 682.6   2. Morbid obesity  E66.01 278.01   3. Lymphedema of both lower extremities  I89.0 457.1     Patient Active Problem List   Diagnosis   • Cellulitis of left lower extremity   • Essential hypertension   • Lymphedema of both lower extremities   • Elevated d-dimer   • Prediabetes   • Acute on chronic respiratory failure with hypoxia and hypercapnia   • Type 2 diabetes mellitus   • Possible CAP (community acquired pneumonia)   • Morbid obesity with BMI of 60.0-69.9, adult   • Secondary pulmonary arterial hypertension   • Mild Aortic stenosis   • Mild Aortic regurgitation   • Probable Obesity hypoventilation syndrome (HCC)   • Bilateral cellulitis of lower leg     Past Medical History:   Diagnosis Date   • Anxiety    • Asthma    • Heart murmur    • Hypertension      Past Surgical History:   Procedure Laterality Date   • APPENDECTOMY     •  SECTION     • HYSTERECTOMY     • NECK SURGERY        General Information     Row Name 2344          OT Time and Intention    Document Type evaluation  -KF     Mode of Treatment occupational therapy  -KF     Row Name 23          General Information    Patient Profile Reviewed yes  -KF     Prior Level of Function independent:;all household mobility;transfer;bed mobility;mod assist:;ADL's;max assist:;edema care;wound care  with QC and 2L NC  -KF     Existing Precautions/Restrictions fall;oxygen therapy device and L/min  -KF     Barriers to Rehab previous functional deficit;medically complex  -KF     Row Name 2344          Occupational Profile    Environmental Supports and Barriers (Occupational Profile) good support at home reported; tub shower with shower chair  -KF     Row Name 23          Living Environment     People in Home spouse  -     Row Name 04/01/23 0944          Home Main Entrance    Number of Stairs, Main Entrance one  -     Row Name 04/01/23 0944          Stairs Within Home, Primary    Number of Stairs, Within Home, Primary none  -     Row Name 04/01/23 0944          Cognition    Orientation Status (Cognition) oriented x 3  -     Row Name 04/01/23 0944          Safety Issues, Functional Mobility    Safety Issues Affecting Function (Mobility) awareness of need for assistance;insight into deficits/self-awareness  -     Impairments Affecting Function (Mobility) balance;endurance/activity tolerance;strength  -           User Key  (r) = Recorded By, (t) = Taken By, (c) = Cosigned By    Initials Name Provider Type    KF Betty Loera, OT Occupational Therapist                 Mobility/ADL's     Row Name 04/01/23 0945          Bed Mobility    Comment, (Bed Mobility) EOB upon arrival  -[facility] Name 04/01/23 0945          Transfers    Transfers stand-sit transfer;bed-chair transfer;sit-stand transfer  -     Comment, (Transfers) no verbal cues for sequencing, setup of QC provided, slight dizziness initially which resolved with time  -     Row Name 04/01/23 0945          Bed-Chair Transfer    Bed-Chair [place] (Transfers) contact guard  -     Assistive Device (Bed-Chair Transfers) cane, quad  -KF     Row Name 04/01/23 0945          Sit-Stand Transfer    Sit-Stand [place] (Transfers) standby assist  -     Assistive Device (Sit-Stand Transfers) cane, quad  -KF     Row Name 04/01/23 0945          Stand-Sit Transfer    Stand-Sit [place] (Transfers) standby assist  -     Assistive Device (Stand-Sit Transfers) cane, quad  -     Row Name 04/01/23 0945          Functional Mobility    Functional Mobility- Ind. Level contact guard assist  -     Functional Mobility- Device cane, quad  -KF     Functional Mobility-Distance (Feet) 4  -KF     Functional Mobility- Safety Issues supplemental  O2;step length decreased  -     Functional Mobility- Comment steps to chair, deferred further to PT  -[facility] Name 04/01/23 0945          Activities of Daily Living    BADL Assessment/Intervention upper body dressing;lower body dressing  -KF     Row Name 04/01/23 0945          Upper Body Dressing Assessment/Training    [place] Level (Upper Body Dressing) don;front opening garment;set up  -     Position (Upper Body Dressing) edge of bed sitting  -[facility] Name 04/01/23 0945          Lower Body Dressing Assessment/Training    [place] Level (Lower Body Dressing) don;socks;dependent (less than 25% patient effort)  -     Position (Lower Body Dressing) edge of bed sitting  -           User Key  (r) = Recorded By, (t) = Taken By, (c) = Cosigned By    Initials Name Provider Type    KF Betty Loera, OT Occupational Therapist               Obj/Interventions     [facility] Name 04/01/23 0947          Sensory Assessment (Somatosensory)    Sensory Assessment (Somatosensory) UE sensation intact  -KF     Row Name 04/01/23 0947          Vision Assessment/Intervention    Visual Impairment/Limitations WFL;corrective lenses for distance  -[facility] Name 04/01/23 0947          Range of Motion Comprehensive    General Range of Motion bilateral upper extremity ROM WFL  -[facility] Name 04/01/23 0947          Strength Comprehensive (MMT)    General Manual Muscle Testing (MMT) Assessment upper extremity strength deficits identified  -     Comment, General Manual Muscle Testing (MMT) Assessment BUE grossly 4+/5, per Pt reports weaker in shoulders compared to baseline  -[facility] Name 04/01/23 0947          Motor Skills    Motor Skills coordination  -     Coordination WFL;bilateral;upper extremity;bimanual skills  -KF     Row Name 04/01/23 0947          Balance    Balance Assessment sitting static balance;sitting dynamic balance;standing static balance;standing dynamic balance  -     Static Sitting Balance  independent  -KF     Dynamic Sitting Balance independent  -KF     Position, Sitting Balance unsupported;sitting edge of bed  -KF     Static Standing Balance standby assist  -KF     Dynamic Standing Balance contact guard  -KF     Position/Device Used, Standing Balance supported;cane, quad  -KF     Balance Interventions standing;weight shifting activity;UE activity with balance activity;supported  -KF     Comment, Balance no LOB with simulated ADL tasks and dynamic reaching in standing, however slightly unsteady with steps and weightshifting compared to baseline  -KF           User Key  (r) = Recorded By, (t) = Taken By, (c) = Cosigned By    Initials Name Provider Type    KF Betty Loera, OT Occupational Therapist               Goals/Plan     Row Name 04/01/23 0954          Bed Mobility Goal 1 (OT)    Activity/Assistive Device (Bed Mobility Goal 1, OT) sit to supine/supine to sit  -KF     [place] Level/Cues Needed (Bed Mobility Goal 1, OT) contact guard required  -KF     Time Frame (Bed Mobility Goal 1, OT) long term goal (LTG);10 days  -KF     Progress/Outcomes (Bed Mobility Goal 1, OT) goal ongoing;new goal  -KF     Row Name 04/01/23 0954          Transfer Goal 1 (OT)    Activity/Assistive Device (Transfer Goal 1, OT) toilet;cane, quad  -KF     [place] Level/Cues Needed (Transfer Goal 1, OT) standby assist  -KF     Time Frame (Transfer Goal 1, OT) long term goal (LTG);by discharge  -KF     Progress/Outcome (Transfer Goal 1, OT) new goal;goal ongoing  -KF     Row Name 04/01/23 0954          Bathing Goal 1 (OT)    Activity/Device (Bathing Goal 1, OT) lower body bathing  AE PRN  -KF     [place] Level/Cues Needed (Bathing Goal 1, OT) minimum assist (75% or more patient effort)  -KF     Time Frame (Bathing Goal 1, OT) long term goal (LTG);10 days  -KF     Progress/Outcomes (Bathing Goal 1, OT) new goal;goal ongoing  -KF     Row Name 04/01/23 0954          Therapy Assessment/Plan (OT)    Planned  Therapy Interventions (OT) activity tolerance training;adaptive equipment training;BADL retraining;functional balance retraining;patient/caregiver education/training;transfer/mobility retraining;occupation/activity based interventions;strengthening exercise;ROM/therapeutic exercise  -           User Key  (r) = Recorded By, (t) = Taken By, (c) = Cosigned By    Initials Name Provider Type    KF Betty Loera, OT Occupational Therapist               Clinical Impression     Row Name 04/01/23 0952          Pain Assessment    Pretreatment Pain Rating 0/10 - no pain  -KF     Posttreatment Pain Rating 0/10 - no pain  -KF     Pre/Posttreatment Pain Comment tolerated  -     Pain Intervention(s) Repositioned;Ambulation/increased activity  -     Row Name 04/01/23 0952          Plan of Care Review    Plan of Care Reviewed With patient  -     Progress improving  -     Outcome Evaluation OT eval completed, Pt demonstrates deficits in strength, balance, and functional activity tolerance compared to baseline for ADL and functional transfer completion, tolerated bed to chair well with CGA and SBA STS, recom IPOT DC home with assist and outpatient therapy services  -     Row Name 04/01/23 0952          Therapy Assessment/Plan (OT)    Rehab Potential (OT) good, to achieve stated therapy goals  -     Criteria for Skilled Therapeutic Interventions Met (OT) yes;meets criteria;skilled treatment is necessary  -     Therapy Frequency (OT) daily  -     Row Name 04/01/23 0952          Therapy Plan Review/Discharge Plan (OT)    Anticipated Discharge Disposition (OT) home with assist;home with outpatient therapy services  -     Row Name 04/01/23 0952          Vital Signs    Pre Systolic BP Rehab 111  -KF     Pre Treatment Diastolic BP 61  -KF     Post Systolic BP Rehab 116  -KF     Post Treatment Diastolic BP 71  -KF     Pretreatment Heart Rate (beats/min) 72  -KF     Posttreatment Heart Rate (beats/min) 83  -KF     Pre  SpO2 (%) 94  -KF     O2 Delivery Pre Treatment supplemental O2  -KF     Post SpO2 (%) 94  -KF     O2 Delivery Post Treatment supplemental O2  -KF     Pre Patient Position Sitting  -KF     Intra Patient Position Standing  -KF     Post Patient Position Sitting  -KF     Rest Breaks  1  -KF     Row Name 04/01/23 0952          Positioning and Restraints    Pre-Treatment Position in bed  -KF     Post Treatment Position chair  -KF     In Chair notified nsg;sitting;with PT;exit alarm on;call light within reach;encouraged to call for assist  -KF           User Key  (r) = Recorded By, (t) = Taken By, (c) = Cosigned By    Initials Name Provider Type    Betty Kim OT Occupational Therapist               Outcome Measures     Row Name 04/01/23 0955          How much help from another is currently needed...    Putting on and taking off regular lower body clothing? 2  -KF     Bathing (including washing, rinsing, and drying) 2  -KF     Toileting (which includes using toilet bed pan or urinal) 2  -KF     Putting on and taking off regular upper body clothing 4  -KF     Taking care of personal grooming (such as brushing teeth) 3  -KF     Eating meals 4  -KF     AM-PAC 6 Clicks Score (OT) 17  -KF     Row Name 04/01/23 0955          Functional Assessment    Outcome Measure Options AM-PAC 6 Clicks Daily Activity (OT)  -KF           User Key  (r) = Recorded By, (t) = Taken By, (c) = Cosigned By    Initials Name Provider Type    Betty Kim OT Occupational Therapist                Occupational Therapy Education     Title: PT OT SLP Therapies (In Progress)     Topic: Occupational Therapy (In Progress)     Point: ADL training (Done)     Description:   Instruct learner(s) on proper safety adaptation and remediation techniques during self care or transfers.   Instruct in proper use of assistive devices.              Learning Progress Summary           Patient Acceptance, E,TB,D, VU,DU,NR by ARY at 4/1/2023 0909                    Point: Home exercise program (Not Started)     Description:   Instruct learner(s) on appropriate technique for monitoring, assisting and/or progressing therapeutic exercises/activities.              Learner Progress:  Not documented in this visit.          Point: Precautions (Done)     Description:   Instruct learner(s) on prescribed precautions during self-care and functional transfers.              Learning Progress Summary           Patient Acceptance, E,TB,D, VU,DU,NR by  at 4/1/2023 0956                   Point: Body mechanics (Done)     Description:   Instruct learner(s) on proper positioning and spine alignment during self-care, functional mobility activities and/or exercises.              Learning Progress Summary           Patient Acceptance, E,TB,D, VU,DU,NR by  at 4/1/2023 0956                               User Key     Initials Effective Dates Name Provider Type Discipline     06/16/21 -  Betty Loera, OT Occupational Therapist OT              OT Recommendation and Plan  Planned Therapy Interventions (OT): activity tolerance training, adaptive equipment training, BADL retraining, functional balance retraining, patient/caregiver education/training, transfer/mobility retraining, occupation/activity based interventions, strengthening exercise, ROM/therapeutic exercise  Therapy Frequency (OT): daily  Plan of Care Review  Plan of Care Reviewed With: patient  Progress: improving  Outcome Evaluation: OT eval completed, Pt demonstrates deficits in strength, balance, and functional activity tolerance compared to baseline for ADL and functional transfer completion, tolerated bed to chair well with CGA and SBA STS, recom IPOT DC home with assist and outpatient therapy services     Time Calculation:    Time Calculation- OT     Row Name 04/01/23 0923             Time Calculation- OT    OT Start Time 0923 -KF      OT Received On 04/01/23  -      OT Goal Re-Cert Due Date 04/11/23  -          Untimed Charges    OT Eval/Re-eval Minutes 46  -KF         Total Minutes    Untimed Charges Total Minutes 46  -KF       Total Minutes 46  -KF            User Key  (r) = Recorded By, (t) = Taken By, (c) = Cosigned By    Initials Name Provider Type    Betty Kim OT Occupational Therapist              Therapy Charges for Today     Code Description Service Date Service Provider Modifiers Qty    10791375792 HC OT EVAL MOD COMPLEXITY 4 4/1/2023 Betty Loera OT GO 1               Betty Loera OT  4/1/2023

## 2023-04-02 LAB
ANION GAP SERPL CALCULATED.3IONS-SCNC: 8 MMOL/L (ref 5–15)
BUN SERPL-MCNC: 20 MG/DL (ref 6–20)
BUN/CREAT SERPL: 27.8 (ref 7–25)
CALCIUM SPEC-SCNC: 9 MG/DL (ref 8.6–10.5)
CHLORIDE SERPL-SCNC: 96 MMOL/L (ref 98–107)
CO2 SERPL-SCNC: 35 MMOL/L (ref 22–29)
CREAT SERPL-MCNC: 0.72 MG/DL (ref 0.57–1)
EGFRCR SERPLBLD CKD-EPI 2021: 98.3 ML/MIN/1.73
GLUCOSE BLDC GLUCOMTR-MCNC: 121 MG/DL (ref 70–130)
GLUCOSE BLDC GLUCOMTR-MCNC: 128 MG/DL (ref 70–130)
GLUCOSE BLDC GLUCOMTR-MCNC: 185 MG/DL (ref 70–130)
GLUCOSE SERPL-MCNC: 134 MG/DL (ref 65–99)
POTASSIUM SERPL-SCNC: 4.1 MMOL/L (ref 3.5–5.2)
QT INTERVAL: 408 MS
QTC INTERVAL: 449 MS
SODIUM SERPL-SCNC: 139 MMOL/L (ref 136–145)

## 2023-04-02 PROCEDURE — 80048 BASIC METABOLIC PNL TOTAL CA: CPT | Performed by: INTERNAL MEDICINE

## 2023-04-02 PROCEDURE — 25010000002 FUROSEMIDE PER 20 MG: Performed by: HOSPITALIST

## 2023-04-02 PROCEDURE — 96366 THER/PROPH/DIAG IV INF ADDON: CPT

## 2023-04-02 PROCEDURE — 25010000002 DAPTOMYCIN PER 1 MG: Performed by: INTERNAL MEDICINE

## 2023-04-02 PROCEDURE — 25010000002 CEFTRIAXONE PER 250 MG: Performed by: INTERNAL MEDICINE

## 2023-04-02 PROCEDURE — 96376 TX/PRO/DX INJ SAME DRUG ADON: CPT

## 2023-04-02 PROCEDURE — 25010000002 HEPARIN (PORCINE) PER 1000 UNITS: Performed by: HOSPITALIST

## 2023-04-02 PROCEDURE — 96372 THER/PROPH/DIAG INJ SC/IM: CPT

## 2023-04-02 PROCEDURE — G0378 HOSPITAL OBSERVATION PER HR: HCPCS

## 2023-04-02 PROCEDURE — 99232 SBSQ HOSP IP/OBS MODERATE 35: CPT | Performed by: INTERNAL MEDICINE

## 2023-04-02 PROCEDURE — 82962 GLUCOSE BLOOD TEST: CPT

## 2023-04-02 RX ADMIN — HEPARIN SODIUM 5000 UNITS: 5000 INJECTION INTRAVENOUS; SUBCUTANEOUS at 21:47

## 2023-04-02 RX ADMIN — CEFTRIAXONE 2 G: 2 INJECTION, POWDER, FOR SOLUTION INTRAMUSCULAR; INTRAVENOUS at 08:26

## 2023-04-02 RX ADMIN — FUROSEMIDE 80 MG: 10 INJECTION, SOLUTION INTRAMUSCULAR; INTRAVENOUS at 13:37

## 2023-04-02 RX ADMIN — Medication 10 ML: at 08:28

## 2023-04-02 RX ADMIN — HEPARIN SODIUM 5000 UNITS: 5000 INJECTION INTRAVENOUS; SUBCUTANEOUS at 13:37

## 2023-04-02 RX ADMIN — Medication 10 ML: at 20:43

## 2023-04-02 RX ADMIN — HEPARIN SODIUM 5000 UNITS: 5000 INJECTION INTRAVENOUS; SUBCUTANEOUS at 05:18

## 2023-04-02 RX ADMIN — FUROSEMIDE 80 MG: 10 INJECTION, SOLUTION INTRAMUSCULAR; INTRAVENOUS at 01:35

## 2023-04-02 RX ADMIN — LOSARTAN POTASSIUM 100 MG: 50 TABLET, FILM COATED ORAL at 08:26

## 2023-04-02 RX ADMIN — DAPTOMYCIN 550 MG: 500 INJECTION, POWDER, LYOPHILIZED, FOR SOLUTION INTRAVENOUS at 20:43

## 2023-04-02 RX ADMIN — METOPROLOL SUCCINATE 100 MG: 50 TABLET, EXTENDED RELEASE ORAL at 08:26

## 2023-04-02 NOTE — PROGRESS NOTES
[facility] Medicine Services  PROGRESS NOTE    Patient Name: Kerry Leal  : 1965  MRN: [id number]    Date of Admission: 3/30/2023  Primary Care Physician: Gopal Kong MD    Subjective   Subjective     CC:  Leg redness x 3 weeks    HPI:  Diuresed, working with IS but still satting 89-91% on room air now with some shallow cough.   Otherwise feeling well, hopeful for home soon    ROS:  Gen- No fevers, chills  CV- No chest pain, palpitations  GI- No N/V/D, abd pain     Objective   Objective     Vital Signs:   Temp:  [98.3 °F (36.8 °C)-98.4 °F (36.9 °C)] 98.3 °F (36.8 °C)  Heart Rate:  [72-80] 78  Resp:  [16-18] 16  BP: (118-145)/(67-87) 145/87  Flow (L/min):  [2] 2     Physical Exam:  Constitutional: No acute distress, awake, alert female sitting up in recliner  HENT: NCAT, mucous membranes moist, n/c in place  Respiratory: decreased breath sounds bilateral bases w crackles in lower lobes, respiratory effort normal w sats now >88% on room air  Cardiovascular: RRR, no murmurs, rubs, or gallops  Gastrointestinal: Soft, nontender, nondistended  Musculoskeletal: Muscle tone within normal limits, no joint effusions appreciated  Lower extremity exam/Skin: legs in wraps bilaterally  Psychiatric: Appropriate affect, cooperative  Neurologic: Alert and oriented, facial movements symmetric and spontaneous movement of all 4 extremities grossly equal bilaterally, speech clear      Results Reviewed:   LAB RESULTS:      Lab 23  0904 23  1440 23  1047   WBC 7.55  --  8.26   HEMOGLOBIN 12.1  --  12.4   HEMATOCRIT 40.5  --  41.5   PLATELETS 279  --  279   NEUTROS ABS  --   --  6.13   IMMATURE GRANS (ABS)  --   --  0.03   LYMPHS ABS  --   --  1.14   MONOS ABS  --   --  0.81   EOS ABS  --   --  0.10   MCV 89.2  --  89.6   PROCALCITONIN 0.06  --  0.06   LACTATE 1.0 0.8 2.3*         Lab 23  0845 23  0557 23  0904 23  1047   SODIUM 139 138 137 137    POTASSIUM 4.1 3.8 4.1 4.3   CHLORIDE 96* 96* 96* 98   CO2 35.0* 34.0* 31.0* 27.0   ANION GAP 8.0 8.0 10.0 12.0   BUN 20 17 12 16   CREATININE 0.72 0.69 0.69 0.65   EGFR 98.3 102.0 102.0 103.5   GLUCOSE 134* 132* 155* 158*   CALCIUM 9.0 8.7 8.7 8.9   MAGNESIUM  --   --  2.0 2.0   HEMOGLOBIN A1C  --   --   --  6.90*   TSH  --   --  1.270  --          Lab 03/31/23  0904 03/30/23  1047   TOTAL PROTEIN 7.0 7.7   ALBUMIN 3.5 3.5   GLOBULIN 3.5 4.2   ALT (SGPT) 12 13   AST (SGOT) 16 16   BILIRUBIN 0.9 0.6   ALK PHOS 79 78         Lab 03/31/23  0904   PROBNP 505.1         Lab 03/31/23  0904   CHOLESTEROL 134   LDL CHOL 84   HDL CHOL 33*   TRIGLYCERIDES 89             Brief Urine Lab Results  (Last result in the past 365 days)      Color   Clarity   Blood   Leuk Est   Nitrite   Protein   CREAT   Urine HCG        03/30/23 1313 Yellow   Clear   Negative   Negative   Negative   Negative                 Microbiology Results Abnormal     Procedure Component Value - Date/Time    Blood Culture - Blood, Arm, Left [[id number]]  (Normal) Collected: 03/30/23 1103    Lab Status: Preliminary result Specimen: Blood from Arm, Left Updated: 04/02/23 1301     Blood Culture No growth at 3 days    Blood Culture - Blood, Arm, Right [[id number]]  (Normal) Collected: 03/30/23 1103    Lab Status: Preliminary result Specimen: Blood from Arm, Right Updated: 04/02/23 1301     Blood Culture No growth at 3 days    MRSA Screen, PCR (Inpatient) - Swab, Nares [[id number]]  (Normal) Collected: 03/31/23 0911    Lab Status: Final result Specimen: Swab from Nares Updated: 03/31/23 1105     MRSA PCR Negative    Narrative:      The negative predictive value of this diagnostic test is high and should only be used to consider de-escalating anti-MRSA therapy. A positive result may indicate colonization with MRSA and must be correlated clinically.  MRSA Negative    Respiratory Panel PCR w/COVID-19(SARS-CoV-2) VIVIEN/ANGIE/PATRIC/PAD/COR/MAD/JEEVAN In-House, NP Swab in [place],  3-4 HR TAT - Swab, Nasopharynx [[id number]]  (Normal) Collected: 03/31/23 0228    Lab Status: Final result Specimen: Swab from Nasopharynx Updated: 03/31/23 0341     ADENOVIRUS, PCR Not Detected     Coronavirus 229E Not Detected     Coronavirus HKU1 Not Detected     Coronavirus NL63 Not Detected     Coronavirus OC43 Not Detected     COVID19 Not Detected     Human Metapneumovirus Not Detected     Human Rhinovirus/Enterovirus Not Detected     Influenza A PCR Not Detected     Influenza B PCR Not Detected     Parainfluenza Virus 1 Not Detected     Parainfluenza Virus 2 Not Detected     Parainfluenza Virus 3 Not Detected     Parainfluenza Virus 4 Not Detected     RSV, PCR Not Detected     Bordetella pertussis pcr Not Detected     Bordetella parapertussis PCR Not Detected     Chlamydophila pneumoniae PCR Not Detected     Mycoplasma pneumo by PCR Not Detected    Narrative:      In the setting of a positive respiratory panel with a viral infection PLUS a negative procalcitonin without other underlying concern for bacterial infection, consider observing off antibiotics or discontinuation of antibiotics and continue supportive care. If the respiratory panel is positive for atypical bacterial infection (Bordetella pertussis, Chlamydophila pneumoniae, or Mycoplasma pneumoniae), consider antibiotic de-escalation to target atypical bacterial infection.          No radiology results from the last 24 hrs    Results for orders placed during the hospital encounter of 10/22/22    Adult Transthoracic Echo Complete W/ Cont if Necessary Per Protocol    Interpretation Summary  •  Left ventricular ejection fraction appears to be 51 - 55%.  •  Left ventricular wall thickness is consistent with mild concentric hypertrophy.  •  Mild aortic valve regurgitation is present. Mild aortic valve stenosis is present.  •  Mild tricuspid valve regurgitation is present. Estimated right ventricular systolic pressure from tricuspid regurgitation is  moderately elevated (45-55 mmHg).      Current medications:  Scheduled Meds:cefTRIAXone, 2 g, Intravenous, Q24H  DAPTOmycin, 6 mg/kg (Adjusted), Intravenous, Q24H  furosemide, 80 mg, Intravenous, Q12H  heparin (porcine), 5,000 Units, Subcutaneous, Q8H  insulin lispro, 0-7 Units, Subcutaneous, TID AC  losartan, 100 mg, Oral, Daily  metoprolol succinate XL, 100 mg, Oral, Daily  sodium chloride, 10 mL, Intravenous, Q12H  sodium chloride, 10 mL, Intravenous, Q12H      Continuous Infusions:   PRN Meds:.•  acetaminophen **OR** acetaminophen **OR** acetaminophen  •  benzonatate  •  dextrose  •  dextrose  •  glucagon (human recombinant)  •  HYDROcodone-acetaminophen  •  ondansetron **OR** ondansetron  •  sodium chloride  •  sodium chloride  •  sodium chloride    Assessment & Plan   Assessment & Plan     Active Hospital Problems    Diagnosis  POA   • **Bilateral cellulitis of lower leg [L03.116, L03.115]  Yes   • Morbid obesity with BMI of 60.0-69.9, adult [E66.01, Z68.44]  Not Applicable   • Type 2 diabetes mellitus [E11.9]  Yes   • Essential hypertension [I10]  Yes   • Lymphedema of both lower extremities [I89.0]  Yes      Resolved Hospital Problems   No resolved problems to display.        Brief Hospital Course to date:  Kerry Leal is a 58 y.o. female w well controlled DMII, BMI >55, chronic bilateral lymphedema w recurrent cellulitis who presents with 3 weeks of worsening left>right leg redness w drainage. Unable to tolerate keflex or bactrim in past.    Recurrent cellulitis, left>right leg  -daptomycin + ceftriaxone per ID, plan for PICC and OP ID close clinic f/u  -consult lymphedema team for wound care w recs for outpatient wound clinic then [facility] lymphedema clinic  -fear recurrence is highly likely given significant skin breakdown and pt risk factors    Acute hypoxic resp failure likely 2/2 CHFpEF exacerbation - improving  -patient H2FPEF score>90% probability of CHFpEF based on 10/2022 ECHO, on chronic  lasix  -continue IV lasix BID for now, will need increase in home diuretic likely  -already on ARB + metoprolol    BMI >55 - complicates volume assessment  DMII, well controlled  - SSI for now  HTN - home meds    Expected Discharge Location and Transportation: home  Expected Discharge 4/3 if off O2  Expected Discharge Date and Time     Expected Discharge Date Expected Discharge Time    Apr 3, 2023            DVT prophylaxis:  Medical DVT prophylaxis orders are present.     AM-PAC 6 Clicks Score (PT): 16 (04/01/23 1128)    CODE STATUS:   Code Status and Medical Interventions:   Ordered at: 03/30/23 1341     Level Of Support Discussed With:    Patient     Code Status (Patient has no pulse and is not breathing):    CPR (Attempt to Resuscitate)     Medical Interventions (Patient has pulse or is breathing):    Full Support       Caroline Adkins MD  04/02/23

## 2023-04-02 NOTE — PLAN OF CARE
Goal Outcome Evaluation:      A&Ox4. VSS. 2L NC. OOB to chair. Standing weight done: 332 lb. O2 dropped to 84% while resting on RA. Safety maintained.

## 2023-04-03 ENCOUNTER — READMISSION MANAGEMENT (OUTPATIENT)
Dept: CALL CENTER | Facility: HOSPITAL | Age: 58
End: 2023-04-03
Payer: MEDICARE

## 2023-04-03 ENCOUNTER — APPOINTMENT (OUTPATIENT)
Dept: GENERAL RADIOLOGY | Facility: HOSPITAL | Age: 58
End: 2023-04-03
Payer: MEDICARE

## 2023-04-03 VITALS
SYSTOLIC BLOOD PRESSURE: 122 MMHG | DIASTOLIC BLOOD PRESSURE: 66 MMHG | TEMPERATURE: 97.6 F | WEIGHT: 293 LBS | OXYGEN SATURATION: 93 % | HEART RATE: 80 BPM | HEIGHT: 63 IN | RESPIRATION RATE: 18 BRPM | BODY MASS INDEX: 51.91 KG/M2

## 2023-04-03 PROBLEM — I50.33 ACUTE ON CHRONIC HEART FAILURE WITH PRESERVED EJECTION FRACTION (HFPEF): Status: RESOLVED | Noted: 2023-04-03 | Resolved: 2023-04-03

## 2023-04-03 PROBLEM — I50.33 ACUTE ON CHRONIC HEART FAILURE WITH PRESERVED EJECTION FRACTION (HFPEF): Status: ACTIVE | Noted: 2023-04-03

## 2023-04-03 PROBLEM — J96.11 CHRONIC RESPIRATORY FAILURE WITH HYPOXIA: Status: ACTIVE | Noted: 2023-04-03

## 2023-04-03 LAB
GLUCOSE BLDC GLUCOMTR-MCNC: 155 MG/DL (ref 70–130)
GLUCOSE BLDC GLUCOMTR-MCNC: 190 MG/DL (ref 70–130)

## 2023-04-03 PROCEDURE — 63710000001 INSULIN LISPRO (HUMAN) PER 5 UNITS: Performed by: INTERNAL MEDICINE

## 2023-04-03 PROCEDURE — G0378 HOSPITAL OBSERVATION PER HR: HCPCS

## 2023-04-03 PROCEDURE — 99239 HOSP IP/OBS DSCHRG MGMT >30: CPT | Performed by: INTERNAL MEDICINE

## 2023-04-03 PROCEDURE — 25010000002 HEPARIN (PORCINE) PER 1000 UNITS: Performed by: HOSPITALIST

## 2023-04-03 PROCEDURE — 96376 TX/PRO/DX INJ SAME DRUG ADON: CPT

## 2023-04-03 PROCEDURE — 96372 THER/PROPH/DIAG INJ SC/IM: CPT

## 2023-04-03 PROCEDURE — 25010000002 CEFTRIAXONE PER 250 MG: Performed by: INTERNAL MEDICINE

## 2023-04-03 PROCEDURE — 82962 GLUCOSE BLOOD TEST: CPT

## 2023-04-03 PROCEDURE — 29580 STRAPPING UNNA BOOT: CPT

## 2023-04-03 PROCEDURE — 71045 X-RAY EXAM CHEST 1 VIEW: CPT

## 2023-04-03 PROCEDURE — 97597 DBRDMT OPN WND 1ST 20 CM/<: CPT

## 2023-04-03 PROCEDURE — 25010000002 FUROSEMIDE PER 20 MG: Performed by: HOSPITALIST

## 2023-04-03 RX ORDER — FUROSEMIDE 80 MG
80 TABLET ORAL DAILY
Qty: 30 TABLET | Refills: 1 | Status: SHIPPED | OUTPATIENT
Start: 2023-04-03 | End: 2023-06-02

## 2023-04-03 RX ORDER — FUROSEMIDE 80 MG
80 TABLET ORAL DAILY
Qty: 30 TABLET | Refills: 1 | Status: SHIPPED | OUTPATIENT
Start: 2023-04-03 | End: 2023-04-03 | Stop reason: SDUPTHER

## 2023-04-03 RX ADMIN — INSULIN LISPRO 2 UNITS: 100 INJECTION, SOLUTION INTRAVENOUS; SUBCUTANEOUS at 09:02

## 2023-04-03 RX ADMIN — LOSARTAN POTASSIUM 100 MG: 50 TABLET, FILM COATED ORAL at 09:02

## 2023-04-03 RX ADMIN — CEFTRIAXONE 2 G: 2 INJECTION, POWDER, FOR SOLUTION INTRAMUSCULAR; INTRAVENOUS at 09:01

## 2023-04-03 RX ADMIN — Medication 10 ML: at 09:02

## 2023-04-03 RX ADMIN — HEPARIN SODIUM 5000 UNITS: 5000 INJECTION INTRAVENOUS; SUBCUTANEOUS at 14:21

## 2023-04-03 RX ADMIN — METOPROLOL SUCCINATE 100 MG: 50 TABLET, EXTENDED RELEASE ORAL at 09:02

## 2023-04-03 RX ADMIN — HEPARIN SODIUM 5000 UNITS: 5000 INJECTION INTRAVENOUS; SUBCUTANEOUS at 06:01

## 2023-04-03 RX ADMIN — FUROSEMIDE 80 MG: 10 INJECTION, SOLUTION INTRAMUSCULAR; INTRAVENOUS at 14:21

## 2023-04-03 RX ADMIN — FUROSEMIDE 80 MG: 10 INJECTION, SOLUTION INTRAMUSCULAR; INTRAVENOUS at 02:27

## 2023-04-03 NOTE — CASE MANAGEMENT/SOCIAL WORK
Case Management Discharge Note      Final Note: TK was contacted by  to change oxygen order to 2L, new order entered into Epic and notified [facility] of change, they will deliver a portable oxygen tank for her for transport home to the room prior to d/c today. TK notified [facility] infusion of pt d/c today, updated pt to go to [facility] office on Tuesday 4/3/23 @ 1145 am for infusion and entered onto AVS. CM also faxed  note to [phone]. Pt has  transport home. No other d/c needs verbalized @ this time. Office date 4/4/23 @ 1145 am for infusion.          Selected Continued Care - Admitted Since 3/30/2023     Destination    No services have been selected for the patient.              Durable Medical Equipment    No services have been selected for the patient.              Dialysis/Infusion    No services have been selected for the patient.              Home Medical Care    No services have been selected for the patient.              Therapy    No services have been selected for the patient.              Community Resources    No services have been selected for the patient.              Community & DME    No services have been selected for the patient.                       Final Discharge Disposition Code: 01 - home or self-care

## 2023-04-03 NOTE — DISCHARGE SUMMARY
[facility] Medicine Services  DISCHARGE SUMMARY    Patient Name: Kerry Leal  : 1965  MRN: [id number]    Date of Admission: 3/30/2023 10:31 AM  Date of Discharge:  4/3/2023  Primary Care Physician: Gopal Kong MD    Consults     Date and Time Order Name Status Description    3/31/2023 12:34 AM Inpatient Infectious Diseases Consult Completed           Hospital Course     Presenting Problem:   Bilateral cellulitis of lower leg [L03.116, L03.115]    Active Hospital Problems    Diagnosis  POA   • **Bilateral cellulitis of lower leg [L03.116, L03.115]  Yes   • Chronic respiratory failure with hypoxia [J96.11]  Yes   • Morbid obesity with BMI of 60.0-69.9, adult [E66.01, Z68.44]  Not Applicable   • Type 2 diabetes mellitus [E11.9]  Yes   • Essential hypertension [I10]  Yes   • Lymphedema of both lower extremities [I89.0]  Yes      Resolved Hospital Problems    Diagnosis Date Resolved POA   • Acute on chronic heart failure with preserved ejection fraction [I50.33] 2023 Yes          Hospital Course:  Kerry Leal is a 58 y.o. female w well controlled DMII, BMI >55, chronic bilateral lymphedema w recurrent cellulitis who presents with 3 weeks of worsening left>right leg redness w drainage. Unable to tolerate keflex or bactrim in past. Placed on daptomycin + ceftriaxone per ID with plans to follow-up in ID clinic daily for infusions. PICC in place, f/u . Referral in for wound care as well, will need this and future referral for [facility] lymphedema clinic once stabilized.    Patient with 1-2 liters n/c need. CXR c/w pulm edema and patient w CHFpEF on review. Believe she did have acute exacerbation this admission and was diuresed, increased home lasix to 80 mg daily. Will need PCP f/u 1-2 weeks for repeat evaluation. On further review w patient, did have script for home oxygen that she didn't use. Encouraged use until follow-up of 2 liters n/c continuously. Believe CHFpEF +  obesity component and d/w patient.     Discharge Follow Up Recommendations for outpatient labs/diagnostics:  F/u PCP 1 week w repeat BMP for assessment of new lasix dose  F/u ID clinic tmrw  F/u wound clinic    Day of Discharge     HPI:   Feels well, ready for home today.    Review of Systems  Gen- No fevers, chills  CV- No chest pain, palpitations  Resp- No cough, dyspnea  GI- No N/V/D, abd pain    Vital Signs:   Temp:  [97.6 °F (36.4 °C)-98.4 °F (36.9 °C)] 97.6 °F (36.4 °C)  Heart Rate:  [70-80] 77  Resp:  [18] 18  BP: (112-141)/(63-68) 122/66  Flow (L/min):  [1-2] 1      Physical Exam:  Constitutional: No acute distress, awake, alert  HENT: NCAT, mucous membranes moist  Respiratory: Clear to auscultation bilaterally, respiratory effort normal   Cardiovascular: RRR, no murmurs, rubs, or gallops  Gastrointestinal: Soft, nontender, nondistended  Musculoskeletal: Muscle tone within normal limits, no joint effusions appreciated  Psychiatric: Appropriate affect, cooperative  Neurologic: Alert and oriented, facial movements symmetric and spontaneous movement of all 4 extremities grossly equal bilaterally, speech clear  Skin: Legs in wraps bilaterally, not examined. Lymphedema present    Pertinent  and/or Most Recent Results     LAB RESULTS:      Lab 03/31/23  0904 03/30/23  1440 03/30/23  1047   WBC 7.55  --  8.26   HEMOGLOBIN 12.1  --  12.4   HEMATOCRIT 40.5  --  41.5   PLATELETS 279  --  279   NEUTROS ABS  --   --  6.13   IMMATURE GRANS (ABS)  --   --  0.03   LYMPHS ABS  --   --  1.14   MONOS ABS  --   --  0.81   EOS ABS  --   --  0.10   MCV 89.2  --  89.6   PROCALCITONIN 0.06  --  0.06   LACTATE 1.0 0.8 2.3*         Lab 04/02/23  0845 04/01/23  0557 03/31/23  0904 03/30/23  1047   SODIUM 139 138 137 137   POTASSIUM 4.1 3.8 4.1 4.3   CHLORIDE 96* 96* 96* 98   CO2 35.0* 34.0* 31.0* 27.0   ANION GAP 8.0 8.0 10.0 12.0   BUN 20 17 12 16   CREATININE 0.72 0.69 0.69 0.65   EGFR 98.3 102.0 102.0 103.5   GLUCOSE 134* 132*  155* 158*   CALCIUM 9.0 8.7 8.7 8.9   MAGNESIUM  --   --  2.0 2.0   HEMOGLOBIN A1C  --   --   --  6.90*   TSH  --   --  1.270  --          Lab 03/31/23  0904 03/30/23  1047   TOTAL PROTEIN 7.0 7.7   ALBUMIN 3.5 3.5   GLOBULIN 3.5 4.2   ALT (SGPT) 12 13   AST (SGOT) 16 16   BILIRUBIN 0.9 0.6   ALK PHOS 79 78         Lab 03/31/23  0904   PROBNP 505.1         Lab 03/31/23  0904   CHOLESTEROL 134   LDL CHOL 84   HDL CHOL 33*   TRIGLYCERIDES 89             Brief Urine Lab Results  (Last result in the past 365 days)      Color   Clarity   Blood   Leuk Est   Nitrite   Protein   CREAT   Urine HCG        03/30/23 1313 Yellow   Clear   Negative   Negative   Negative   Negative               Microbiology Results (last 10 days)     Procedure Component Value - Date/Time    MRSA Screen, PCR (Inpatient) - Swab, Nares [[id number]]  (Normal) Collected: 03/31/23 0911    Lab Status: Final result Specimen: Swab from Nares Updated: 03/31/23 1105     MRSA PCR Negative    Narrative:      The negative predictive value of this diagnostic test is high and should only be used to consider de-escalating anti-MRSA therapy. A positive result may indicate colonization with MRSA and must be correlated clinically.  MRSA Negative    Respiratory Panel PCR w/COVID-19(SARS-CoV-2) VIVIEN/ANGIE/PATRIC/PAD/COR/MAD/JEEVAN In-House, NP Swab in UTM/VTM, 3-4 HR TAT - Swab, Nasopharynx [[id number]]  (Normal) Collected: 03/31/23 0228    Lab Status: Final result Specimen: Swab from Nasopharynx Updated: 03/31/23 0341     ADENOVIRUS, PCR Not Detected     Coronavirus 229E Not Detected     Coronavirus HKU1 Not Detected     Coronavirus NL63 Not Detected     Coronavirus OC43 Not Detected     COVID19 Not Detected     Human Metapneumovirus Not Detected     Human Rhinovirus/Enterovirus Not Detected     Influenza A PCR Not Detected     Influenza B PCR Not Detected     Parainfluenza Virus 1 Not Detected     Parainfluenza Virus 2 Not Detected     Parainfluenza Virus 3 Not Detected      Parainfluenza Virus 4 Not Detected     RSV, PCR Not Detected     Bordetella pertussis pcr Not Detected     Bordetella parapertussis PCR Not Detected     Chlamydophila pneumoniae PCR Not Detected     Mycoplasma pneumo by PCR Not Detected    Narrative:      In the setting of a positive respiratory panel with a viral infection PLUS a negative procalcitonin without other underlying concern for bacterial infection, consider observing off antibiotics or discontinuation of antibiotics and continue supportive care. If the respiratory panel is positive for atypical bacterial infection (Bordetella pertussis, Chlamydophila pneumoniae, or Mycoplasma pneumoniae), consider antibiotic de-escalation to target atypical bacterial infection.    Blood Culture - Blood, Arm, Left [[id number]]  (Normal) Collected: 03/30/23 1103    Lab Status: Preliminary result Specimen: Blood from Arm, Left Updated: 04/02/23 1301     Blood Culture No growth at 3 days    Blood Culture - Blood, Arm, Right [[id number]]  (Normal) Collected: 03/30/23 1103    Lab Status: Preliminary result Specimen: Blood from Arm, Right Updated: 04/02/23 1301     Blood Culture No growth at 3 days          XR Chest 1 View    Result Date: 4/3/2023  XR CHEST 1 VW Date of Exam: 4/3/2023 8:09 AM EDT Indication: Hypoxia. Comparison: 3/31/2023. Findings: The heart is enlarged. The pulmonary vascular markings are increased consistent with vascular congestion/low-grade pulmonary edema. There is no airspace disease, pleural effusion, or pneumothorax. There has been placement of a right upper extremity PICC line with the tip terminating in the right atrial/SVC junction. There is fusion hardware in the lower cervical spine.     Impression: Cardiomegaly with vascular congestion/low-grade pulmonary edema. Electronically Signed: Erasto Lopez  4/3/2023 8:46 AM EDT  Workstation ID: [id number]    XR Chest 1 View    Result Date: 3/31/2023  Exam:  Single view chest Date: March 31, 2023 History:  Shortness of breath, cough Comparison: October 24, 2022 Technique: Single frontal radiograph of the chest was obtained Findings: The heart is enlarged. There is central vascular congestion and an element of interstitial edema. There is airspace disease in the left lower lobe. There is no pneumothorax.     1. Cardiomegaly with vascular congestion and interstitial edema. Correlate for congestive heart failure. 2. Airspace disease in the left lower lobe could represent atelectasis or left lower lobe pneumonia. A short-term follow-up 2 views of the chest is suggested for better characterization. Slot 63 Electronically signed by:  Diego Vang M.D.  3/31/2023 12:56 AM Mountain Time    Duplex Venous Lower Extremity - Left    Result Date: 3/30/2023  •  Normal left lower extremity venous duplex scan.       Results for orders placed during the hospital encounter of 03/30/23    Duplex Venous Lower Extremity - Left    Interpretation Summary  •  Normal left lower extremity venous duplex scan.      Results for orders placed during the hospital encounter of 03/30/23    Duplex Venous Lower Extremity - Left    Interpretation Summary  •  Normal left lower extremity venous duplex scan.      Results for orders placed during the hospital encounter of 10/22/22    Adult Transthoracic Echo Complete W/ Cont if Necessary Per Protocol    Interpretation Summary  •  Left ventricular ejection fraction appears to be 51 - 55%.  •  Left ventricular wall thickness is consistent with mild concentric hypertrophy.  •  Mild aortic valve regurgitation is present. Mild aortic valve stenosis is present.  •  Mild tricuspid valve regurgitation is present. Estimated right ventricular systolic pressure from tricuspid regurgitation is moderately elevated (45-55 mmHg).      Plan for Follow-up of Pending Labs/Results: ngtd  Pending Labs     Order Current Status    Blood Culture - Blood, Arm, Left Preliminary result    Blood Culture - Blood, Arm, Right  Preliminary result        Discharge Details        Discharge Medications      New Medications      Instructions Start Date   cefTRIAXone  Commonly known as: ROCEPHIN   2 g, Intravenous, Every 24 Hours      DAPTOmycin 550 mg in sodium chloride 0.9 % 50 mL   6 mg/kg (550 mg), Intravenous, Every 24 Hours         Changes to Medications      Instructions Start Date   furosemide 80 MG tablet  Commonly known as: Lasix  What changed:   · medication strength  · how much to take   80 mg, Oral, Daily         Continue These Medications      Instructions Start Date   acetaminophen 325 MG tablet  Commonly known as: TYLENOL   2 tablets, Oral, Every 6 Hours PRN, OTC      albuterol (2.5 MG/3ML) 0.083% nebulizer solution  Commonly known as: PROVENTIL   2.5 mg, Nebulization, Every 4 Hours PRN      losartan 100 MG tablet  Commonly known as: COZAAR   100 mg, Oral, Daily      metoprolol succinate  MG 24 hr tablet  Commonly known as: TOPROL-XL   100 mg, Oral, Daily             Allergies   Allergen Reactions   • Diclofenac Swelling   • Benadryl [Diphenhydramine Hcl] Other (See Comments)     UNSURE   • Clindamycin/Lincomycin Other (See Comments)     UNSURE   • Doxycycline Other (See Comments)     UNSURE   • Fluticasone Other (See Comments)     NOSE BLEEDS   • Fluvoxamine Other (See Comments)     UNSURE   • Montelukast Swelling   • Symbicort [Budesonide-Formoterol Fumarate] Other (See Comments)     UNSURE   • Lisinopril Dizziness   • Penicillins Other (See Comments)     MOTHER TOLD HER SHE WAS ALLERGIC  *has tolerated ceftriaxone   • Smz-Tmp Ds [Sulfamethoxazole-Trimethoprim] GI Intolerance   • Sulfa Antibiotics GI Intolerance         Discharge Disposition:  Home or Self Care    Diet:  Hospital:  Diet Order   Procedures   • Diet: Cardiac Diets, Fluid Restriction (240 mL/tray) Diets; Low Sodium (2g); 1500 mL/day; Texture: Regular Texture (IDDSI 7); Fluid Consistency: Thin (IDDSI 0)       Activity:   PICC instructions    Restrictions or  Other Recommendations:  none       CODE STATUS:    Code Status and Medical Interventions:   Ordered at: 03/30/23 1341     Level Of Support Discussed With:    Patient     Code Status (Patient has no pulse and is not breathing):    CPR (Attempt to Resuscitate)     Medical Interventions (Patient has pulse or is breathing):    Full Support       No future appointments.    Additional Instructions for the Follow-ups that You Need to Schedule     Discharge Follow-up with PCP   As directed       Currently Documented PCP:    Gopal Kong MD    PCP Phone Number:    [phone]     Follow Up Details: 1 week with repeat BMP ( changed lasix dose f/u)         Discharge Follow-up with Specified Provider: ID clinic 4/4   As directed      To: ID clinic 4/4         Referral to Wound Clinic   As directed                  Caroline Adkins MD  04/03/23      Time Spent on Discharge:  I spent  35 minutes on this discharge activity which included: d/w case management, counseling of patient, face-to-face encounter with the patient, reviewing the data in the system, coordination of the care with the nursing staff as well as consultants, documentation, and entering orders.

## 2023-04-03 NOTE — PROGRESS NOTES
INFECTIOUS DISEASE PROGRESS NOTE    Kerry Leal  1965  [id number]    Date of consult: 3/31/23  Admit date: 3/30/2023    Requesting Provider: Dr. Crandall  Evaluating physician: Shine Rodriguez MD  Reason for Consultation: lower extremity cellulitis  Chief Complaint: lower extremity pain and redness      Subjective   History of present illness:  Kerry Leal is a  58 y.o.  Yr old female with pmh of HTN, asthma, and chronic lymphedema who I have evaluated in the past for recurrent left lower extremity cellulitis.  The patient has been treated with multiple courses of IV antibiotics in the past, most recently with a couple doses of Dalvance in 06/2022.  She was subsequently placed on chronic suppressive oral Keflex 250 mg by mouth twice a day at the end of her IV antibiotic course in June 2022.  I followed her up last in October 2022 in clinic and she had chronic discoloration of her left lower extremity but no signs of recurrent cellulitis at that time.  She had been tolerating her Keflex at that time with no complaints. Plan was for her to follow-up in my clinic in 6 months.    The patient states that she thought that she had some shortness of breath with the Keflex and stopped this antibiotic several months ago herself.  She denies any lip swelling or tongue swelling. She additionally did not have any rash while on Keflex.    Patient called our clinic earlier this month with some increased drainage from the back of her leg for 2 weeks and worsening discoloration and was advised to go to the ER. She states that she saw her primary care provider and her primary care provider prescribed her Bactrim in the setting of her prior sulfa allergy and she subsequently had a bad rash to the Bactrim. She had worsening left lower extremity swelling, pain, and redness.  She has additionally developed some redness and tenderness over her right lower extremity.  She presented to the ER yesterday due to her worsening  symptoms.    Since arrival, the patient has remained afebrile. Blood cell count was okay at 8.26.  Hemoglobin A1c was 6.9. lactic acid was 2.3.  Pro-calcitonin was 0.06.  Creatinine was okay at 0.65.  LFTs were within normal limits.  Blood cultures ×2 were collected on 3/30 are no growth to date. Tory PCR panel is negative. MRSA PCR of the nares was negative. Venous duplex of the left lower extremity was negative for DVT. Patient did have increased shortness of breath and cough over the night and so a chest x-ray was obtained that showed cardiomegaly with vascular congestion and interstitial edema.  There was some mild airspace disease in the left lower lobe that could represent atelectasis or left lower lobe pneumonia.    Subjective:    23: PICC placed. No fevers. Still with significant erythema and tenderness over her BLE, L>R that is about the same as yesterday. No drainage from her lower extremities. No n/v/d. No rashes, lip swelling, or throat swelling. Breathing has improved with diuresis.     4/3/23: The patient was doing okay this morning.  Had bilateral Unna boots in place.  No increased pain over her legs.  No nausea, vomiting, or diarrhea.  Breathing has improved with diuresis.  No new rashes reported.    Past Medical History:   Diagnosis Date   • Anxiety    • Asthma    • Heart murmur    • Hypertension        Past Surgical History:   Procedure Laterality Date   • APPENDECTOMY     •  SECTION     • HYSTERECTOMY     • NECK SURGERY         Social history:  The patient lives in [facility]. She is  and has a son. She is a never smoker.  No alcohol or IV drug abuse.      family history includes Diabetes in her mother; Heart disease in her mother.    Allergies   Allergen Reactions   • Diclofenac Swelling   • Benadryl [Diphenhydramine Hcl] Other (See Comments)     UNSURE   • Clindamycin/Lincomycin Other (See Comments)     UNSURE   • Doxycycline Other (See Comments)     UNSURE   •  Fluticasone Other (See Comments)     NOSE BLEEDS   • Fluvoxamine Other (See Comments)     UNSURE   • Montelukast Swelling   • Symbicort [Budesonide-Formoterol Fumarate] Other (See Comments)     UNSURE   • Lisinopril Dizziness   • Penicillins Other (See Comments)     MOTHER TOLD HER SHE WAS ALLERGIC  *has tolerated ceftriaxone   • Smz-Tmp Ds [Sulfamethoxazole-Trimethoprim] GI Intolerance   • Sulfa Antibiotics GI Intolerance         There is no immunization history on file for this patient.    Medication:  No current facility-administered medications for this encounter.    Current Outpatient Medications:   •  acetaminophen (TYLENOL) 325 MG tablet, Take 2 tablets by mouth Every 6 (Six) Hours As Needed for Mild Pain. OTC  Indications: Pain, Disp: , Rfl:   •  albuterol (PROVENTIL) (2.5 MG/3ML) 0.083% nebulizer solution, Take 2.5 mg by nebulization Every 4 (Four) Hours As Needed for Wheezing. Indications: Spasm of Lung Air Passages, Disp: , Rfl:   •  cefTRIAXone (ROCEPHIN) 2 g/100 mL 0.9% NS IVPB (MBP), Infuse 100 mL into a venous catheter Daily for 2 doses. Indications: Infection of the Skin and/or Soft Tissue, Disp: 200 mL, Rfl: 0  •  DAPTOmycin 550 mg in sodium chloride 0.9 % 50 mL, Infuse 550 mg into a venous catheter Daily for 4 doses. Indications: Infection of the Skin and/or Soft Tissue, Disp: , Rfl:   •  furosemide (Lasix) 80 MG tablet, Take 1 tablet by mouth Daily for 60 days., Disp: 30 tablet, Rfl: 1  •  losartan (COZAAR) 100 MG tablet, Take 1 tablet by mouth Daily., Disp: , Rfl:   •  metoprolol succinate XL (TOPROL-XL) 100 MG 24 hr tablet, Take 1 tablet by mouth Daily., Disp: , Rfl:     Please refer to the medical record for a full medication list    Review of Systems:  As above    Physical Exam:   Vital Signs   Temp:  [97.6 °F (36.4 °C)-98.4 °F (36.9 °C)] 97.6 °F (36.4 °C)  Heart Rate:  [70-80] 80  Resp:  [18] 18  BP: (112-141)/(63-66) 122/66    Temp  Min: 97.6 °F (36.4 °C)  Max: 98.4 °F (36.9 °C)  BP  Min:  "112/63  Max: 141/66  Pulse  Min: 70  Max: 80  Resp  Min: 18  Max: 18  SpO2  Min: 91 %  Max: 95 %    Blood pressure 122/66, pulse 80, temperature 97.6 °F (36.4 °C), temperature source Oral, resp. rate 18, height 160 cm (62.99\"), weight (!) 151 kg (332 lb 14.4 oz), SpO2 93 %.  GENERAL: Awake and alert, in no acute distress. Morbidly obese. Sitting up in bed  HEENT:  Normocephalic, atraumatic. No external oral lesions noted  EYES:   Anicteric.  No conjunctival injection  HEART: RRR, no murmur  LUNGS: clear to auscultation bilaterally.  Nonlabored breathing   ABDOMEN: obese abdomen. Non-distended  GENITAL: no Perdomo catheter  SKIN: no new rashes noted  PSYCHIATRIC: cooperative.  Appropriate mood and affect  EXT:  Unna boots in place over her bilateral lower extremities, no severe erythema extending over her thighs  NEURO: awake alert and oriented ×4.  Normal speech and cognition    Results Review:   I reviewed the patient's new clinical results.    Results from last 7 days   Lab Units 03/31/23  0904 03/30/23  1047   WBC 10*3/mm3 7.55 8.26   HEMOGLOBIN g/dL 12.1 12.4   HEMATOCRIT % 40.5 41.5   PLATELETS 10*3/mm3 279 279     Results from last 7 days   Lab Units 04/02/23  0845   SODIUM mmol/L 139   POTASSIUM mmol/L 4.1   CHLORIDE mmol/L 96*   CO2 mmol/L 35.0*   BUN mg/dL 20   CREATININE mg/dL 0.72   GLUCOSE mg/dL 134*   CALCIUM mg/dL 9.0     Results from last 7 days   Lab Units 03/31/23  0904   ALK PHOS U/L 79   BILIRUBIN mg/dL 0.9   ALT (SGPT) U/L 12   AST (SGOT) U/L 16                 Results from last 7 days   Lab Units 03/31/23  0904   LACTATE mmol/L 1.0     Estimated Creatinine Clearance: 126.4 mL/min (by C-G formula based on SCr of 0.72 mg/dL).  CPK    Common Labsle 3/31/23   Creatine Kinase 161            Procalitonin Results:      Lab 03/31/23  0904 03/30/23  1047   PROCALCITONIN 0.06 0.06      Brief Urine Lab Results  (Last result in the past 365 days)      Color   Clarity   Blood   Leuk Est   Nitrite   Protein   " CREAT   Urine HCG        03/30/23 1313 Yellow   Clear   Negative   Negative   Negative   Negative                No results found for: SITE, ALLENTEST, PHART, IWN6ITB, PO2ART, LXC0IIN, BASEEXCESS, Q3JHNEIO, HGBBG, HCTABG, OXYHEMOGLOBI, METHHGBN, CARBOXYHGB, CO2CT, BAROMETRIC, MODALITY, FIO2     Microbiology:  Blood culture ×2: No growth to date    Radiology:  Imaging Results (Last 72 Hours)     Procedure Component Value Units Date/Time    XR Chest 1 View [[id number]] Collected: 04/03/23 0843     Updated: 04/03/23 0849    Narrative:      XR CHEST 1 VW    Date of Exam: 4/3/2023 8:09 AM EDT    Indication: Hypoxia.    Comparison: 3/31/2023.    Findings:  The heart is enlarged. The pulmonary vascular markings are increased consistent with vascular congestion/low-grade pulmonary edema. There is no airspace disease, pleural effusion, or pneumothorax. There has been placement of a right upper extremity PICC   line with the tip terminating in the right atrial/SVC junction. There is fusion hardware in the lower cervical spine.      Impression:      Impression:  Cardiomegaly with vascular congestion/low-grade pulmonary edema.    Electronically Signed: Erasto Lopez    4/3/2023 8:46 AM EDT    Workstation ID: [id number]          IMPRESSION:     Problems:    1.  Bilateral lower extremity cellulitis, left greater than right-her cellulitis is fairly severe on the left side. Most likely culprits are Staphylococcus and Streptococcus.  She has experienced some wounds over her left lower extremity in the past but no large wounds noted at this time. She has a smaller area of cellulitis on her right lower extremity.   2.  Acute hypoxic respiratory failure likely secondary to CHFpEF exacerbation- improved with diuresis  3.  Morbid obesity  4.  Chronic bilateral lower extremity lymphedema-this is a complicated factors contribute into her recurrent lower extremity cellulitis.  5.  hypertension    RECOMMENDATIONS:  -continue to follow her blood  cultures-no growth to date  -continue ceftriaxone but increase the dose to 2 g IV every 24 hours  -continue daptomycin 6 mg/kg IV every 24 hours.  -picc placed    Discharge today with the below plan    UM/LY:  1. Ceftriaxone 2g IV q24  2. Daptomycin 500 mg IV q24h  3. Weekly cbc with diff, cmp, esr, crp, and cpk level  4. Follow up in my clinic in 1 week  5. Please fax a copy of my orders to [facility] at [phone] and call [phone] with final discharge plans        Thank you for asking me to see Kerry Leal.    Shine Rodriguez MD  4/3/2023

## 2023-04-03 NOTE — CASE MANAGEMENT/SOCIAL WORK
Continued Stay Note  [facility]     Patient Name: Kerry Leal  MRN: [id number]  Today's Date: 4/3/2023    Admit Date: 3/30/2023    Plan: Home   Discharge Plan     Row Name 04/03/23 1014       Plan    Plan Home    Patient/Family in Agreement with Plan yes    Plan Comments CM met w/patient in room. Pt sitting up in chair, currently on 1L of oxygen. ID recs in CM consult. CM called [facility] and spoke w/Alisha, plan is for pt to come to office daily for infusions, so that Dr. Rodriguez can monitor BLE. Pt verbalized same plan to CM while in room as well. Family will transport. No d/c needs verbalized @ this time.    Final Discharge Disposition Code 01 - home or self-care               Discharge Codes    No documentation.               Expected Discharge Date and Time     Expected Discharge Date Expected Discharge Time    Apr 3, 2023             Leonidas Villa RN

## 2023-04-03 NOTE — THERAPY WOUND CARE TREATMENT
Acute Care - Wound/Debridement Treatment Note  [facility]     Patient Name: Kerry Leal  : 1965  MRN: [id number]  Today's Date: 4/3/2023                Admit Date: 3/30/2023    Visit Dx:    ICD-10-CM ICD-9-CM   1. Cellulitis of left lower leg  L03.116 682.6   2. Morbid obesity  E66.01 278.01   3. Lymphedema of both lower extremities  I89.0 457.1   4. Bilateral cellulitis of lower leg  L03.116 682.6    L03.115    5. Congestive heart failure, unspecified HF chronicity, unspecified heart failure type  I50.9 428.0       Patient Active Problem List   Diagnosis   • Cellulitis of left lower extremity   • Essential hypertension   • Lymphedema of both lower extremities   • Elevated d-dimer   • Prediabetes   • Acute on chronic respiratory failure with hypoxia and hypercapnia   • Type 2 diabetes mellitus   • Possible CAP (community acquired pneumonia)   • Morbid obesity with BMI of 60.0-69.9, adult   • Secondary pulmonary arterial hypertension   • Mild Aortic stenosis   • Mild Aortic regurgitation   • Probable Obesity hypoventilation syndrome (HCC)   • Bilateral cellulitis of lower leg   • Chronic respiratory failure with hypoxia        Past Medical History:   Diagnosis Date   • Anxiety    • Asthma    • Heart murmur    • Hypertension         Past Surgical History:   Procedure Laterality Date   • APPENDECTOMY     •  SECTION     • HYSTERECTOMY     • NECK SURGERY             Wound Left medial gluteal Pressure Injury (Active)   Dressing Appearance open to air 23 1400   Closure Open to air 23 1400   Base blanchable;clean;dry;red 23 1400   Periwound intact;blanchable;dry 23 1400   Periwound Temperature warm 23 1400   Periwound Skin Turgor soft 23 1400   Drainage Amount none 23 2000   Care, Wound barrier applied 23 1800   Periwound Care dry periwound area maintained 23 1400       Wound 23 0810 Right anterior leg Other (comment) (Active)   Dressing  "Appearance dry;intact;no drainage 04/03/23 1400   Closure WILLIAM 04/03/23 1400   Base dressing in place, unable to visualize 04/03/23 1400   Periwound intact;blanchable;dry 04/03/23 1400   Periwound Temperature warm 04/03/23 1400   Periwound Skin Turgor soft 04/03/23 1400   Edges open 04/03/23 1330   Drainage Characteristics/Odor serosanguineous 04/03/23 1330   Drainage Amount scant 04/03/23 1330   Care, Wound cleansed with;soap and water 04/03/23 1330   Dressing Care dressing changed 04/03/23 1330   Periwound Care dry periwound area maintained 04/03/23 1400       Wound 03/31/23 0810 Left anterior leg Other (comment) (Active)   Dressing Appearance dry;intact;no drainage 04/03/23 1400   Closure WILLIAM 04/03/23 1400   Base dressing in place, unable to visualize 04/03/23 1400   Periwound intact;blanchable;dry 04/03/23 1400   Periwound Temperature warm 04/03/23 1400   Periwound Skin Turgor soft 04/03/23 1400   Drainage Amount none 04/03/23 1330   Care, Wound cleansed with;soap and water 04/03/23 1330   Dressing Care petroleum-based 04/03/23 1330   Periwound Care dry periwound area maintained 04/03/23 1400      Lymphedema     Row Name 04/03/23 1330             Lymphedema Edema Assessment    Ptting Edema Category By severity  -      Pitting Edema Severe  -         Compression/Skin Care    Compression/Skin Care skin care;wrapping location  -      Skin Care washed/dried;lotion applied  -MF      Wrapping Location lower extremity  -MF      Wrapping Location LE bilateral:;foot to knee  -      Wrapping Comments xeroform to LLE with BLE Unna boot applied in clamshell pattern, 4\" coban, spandage to secure.  -            User Key  (r) = Recorded By, (t) = Taken By, (c) = Cosigned By    Initials Name Provider Type    Louis Ibarra, PT Physical Therapist                WOUND DEBRIDEMENT  Total area of Debridement: ~15cm2  Debridement Site 1  Location- Site 1: LLE  Selective Debridement- Site 1: Wound Surface " <20cmsq  Instruments- Site 1: tweezers  Excised Tissue Description- Site 1: moderate, other (comment) (hypertrophic crust)  Bleeding- Site 1: minimum, held pressure               PT Assessment (last 12 hours)     PT Evaluation and Treatment     Row Name 04/03/23 1330          Physical Therapy Time and Intention    Subjective Information complains of;weakness;fatigue  -MF     Document Type therapy note (daily note);wound care  -MF     Mode of Treatment individual therapy;physical therapy  -     Row Name 04/03/23 1330          Pain    Pretreatment Pain Rating 0/10 - no pain  -MF     Posttreatment Pain Rating 0/10 - no pain  -     Row Name             Wound Left medial gluteal Pressure Injury    Wound - Properties Group Present on Hospital Admission: Y  -TH Side: Left  -TH Orientation: medial  -TH Location: gluteal  -TH Primary Wound Type: Pressure inj  -TH    Retired Wound - Properties Group Present on Hospital Admission: Y  -TH Side: Left  -TH Orientation: medial  -TH Location: gluteal  -TH Primary Wound Type: Pressure inj  -TH    Retired Wound - Properties Group Present on Hospital Admission: Y  -TH Side: Left  -TH Location: gluteal  -TH Primary Wound Type: Pressure inj  -TH    Row Name 04/03/23 1330          Wound 03/31/23 0810 Right anterior leg Other (comment)    Wound - Properties Group Placement Date: 03/31/23  -JS Placement Time: 0810  -JS Present on Hospital Admission: Y  -JS Side: Right  -JS Orientation: anterior  -JS Location: leg  -JS Primary Wound Type: Other  -JS, rash     Dressing Appearance dry;intact  -MF     Base moist;pink;red  -MF     Periwound intact;blanchable;dry  -MF     Periwound Temperature warm  -MF     Periwound Skin Turgor soft  -MF     Edges open  -MF     Drainage Characteristics/Odor serosanguineous  -MF     Drainage Amount scant  -MF     Care, Wound cleansed with;soap and water  -MF     Dressing Care dressing changed  -MF     Periwound Care cleansed with pH balanced cleanser  -MF      Retired Wound - Properties Group Placement Date: 03/31/23  -JS Placement Time: 0810  -JS Present on Hospital Admission: Y  -JS Side: Right  -JS Orientation: anterior  -JS Location: leg  -JS Primary Wound Type: Other  -JS, rash     Retired Wound - Properties Group Date first assessed: 03/31/23  - Time first assessed: 0810  -JS Present on Hospital Admission: Y  -JS Side: Right  -JS Location: leg  -JS Primary Wound Type: Other  -JS, rash     Row Name 04/03/23 1330          Wound 03/31/23 0810 Left anterior leg Other (comment)    Wound - Properties Group Placement Date: 03/31/23  -JS Placement Time: 0810  -JS Side: Left  -JS Orientation: anterior  -JS Location: leg  -JS Primary Wound Type: Other  -JS, cellulitis/rash     Dressing Appearance dry;intact  -MF     Base other (see comments)  dry, hypertrophic crust  -MF     Periwound intact;dry  -MF     Periwound Temperature warm  -MF     Periwound Skin Turgor soft  -MF     Drainage Amount none  -MF     Care, Wound cleansed with;soap and water  -MF     Dressing Care petroleum-based  -MF     Retired Wound - Properties Group Placement Date: 03/31/23  - Placement Time: 0810  -JS Side: Left  -JS Orientation: anterior  -JS Location: leg  -JS Primary Wound Type: Other  -JS, cellulitis/rash     Retired Wound - Properties Group Date first assessed: 03/31/23  - Time first assessed: 0810  -JS Side: Left  -JS Location: leg  -JS Primary Wound Type: Other  -JS, cellulitis/rash     Row Name 04/03/23 1330          Coping    Observed Emotional State calm;cooperative  -     Verbalized Emotional State acceptance  -MF     Trust Relationship/Rapport care explained  -     Row Name 04/03/23 1330          Plan of Care Review    Plan of Care Reviewed With patient  -MF     Outcome Evaluation PT able to lightly debride a moderate amount of crusted hypertrophic crust to BLEs to help improve skin integrity. PT covered LLE with xeroform to help further soften remaining crust. PT will cont  with compression wrapping every 3-4 days.  -     Row Name 04/03/23 1330          Positioning and Restraints    Pre-Treatment Position sitting in chair/recliner  -     Post Treatment Position chair  -MF     In Chair sitting;call light within reach  -           User Key  (r) = Recorded By, (t) = Taken By, (c) = Cosigned By    Initials Name Provider Type     Louis Adler, PT Physical Therapist    Malu Pickens, RN Registered Nurse    Lizeth Burk RN Registered Nurse              Physical Therapy Education     Title: PT OT SLP Therapies (Resolved)     Topic: Physical Therapy (Resolved)     Point: Mobility training (Resolved)     Learning Progress Summary           Patient Acceptance, E, VU,NR by CD at 4/1/2023 1129    Comment: BENEFITS OF OOB ACTIVITY, SAFETY WITH MOBILITY, PROGRESSION OF POC, D/C PLANNING,                   Point: Home exercise program (Resolved)     Learning Progress Summary           Patient Acceptance, E, VU,NR by CD at 4/1/2023 1129    Comment: BENEFITS OF OOB ACTIVITY, SAFETY WITH MOBILITY, PROGRESSION OF POC, D/C PLANNING,                   Point: Body mechanics (Resolved)     Learning Progress Summary           Patient Acceptance, E, VU,NR by CD at 4/1/2023 1129    Comment: BENEFITS OF OOB ACTIVITY, SAFETY WITH MOBILITY, PROGRESSION OF POC, D/C PLANNING,                   Point: Precautions (Resolved)     Learning Progress Summary           Patient Acceptance, E, VU,NR by CD at 4/1/2023 1129    Comment: BENEFITS OF OOB ACTIVITY, SAFETY WITH MOBILITY, PROGRESSION OF POC, D/C PLANNING,                               User Key     Initials Effective Dates Name Provider Type Discipline    CD 02/03/23 -  Sheila Ward, PT Physical Therapist PT                Recommendation and Plan  Anticipated Equipment Needs at Discharge (PT): quad cane  Anticipated Discharge Disposition (PT): home with assist, home with outpatient therapy services  Planned Therapy Interventions (PT): balance  training, bed mobility training, gait training, transfer training, strengthening, patient/family education, home exercise program  Therapy Frequency (PT): daily  Plan of Care Reviewed With: patient           Outcome Evaluation: PT able to lightly debride a moderate amount of crusted hypertrophic crust to BLEs to help improve skin integrity. PT covered LLE with xeroform to help further soften remaining crust. PT will cont with compression wrapping every 3-4 days.  Plan of Care Reviewed With: patient            Time Calculation   PT Charges     Row Name 04/03/23 1330             Time Calculation    Start Time 1330  -MF      PT Goal Re-Cert Due Date 04/11/23  -MF         Untimed Charges    29580-Unna Boot 25  -MF      92127-Zyqspecqq debridement 15  -MF         Total Minutes    Untimed Charges Total Minutes 40  -MF       Total Minutes 40  -MF            User Key  (r) = Recorded By, (t) = Taken By, (c) = Cosigned By    Initials Name Provider Type     Louis Adler, PT Physical Therapist                  Therapy Charges for Today     Code Description Service Date Service Provider Modifiers Qty    [id number] HC JOHN DEBRIDE OPEN WOUND UP TO 20CM 4/3/2023 Louis Adler, PT GP 1    [id number]  PT STAPPING UNNA BOOT 4/3/2023 Louis Adler, PT GP 1            PT G-Codes  Outcome Measure Options: AM-PAC 6 Clicks Basic Mobility (PT)  AM-PAC 6 Clicks Score (PT): 12  AM-PAC 6 Clicks Score (OT): 17       Louis Adler PT  4/3/2023

## 2023-04-03 NOTE — DISCHARGE INSTR - APPOINTMENTS
Please return to [facility] [address], [address] on Tuesday 4/3/23 @ 1145 am for infusion.

## 2023-04-03 NOTE — PLAN OF CARE
Goal Outcome Evaluation:  Plan of Care Reviewed With: patient           Outcome Evaluation: PT able to lightly debride a moderate amount of crusted hypertrophic crust to BLEs to help improve skin integrity. PT covered LLE with xeroform to help further soften remaining crust. PT will cont with compression wrapping every 3-4 days.

## 2023-04-04 LAB
BACTERIA SPEC AEROBE CULT: NORMAL
BACTERIA SPEC AEROBE CULT: NORMAL

## 2023-04-04 NOTE — OUTREACH NOTE
Prep Survey    Flowsheet Row Responses   [facility] facility patient discharged from? [place]   Is LACE score < 7 ? No   Eligibility Readm Mgmt   Discharge diagnosis Bilateral cellulitis of lower leg   Does the patient have one of the following disease processes/diagnoses(primary or secondary)? Other   Does the patient have Home health ordered? No   Is there a DME ordered? No   Prep survey completed? Yes          ANETA JARVIS - Registered Nurse

## 2023-04-06 ENCOUNTER — READMISSION MANAGEMENT (OUTPATIENT)
Dept: CALL CENTER | Facility: HOSPITAL | Age: 58
End: 2023-04-06
Payer: MEDICARE

## 2023-04-06 ENCOUNTER — HOSPITAL ENCOUNTER (OUTPATIENT)
Dept: PHYSICAL THERAPY | Facility: HOSPITAL | Age: 58
Setting detail: THERAPIES SERIES
Discharge: HOME | End: 2023-04-06
Payer: MEDICARE

## 2023-04-06 DIAGNOSIS — L03.116 CELLULITIS OF LEFT LOWER EXTREMITY: ICD-10-CM

## 2023-04-06 DIAGNOSIS — I89.0 CHRONIC ACQUIRED LYMPHEDEMA: Primary | ICD-10-CM

## 2023-04-06 DIAGNOSIS — L03.115 BILATERAL CELLULITIS OF LOWER LEG: ICD-10-CM

## 2023-04-06 DIAGNOSIS — L03.116 BILATERAL CELLULITIS OF LOWER LEG: ICD-10-CM

## 2023-04-06 PROCEDURE — 29580 STRAPPING UNNA BOOT: CPT

## 2023-04-06 PROCEDURE — 97597 DBRDMT OPN WND 1ST 20 CM/<: CPT

## 2023-04-06 PROCEDURE — 97162 PT EVAL MOD COMPLEX 30 MIN: CPT

## 2023-04-06 NOTE — THERAPY EVALUATION
Outpatient Rehabilitation - Wound/Debridement Initial Eval  [facility]     Patient Name: Kerry Leal  : 1965  MRN: [id number]  Today's Date: 2023                  Admit Date: 2023    Visit Dx:    ICD-10-CM ICD-9-CM   1. Chronic acquired lymphedema  I89.0 457.1   2. Cellulitis of left lower extremity  L03.116 682.6           Patient Active Problem List   Diagnosis   • Cellulitis of left lower extremity   • Essential hypertension   • Lymphedema of both lower extremities   • Elevated d-dimer   • Prediabetes   • Acute on chronic respiratory failure with hypoxia and hypercapnia   • Type 2 diabetes mellitus   • Possible CAP (community acquired pneumonia)   • Morbid obesity with BMI of 60.0-69.9, adult   • Secondary pulmonary arterial hypertension   • Mild Aortic stenosis   • Mild Aortic regurgitation   • Probable Obesity hypoventilation syndrome (HCC)   • Bilateral cellulitis of lower leg   • Chronic respiratory failure with hypoxia        Past Medical History:   Diagnosis Date   • Anxiety    • Asthma    • Heart murmur    • Hypertension         Past Surgical History:   Procedure Laterality Date   • APPENDECTOMY     •  SECTION     • HYSTERECTOMY     • NECK SURGERY          Patient History     Row Name 23 1300             History    Chief Complaint Ulcer, wound or other skin conditions;Swelling  -MF      Type of Pain Lower Extremity / Leg  -MF      Brief Description of Current Complaint Pt with h/o lymphedema and cellulitis, now s/p hospitalization for new episode of increased LE Edema and weeping erythema.  -      Patient/Caregiver Goals Know what to do to help the symptoms;Heal wound  -MF         Pain     Pain Location Leg  -MF      [place] FACES Pain Rating  4  -         Services    Are you currently receiving Home Health services No  -MF         Daily Activities    Primary Language English  -      Pt Participated in POC and Goals Yes  -MF            User Key  (r) =  Recorded By, (t) = Taken By, (c) = Cosigned By    Initials Name Provider Type     Louis Adler, PT Physical Therapist                EVALUATION       Lymphedema     Row Name 04/06/23 1300             Subjective Pain    Able to rate subjective pain? yes  -      Pre-Treatment Pain Level 0  -      Post-Treatment Pain Level 0  -      Subjective Pain Comment 3-4 /10 with debridement.  -         Lymphedema Edema Assessment    Pitting Edema Severe;Very Severe  L>R  -         Skin Changes/Observations    Location/Assessment Lower Extremity  -      Lower Extremity Conditions bilateral:;scaly;crust;inflamed  -      Lower Extremity Color/Pigment bilateral:;erythema;hyperpigmented  -         Lymphedema Measurements    Measurement Type(s) Quick Girth  -      Quick Girth Areas Lower extremities  -         LLE Quick Girth (cm)    Mid foot 28 cm  -MF      Smallest ankle 40 cm  -MF      Largest calf 78 cm  -MF         RLE Quick Girth (cm)    Mid foot 27 cm  -MF      Smallest ankle 38 cm  -MF      Largest calf 66 cm  -MF      RLE Quick Girth Total 131  -         Compression/Skin Care    Compression/Skin Care skin care;wrapping location;bandaging  -      Skin Care washed/dried;lotion applied  -      Wrapping Location lower extremity  -      Wrapping Location LE bilateral:;foot to knee  -      Wrapping Comments lotion to LEs with xeroform to areas of hypertrophic crust. unna boot applied with coban and spandage to secure.  -            User Key  (r) = Recorded By, (t) = Taken By, (c) = Cosigned By    Initials Name Provider Type     Louis Adler, PT Physical Therapist                WOUND DEBRIDEMENT  Total area of Debridement: ~20cm2  Debridement Site 1  Location- Site 1: BLEs  Selective Debridement- Site 1: Wound Surface <20cmsq  Instruments- Site 1: tweezers  Excised Tissue Description- Site 1: moderate, other (comment) (hypertrophic skin / crust)  Bleeding- Site 1: seeping, held  pressure, 1 minute              Therapy Education     Row Name 23 1300             Therapy Education    Education Details Pt to keep LEs dressings dry and intact until next visit and maintain LE elevation as much as possible.  -MF      Given Symptoms/condition management;Bandaging/dressing change;Edema management  -      Program New  -MF      How Provided Verbal;Demonstration  -MF      Provided to Patient  -      Level of Understanding Verbalized  -            User Key  (r) = Recorded By, (t) = Taken By, (c) = Cosigned By    Initials Name Provider Type    MF Louis Adler, PT Physical Therapist                Recommendation and Plan   PT Assessment/Plan     Row Name 23 1300          PT Assessment    Functional Limitations Performance in self-care ADL  -     Impairments Edema;Integumentary integrity;Pain  -MF     Assessment Comments Pt returns with increased BLE Edema and weeping erythema, now with significant hypertrophic crust to BLEs. PT was able to lightly debride a moderate amount of hypertrophic crust from LEs to help improve skin integrity and limit potential breakdown. PT also applied light multilayered compression with unna boots to help reduce LE Edema and maintain moisture to LES to soften remaining hypertrophic crust to allow for easier debridement.  -     Rehab Potential Good  -     Patient/caregiver participated in establishment of treatment plan and goals Yes  -MF     Patient would benefit from skilled therapy intervention Yes  -MF        PT Plan    PT Frequency 2x/week  -     Predicted Duration of Therapy Intervention (PT) 6-8 weeks  -     Planned CPT's? PT EVAL MOD COMPLELITY: 23285;PT JOHN DEBRIDE OPEN WOUND UP TO 20 CM: 90753;PT SELF CARE/MGMT/TRAIN 15 MIN: 65008;PT MULTI LAYER COMP SYS LE  -     Physical Therapy Interventions (Optional Details) wound care  -     PT Plan Comments unna boot with debridement 2 x/week .  -           User Key  (r) =  Recorded By, (t) = Taken By, (c) = Cosigned By    Initials Name Provider Type    Louis Ibarra, PT Physical Therapist                  Goals   PT OP Goals     Row Name 04/06/23 1300          PT Short Term Goals    STG Date to Achieve 05/21/23  -MF     STG 1 Pt to have no open areas / areas of weeping to BLES to allow for transtion to MLW.  -MF     STG 2 Reduce BLE Edema by 10% to improve skin integrity and mobility  -        Long Term Goals    LTG Date to Achieve 07/05/23  -     LTG 1 Pt and family able to manage home dressing changes / compression wrapping.  -     LTG 2 Reduce BLE Edema by 20% to improve skin integrity and mobility  -        Time Calculation    PT Goal Re-Cert Due Date 07/05/23  -           User Key  (r) = Recorded By, (t) = Taken By, (c) = Cosigned By    Initials Name Provider Type    Louis Ibarra, PT Physical Therapist                Time Calculation: Start Time: 1300  Untimed Charges  PT Eval/Re-eval Minutes: 40  Wound Care: 05986 Selective debridement, 99702 Unna boot  53754-Oiix Boot: 25  01520-Gihyctnwy debridement: 25  Total Minutes  Untimed Charges Total Minutes: 90   Total Minutes: 90  Therapy Charges for Today     Code Description Service Date Service Provider Modifiers Qty    92969972923 HC JOHN DEBRIDE OPEN WOUND UP TO 20CM 4/6/2023 Louis Adler, PT GP 1    [id number] HC PT STAPPING UNNA BOOT 4/6/2023 Louis Adler, PT GP 1    [id number] HC PT EVAL MOD COMPLEXITY 3 4/6/2023 Louis Adler, PT GP 1                Louis Adler, PT  4/6/2023

## 2023-04-06 NOTE — OUTREACH NOTE
Medical Week 1 Survey    Flowsheet Row Responses   [facility] patient discharged from? [place]   Does the patient have one of the following disease processes/diagnoses(primary or secondary)? Other   Week 1 attempt successful? Yes   Call start time 1000   Call end time 1008   Discharge diagnosis Bilateral cellulitis of lower leg   Medication alerts for this patient Pt receiving IV antibiotics   Meds reviewed with patient/caregiver? Yes   Is the patient having any side effects they believe may be caused by any medication additions or changes? No   Does the patient have all medications ordered at discharge? Yes   Is the patient taking all medications as directed (includes completed medication regime)? Yes   Does the patient have a primary care provider?  Yes   Comments regarding PCP Pt following with ID MD and has appt today 4--6-23   Has the patient kept scheduled appointments due by today? N/A   Comments wound care appt 4-6-23   Has home health visited the patient within 72 hours of discharge? N/A   Home health comments PT    What DME was ordered? Oxygen--reviewed use as MD noted need to use contintinously until f/u--education provided to pt about use as well as sat readings.  She plans on checking into acquiring a pulse ox   Psychosocial issues? No   Did the patient receive a copy of their discharge instructions? Yes   Nursing interventions Reviewed instructions with patient   What is the patient's perception of their health status since discharge? Improving  [pt reports she is doing ok--unable to actualize visualize and check for edema, cellulitis improvement as she has TRISTON boots in place. She has ID MD appt today as well as wound appt.  We discussed use of lasix, daily weights, diet, oxygen, s/s infection]   Is the patient/caregiver able to teach back signs and symptoms related to disease process for when to call PCP? Yes   Is the patient/caregiver able to teach back signs and symptoms related to disease  process for when to call 911? Yes   Week 1 call completed? Yes          ANTONIA KRUSE - Registered Nurse

## 2023-04-10 ENCOUNTER — HOSPITAL ENCOUNTER (OUTPATIENT)
Dept: PHYSICAL THERAPY | Facility: HOSPITAL | Age: 58
Setting detail: THERAPIES SERIES
Discharge: HOME | End: 2023-04-10
Payer: MEDICARE

## 2023-04-10 DIAGNOSIS — L03.116 CELLULITIS OF LEFT LOWER EXTREMITY: ICD-10-CM

## 2023-04-10 DIAGNOSIS — I89.0 CHRONIC ACQUIRED LYMPHEDEMA: Primary | ICD-10-CM

## 2023-04-10 PROCEDURE — 29580 STRAPPING UNNA BOOT: CPT

## 2023-04-10 PROCEDURE — 97598 DBRDMT OPN WND ADDL 20CM/<: CPT

## 2023-04-10 PROCEDURE — 97597 DBRDMT OPN WND 1ST 20 CM/<: CPT

## 2023-04-10 NOTE — THERAPY WOUND CARE TREATMENT
Outpatient Rehabilitation - Wound/Debridement Treatment Note  [facility]     Patient Name: Kerry Leal  : 1965  MRN: [id number]  Today's Date: 4/10/2023                 Admit Date: 4/10/2023    Visit Dx:    ICD-10-CM ICD-9-CM   1. Chronic acquired lymphedema  I89.0 457.1   2. Cellulitis of left lower extremity  L03.116 682.6       Patient Active Problem List   Diagnosis   • Cellulitis of left lower extremity   • Essential hypertension   • Lymphedema of both lower extremities   • Elevated d-dimer   • Prediabetes   • Acute on chronic respiratory failure with hypoxia and hypercapnia   • Type 2 diabetes mellitus   • Possible CAP (community acquired pneumonia)   • Morbid obesity with BMI of 60.0-69.9, adult   • Secondary pulmonary arterial hypertension   • Mild Aortic stenosis   • Mild Aortic regurgitation   • Probable Obesity hypoventilation syndrome (HCC)   • Bilateral cellulitis of lower leg   • Chronic respiratory failure with hypoxia        Past Medical History:   Diagnosis Date   • Anxiety    • Asthma    • Heart murmur    • Hypertension         Past Surgical History:   Procedure Laterality Date   • APPENDECTOMY     •  SECTION     • HYSTERECTOMY     • NECK SURGERY           EVALUATION   PT Ortho     Row Name 04/10/23 1300       Subjective Comments    Subjective Comments Pt with no new issues / complaints  -       Subjective Pain    Able to rate subjective pain? yes  -    Pre-Treatment Pain Level 0  -MF    Post-Treatment Pain Level 0  -MF       Transfers    Comment, (Transfers) seated in transport chair  -          User Key  (r) = Recorded By, (t) = Taken By, (c) = Cosigned By    Initials Name Provider Type     Louis Adler, PT Physical Therapist                   Lymphedema     Row Name 04/10/23 1300             Lymphedema Edema Assessment    Pitting Edema Severe  -         Compression/Skin Care    Compression/Skin Care skin care;wrapping location;bandaging  -      Skin  Care washed/dried;lotion applied  -MF      Wrapping Location lower extremity  -MF      Wrapping Location LE bilateral:;foot to knee  -MF      Wrapping Comments lotion to LEs with xeroform to areas of hypertrophic crust. unna boot applied with coban and spandage to secure.  -            User Key  (r) = Recorded By, (t) = Taken By, (c) = Cosigned By    Initials Name Provider Type    Louis Ibarra, PT Physical Therapist                WOUND DEBRIDEMENT  Total area of Debridement: ~90cm2  Debridement Site 1  Location- Site 1: BLEs  Selective Debridement- Site 1: Wound Surface <20cmsq  Instruments- Site 1: tweezers  Excised Tissue Description- Site 1: maximum, other (comment) (hypertrophic skin)  Bleeding- Site 1: seeping, held pressure, 1 minute                 Recommendation and Plan   PT Assessment/Plan     Row Name 04/10/23 1300          PT Assessment    Functional Limitations Performance in self-care ADL  -     Impairments Edema;Integumentary integrity;Pain  -MF     Assessment Comments RLE skin integrity improved with no open areas noted and no significant hypertrophic skin build up.  LLE hypertrophic skin softening well with addition of lotion and xeroform allow for further debridement today.  PT will cont with unna boots 2x/week to help further reduce LE Edema and improve skin integrity.  -     Rehab Potential Good  -MF     Patient/caregiver participated in establishment of treatment plan and goals Yes  -MF     Patient would benefit from skilled therapy intervention Yes  -MF        PT Plan    PT Frequency 2x/week  -     Physical Therapy Interventions (Optional Details) wound care  -     PT Plan Comments cont with unna boot and debridement 2x/week  -           User Key  (r) = Recorded By, (t) = Taken By, (c) = Cosigned By    Initials Name Provider Type    Louis Ibarra, PT Physical Therapist                Goals   PT OP Goals     Row Name 04/10/23 1300          Time Calculation    PT  Goal Re-Cert Due Date 07/05/23  -           User Key  (r) = Recorded By, (t) = Taken By, (c) = Cosigned By    Initials Name Provider Type     Louis Adler, PT Physical Therapist                PT Goal Re-Cert Due Date: 07/05/23            Time Calculation: Start Time: 1300  Untimed Charges  Wound Care: 62695 Add't debridement ea 20 cm  66270-Qvam Boot: 15  87460-Svkhdubki debridement: 20  69360-Crx't debridement ea 20 cm: 20  Total Minutes  Untimed Charges Total Minutes: 55   Total Minutes: 55  Therapy Charges for Today     Code Description Service Date Service Provider Modifiers Qty    62906908191 HC JOHN DEBRIDE OPEN WOUND UP TO 20CM 4/10/2023 Louis Adler, PT GP 1    39543492174 HC PT STAPPING UNNA BOOT 4/10/2023 Louis Adler, PT GP 1    [id number] HC PT JOHN DEBRIDE OPEN WOUND EA ADD 20CM 4/10/2023 Louis Adler, PT GP 4                  Louis Adler, PT  4/10/2023

## 2023-04-13 ENCOUNTER — TRANSCRIBE ORDERS (OUTPATIENT)
Dept: LAB | Facility: HOSPITAL | Age: 58
End: 2023-04-13
Payer: MEDICARE

## 2023-04-13 ENCOUNTER — HOSPITAL ENCOUNTER (OUTPATIENT)
Dept: PHYSICAL THERAPY | Facility: HOSPITAL | Age: 58
Setting detail: THERAPIES SERIES
Discharge: HOME | End: 2023-04-13
Payer: MEDICARE

## 2023-04-13 ENCOUNTER — APPOINTMENT (OUTPATIENT)
Dept: LAB | Facility: HOSPITAL | Age: 58
End: 2023-04-13
Payer: MEDICARE

## 2023-04-13 ENCOUNTER — HOSPITAL ENCOUNTER (OUTPATIENT)
Dept: ONCOLOGY | Facility: HOSPITAL | Age: 58
Discharge: HOME | End: 2023-04-13
Admitting: INTERNAL MEDICINE
Payer: MEDICARE

## 2023-04-13 ENCOUNTER — READMISSION MANAGEMENT (OUTPATIENT)
Dept: CALL CENTER | Facility: HOSPITAL | Age: 58
End: 2023-04-13
Payer: MEDICARE

## 2023-04-13 VITALS
DIASTOLIC BLOOD PRESSURE: 84 MMHG | BODY MASS INDEX: 51.91 KG/M2 | WEIGHT: 293 LBS | TEMPERATURE: 97.4 F | RESPIRATION RATE: 16 BRPM | SYSTOLIC BLOOD PRESSURE: 182 MMHG | HEART RATE: 83 BPM | HEIGHT: 63 IN

## 2023-04-13 DIAGNOSIS — L03.116 BILATERAL CELLULITIS OF LOWER LEG: Primary | ICD-10-CM

## 2023-04-13 DIAGNOSIS — L03.116 CELLULITIS OF LEFT LOWER EXTREMITY: ICD-10-CM

## 2023-04-13 DIAGNOSIS — L03.115 BILATERAL CELLULITIS OF LOWER LEG: Primary | ICD-10-CM

## 2023-04-13 DIAGNOSIS — L03.115 CELLULITIS OF RIGHT FOOT: Primary | ICD-10-CM

## 2023-04-13 DIAGNOSIS — S81.802D OPEN WOUND OF LEFT LOWER EXTREMITY, SUBSEQUENT ENCOUNTER: ICD-10-CM

## 2023-04-13 DIAGNOSIS — L03.115 BILATERAL CELLULITIS OF LOWER LEG: ICD-10-CM

## 2023-04-13 DIAGNOSIS — L02.416 ABSCESS OF LEFT HIP: ICD-10-CM

## 2023-04-13 DIAGNOSIS — L03.116 BILATERAL CELLULITIS OF LOWER LEG: ICD-10-CM

## 2023-04-13 DIAGNOSIS — R11.0 NAUSEA: ICD-10-CM

## 2023-04-13 DIAGNOSIS — I89.0 CHRONIC ACQUIRED LYMPHEDEMA: Primary | ICD-10-CM

## 2023-04-13 PROCEDURE — 25010000002 DALBAVANCIN 500 MG RECONSTITUTED SOLUTION 1 EACH VIAL: Performed by: INTERNAL MEDICINE

## 2023-04-13 PROCEDURE — 29580 STRAPPING UNNA BOOT: CPT

## 2023-04-13 PROCEDURE — 97597 DBRDMT OPN WND 1ST 20 CM/<: CPT

## 2023-04-13 PROCEDURE — 97598 DBRDMT OPN WND ADDL 20CM/<: CPT

## 2023-04-13 PROCEDURE — 96375 TX/PRO/DX INJ NEW DRUG ADDON: CPT

## 2023-04-13 PROCEDURE — 96365 THER/PROPH/DIAG IV INF INIT: CPT

## 2023-04-13 PROCEDURE — 25010000002 METHYLPREDNISOLONE PER 40 MG: Performed by: INTERNAL MEDICINE

## 2023-04-13 RX ORDER — METHYLPREDNISOLONE SODIUM SUCCINATE 40 MG/ML
40 INJECTION, POWDER, LYOPHILIZED, FOR SOLUTION INTRAMUSCULAR; INTRAVENOUS ONCE
Status: CANCELLED
Start: 2023-04-13 | End: 2023-04-13

## 2023-04-13 RX ORDER — CETIRIZINE HYDROCHLORIDE 10 MG/1
10 TABLET ORAL ONCE
Status: CANCELLED
Start: 2023-04-13 | End: 2023-04-13

## 2023-04-13 RX ORDER — CETIRIZINE HYDROCHLORIDE 10 MG/1
10 TABLET ORAL ONCE
Status: DISCONTINUED | OUTPATIENT
Start: 2023-04-13 | End: 2023-04-14 | Stop reason: HOSPADM

## 2023-04-13 RX ORDER — METHYLPREDNISOLONE SODIUM SUCCINATE 40 MG/ML
40 INJECTION, POWDER, LYOPHILIZED, FOR SOLUTION INTRAMUSCULAR; INTRAVENOUS ONCE
Status: COMPLETED | OUTPATIENT
Start: 2023-04-13 | End: 2023-04-13

## 2023-04-13 RX ADMIN — DALBAVANCIN 1500 MG: 500 INJECTION, POWDER, FOR SOLUTION INTRAVENOUS at 15:56

## 2023-04-13 RX ADMIN — METHYLPREDNISOLONE SODIUM SUCCINATE 40 MG: 40 INJECTION, POWDER, FOR SOLUTION INTRAMUSCULAR; INTRAVENOUS at 15:40

## 2023-04-13 NOTE — THERAPY WOUND CARE TREATMENT
Outpatient Rehabilitation - Wound/Debridement Treatment Note  [facility]     Patient Name: Kerry Leal  : 1965  MRN: [id number]  Today's Date: 2023                 Admit Date: 2023    Visit Dx:    ICD-10-CM ICD-9-CM   1. Chronic acquired lymphedema  I89.0 457.1   2. Cellulitis of left lower extremity  L03.116 682.6   3. Bilateral cellulitis of lower leg  L03.116 682.6    L03.115    4. Open wound of left lower extremity, subsequent encounter  S81.802D V58.89     891.0       Patient Active Problem List   Diagnosis   • Cellulitis of left lower extremity   • Essential hypertension   • Lymphedema of both lower extremities   • Elevated d-dimer   • Prediabetes   • Acute on chronic respiratory failure with hypoxia and hypercapnia   • Type 2 diabetes mellitus   • Possible CAP (community acquired pneumonia)   • Morbid obesity with BMI of 60.0-69.9, adult   • Secondary pulmonary arterial hypertension   • Mild Aortic stenosis   • Mild Aortic regurgitation   • Probable Obesity hypoventilation syndrome (HCC)   • Bilateral cellulitis of lower leg   • Chronic respiratory failure with hypoxia        Past Medical History:   Diagnosis Date   • Anxiety    • Asthma    • Heart murmur    • Hypertension         Past Surgical History:   Procedure Laterality Date   • APPENDECTOMY     •  SECTION     • HYSTERECTOMY     • NECK SURGERY           EVALUATION   PT Ortho     Row Name 23 8517       Subjective Comments    Subjective Comments Pt with no new issues noted. Pt to have PICC pulled today after final round of IV antibiotics  -       Subjective Pain    Able to rate subjective pain? yes  -MF    Pre-Treatment Pain Level 0  -MF    Post-Treatment Pain Level 0  -MF       Transfers    Comment, (Transfers) seated in transport chair  -          User Key  (r) = Recorded By, (t) = Taken By, (c) = Cosigned By    Initials Name Provider Type    Louis Ibarra, PT Physical Therapist                    Lymphedema     Row Name 04/13/23 1430             Lymphedema Edema Assessment    Pitting Edema Severe;Moderate  L>R  -         Skin Changes/Observations    Location/Assessment Lower Extremity  -MF      Lower Extremity Conditions bilateral:;scaly;crust;inflamed  -      Lower Extremity Color/Pigment bilateral:;erythema;hyperpigmented  -MF         Compression/Skin Care    Compression/Skin Care skin care;wrapping location;bandaging  -MF      Skin Care washed/dried;lotion applied  -MF      Wrapping Location lower extremity  -MF      Wrapping Location LE bilateral:;foot to knee  -MF      Wrapping Comments cocoa butter to LEs to areas of hypertrophic crust. unna boot applied with coban and spandage to secure.  -            User Key  (r) = Recorded By, (t) = Taken By, (c) = Cosigned By    Initials Name Provider Type    MF Louis Adler, PT Physical Therapist                WOUND DEBRIDEMENT  Total area of Debridement: ~40cm2  Debridement Site 1  Location- Site 1: BLEs  Selective Debridement- Site 1: Wound Surface <20cmsq  Instruments- Site 1: tweezers  Excised Tissue Description- Site 1: moderate, other (comment) (hypertrophic crust)  Bleeding- Site 1: minimum, held pressure                 Recommendation and Plan   PT Assessment/Plan     Row Name 04/13/23 1430          PT Assessment    Functional Limitations Performance in self-care ADL  -MF     Impairments Edema;Integumentary integrity;Pain  -MF     Assessment Comments Pt cont to make excellent progress with LE compression wrapping. PT transitioned to cocoa butter today to help further soften remaining hypertrophic crust to allow for easier removal of nonviable crust.  Pt may benefit from transition to MLW compression wrapping in next 1-2 visits to help further reduce LE edema  -MF     Rehab Potential Good  -MF     Patient/caregiver participated in establishment of treatment plan and goals Yes  -MF     Patient would benefit from skilled therapy intervention Yes   -        PT Plan    PT Frequency 2x/week  -     Physical Therapy Interventions (Optional Details) wound care  -     PT Plan Comments cont with unna boots and debridement.  -           User Key  (r) = Recorded By, (t) = Taken By, (c) = Cosigned By    Initials Name Provider Type    Louis Ibarra, PT Physical Therapist                Goals   PT OP Goals     Row Name 04/13/23 1430          Time Calculation    PT Goal Re-Cert Due Date 07/05/23  -           User Key  (r) = Recorded By, (t) = Taken By, (c) = Cosigned By    Initials Name Provider Type    Louis Ibarra, PT Physical Therapist                PT Goal Re-Cert Due Date: 07/05/23            Time Calculation: Start Time: 1430  Untimed Charges  92214-Cdeb Boot: 20 9759731423-Mgirtkrmp debridement: 20 97598-Add't debridement ea 20 cm: 10  Total Minutes  Untimed Charges Total Minutes: 50   Total Minutes: 50  Therapy Charges for Today     Code Description Service Date Service Provider Modifiers Qty    04489542921 HC JOHN DEBRIDE OPEN WOUND UP TO 20CM 4/13/2023 Louis Adler, PT GP 1    [id number]  PT JOHN DEBRIDE OPEN WOUND EA ADD 20CM 4/13/2023 Louis Adler, PT GP 1    [id number]  PT STAPPING UNNA BOOT 4/13/2023 Louis Adler, PT GP 1                  Louis Adler, PT  4/13/2023

## 2023-04-13 NOTE — OUTREACH NOTE
Medical Week 2 Survey    Flowsheet Row Responses   [facility] patient discharged from? [place]   Does the patient have one of the following disease processes/diagnoses(primary or secondary)? Other   Week 2 attempt successful? No   Unsuccessful attempts Attempt 1          ANTONIA KRUSE - Registered Nurse

## 2023-04-17 ENCOUNTER — READMISSION MANAGEMENT (OUTPATIENT)
Dept: CALL CENTER | Facility: HOSPITAL | Age: 58
End: 2023-04-17
Payer: MEDICARE

## 2023-04-17 NOTE — OUTREACH NOTE
Medical Week 2 Survey    Flowsheet Row Responses   [facility] patient discharged from? [place]   Does the patient have one of the following disease processes/diagnoses(primary or secondary)? Other   Week 2 attempt successful? No   Unsuccessful attempts Attempt 2          Orqu[place] JO - Registered Nurse

## 2023-04-18 ENCOUNTER — HOSPITAL ENCOUNTER (OUTPATIENT)
Dept: PHYSICAL THERAPY | Facility: HOSPITAL | Age: 58
Setting detail: THERAPIES SERIES
Discharge: HOME | End: 2023-04-18
Payer: MEDICARE

## 2023-04-18 DIAGNOSIS — I89.0 CHRONIC ACQUIRED LYMPHEDEMA: Primary | ICD-10-CM

## 2023-04-18 DIAGNOSIS — L03.116 BILATERAL CELLULITIS OF LOWER LEG: ICD-10-CM

## 2023-04-18 DIAGNOSIS — L03.116 CELLULITIS OF LEFT LOWER EXTREMITY: ICD-10-CM

## 2023-04-18 DIAGNOSIS — L03.115 BILATERAL CELLULITIS OF LOWER LEG: ICD-10-CM

## 2023-04-18 PROCEDURE — 29580 STRAPPING UNNA BOOT: CPT

## 2023-04-18 PROCEDURE — 97598 DBRDMT OPN WND ADDL 20CM/<: CPT

## 2023-04-18 PROCEDURE — 97597 DBRDMT OPN WND 1ST 20 CM/<: CPT

## 2023-04-18 NOTE — THERAPY WOUND CARE TREATMENT
Outpatient Rehabilitation - Wound/Debridement Treatment Note  [facility]     Patient Name: Kerry Leal  : 1965  MRN: [id number]  Today's Date: 2023                 Admit Date: 2023    Visit Dx:    ICD-10-CM ICD-9-CM   1. Chronic acquired lymphedema  I89.0 457.1   2. Cellulitis of left lower extremity  L03.116 682.6   3. Bilateral cellulitis of lower leg  L03.116 682.6    L03.115        Patient Active Problem List   Diagnosis   • Cellulitis of left lower extremity   • Essential hypertension   • Lymphedema of both lower extremities   • Elevated d-dimer   • Prediabetes   • Acute on chronic respiratory failure with hypoxia and hypercapnia   • Type 2 diabetes mellitus   • Possible CAP (community acquired pneumonia)   • Morbid obesity with BMI of 60.0-69.9, adult   • Secondary pulmonary arterial hypertension   • Mild Aortic stenosis   • Mild Aortic regurgitation   • Probable Obesity hypoventilation syndrome (HCC)   • Bilateral cellulitis of lower leg   • Chronic respiratory failure with hypoxia        Past Medical History:   Diagnosis Date   • Anxiety    • Asthma    • Heart murmur    • Hypertension         Past Surgical History:   Procedure Laterality Date   • APPENDECTOMY     •  SECTION     • HYSTERECTOMY     • NECK SURGERY           EVALUATION   PT Ortho     Row Name 23 1500       Subjective Comments    Subjective Comments Pt had to remove the unna boots yesterday due to unraveling and discomfort. She has called her previous lymphedema therapist to discuss returning to lymphedema care once she's discharged from wound care.  -MC       Subjective Pain    Able to rate subjective pain? yes  -MC    Pre-Treatment Pain Level 0  -MC    Post-Treatment Pain Level 0  -MC       Transfers    Comment, (Transfers) remained seated in transport chair for tx  -MC          User Key  (r) = Recorded By, (t) = Taken By, (c) = Cosigned By    Initials Name Provider Type    Sharon Mena, PT  Physical Therapist                   Lymphedema     Row Name 04/18/23 1500             Lymphedema Edema Assessment    Pitting Edema Severe;Moderate  L>R  -         Skin Changes/Observations    Location/Assessment Lower Extremity  -      Lower Extremity Conditions bilateral:;scaly;crust;inflamed  -      Lower Extremity Color/Pigment bilateral:;erythema;hyperpigmented  -         Compression/Skin Care    Compression/Skin Care skin care;wrapping location;bandaging  -      Skin Care washed/dried;lotion applied  -      Wrapping Location lower extremity  -      Wrapping Location LE bilateral:;foot to knee  -      Wrapping Comments LLE: cocoa butter to LEs to areas of hypertrophic crust; unna boot applied with coban and spandage to secure. RLE: MLW size 4/6/8 doubled and overlapping to create gradient compression  -            User Key  (r) = Recorded By, (t) = Taken By, (c) = Cosigned By    Initials Name Provider Type    Sharon Mena, PT Physical Therapist                WOUND DEBRIDEMENT  Total area of Debridement: 35 cm2  Debridement Site 1  Location- Site 1: LLE wound and periwound areas  Selective Debridement- Site 1: Wound Surface <20cmsq  Instruments- Site 1: tweezers  Excised Tissue Description- Site 1: moderate, other (comment) (hypertrophic crusts)  Bleeding- Site 1: none              Therapy Education     Row Name 04/18/23 1500             Therapy Education    Education Details Continue current POC to the LLE. Switch to MLW on the RLE due to improvement. May remove MLW as desired for hygiene, lotion application, etc. PT recommends going ahead and scheduling a follow up with your lymphedema specialist as she's approaching appropriateness for MLD.  -MC      Given Symptoms/condition management;Bandaging/dressing change;Edema management  -      Program Modified  -      How Provided Verbal;Demonstration  -MC      Provided to Patient  -MC      Level of Understanding Verbalized  -MC             User Key  (r) = Recorded By, (t) = Taken By, (c) = Cosigned By    Initials Name Provider Type    Sharon Mena, PT Physical Therapist                Recommendation and Plan   PT Assessment/Plan     Row Name 04/18/23 1500          PT Assessment    Functional Limitations Performance in self-care ADL  -     Impairments Edema;Integumentary integrity;Pain  -     Assessment Comments Pt with no open areas to the LLE. Edema and skin integrity to the RLE are much improved, so PT transitioned patient to MLW on the RLE to continue with appropriate edema management. The LLE has some brawy, residual redness and extensive hypertrophic crusting that required additional debridement today. Pt with no overt openings to the LLE and no active drainage, so she is likely appropriate to transition to outpatient lymphedema management when an appointment becomes available. Until that time, she will continue to benefit from OP skilled PT wound care for debridement and advanced edema management.  -     Rehab Potential Good  -     Patient/caregiver participated in establishment of treatment plan and goals Yes  -     Patient would benefit from skilled therapy intervention Yes  -        PT Plan    PT Frequency 2x/week  -     Physical Therapy Interventions (Optional Details) wound care;patient/family education  -     PT Plan Comments MLW RLE, UB LLE with debridement  -           User Key  (r) = Recorded By, (t) = Taken By, (c) = Cosigned By    Initials Name Provider Type    Sharon Mena, PT Physical Therapist                Goals   PT OP Goals     Row Name 04/18/23 1600          Time Calculation    PT Goal Re-Cert Due Date 07/05/23  -           User Key  (r) = Recorded By, (t) = Taken By, (c) = Cosigned By    Initials Name Provider Type    Sharon Mena, PT Physical Therapist                PT Goal Re-Cert Due Date: 07/05/23            Time Calculation: Start Time: 1345  Untimed  Charges  14209-Efcl Boot: 20  17592-Vopecktdd debridement: 10  44843-Qua't debridement ea 20 cm: 5  Total Minutes  Untimed Charges Total Minutes: 35   Total Minutes: 35  Therapy Charges for Today     Code Description Service Date Service Provider Modifiers Qty    82588247861 HC JOHN DEBRIDE OPEN WOUND UP TO 20CM 4/18/2023 Sharon Melgar, PT GP 1    64586158864 HC PT STAPPING UNNA BOOT 4/18/2023 Sharon Melgar, PT GP 1    98291922129 HC PT JOHN DEBRIDE OPEN WOUND EA ADD 20CM 4/18/2023 Sharon Melgar, PT GP 1                  Sharon Melgar, PT  4/18/2023

## 2023-04-20 ENCOUNTER — HOSPITAL ENCOUNTER (OUTPATIENT)
Dept: PHYSICAL THERAPY | Facility: HOSPITAL | Age: 58
Setting detail: THERAPIES SERIES
Discharge: HOME | End: 2023-04-20
Payer: MEDICARE

## 2023-04-20 DIAGNOSIS — L03.116 CELLULITIS OF LEFT LOWER EXTREMITY: ICD-10-CM

## 2023-04-20 DIAGNOSIS — L03.116 BILATERAL CELLULITIS OF LOWER LEG: ICD-10-CM

## 2023-04-20 DIAGNOSIS — L03.115 BILATERAL CELLULITIS OF LOWER LEG: ICD-10-CM

## 2023-04-20 DIAGNOSIS — I89.0 CHRONIC ACQUIRED LYMPHEDEMA: Primary | ICD-10-CM

## 2023-04-20 PROCEDURE — 29580 STRAPPING UNNA BOOT: CPT

## 2023-04-20 PROCEDURE — 97597 DBRDMT OPN WND 1ST 20 CM/<: CPT

## 2023-04-20 NOTE — THERAPY WOUND CARE TREATMENT
Outpatient Rehabilitation - Wound/Debridement Treatment Note  [facility]     Patient Name: Kerry Leal  : 1965  MRN: [id number]  Today's Date: 2023                 Admit Date: 2023    Visit Dx:    ICD-10-CM ICD-9-CM   1. Chronic acquired lymphedema  I89.0 457.1   2. Cellulitis of left lower extremity  L03.116 682.6   3. Bilateral cellulitis of lower leg  L03.116 682.6    L03.115        Patient Active Problem List   Diagnosis   • Cellulitis of left lower extremity   • Essential hypertension   • Lymphedema of both lower extremities   • Elevated d-dimer   • Prediabetes   • Acute on chronic respiratory failure with hypoxia and hypercapnia   • Type 2 diabetes mellitus   • Possible CAP (community acquired pneumonia)   • Morbid obesity with BMI of 60.0-69.9, adult   • Secondary pulmonary arterial hypertension   • Mild Aortic stenosis   • Mild Aortic regurgitation   • Probable Obesity hypoventilation syndrome (HCC)   • Bilateral cellulitis of lower leg   • Chronic respiratory failure with hypoxia        Past Medical History:   Diagnosis Date   • Anxiety    • Asthma    • Heart murmur    • Hypertension         Past Surgical History:   Procedure Laterality Date   • APPENDECTOMY     •  SECTION     • HYSTERECTOMY     • NECK SURGERY           EVALUATION   PT Ortho     Row Name 23 1400       Subjective Comments    Subjective Comments Pt removed both Unna boot and MLW this morning to wash legs. Reports her MD has sent a referral to the lymphedema clinic.  -       Subjective Pain    Able to rate subjective pain? yes  -    Pre-Treatment Pain Level 0  -    Post-Treatment Pain Level 0  -          User Key  (r) = Recorded By, (t) = Taken By, (c) = Cosigned By    Initials Name Provider Type     Ariel Lai, PT Physical Therapist                   Lymphedema     Row Name 23 1400             Lymphedema Edema Assessment    Pitting Edema Severe;Moderate  L>R  -         Skin  Changes/Observations    Location/Assessment Lower Extremity  -      Lower Extremity Conditions bilateral:;scaly;crust;inflamed  -      Lower Extremity Color/Pigment bilateral:;erythema;hyperpigmented  -         Compression/Skin Care    Compression/Skin Care skin care;wrapping location;bandaging  -      Skin Care washed/dried;lotion applied  -      Wrapping Location lower extremity  -      Wrapping Location LE bilateral:;foot to knee  -      Wrapping Comments LLE: cocoa butter to LEs to areas of hypertrophic crust; unna boot applied with coban and spandage to secure. RLE: MLW size 4/6/8 doubled and overlapping to create gradient compression  -            User Key  (r) = Recorded By, (t) = Taken By, (c) = Cosigned By    Initials Name Provider Type    Ariel Farfan, PT Physical Therapist                WOUND DEBRIDEMENT  Total area of Debridement: 15cm22  Debridement Site 1  Location- Site 1: LLE wound and periwound areas  Selective Debridement- Site 1: Wound Surface <20cmsq  Instruments- Site 1: tweezers  Excised Tissue Description- Site 1: moderate, other (comment) (hypertophic crusts)  Bleeding- Site 1: none              Therapy Education     Row Name 04/20/23 1500             Therapy Education    Education Details Continue current POC until able to see lymphedema clinic.  -      Given Symptoms/condition management;Bandaging/dressing change;Edema management  -      Program Reinforced  -      How Provided Verbal;Demonstration  -      Provided to Patient  -      Level of Understanding Verbalized  -            User Key  (r) = Recorded By, (t) = Taken By, (c) = Cosigned By    Initials Name Provider Type    Ariel Farfan, PT Physical Therapist                Recommendation and Plan   PT Assessment/Plan     Row Name 04/20/23 1400          PT Assessment    Functional Limitations Performance in self-care ADL  -     Impairments Edema;Integumentary integrity;Pain  -     Assessment  Comments Pt continuing with no open wounds to BLEs although also continues with moderate LLE erthema and hypertophic crusts. Pt's RLE appears to be demonstrating appropriate improvements in skin integrity and reductions in limb girth in response to transition to MLW. Pt continuing to await referral to OP lymphedema clinic. Pt would continue to benefit from skilled wound care to improve skin integrity and reduce limb girth.  -     Rehab Potential Good  -     Patient/caregiver participated in establishment of treatment plan and goals Yes  -     Patient would benefit from skilled therapy intervention Yes  -        PT Plan    PT Frequency 2x/week  -     Physical Therapy Interventions (Optional Details) wound care;patient/family education  -     PT Plan Comments MLW RLE, UB LLE with debridement  -           User Key  (r) = Recorded By, (t) = Taken By, (c) = Cosigned By    Initials Name Provider Type     Ariel Lai, PT Physical Therapist                Goals   PT OP Goals     Row Name 04/20/23 1503          Time Calculation    PT Goal Re-Cert Due Date 07/05/23  -           User Key  (r) = Recorded By, (t) = Taken By, (c) = Cosigned By    Initials Name Provider Type     Ariel Lai, PT Physical Therapist                PT Goal Re-Cert Due Date: 07/05/23            Time Calculation: Start Time: 1345  Untimed Charges  38734-Kblm Boot: 20  22116-Bfebgfgjx debridement: 10  Total Minutes  Untimed Charges Total Minutes: 30   Total Minutes: 30  Therapy Charges for Today     Code Description Service Date Service Provider Modifiers Qty    18519427449 HC JOHN DEBRIDE OPEN WOUND UP TO 20CM 4/20/2023 Ariel Lai, PT GP 1    92713897141 HC PT STAPPING UNNA BOOT 4/20/2023 Ariel Lai, PT GP 1                  Ariel Lai PT  4/20/2023

## 2023-04-22 ENCOUNTER — NURSE TRIAGE (OUTPATIENT)
Dept: CALL CENTER | Facility: HOSPITAL | Age: 58
End: 2023-04-22
Payer: MEDICARE

## 2023-04-22 NOTE — TELEPHONE ENCOUNTER
"Caller reported rasheeda boot came off and unable to get back on. Denied having another one to replace with. Reported that PT applies them for her and that her next appointment isn't til Tuesday.    Spoke with  who stated would check if PT or wound care is open on weekends and will call back with further advice.    HM called back and stated PT outpt not open on weekends, would have to be inpt or ER for PT to treat. Advised to check with urgent care, or call wound care on Monday.    Called pt back and advised to call PT dept in am to see if able to assist, if not advised to call [facility]. Numbers given for both. For tonight advised to wrap with guaze if has any open or draining areas to legs. Verbalized understanding.    Reason for Disposition  • Health Information question, no triage required and triager able to answer question    Additional Information  • Negative: [1] Caller is not with the adult (patient) AND [2] reporting urgent symptoms  • Negative: Lab result questions  • Negative: Medication questions  • Negative: Caller can't be reached by phone  • Negative: Caller has already spoken to PCP or another triager  • Negative: RN needs further essential information from caller in order to complete triage  • Negative: Requesting regular office appointment  • Negative: [1] Caller requesting NON-URGENT health information AND [2] PCP's office is the best resource    Answer Assessment - Initial Assessment Questions  1. REASON FOR CALL or QUESTION: \"What is your reason for calling today?\" or \"How can I best help you?\" or \"What question do you have that I can help answer?\"      Rasheeda boot came off, unable to put back on, doesn't have a replacement, wanting to know what to do    Protocols used: INFORMATION ONLY CALL - NO TRIAGE-ADULT-      "

## 2023-04-25 ENCOUNTER — HOSPITAL ENCOUNTER (OUTPATIENT)
Dept: PHYSICAL THERAPY | Facility: HOSPITAL | Age: 58
Setting detail: THERAPIES SERIES
Discharge: HOME | End: 2023-04-25
Payer: MEDICARE

## 2023-04-25 DIAGNOSIS — I89.0 CHRONIC ACQUIRED LYMPHEDEMA: Primary | ICD-10-CM

## 2023-04-25 DIAGNOSIS — L03.116 CELLULITIS OF LEFT LOWER EXTREMITY: ICD-10-CM

## 2023-04-25 PROCEDURE — 29580 STRAPPING UNNA BOOT: CPT

## 2023-04-25 PROCEDURE — 97597 DBRDMT OPN WND 1ST 20 CM/<: CPT

## 2023-04-25 NOTE — THERAPY WOUND CARE TREATMENT
Outpatient Rehabilitation - Wound/Debridement Treatment Note  [facility]     Patient Name: Kerry Leal  : 1965  MRN: [id number]  Today's Date: 2023                   Admit Date: 2023    Visit Dx:    ICD-10-CM ICD-9-CM   1. Chronic acquired lymphedema  I89.0 457.1   2. Cellulitis of left lower extremity  L03.116 682.6       Patient Active Problem List   Diagnosis   • Cellulitis of left lower extremity   • Essential hypertension   • Lymphedema of both lower extremities   • Elevated d-dimer   • Prediabetes   • Acute on chronic respiratory failure with hypoxia and hypercapnia   • Type 2 diabetes mellitus   • Possible CAP (community acquired pneumonia)   • Morbid obesity with BMI of 60.0-69.9, adult   • Secondary pulmonary arterial hypertension   • Mild Aortic stenosis   • Mild Aortic regurgitation   • Probable Obesity hypoventilation syndrome (HCC)   • Bilateral cellulitis of lower leg   • Chronic respiratory failure with hypoxia        Past Medical History:   Diagnosis Date   • Anxiety    • Asthma    • Heart murmur    • Hypertension         Past Surgical History:   Procedure Laterality Date   • APPENDECTOMY     •  SECTION     • HYSTERECTOMY     • NECK SURGERY           EVALUATION   PT Ortho     Row Name 23 1400       Subjective Comments    Subjective Comments Spouse removed wraps a couple days ago as they started to bunch up and they could not get them to stick back into place. She has called her PCP's office and is hopeful to hear from the lymphedema clinic soon.  -MC       Subjective Pain    Able to rate subjective pain? yes  -MC    Pre-Treatment Pain Level 0  -MC    Post-Treatment Pain Level 0  -MC       Transfers    Comment, (Transfers) remained seated in transport chair for tx  -MC          User Key  (r) = Recorded By, (t) = Taken By, (c) = Cosigned By    Initials Name Provider Type    Sharon Mena, PT Physical Therapist                   Lymphedema     Row Name  04/25/23 1400             Lymphedema Edema Assessment    Pitting Edema Severe;Moderate  L>R  -MC         Skin Changes/Observations    Location/Assessment Lower Extremity  -      Lower Extremity Conditions bilateral:;scaly;crust;inflamed  -      Lower Extremity Color/Pigment bilateral:;erythema;hyperpigmented  -         Compression/Skin Care    Compression/Skin Care skin care;wrapping location;bandaging  -      Skin Care washed/dried;lotion applied  -      Wrapping Location lower extremity  -      Wrapping Location LE bilateral:;foot to knee  -      Wrapping Comments LLE: cocoa butter to LEs to areas of hypertrophic crust; unna boot applied with coban and spandage to secure. RLE: MLW size 4/6/8 doubled and overlapping to create gradient compression  -MC            User Key  (r) = Recorded By, (t) = Taken By, (c) = Cosigned By    Initials Name Provider Type    Sharon Mena, PT Physical Therapist                WOUND DEBRIDEMENT  Total area of Debridement: 10 cm2  Debridement Site 1  Location- Site 1: LLE wound and periwound areas  Selective Debridement- Site 1: Wound Surface <20cmsq  Instruments- Site 1: tweezers  Excised Tissue Description- Site 1: moderate, other (comment) (crust)  Bleeding- Site 1: none              Therapy Education     Row Name 04/25/23 1400             Therapy Education    Education Details Continue current POC until able to follow-up with lymphedema clinic.  -MC      Given Symptoms/condition management;Bandaging/dressing change;Edema management  -MC      Program Reinforced  -MC      How Provided Verbal;Demonstration  -MC      Provided to Patient  -MC      Level of Understanding Verbalized  -MC            User Key  (r) = Recorded By, (t) = Taken By, (c) = Cosigned By    Initials Name Provider Type    Sharon Mena PT Physical Therapist                Recommendation and Plan   PT Assessment/Plan     Row Name 04/25/23 1400          PT Assessment    Functional  Limitations Performance in self-care ADL  -     Impairments Edema;Integumentary integrity;Pain  -     Assessment Comments PT able to debride additional crusting from the LLE due to hypertrophic skin changes. Overall inflammation is much improved. The RLE has some increased edema and shiny, pink/red skin along the anterior shin, likely due to lack of compression for the past 2 days. Pt will continue to benefit from skilled PT wound care to promote healing and edema management until able to follow up with a lymphedema specialist.  -     Rehab Potential Good  -     Patient/caregiver participated in establishment of treatment plan and goals Yes  -     Patient would benefit from skilled therapy intervention Yes  -        PT Plan    PT Frequency 2x/week  -     Physical Therapy Interventions (Optional Details) wound care;patient/family education  -     PT Plan Comments debridement prn, MLW RLE, UB LLE  -           User Key  (r) = Recorded By, (t) = Taken By, (c) = Cosigned By    Initials Name Provider Type    Sharon Mena, PT Physical Therapist                Goals   PT OP Goals     Row Name 04/25/23 1454          Time Calculation    PT Goal Re-Cert Due Date 07/05/23  -           User Key  (r) = Recorded By, (t) = Taken By, (c) = Cosigned By    Initials Name Provider Type     Sharon Melgar, PT Physical Therapist                PT Goal Re-Cert Due Date: 07/05/23            Time Calculation: Start Time: 1345  Untimed Charges  64882-Nbef Boot: 20  49623-Htvsyvobj debridement: 10  Total Minutes  Untimed Charges Total Minutes: 30   Total Minutes: 30  Therapy Charges for Today     Code Description Service Date Service Provider Modifiers Qty    45847580687 HC JOHN DEBRIDE OPEN WOUND UP TO 20CM 4/25/2023 Sharon Melgar, PT GP 1    [id number] HC PT STAPPING UNNA BOOT 4/25/2023 Sharon Melgar, PT GP 1                  Sharon Melgar, PT  4/25/2023

## 2023-04-26 ENCOUNTER — READMISSION MANAGEMENT (OUTPATIENT)
Dept: CALL CENTER | Facility: HOSPITAL | Age: 58
End: 2023-04-26
Payer: MEDICARE

## 2023-04-26 NOTE — OUTREACH NOTE
Medical Week 3 Survey    Flowsheet Row Responses   [facility] patient discharged from? [place]   Does the patient have one of the following disease processes/diagnoses(primary or secondary)? Other   Week 3 attempt successful? Yes   Call start time 1031   Call end time 1036   Discharge diagnosis Bilateral cellulitis of lower leg   Meds reviewed with patient/caregiver? Yes   Is the patient having any side effects they believe may be caused by any medication additions or changes? No   Does the patient have all medications ordered at discharge? Yes   Is the patient taking all medications as directed (includes completed medication regime)? Yes   Does the patient have a primary care provider?  Yes   Psychosocial issues? No   Comments uses 2L O2 at night, antibiotic IV infusions will end today, will start PO Keflex tomorrow, 4/27/23   Did the patient receive a copy of their discharge instructions? Yes   Nursing interventions Reviewed instructions with patient   What is the patient's perception of their health status since discharge? Improving   Is the patient/caregiver able to teach back signs and symptoms related to disease process for when to call PCP? Yes   Is the patient/caregiver able to teach back signs and symptoms related to disease process for when to call 911? Yes   Is the patient/caregiver able to teach back the hierarchy of who to call/visit for symptoms/problems? PCP, Specialist, Home health nurse, Urgent Care, ED, 911 Yes   Additional teach back comments states leg dry, free of leaks   Week 3 Call Completed? Yes          [place] JO - Registered Nurse

## 2023-04-27 ENCOUNTER — HOSPITAL ENCOUNTER (OUTPATIENT)
Dept: PHYSICAL THERAPY | Facility: HOSPITAL | Age: 58
Setting detail: THERAPIES SERIES
Discharge: HOME | End: 2023-04-27
Payer: MEDICARE

## 2023-04-27 DIAGNOSIS — I89.0 CHRONIC ACQUIRED LYMPHEDEMA: Primary | ICD-10-CM

## 2023-04-27 DIAGNOSIS — L03.116 CELLULITIS OF LEFT LOWER EXTREMITY: ICD-10-CM

## 2023-04-27 PROCEDURE — 29580 STRAPPING UNNA BOOT: CPT

## 2023-04-27 PROCEDURE — 97597 DBRDMT OPN WND 1ST 20 CM/<: CPT

## 2023-04-27 NOTE — THERAPY WOUND CARE TREATMENT
Outpatient Rehabilitation - Wound/Debridement Treatment Note  [facility]     Patient Name: Kerry Leal  : 1965  MRN: [id number]  Today's Date: 2023                 Admit Date: 2023    Visit Dx:    ICD-10-CM ICD-9-CM   1. Chronic acquired lymphedema  I89.0 457.1   2. Cellulitis of left lower extremity  L03.116 682.6   LLE:    L calf:    RLE:      Patient Active Problem List   Diagnosis   • Cellulitis of left lower extremity   • Essential hypertension   • Lymphedema of both lower extremities   • Elevated d-dimer   • Prediabetes   • Acute on chronic respiratory failure with hypoxia and hypercapnia   • Type 2 diabetes mellitus   • Possible CAP (community acquired pneumonia)   • Morbid obesity with BMI of 60.0-69.9, adult   • Secondary pulmonary arterial hypertension   • Mild Aortic stenosis   • Mild Aortic regurgitation   • Probable Obesity hypoventilation syndrome (HCC)   • Bilateral cellulitis of lower leg   • Chronic respiratory failure with hypoxia        Past Medical History:   Diagnosis Date   • Anxiety    • Asthma    • Heart murmur    • Hypertension         Past Surgical History:   Procedure Laterality Date   • APPENDECTOMY     •  SECTION     • HYSTERECTOMY     • NECK SURGERY           EVALUATION   PT Ortho     Row Name 23 8319       Subjective Comments    Subjective Comments Removed rolling wraps last night, left legs DOMO since then.  Pt still waiting on lymphedema referral schedule.  -MEGHAN       Subjective Pain    Able to rate subjective pain? yes  -MEGHAN    Pre-Treatment Pain Level 0  -    Post-Treatment Pain Level 0  -       Transfers    Comment, (Transfers) remained in transport chair for tx  -          User Key  (r) = Recorded By, (t) = Taken By, (c) = Cosigned By    Initials Name Provider Type    Kelin Lozano, PT Physical Therapist                   Lymphedema     Row Name 23 3506             Lymphedema Edema Assessment    Pitting Edema  "Severe;Moderate  L>R  -         Skin Changes/Observations    Location/Assessment Lower Extremity  -      Lower Extremity Conditions bilateral:;scaly;crust;inflamed  -      Lower Extremity Color/Pigment bilateral:;erythema;hyperpigmented  -         Compression/Skin Care    Compression/Skin Care skin care;wrapping location;bandaging  -      Skin Care washed/dried;lotion applied  -      Wrapping Location lower extremity  -      Wrapping Location LE bilateral:;foot to knee  -      Wrapping Comments BLE unna boots in clamshell application, 4\" coban, size 6 spandage  -            User Key  (r) = Recorded By, (t) = Taken By, (c) = Cosigned By    Initials Name Provider Type    Kelin Lozano, PT Physical Therapist                WOUND DEBRIDEMENT  Total area of Debridement: 12cmsq  Debridement Site 1  Location- Site 1: LLE wound and periwound areas  Selective Debridement- Site 1: Wound Surface <20cmsq  Instruments- Site 1: tweezers  Excised Tissue Description- Site 1: moderate, other (comment) (hypertrophic crust)  Bleeding- Site 1: scant              Therapy Education     Row Name 04/27/23 4052             Therapy Education    Education Details Discussed importance of consistent compression and leg elevation, diuretics as prescribed, for long term-management of edema.  Will benefit from custom compression stockings or velcro-closure wraps if able to obtain a size large enough for pt's legs.  Encouraged pt to follow-up on lyphedema referral.  -      Given Symptoms/condition management;Bandaging/dressing change;Edema management  -      Program Reinforced  -      How Provided Verbal;Demonstration  -      Provided to Patient  -      Level of Understanding Verbalized  -            User Key  (r) = Recorded By, (t) = Taken By, (c) = Cosigned By    Initials Name Provider Type    Kelin Lozano, PT Physical Therapist                Recommendation and Plan   PT Assessment/Plan     Row Name " 04/27/23 1345          PT Assessment    Functional Limitations Performance in self-care ADL  -     Impairments Edema;Integumentary integrity;Pain  -     Assessment Comments Pt with slight weeping from anterior RLE noted today with continued erythema, so PT applied unna boots to both LEs to improve inflammation and venous return.  LLE erythema and skin integrity improving with unna boot use.  -        PT Plan    PT Frequency 2x/week  -     Physical Therapy Interventions (Optional Details) patient/family education;wound care  -     PT Plan Comments debridement, compression  -           User Key  (r) = Recorded By, (t) = Taken By, (c) = Cosigned By    Initials Name Provider Type    Kelin Lozano, PT Physical Therapist                Goals   PT OP Goals     Row Name 04/27/23 1345          Time Calculation    PT Goal Re-Cert Due Date 07/05/23  -           User Key  (r) = Recorded By, (t) = Taken By, (c) = Cosigned By    Initials Name Provider Type    Kelin Lozano, PT Physical Therapist                PT Goal Re-Cert Due Date: 07/05/23            Time Calculation: Start Time: 1345  Untimed Charges  07767-Cyrb Boot: 25  93017-Vlprvnmem debridement: 10  Total Minutes  Untimed Charges Total Minutes: 35   Total Minutes: 35  Therapy Charges for Today     Code Description Service Date Service Provider Modifiers Qty    15778314281 HC JOHN DEBRIDE OPEN WOUND UP TO 20CM 4/27/2023 Kelin Segal, PT GP 1    24693029239 HC PT STAPPING UNNA BOOT 4/27/2023 Kelin Segal, PT GP 1                  Kelin Segal, PT  4/27/2023

## 2023-05-02 ENCOUNTER — HOSPITAL ENCOUNTER (OUTPATIENT)
Dept: PHYSICAL THERAPY | Facility: HOSPITAL | Age: 58
Setting detail: THERAPIES SERIES
Discharge: HOME | End: 2023-05-02
Payer: MEDICARE

## 2023-05-02 DIAGNOSIS — I89.0 CHRONIC ACQUIRED LYMPHEDEMA: Primary | ICD-10-CM

## 2023-05-02 DIAGNOSIS — L03.116 BILATERAL CELLULITIS OF LOWER LEG: ICD-10-CM

## 2023-05-02 DIAGNOSIS — L03.116 CELLULITIS OF LEFT LOWER EXTREMITY: ICD-10-CM

## 2023-05-02 DIAGNOSIS — L03.115 BILATERAL CELLULITIS OF LOWER LEG: ICD-10-CM

## 2023-05-02 PROCEDURE — 29580 STRAPPING UNNA BOOT: CPT

## 2023-05-02 NOTE — THERAPY WOUND CARE TREATMENT
Outpatient Rehabilitation - Wound/Debridement Treatment Note  [facility]     Patient Name: Kerry Leal  : 1965  MRN: [id number]  Today's Date: 2023                 Admit Date: 2023    Visit Dx:    ICD-10-CM ICD-9-CM   1. Chronic acquired lymphedema  I89.0 457.1   2. Cellulitis of left lower extremity  L03.116 682.6   3. Bilateral cellulitis of lower leg  L03.116 682.6    L03.115    RLE erythema:      Patient Active Problem List   Diagnosis   • Cellulitis of left lower extremity   • Essential hypertension   • Lymphedema of both lower extremities   • Elevated d-dimer   • Prediabetes   • Acute on chronic respiratory failure with hypoxia and hypercapnia   • Type 2 diabetes mellitus   • Possible CAP (community acquired pneumonia)   • Morbid obesity with BMI of 60.0-69.9, adult   • Secondary pulmonary arterial hypertension   • Mild Aortic stenosis   • Mild Aortic regurgitation   • Probable Obesity hypoventilation syndrome (HCC)   • Bilateral cellulitis of lower leg   • Chronic respiratory failure with hypoxia        Past Medical History:   Diagnosis Date   • Anxiety    • Asthma    • Heart murmur    • Hypertension         Past Surgical History:   Procedure Laterality Date   • APPENDECTOMY     •  SECTION     • HYSTERECTOMY     • NECK SURGERY           EVALUATION   PT Ortho     Row Name 23 1300       Subjective Comments    Subjective Comments Pt states she saw Dr. Rodriguez yesterday, remains on oral Keflex.  States MENON was concerned about redness of anterior RLE and instructed pt to contact him if it gets any worse.  Pt had unna boots removed by spouse due to discomfort and purple discoloration of toes.  Pt states MENON placed the lymphedema referral at [place] [facility] where she was being treated before.  -MEGHAN       Subjective Pain    Able to rate subjective pain? yes  -JM    Pre-Treatment Pain Level 0  -JM    Post-Treatment Pain Level 0  -JM       Transfers    Comment,  "(Transfers) remained in transport chair for tx  -          User Key  (r) = Recorded By, (t) = Taken By, (c) = Cosigned By    Initials Name Provider Type    Kelin Lozano, PT Physical Therapist                   Lymphedema     Row Name 05/02/23 1300             Lymphedema Edema Assessment    Pitting Edema Severe;Moderate  L>R  -         Skin Changes/Observations    Location/Assessment Lower Extremity  -JM      Lower Extremity Conditions bilateral:;scaly;crust;inflamed  -      Lower Extremity Color/Pigment bilateral:;erythema;hyperpigmented  -         Compression/Skin Care    Compression/Skin Care skin care;wrapping location;bandaging  -      Skin Care washed/dried;lotion applied  -JM      Wrapping Location lower extremity  -JM      Wrapping Location LE bilateral:;foot to knee  -JM      Wrapping Comments BLE unna boots in clamshell application, 4\" coban, size 6 spandage  -            User Key  (r) = Recorded By, (t) = Taken By, (c) = Cosigned By    Initials Name Provider Type    Kelin Lozano, PT Physical Therapist                   Therapy Education     Row Name 05/02/23 1300             Therapy Education    Education Details Reinforced consistent compression, leg elevation, follow-up with lymphedema clinic.  -JM      Given Symptoms/condition management;Bandaging/dressing change;Edema management  -      Program Reinforced  -JM      How Provided Verbal;Demonstration  -JM      Provided to Patient  -JM      Level of Understanding Verbalized  -            User Key  (r) = Recorded By, (t) = Taken By, (c) = Cosigned By    Initials Name Provider Type    Kelin Lozano, PT Physical Therapist                Recommendation and Plan   PT Assessment/Plan     Row Name 05/02/23 1300          PT Assessment    Functional Limitations Performance in self-care ADL  -JM     Impairments Edema;Integumentary integrity;Pain  -     Assessment Comments BLE without drainage, no open areas LLE.  Area of " erythema anterior RLE noted today (photo obtained).  PT continued with unna boots for increased venous return.  Will plan to continue with UB or MLW until transition to lymphedema clinic or pt/family independence with compression use.  -        PT Plan    PT Frequency 2x/week  -MEGHAN     Physical Therapy Interventions (Optional Details) patient/family education;wound care  -     PT Plan Comments UB/MLW, debridement  -           User Key  (r) = Recorded By, (t) = Taken By, (c) = Cosigned By    Initials Name Provider Type    Kelin Lozano, PT Physical Therapist                Goals   PT OP Goals     Row Name 05/02/23 1300          Time Calculation    PT Goal Re-Cert Due Date 07/05/23  -           User Key  (r) = Recorded By, (t) = Taken By, (c) = Cosigned By    Initials Name Provider Type    Kelin Lozano, PT Physical Therapist                PT Goal Re-Cert Due Date: 07/05/23            Time Calculation: Start Time: 1300  Untimed Charges  72578-Wmnd Boot: 20  Total Minutes  Untimed Charges Total Minutes: 20   Total Minutes: 20  Therapy Charges for Today     Code Description Service Date Service Provider Modifiers Qty    73138636150  PT STAPPING UNNA BOOT 5/2/2023 Kelin Segal, PT GP 1                  Kelin Segal, PT  5/2/2023

## 2023-05-05 ENCOUNTER — HOSPITAL ENCOUNTER (OUTPATIENT)
Dept: PHYSICAL THERAPY | Facility: HOSPITAL | Age: 58
Setting detail: THERAPIES SERIES
Discharge: HOME | End: 2023-05-05
Payer: MEDICARE

## 2023-05-05 DIAGNOSIS — S81.802D OPEN WOUND OF LEFT LOWER EXTREMITY, SUBSEQUENT ENCOUNTER: ICD-10-CM

## 2023-05-05 DIAGNOSIS — I89.0 CHRONIC ACQUIRED LYMPHEDEMA: Primary | ICD-10-CM

## 2023-05-05 DIAGNOSIS — L03.116 BILATERAL CELLULITIS OF LOWER LEG: ICD-10-CM

## 2023-05-05 DIAGNOSIS — L03.115 BILATERAL CELLULITIS OF LOWER LEG: ICD-10-CM

## 2023-05-05 PROCEDURE — 29580 STRAPPING UNNA BOOT: CPT

## 2023-05-05 NOTE — THERAPY WOUND CARE TREATMENT
Outpatient Rehabilitation - Wound/Debridement Treatment Note  [facility]     Patient Name: Kerry Leal  : 1965  MRN: [id number]  Today's Date: 2023                 Admit Date: 2023    Visit Dx:    ICD-10-CM ICD-9-CM   1. Chronic acquired lymphedema  I89.0 457.1   2. Bilateral cellulitis of lower leg  L03.116 682.6    L03.115    3. Open wound of left lower extremity, subsequent encounter  S81.802D V58.89     891.0       Patient Active Problem List   Diagnosis   • Cellulitis of left lower extremity   • Essential hypertension   • Lymphedema of both lower extremities   • Elevated d-dimer   • Prediabetes   • Acute on chronic respiratory failure with hypoxia and hypercapnia   • Type 2 diabetes mellitus   • Possible CAP (community acquired pneumonia)   • Morbid obesity with BMI of 60.0-69.9, adult   • Secondary pulmonary arterial hypertension   • Mild Aortic stenosis   • Mild Aortic regurgitation   • Probable Obesity hypoventilation syndrome (HCC)   • Bilateral cellulitis of lower leg   • Chronic respiratory failure with hypoxia        Past Medical History:   Diagnosis Date   • Anxiety    • Asthma    • Heart murmur    • Hypertension         Past Surgical History:   Procedure Laterality Date   • APPENDECTOMY     •  SECTION     • HYSTERECTOMY     • NECK SURGERY           EVALUATION   PT Ortho     Row Name 23 4309       Subjective Comments    Subjective Comments Pt able to keep unna boots in place until last night, put compressogrips on after removing unna boots.  -JM       Subjective Pain    Able to rate subjective pain? yes  -MEGHAN    Pre-Treatment Pain Level 0  -JM    Post-Treatment Pain Level 0  -JM       Transfers    Comment, (Transfers) remained in transport chair for tx  -          User Key  (r) = Recorded By, (t) = Taken By, (c) = Cosigned By    Initials Name Provider Type    Kelin Lozano, PT Physical Therapist                   Lymphedema     Row Name 23 8727     "         Lymphedema Edema Assessment    Pitting Edema Severe;Moderate  L>R  -JM         Skin Changes/Observations    Location/Assessment Lower Extremity  -JM      Lower Extremity Conditions bilateral:;scaly;crust;inflamed  -JM      Lower Extremity Color/Pigment bilateral:;erythema;hyperpigmented  -JM         LLE Quick Girth (cm)    Mid foot 29.3 cm  -JM      Smallest ankle 40.3 cm  -JM      Largest calf 79.8 cm  -JM         RLE Quick Girth (cm)    Mid foot 27.8 cm  -JM      Smallest ankle 35.7 cm  -JM      Largest calf 71 cm  -JM      RLE Quick Girth Total 134.5  -JM         Compression/Skin Care    Compression/Skin Care skin care;wrapping location;bandaging  -JM      Skin Care washed/dried;lotion applied  -JM      Wrapping Location lower extremity  -JM      Wrapping Location LE bilateral:;foot to knee  -JM      Wrapping Comments BLE unna boots in clamshell application, 4\" coban, size 6 spandage  -            User Key  (r) = Recorded By, (t) = Taken By, (c) = Cosigned By    Initials Name Provider Type    Kelin Lozano, PT Physical Therapist                WOUND DEBRIDEMENT                    Therapy Education     Row Name 05/05/23 0506             Therapy Education    Education Details Continue consistent compression and elevation of legs, follow-up with lymphedema clinic about getting scheduled to resume tx.  -JM      Given Symptoms/condition management;Bandaging/dressing change;Edema management  -      Program Reinforced  -MEGHAN      How Provided Verbal;Demonstration  -JM      Provided to Patient;Caregiver  -MEGHAN      Level of Understanding Verbalized  -            User Key  (r) = Recorded By, (t) = Taken By, (c) = Cosigned By    Initials Name Provider Type    Kelin Lozano, PT Physical Therapist                Recommendation and Plan   PT Assessment/Plan     Row Name 05/05/23 9171          PT Assessment    Functional Limitations Performance in self-care ADL  -JM     Impairments " Edema;Integumentary integrity;Pain  -     Assessment Comments No drainage, anterior RLE erythema slightly improved but RLE edema increased and faint redness noted medial RLE, will monitor.  LLE stable without drainage or open wounds, erythema slowly improving.  -        PT Plan    PT Frequency 2x/week  -     Physical Therapy Interventions (Optional Details) patient/family education;wound care  -     PT Plan Comments UB/MLW, PRN debridement  -           User Key  (r) = Recorded By, (t) = Taken By, (c) = Cosigned By    Initials Name Provider Type    Kelin Lozano, PT Physical Therapist                Goals   PT OP Goals     Row Name 05/05/23 1345          Time Calculation    PT Goal Re-Cert Due Date 07/05/23  -           User Key  (r) = Recorded By, (t) = Taken By, (c) = Cosigned By    Initials Name Provider Type    Kelin Lozano, PT Physical Therapist                PT Goal Re-Cert Due Date: 07/05/23            Time Calculation: Start Time: 1345  Untimed Charges  88552-Nlar Boot: 20  Total Minutes  Untimed Charges Total Minutes: 20   Total Minutes: 20  Therapy Charges for Today     Code Description Service Date Service Provider Modifiers Qty    50435449572 HC PT STAPPING UNNA BOOT 5/5/2023 Kelin Segal, PT GP 1                  Kelin Segal, PT  5/5/2023

## 2023-05-05 NOTE — THERAPY PROGRESS REPORT/RE-CERT
Outpatient Rehabilitation - Wound/Debridement Progress Note  [facility]     Patient Name: Kerry Leal  : 1965  MRN: [id number]  Today's Date: 2023                 Admit Date: 2023    Visit Dx:    ICD-10-CM ICD-9-CM   1. Chronic acquired lymphedema  I89.0 457.1   2. Bilateral cellulitis of lower leg  L03.116 682.6    L03.115    3. Open wound of left lower extremity, subsequent encounter  S81.802D V58.89     891.0   LLE:    RLE:      Patient Active Problem List   Diagnosis   • Cellulitis of left lower extremity   • Essential hypertension   • Lymphedema of both lower extremities   • Elevated d-dimer   • Prediabetes   • Acute on chronic respiratory failure with hypoxia and hypercapnia   • Type 2 diabetes mellitus   • Possible CAP (community acquired pneumonia)   • Morbid obesity with BMI of 60.0-69.9, adult   • Secondary pulmonary arterial hypertension   • Mild Aortic stenosis   • Mild Aortic regurgitation   • Probable Obesity hypoventilation syndrome (HCC)   • Bilateral cellulitis of lower leg   • Chronic respiratory failure with hypoxia        Past Medical History:   Diagnosis Date   • Anxiety    • Asthma    • Heart murmur    • Hypertension         Past Surgical History:   Procedure Laterality Date   • APPENDECTOMY     •  SECTION     • HYSTERECTOMY     • NECK SURGERY           EVALUATION   PT Ortho     Row Name 23 2785       Subjective Comments    Subjective Comments Pt able to keep unna boots in place until last night, put compressogrips on after removing unna boots.  -JM       Subjective Pain    Able to rate subjective pain? yes  -JM    Pre-Treatment Pain Level 0  -JM    Post-Treatment Pain Level 0  -JM       Transfers    Comment, (Transfers) remained in transport chair for tx  -JM          User Key  (r) = Recorded By, (t) = Taken By, (c) = Cosigned By    Initials Name Provider Type    Kelin Lozano, PT Physical Therapist                   Lymphedema     Row Name  "05/05/23 1345             Lymphedema Edema Assessment    Pitting Edema Severe;Moderate  L>R  -JM         Skin Changes/Observations    Location/Assessment Lower Extremity  -JM      Lower Extremity Conditions bilateral:;scaly;crust;inflamed  -      Lower Extremity Color/Pigment bilateral:;erythema;hyperpigmented  -JM         LLE Quick Girth (cm)    Mid foot 29.3 cm  -JM      Smallest ankle 40.3 cm  -JM      Largest calf 79.8 cm  -JM         RLE Quick Girth (cm)    Mid foot 27.8 cm  -JM      Smallest ankle 35.7 cm  -JM      Largest calf 71 cm  -JM      RLE Quick Girth Total 134.5  -JM         Compression/Skin Care    Compression/Skin Care skin care;wrapping location;bandaging  -JM      Skin Care washed/dried;lotion applied  -JM      Wrapping Location lower extremity  -JM      Wrapping Location LE bilateral:;foot to knee  -JM      Wrapping Comments BLE unna boots in clamshell application, 4\" coban, size 6 spandage  -            User Key  (r) = Recorded By, (t) = Taken By, (c) = Cosigned By    Initials Name Provider Type    Kelin Lozano, PT Physical Therapist                   Therapy Education     Row Name 05/05/23 1345             Therapy Education    Education Details Continue consistent compression and elevation of legs, follow-up with lymphedema clinic about getting scheduled to resume tx.  -JM      Given Symptoms/condition management;Bandaging/dressing change;Edema management  -      Program Reinforced  -MEGHAN      How Provided Verbal;Demonstration  -JM      Provided to Patient;Caregiver  -MEGHAN      Level of Understanding Verbalized  -            User Key  (r) = Recorded By, (t) = Taken By, (c) = Cosigned By    Initials Name Provider Type    Kelin Lozano, PT Physical Therapist                Recommendation and Plan   PT Assessment/Plan     Row Name 05/05/23 1345          PT Assessment    Functional Limitations Performance in self-care ADL  -JM     Impairments Edema;Integumentary integrity;Pain  " -     Assessment Comments No drainage, anterior RLE erythema slightly improved but RLE edema increased and faint redness noted medial RLE, will monitor.  LLE stable without drainage or open wounds, erythema slowly improving.  Pt has not met any additional goals since last PN, but is overall improving and making progress, will continue to benefit from compression and debridement until able to transition to lymphedema clinic.  Continue current POC.  -     Rehab Potential Good  -     Patient/caregiver participated in establishment of treatment plan and goals Yes  -     Patient would benefit from skilled therapy intervention Yes  -        PT Plan    PT Frequency 2x/week  -     Predicted Duration of Therapy Intervention (PT) 8 visits  -     Planned CPT's? PT UNNA BOOT: 17035;PT MULTI LAYER COMP SYS LE;PT JOHN DEBRIDE OPEN WOUND UP TO 20 CM: 17666;PT JOHN DEBRIDE OPEN WOUND EA ADD 20 CM: 65781;PT NONSELECT DEBRIDE 15 MIN: 53308;PT SELF CARE/MGMT/TRAIN 15 MIN: 32731  -     Physical Therapy Interventions (Optional Details) patient/family education;wound care  -     PT Plan Comments UB/MLW, PRN debridement  -           User Key  (r) = Recorded By, (t) = Taken By, (c) = Cosigned By    Initials Name Provider Type    Kelin Lozano, PT Physical Therapist                Goals   PT OP Goals     Row Name 23 1345          PT Short Term Goals    STG Date to Achieve 23  -     STG 1 Pt to have no open areas / areas of weeping to BLES to allow for transtion to MLW.  -     STG 1 Progress Met  -     STG 2 Reduce BLE Edema by 10% to improve skin integrity and mobility  -     STG 2 Progress Met  -     STG 3 Reduce LLE drainage to minimal amount to promote decreased dressing change frequency and improve skin integrity.  -     STG 3 Progress Met  -        Long Term Goals    LTG Date to Achieve 23  -     LTG 1 Pt and family able to manage home dressing changes / compression  wrapping.  -     LTG 1 Progress Met  -MEGHAN     LTG 2 Reduce BLE Edema by 20% to improve skin integrity and mobility  -     LTG 2 Progress Not Met  -MEGHAN     LTG 3 LLE without drainage or open wounds to allow return to lymphedema therapy and home compression pump use.  -     LTG 3 Progress Met  -        Time Calculation    PT Goal Re-Cert Due Date 07/05/23  -           User Key  (r) = Recorded By, (t) = Taken By, (c) = Cosigned By    Initials Name Provider Type    Kelin Lozano, PT Physical Therapist                  Time Calculation: Start Time: 1345  Untimed Charges  19347-Zprc Boot: 20  Total Minutes  Untimed Charges Total Minutes: 20   Total Minutes: 20  Therapy Charges for Today     Code Description Service Date Service Provider Modifiers Qty    66455410144  PT STAPPING UNNA BOOT 5/5/2023 Kelin Segal, PT GP 1                  Kelin Segal, PT  5/5/2023

## 2023-05-09 ENCOUNTER — APPOINTMENT (OUTPATIENT)
Dept: PHYSICAL THERAPY | Facility: HOSPITAL | Age: 58
End: 2023-05-09
Payer: MEDICARE

## 2023-05-10 ENCOUNTER — APPOINTMENT (OUTPATIENT)
Dept: PHYSICAL THERAPY | Facility: HOSPITAL | Age: 58
End: 2023-05-10
Payer: MEDICARE

## 2023-05-12 ENCOUNTER — HOSPITAL ENCOUNTER (OUTPATIENT)
Dept: PHYSICAL THERAPY | Facility: HOSPITAL | Age: 58
Setting detail: THERAPIES SERIES
Discharge: HOME | End: 2023-05-12
Payer: MEDICARE

## 2023-05-12 DIAGNOSIS — I89.0 CHRONIC ACQUIRED LYMPHEDEMA: Primary | ICD-10-CM

## 2023-05-12 DIAGNOSIS — L03.116 BILATERAL CELLULITIS OF LOWER LEG: ICD-10-CM

## 2023-05-12 DIAGNOSIS — L03.115 BILATERAL CELLULITIS OF LOWER LEG: ICD-10-CM

## 2023-05-12 PROCEDURE — 97597 DBRDMT OPN WND 1ST 20 CM/<: CPT

## 2023-05-12 PROCEDURE — 29580 STRAPPING UNNA BOOT: CPT

## 2023-05-12 NOTE — THERAPY WOUND CARE TREATMENT
Outpatient Rehabilitation - Wound/Debridement Treatment Note  [facility]     Patient Name: Kerry Leal  : 1965  MRN: [id number]  Today's Date: 2023                 Admit Date: 2023    Visit Dx:    ICD-10-CM ICD-9-CM   1. Chronic acquired lymphedema  I89.0 457.1   2. Bilateral cellulitis of lower leg  L03.116 682.6    L03.115    LLE:      RLE:      Patient Active Problem List   Diagnosis   • Cellulitis of left lower extremity   • Essential hypertension   • Lymphedema of both lower extremities   • Elevated d-dimer   • Prediabetes   • Acute on chronic respiratory failure with hypoxia and hypercapnia   • Type 2 diabetes mellitus   • Possible CAP (community acquired pneumonia)   • Morbid obesity with BMI of 60.0-69.9, adult   • Secondary pulmonary arterial hypertension   • Mild Aortic stenosis   • Mild Aortic regurgitation   • Probable Obesity hypoventilation syndrome (HCC)   • Bilateral cellulitis of lower leg   • Chronic respiratory failure with hypoxia        Past Medical History:   Diagnosis Date   • Anxiety    • Asthma    • Heart murmur    • Hypertension         Past Surgical History:   Procedure Laterality Date   • APPENDECTOMY     •  SECTION     • HYSTERECTOMY     • NECK SURGERY           EVALUATION   PT Ortho     Row Name 23 1544       Subjective Comments    Subjective Comments Feeling better today after having a few days of shortness of breath, states she has oxygen she uses when needed.  Took unna boots off Saturday, has been using compressogrips except for today due to coming for tx.  -MEGHAN       Subjective Pain    Able to rate subjective pain? yes  -MEGHAN    Pre-Treatment Pain Level 0  -MEGHAN    Post-Treatment Pain Level 0  -       Transfers    Comment, (Transfers) seated in transport chair  -MEGHAN          User Key  (r) = Recorded By, (t) = Taken By, (c) = Cosigned By    Initials Name Provider Type    Kelin Lozano, PT Physical Therapist                   Lymphedema   "   Row Name 05/12/23 1343             Lymphedema Edema Assessment    Pitting Edema Severe;Moderate  L>R  -         Skin Changes/Observations    Location/Assessment Lower Extremity  -      Lower Extremity Conditions bilateral:;scaly;crust;inflamed  -      Lower Extremity Color/Pigment bilateral:;erythema;hyperpigmented  -         Compression/Skin Care    Compression/Skin Care skin care;wrapping location;bandaging  -      Skin Care washed/dried;lotion applied  cocoa butter to crusts  -      Wrapping Location lower extremity  -JM      Wrapping Location LE bilateral:;foot to knee  -      Wrapping Comments BLE unna boots in clamshell application, 4\" coban, size 6 spandage  -            User Key  (r) = Recorded By, (t) = Taken By, (c) = Cosigned By    Initials Name Provider Type    Kelin Lozano, PT Physical Therapist                WOUND DEBRIDEMENT  Total area of Debridement: 20cmsq  Debridement Site 1  Location- Site 1: LLE  Selective Debridement- Site 1: Wound Surface <20cmsq  Instruments- Site 1: tweezers  Excised Tissue Description- Site 1: moderate, other (comment) (hypertrophic crusts)              Therapy Education     Row Name 05/12/23 3637             Therapy Education    Education Details Keep unna boots in place until next tx, if possible.  If removed, use multiple layers of compressogrip for continued compression.  Notify [facility] office if redness of legs worsens.  -JM      Given Symptoms/condition management;Bandaging/dressing change;Edema management  -MEGHAN      Program Reinforced  -MEGHAN      How Provided Verbal;Demonstration  -MEGHAN      Provided to Patient;Caregiver  -MEGHAN      Level of Understanding Verbalized  -            User Key  (r) = Recorded By, (t) = Taken By, (c) = Cosigned By    Initials Name Provider Type    Kelin Lozano, PT Physical Therapist                Recommendation and Plan   PT Assessment/Plan     Row Name 05/12/23 7579          PT Assessment    Functional " Limitations Performance in self-care ADL  -     Impairments Edema;Integumentary integrity;Pain  -     Assessment Comments BLE edema appears stable, RLE erythema slightly improved.  PT able to debride moderate amounts of hypertrophic crusts from LLE to facilitate improved skin integrity.  Continue with POC, notify [facility] office if erythema is increasing.  -        PT Plan    PT Frequency 2x/week  -     Physical Therapy Interventions (Optional Details) patient/family education;wound care  -     PT Plan Comments UB/MLW, PRN debridement  -           User Key  (r) = Recorded By, (t) = Taken By, (c) = Cosigned By    Initials Name Provider Type    Kelin Lozano, PT Physical Therapist                Goals   PT OP Goals     Row Name 05/12/23 1345          Time Calculation    PT Goal Re-Cert Due Date 07/05/23  -           User Key  (r) = Recorded By, (t) = Taken By, (c) = Cosigned By    Initials Name Provider Type    Kelin Lozano, PT Physical Therapist                PT Goal Re-Cert Due Date: 07/05/23            Time Calculation: Start Time: 1345  Untimed Charges  04695-Hjlj Boot: 20  81645-Vwnsapkwl debridement: 10  Total Minutes  Untimed Charges Total Minutes: 30   Total Minutes: 30  Therapy Charges for Today     Code Description Service Date Service Provider Modifiers Qty    55995231447 HC JOHN DEBRIDE OPEN WOUND UP TO 20CM 5/12/2023 Kelin Segal, PT GP 1    26912897619 HC PT STAPPING UNNA BOOT 5/12/2023 Kelin Segal, PT GP 1                  Kelin Segal, PT  5/12/2023

## 2023-05-16 ENCOUNTER — APPOINTMENT (OUTPATIENT)
Dept: PHYSICAL THERAPY | Facility: HOSPITAL | Age: 58
End: 2023-05-16
Payer: MEDICARE

## 2023-05-19 ENCOUNTER — HOSPITAL ENCOUNTER (OUTPATIENT)
Dept: PHYSICAL THERAPY | Facility: HOSPITAL | Age: 58
Setting detail: THERAPIES SERIES
Discharge: HOME | End: 2023-05-19
Payer: MEDICARE

## 2023-05-19 DIAGNOSIS — L03.116 BILATERAL CELLULITIS OF LOWER LEG: ICD-10-CM

## 2023-05-19 DIAGNOSIS — L03.115 BILATERAL CELLULITIS OF LOWER LEG: ICD-10-CM

## 2023-05-19 DIAGNOSIS — I89.0 CHRONIC ACQUIRED LYMPHEDEMA: Primary | ICD-10-CM

## 2023-05-19 PROCEDURE — 29580 STRAPPING UNNA BOOT: CPT

## 2023-05-19 PROCEDURE — 97597 DBRDMT OPN WND 1ST 20 CM/<: CPT

## 2023-05-19 NOTE — THERAPY WOUND CARE TREATMENT
Outpatient Rehabilitation - Wound/Debridement Treatment Note  [facility]     Patient Name: Kerry Leal  : 1965  MRN: [id number]  Today's Date: 2023                 Admit Date: 2023    Visit Dx:    ICD-10-CM ICD-9-CM   1. Chronic acquired lymphedema  I89.0 457.1   2. Bilateral cellulitis of lower leg  L03.116 682.6    L03.115    LLE:    L calf:    RLE:      Patient Active Problem List   Diagnosis   • Cellulitis of left lower extremity   • Essential hypertension   • Lymphedema of both lower extremities   • Elevated d-dimer   • Prediabetes   • Acute on chronic respiratory failure with hypoxia and hypercapnia   • Type 2 diabetes mellitus   • Possible CAP (community acquired pneumonia)   • Morbid obesity with BMI of 60.0-69.9, adult   • Secondary pulmonary arterial hypertension   • Mild Aortic stenosis   • Mild Aortic regurgitation   • Probable Obesity hypoventilation syndrome (HCC)   • Bilateral cellulitis of lower leg   • Chronic respiratory failure with hypoxia        Past Medical History:   Diagnosis Date   • Anxiety    • Asthma    • Heart murmur    • Hypertension         Past Surgical History:   Procedure Laterality Date   • APPENDECTOMY     •  SECTION     • HYSTERECTOMY     • NECK SURGERY           EVALUATION   PT Ortho     Row Name 23 1400       Subjective Comments    Subjective Comments Pt missed last appt, states she kept unna boot in place until a couple days ago, then put the compressogrips on, but states she can tell the compressogrips don't give her as much compression.  -       Subjective Pain    Able to rate subjective pain? yes  -MEGHAN    Pre-Treatment Pain Level 0  -    Post-Treatment Pain Level 0  -       Transfers    Comment, (Transfers) seated in transport chair  -MEGHAN          User Key  (r) = Recorded By, (t) = Taken By, (c) = Cosigned By    Initials Name Provider Type    Kelin Lozano, PT Physical Therapist                   Lymphedema     Row Name  "05/19/23 1400             Lymphedema Edema Assessment    Pitting Edema Severe;Moderate  L>R  -         Skin Changes/Observations    Location/Assessment Lower Extremity  -      Lower Extremity Conditions bilateral:;scaly;crust;inflamed  -      Lower Extremity Color/Pigment bilateral:;erythema;hyperpigmented  -         Compression/Skin Care    Compression/Skin Care skin care;wrapping location;bandaging  -      Skin Care washed/dried;lotion applied  cocoa butter to crusts  -      Wrapping Location lower extremity  -      Wrapping Location LE bilateral:;foot to knee  -      Wrapping Comments BLE unna boots in clamshell application, 3\" coban on foot/ankle, 4\" coban on calf, size 6 spandage  -            User Key  (r) = Recorded By, (t) = Taken By, (c) = Cosigned By    Initials Name Provider Type    Kelin Lozano, PT Physical Therapist                WOUND DEBRIDEMENT  Total area of Debridement: 12cmsq  Debridement Site 1  Location- Site 1: LLE  Selective Debridement- Site 1: Wound Surface <20cmsq  Instruments- Site 1: tweezers  Excised Tissue Description- Site 1: minimum, other (comment) (hypertrophic crusts)  Bleeding- Site 1: scant              Therapy Education     Row Name 05/19/23 1400             Therapy Education    Education Details Continue leg elevation/compression, follow-up with lymphedema clinic about re-establishing care.  PT will also contact [facility] lymphedema clinic ([phone]) to clarify that pt does not have open wounds and PT wound care will d/c once pt starts lymphedema therapy.  -      Given Symptoms/condition management;Bandaging/dressing change;Edema management  -      Program Reinforced  -MEGHAN      How Provided Verbal;Demonstration  -      Provided to Patient;Caregiver  -      Level of Understanding Verbalized  -            User Key  (r) = Recorded By, (t) = Taken By, (c) = Cosigned By    Initials Name Provider Type    Kelin Lozano, PT Physical Therapist "                Recommendation and Plan   PT Assessment/Plan     Row Name 05/19/23 1400          PT Assessment    Functional Limitations Performance in self-care ADL  -     Impairments Edema;Integumentary integrity;Pain  -     Assessment Comments BLE edema and erythema stable, no open wounds or drainage.  PT only able to debride a few loose crusts today.   Continued with unna boots for increased venous return.  PT also contacted [facility] lymphedema clinic to clarify that pt does not have open wounds and can be scheduled.  PT will continue current tx until established at [facility].  -        PT Plan    PT Frequency 2x/week  -     Physical Therapy Interventions (Optional Details) patient/family education;wound care  -     PT Plan Comments UB/MLW, debridement until pt starts at lymphedema clinic  -           User Key  (r) = Recorded By, (t) = Taken By, (c) = Cosigned By    Initials Name Provider Type    Kelin Lozano, PT Physical Therapist                Goals   PT OP Goals     Row Name 05/19/23 1400          Time Calculation    PT Goal Re-Cert Due Date 07/05/23  -           User Key  (r) = Recorded By, (t) = Taken By, (c) = Cosigned By    Initials Name Provider Type    Kelin Lozano, PT Physical Therapist                PT Goal Re-Cert Due Date: 07/05/23            Time Calculation: Start Time: 1400  Untimed Charges  29580-Unna Boot: 15  22648-Njetrfvysg comp below knee: 15  Total Minutes  Untimed Charges Total Minutes: 30   Total Minutes: 30  Therapy Charges for Today     Code Description Service Date Service Provider Modifiers Qty    32293567600 HC JOHN DEBRIDE OPEN WOUND UP TO 20CM 5/19/2023 Kelin Segal, PT GP 1    50180780093 HC PT STAPPING UNNA BOOT 5/19/2023 Kelin Segal, PT GP 1                  Kelin Segal, PT  5/19/2023

## 2023-05-23 ENCOUNTER — HOSPITAL ENCOUNTER (OUTPATIENT)
Dept: PHYSICAL THERAPY | Facility: HOSPITAL | Age: 58
Setting detail: THERAPIES SERIES
Discharge: HOME | End: 2023-05-23
Payer: MEDICARE

## 2023-05-23 DIAGNOSIS — S81.802D OPEN WOUND OF LEFT LOWER EXTREMITY, SUBSEQUENT ENCOUNTER: ICD-10-CM

## 2023-05-23 DIAGNOSIS — L03.116 BILATERAL CELLULITIS OF LOWER LEG: ICD-10-CM

## 2023-05-23 DIAGNOSIS — I89.0 CHRONIC ACQUIRED LYMPHEDEMA: Primary | ICD-10-CM

## 2023-05-23 DIAGNOSIS — L03.115 BILATERAL CELLULITIS OF LOWER LEG: ICD-10-CM

## 2023-05-23 PROCEDURE — 29580 STRAPPING UNNA BOOT: CPT

## 2023-05-23 PROCEDURE — 97597 DBRDMT OPN WND 1ST 20 CM/<: CPT

## 2023-05-23 NOTE — THERAPY WOUND CARE TREATMENT
Outpatient Rehabilitation - Wound/Debridement Treatment Note  [facility]     Patient Name: Kerry Leal  : 1965  MRN: [id number]  Today's Date: 2023                 Admit Date: 2023    Visit Dx:    ICD-10-CM ICD-9-CM   1. Chronic acquired lymphedema  I89.0 457.1   2. Bilateral cellulitis of lower leg  L03.116 682.6    L03.115    3. Open wound of left lower extremity, subsequent encounter  S81.802D V58.89     891.0   LLE:    L post ankle/calf (new small white area with scant drainage):    RLE:      Patient Active Problem List   Diagnosis   • Cellulitis of left lower extremity   • Essential hypertension   • Lymphedema of both lower extremities   • Elevated d-dimer   • Prediabetes   • Acute on chronic respiratory failure with hypoxia and hypercapnia   • Type 2 diabetes mellitus   • Possible CAP (community acquired pneumonia)   • Morbid obesity with BMI of 60.0-69.9, adult   • Secondary pulmonary arterial hypertension   • Mild Aortic stenosis   • Mild Aortic regurgitation   • Probable Obesity hypoventilation syndrome (HCC)   • Bilateral cellulitis of lower leg   • Chronic respiratory failure with hypoxia        Past Medical History:   Diagnosis Date   • Anxiety    • Asthma    • Heart murmur    • Hypertension         Past Surgical History:   Procedure Laterality Date   • APPENDECTOMY     •  SECTION     • HYSTERECTOMY     • NECK SURGERY           EVALUATION   PT Ortho     Row Name 23 9249       Subjective Comments    Subjective Comments Pt c/o wheezing and feeling more swollen and short of breath today.  States she has an appt at lymphedema clinic on 23.  -JM       Subjective Pain    Able to rate subjective pain? yes  -JM    Pre-Treatment Pain Level 2  -JM    Post-Treatment Pain Level 2  -JM    Subjective Pain Comment L knee  -JM       Transfers    Comment, (Transfers) seated in transport chair for tx  -JM          User Key  (r) = Recorded By, (t) = Taken By, (c) = Cosigned By  "   Initials Name Provider Type    Kelin Lozano, PT Physical Therapist                   Lymphedema     Row Name 05/23/23 2449             Lymphedema Edema Assessment    Pitting Edema Severe;Very Severe  L>R  -         Skin Changes/Observations    Location/Assessment Lower Extremity  -      Lower Extremity Conditions bilateral:;scaly;crust;inflamed  -      Lower Extremity Color/Pigment bilateral:;erythema;hyperpigmented  -      Lower Extremity Skin Details small dime-sized white area post L ankle possibly draining  -         LLE Quick Girth (cm)    Met-heads 27 cm  -JM      Mid foot 29.5 cm  -JM      Smallest ankle 40.2 cm  -JM      Largest calf 83.5 cm  -JM         RLE Quick Girth (cm)    Met-heads 26.5 cm  -JM      Mid foot 28.5 cm  -JM      Smallest ankle 35 cm  -JM      Largest calf 69.5 cm  -JM      RLE Quick Girth Total 159.5  -JM         Compression/Skin Care    Compression/Skin Care skin care;wrapping location;bandaging  -      Skin Care washed/dried;lotion applied  cocoa butter to crusts  -      Wrapping Location lower extremity  -      Wrapping Location LE bilateral:;foot to knee  -      Wrapping Comments BLE unna boots in clamshell application, 3\" coban on foot/ankle, 4\" coban on calf, size 6 spandage  -            User Key  (r) = Recorded By, (t) = Taken By, (c) = Cosigned By    Initials Name Provider Type    Kelin Lozano, PT Physical Therapist                WOUND DEBRIDEMENT  Total area of Debridement: 10cmsq  Debridement Site 1  Location- Site 1: LLE  Selective Debridement- Site 1: Wound Surface <20cmsq  Instruments- Site 1: tweezers  Excised Tissue Description- Site 1: minimum, other (comment) (hypertrophic crusts)  Bleeding- Site 1: scant              Therapy Education     Row Name 05/23/23 4405             Therapy Education    Education Details Recommended pt contact her PCP about increased swelling and SOA.  -MEGHAN      Given Symptoms/condition " management;Bandaging/dressing change;Edema management  -      Program Reinforced  -      How Provided Verbal;Demonstration  -      Provided to Patient;Caregiver  -      Level of Understanding Verbalized  -            User Key  (r) = Recorded By, (t) = Taken By, (c) = Cosigned By    Initials Name Provider Type    Kelin Lozano, PT Physical Therapist                Recommendation and Plan   PT Assessment/Plan     Row Name 05/23/23 1345          PT Assessment    Functional Limitations Performance in self-care ADL  -     Impairments Edema;Integumentary integrity;Pain  -     Assessment Comments Pt with increased LLE edema and possibly new open area to posterior L ankle today, pt also c/o increased SOA and wheezing.  PT recommended pt contact PCP about symptoms.  Pt continues to benefit from unna boots to increase venous return.  Continue POC until lymphedema eval 6/5/23.  -        PT Plan    PT Frequency 2x/week  -MEGHAN     Physical Therapy Interventions (Optional Details) patient/family education;wound care  -     PT Plan Comments UB/MLW  -           User Key  (r) = Recorded By, (t) = Taken By, (c) = Cosigned By    Initials Name Provider Type    Kelin Lozano, PT Physical Therapist                Goals   PT OP Goals     Row Name 05/23/23 1345          Time Calculation    PT Goal Re-Cert Due Date 07/05/23  -           User Key  (r) = Recorded By, (t) = Taken By, (c) = Cosigned By    Initials Name Provider Type    Kelin Lozano, PT Physical Therapist                PT Goal Re-Cert Due Date: 07/05/23            Time Calculation: Start Time: 1345  Untimed Charges  33801-Fhvw Boot: 20  73127-Objqpxzzq debridement: 10  Total Minutes  Untimed Charges Total Minutes: 30   Total Minutes: 30  Therapy Charges for Today     Code Description Service Date Service Provider Modifiers Qty    07402679918 HC JOHN DEBRIDE OPEN WOUND UP TO 20CM 5/23/2023 Kelin Segal, PT GP 1    [id number]   PT STAPPING ANTIONETTE JENKINS 5/23/2023 Kelin Segal, PT GP 1                  Kelin Segal, PT  5/23/2023

## 2023-05-26 ENCOUNTER — HOSPITAL ENCOUNTER (OUTPATIENT)
Dept: PHYSICAL THERAPY | Facility: HOSPITAL | Age: 58
Setting detail: THERAPIES SERIES
Discharge: HOME | End: 2023-05-26
Payer: MEDICARE

## 2023-05-26 DIAGNOSIS — L03.115 BILATERAL CELLULITIS OF LOWER LEG: ICD-10-CM

## 2023-05-26 DIAGNOSIS — S81.802D OPEN WOUND OF LEFT LOWER EXTREMITY, SUBSEQUENT ENCOUNTER: ICD-10-CM

## 2023-05-26 DIAGNOSIS — I89.0 CHRONIC ACQUIRED LYMPHEDEMA: Primary | ICD-10-CM

## 2023-05-26 DIAGNOSIS — L03.116 BILATERAL CELLULITIS OF LOWER LEG: ICD-10-CM

## 2023-05-26 PROCEDURE — 29580 STRAPPING UNNA BOOT: CPT

## 2023-05-26 PROCEDURE — 97597 DBRDMT OPN WND 1ST 20 CM/<: CPT

## 2023-05-26 NOTE — THERAPY WOUND CARE TREATMENT
Outpatient Rehabilitation - Wound/Debridement Treatment Note  [facility]     Patient Name: Kerry Leal  : 1965  MRN: [id number]  Today's Date: 2023                 Admit Date: 2023    Visit Dx:    ICD-10-CM ICD-9-CM   1. Chronic acquired lymphedema  I89.0 457.1   2. Bilateral cellulitis of lower leg  L03.116 682.6    L03.115    3. Open wound of left lower extremity, subsequent encounter  S81.802D V58.89     891.0       Patient Active Problem List   Diagnosis   • Cellulitis of left lower extremity   • Essential hypertension   • Lymphedema of both lower extremities   • Elevated d-dimer   • Prediabetes   • Acute on chronic respiratory failure with hypoxia and hypercapnia   • Type 2 diabetes mellitus   • Possible CAP (community acquired pneumonia)   • Morbid obesity with BMI of 60.0-69.9, adult   • Secondary pulmonary arterial hypertension   • Mild Aortic stenosis   • Mild Aortic regurgitation   • Probable Obesity hypoventilation syndrome (HCC)   • Bilateral cellulitis of lower leg   • Chronic respiratory failure with hypoxia        Past Medical History:   Diagnosis Date   • Anxiety    • Asthma    • Heart murmur    • Hypertension         Past Surgical History:   Procedure Laterality Date   • APPENDECTOMY     •  SECTION     • HYSTERECTOMY     • NECK SURGERY           EVALUATION   PT Ortho     Row Name 23 6214       Subjective Comments    Subjective Comments Pt reports she contacted her PCP about wheezing/edema, was told to monitor symptoms and fluid intake, reduce sodium intake.  Spouse states he took off unna boot last night and noticed a small area of drainage to posterior L calf.  -       Subjective Pain    Able to rate subjective pain? yes  -JM    Pre-Treatment Pain Level 0  -    Post-Treatment Pain Level 0  -JM       Transfers    Comment, (Transfers) seated in transport chair  -          User Key  (r) = Recorded By, (t) = Taken By, (c) = Cosigned By    Initials Name  Provider Type    Kelin Lozano, PT Physical Therapist                 LDA Wound     Row Name 05/26/23 1500             [REMOVED] Wound 06/01/22 1500 Left posterior calf Venous Ulcer    Wound - Properties Group Placement Date: 06/01/22  - Placement Time: 1500  -JM Side: Left  -JM Orientation: posterior  -JM Location: calf  -JM Primary Wound Type: Venous ulcer  -JM Removal Date: 10/30/22  -LA    Dressing Appearance moist drainage;open to air  -JM      Base moist;pink;white  -JM      Periwound intact;macerated  -JM      Periwound Temperature warm  -JM      Periwound Skin Turgor firm  -JM      Edges irregular  -JM      Wound Length (cm) 2 cm  -JM      Wound Width (cm) 3 cm  -JM      Wound Surface Area (cm^2) 6 cm^2  -JM      Drainage Characteristics/Odor serous  -JM      Drainage Amount scant  -JM      Care, Wound cleansed with;wound cleanser;debrided;cynthia boot  -JM      Dressing Care dressing applied  UB  -JM      Retired Wound - Properties Group Placement Date: 06/01/22  - Placement Time: 1500  -JM Side: Left  -JM Orientation: posterior  -JM Location: calf  -JM Primary Wound Type: Venous ulcer  -JM Removal Date: 10/30/22  -LA    Retired Wound - Properties Group Date first assessed: 06/01/22  - Time first assessed: 1500  -JM Side: Left  -JM Location: calf  -JM Primary Wound Type: Venous ulcer  -JM Resolution Date: 10/30/22  -LA          User Key  (r) = Recorded By, (t) = Taken By, (c) = Cosigned By    Initials Name Provider Type    Kelin Lozano, PT Physical Therapist    Kelin Lozano, PT Physical Therapist    Anamaria Montana, RN Registered Nurse               Lymphedema     Row Name 05/26/23 5755             Lymphedema Edema Assessment    Pitting Edema Severe;Very Severe  L>R  -JM         Skin Changes/Observations    Location/Assessment Lower Extremity  -JM      Lower Extremity Conditions bilateral:;scaly;crust;inflamed  -      Lower Extremity Color/Pigment  "bilateral:;erythema;hyperpigmented  -         Compression/Skin Care    Compression/Skin Care skin care;wrapping location;bandaging  -      Skin Care washed/dried;lotion applied  cocoa butter to crusts  -JM      Wrapping Location lower extremity  -JM      Wrapping Location LE bilateral:;foot to knee  -      Wrapping Comments BLE unna boots in clamshell application, 3\" coban on foot/ankle, 4\" coban on calf, size 7 spandage  -            User Key  (r) = Recorded By, (t) = Taken By, (c) = Cosigned By    Initials Name Provider Type    Kelin Lozano, PT Physical Therapist                WOUND DEBRIDEMENT  Total area of Debridement: 6cmsq  Debridement Site 1  Location- Site 1: LLE  Selective Debridement- Site 1: Wound Surface <20cmsq  Instruments- Site 1: tweezers  Excised Tissue Description- Site 1: minimum, other (comment) (hypertrophic crusts)  Bleeding- Site 1: scant              Therapy Education     Row Name 05/26/23 6160             Therapy Education    Education Details Continue leg elevation, seek medical attention if S&S of infection worsen.  -      Given Symptoms/condition management;Bandaging/dressing change;Edema management  -      Program Reinforced  -      How Provided Verbal;Demonstration  -      Provided to Patient;Caregiver  -      Level of Understanding Verbalized  -            User Key  (r) = Recorded By, (t) = Taken By, (c) = Cosigned By    Initials Name Provider Type    Kelin Lozano, PT Physical Therapist                Recommendation and Plan   PT Assessment/Plan     Row Name 05/26/23 9984          PT Assessment    Functional Limitations Performance in self-care ADL  -     Impairments Edema;Integumentary integrity;Pain  -     Assessment Comments Pt erythema and edema stable, but with resuming of weeping from posterior L calf and c/o fluid overload, PCP aware.  Pt will continue to benefit from compression to BLE.  PT was unable to leave a VM for [facility] MD to " notify him of new weeping from LLE, will follow-up on Tuesday.  -        PT Plan    PT Frequency 2x/week  -     Physical Therapy Interventions (Optional Details) patient/family education;wound care  -     PT Plan Comments UB, debridement  -           User Key  (r) = Recorded By, (t) = Taken By, (c) = Cosigned By    Initials Name Provider Type    Kelin Lozano, PT Physical Therapist                Goals   PT OP Goals     Row Name 05/26/23 1513          Time Calculation    PT Goal Re-Cert Due Date 07/05/23  -           User Key  (r) = Recorded By, (t) = Taken By, (c) = Cosigned By    Initials Name Provider Type    Kelin Lozano, PT Physical Therapist                PT Goal Re-Cert Due Date: 07/05/23            Time Calculation: Start Time: 1345  Untimed Charges  01205-Zmrh Boot: 15  13084-Ybpryxidh debridement: 10  Total Minutes  Untimed Charges Total Minutes: 25   Total Minutes: 25  Therapy Charges for Today     Code Description Service Date Service Provider Modifiers Qty    24876168031 HC JOHN DEBRIDE OPEN WOUND UP TO 20CM 5/26/2023 Kelin Segal, PT GP 1    84966100755 HC PT STAPPING UNNA BOOT 5/26/2023 Kelin Segal, PT GP 1                  Kelin Segal, PT  5/26/2023

## 2023-05-30 ENCOUNTER — HOSPITAL ENCOUNTER (OUTPATIENT)
Dept: PHYSICAL THERAPY | Facility: HOSPITAL | Age: 58
Setting detail: THERAPIES SERIES
Discharge: HOME | End: 2023-05-30
Payer: MEDICARE

## 2023-05-30 DIAGNOSIS — L03.116 BILATERAL CELLULITIS OF LOWER LEG: ICD-10-CM

## 2023-05-30 DIAGNOSIS — I89.0 CHRONIC ACQUIRED LYMPHEDEMA: Primary | ICD-10-CM

## 2023-05-30 DIAGNOSIS — L03.115 BILATERAL CELLULITIS OF LOWER LEG: ICD-10-CM

## 2023-05-30 PROCEDURE — 29580 STRAPPING UNNA BOOT: CPT

## 2023-05-30 PROCEDURE — 97597 DBRDMT OPN WND 1ST 20 CM/<: CPT

## 2023-05-30 NOTE — THERAPY WOUND CARE TREATMENT
Outpatient Rehabilitation - Wound/Debridement Treatment Note  [facility]     Patient Name: Kerry Leal  : 1965  MRN: [id number]  Today's Date: 2023                 Admit Date: 2023    Visit Dx:    ICD-10-CM ICD-9-CM   1. Chronic acquired lymphedema  I89.0 457.1   2. Bilateral cellulitis of lower leg  L03.116 682.6    L03.115      L anterior leg      L posterior leg      R anterior leg          Patient Active Problem List   Diagnosis   • Cellulitis of left lower extremity   • Essential hypertension   • Lymphedema of both lower extremities   • Elevated d-dimer   • Prediabetes   • Acute on chronic respiratory failure with hypoxia and hypercapnia   • Type 2 diabetes mellitus   • Possible CAP (community acquired pneumonia)   • Morbid obesity with BMI of 60.0-69.9, adult   • Secondary pulmonary arterial hypertension   • Mild Aortic stenosis   • Mild Aortic regurgitation   • Probable Obesity hypoventilation syndrome (HCC)   • Bilateral cellulitis of lower leg   • Chronic respiratory failure with hypoxia        Past Medical History:   Diagnosis Date   • Anxiety    • Asthma    • Heart murmur    • Hypertension         Past Surgical History:   Procedure Laterality Date   • APPENDECTOMY     •  SECTION     • HYSTERECTOMY     • NECK SURGERY           EVALUATION   PT Ortho     Row Name 23 1400       Subjective Comments    Subjective Comments Pt reports she is eager to begin lymphedema therapy at [place] on 23. Reports continued minor drainage from L posterior calf.  -       Subjective Pain    Able to rate subjective pain? yes  -    Pre-Treatment Pain Level 0  -    Post-Treatment Pain Level 0  -       Transfers    Comment, (Transfers) seated in transport chair  -          User Key  (r) = Recorded By, (t) = Taken By, (c) = Cosigned By    Initials Name Provider Type     Ariel Lai, PT Physical Therapist                 LDA Wound     Row Name 23 1400              [REMOVED] Wound 06/01/22 1500 Left posterior calf Venous Ulcer    Wound - Properties Group Placement Date: 06/01/22  - Placement Time: 1500  -JM Side: Left  - Orientation: posterior  - Location: calf  -JM Primary Wound Type: Venous ulcer  -JM Removal Date: 10/30/22  -LA    Wound Image Images linked: 2  -LH      Dressing Appearance moist drainage;open to air  scant drainage  -      Base moist;pink;white  -      Periwound intact;macerated  -      Periwound Temperature warm  -      Periwound Skin Turgor firm  -      Edges irregular  -      Drainage Characteristics/Odor serous  -LH      Drainage Amount scant  -      Care, Wound cleansed with;wound cleanser;debrided;cynthia boot  -LH      Dressing Care dressing applied;other (see comments)  Unna boot only  -      Periwound Care cleansed with pH balanced cleanser;dry periwound area maintained;moisturizer applied  -      Retired Wound - Properties Group Placement Date: 06/01/22  - Placement Time: 1500  -JM Side: Left  - Orientation: posterior  - Location: calf  -JM Primary Wound Type: Venous ulcer  -JM Removal Date: 10/30/22  -LA    Retired Wound - Properties Group Date first assessed: 06/01/22  - Time first assessed: 1500  - Side: Left  - Location: calf  - Primary Wound Type: Venous ulcer  - Resolution Date: 10/30/22  -LA          User Key  (r) = Recorded By, (t) = Taken By, (c) = Cosigned By    Initials Name Provider Type    Kelin Lozano, PT Physical Therapist    Ariel Farfan, PT Physical Therapist    Anamaria Montana, RN Registered Nurse               Lymphedema     Row Name 05/30/23 1400             Lymphedema Edema Assessment    Pitting Edema Severe;Very Severe  L>R  -LH         Skin Changes/Observations    Location/Assessment Lower Extremity  -      Lower Extremity Conditions bilateral:;scaly;crust;inflamed  -      Lower Extremity Color/Pigment bilateral:;erythema;hyperpigmented  -         Compression/Skin Care  "   Compression/Skin Care skin care;wrapping location;bandaging  -      Skin Care washed/dried;lotion applied  cocoa butter to crusts  -      Wrapping Location lower extremity  -      Wrapping Location LE bilateral:;foot to knee  -      Wrapping Comments BLE unna boots in clamshell application, 3\" coban on foot/ankle, 4\" coban on calf, size 7 spandage  -            User Key  (r) = Recorded By, (t) = Taken By, (c) = Cosigned By    Initials Name Provider Type     Ariel Lai, PT Physical Therapist                WOUND DEBRIDEMENT  Total area of Debridement: 4cm2  Debridement Site 1  Location- Site 1: LLE  Selective Debridement- Site 1: Wound Surface <20cmsq  Instruments- Site 1: tweezers  Excised Tissue Description- Site 1: minimum, other (comment) (hypertrophic crusts)  Bleeding- Site 1: scant              Therapy Education     Row Name 05/30/23 1400             Therapy Education    Education Details Continue current POC until lymphedema appointment.  -      Given Symptoms/condition management;Bandaging/dressing change;Edema management  -      Program Reinforced  -      How Provided Verbal;Demonstration  -      Provided to Patient;Caregiver  -      Level of Understanding Verbalized  -            User Key  (r) = Recorded By, (t) = Taken By, (c) = Cosigned By    Initials Name Provider Type     Ariel Lai, PT Physical Therapist                Recommendation and Plan   PT Assessment/Plan     Row Name 05/30/23 1400          PT Assessment    Functional Limitations Performance in self-care ADL  -     Impairments Edema;Integumentary integrity;Pain  -     Assessment Comments Pt's BLE edema appears stable this session with only scant remaining drainage from the L posterior calf area. Pt's erythema also unchanged from previous photos. Pt would continue to benefit from skilled wound care for edema management until pt's upcoming lymphedema therapy appointment.  -        PT Plan    PT " Frequency 2x/week  -     Physical Therapy Interventions (Optional Details) wound care;patient/family education  -     PT Plan Comments UB, debridement  -           User Key  (r) = Recorded By, (t) = Taken By, (c) = Cosigned By    Initials Name Provider Type     Ariel aLi, PT Physical Therapist                Goals   PT OP Goals     Row Name 05/30/23 1440          Time Calculation    PT Goal Re-Cert Due Date 07/05/23  -           User Key  (r) = Recorded By, (t) = Taken By, (c) = Cosigned By    Initials Name Provider Type     Ariel Lai, PT Physical Therapist                PT Goal Re-Cert Due Date: 07/05/23            Time Calculation: Start Time: 1345  Untimed Charges  00020-Gkjl Boot: 20  59187-[id number] debridement: 10  Total Minutes  Untimed Charges Total Minutes: 30   Total Minutes: 30  Therapy Charges for Today     Code Description Service Date Service Provider Modifiers Qty    54211288756 HC JOHN DEBRIDE OPEN WOUND UP TO 20CM 5/30/2023 Ariel Lai, PT GP 1    54796760766 HC PT STAPPING UNNA BOOT 5/30/2023 Ariel Lai, PT GP 1                  Ariel Lai PT  5/30/2023

## 2023-07-22 ENCOUNTER — APPOINTMENT (OUTPATIENT)
Dept: CT IMAGING | Facility: HOSPITAL | Age: 58
DRG: 291 | End: 2023-07-22
Payer: MEDICARE

## 2023-07-22 ENCOUNTER — HOSPITAL ENCOUNTER (INPATIENT)
Facility: HOSPITAL | Age: 58
LOS: 11 days | Discharge: HOME HEALTH | DRG: 291 | End: 2023-08-02
Attending: STUDENT IN AN ORGANIZED HEALTH CARE EDUCATION/TRAINING PROGRAM | Admitting: INTERNAL MEDICINE
Payer: MEDICARE

## 2023-07-22 ENCOUNTER — APPOINTMENT (OUTPATIENT)
Dept: GENERAL RADIOLOGY | Facility: HOSPITAL | Age: 58
DRG: 291 | End: 2023-07-22
Payer: MEDICARE

## 2023-07-22 ENCOUNTER — APPOINTMENT (OUTPATIENT)
Dept: CARDIOLOGY | Facility: HOSPITAL | Age: 58
DRG: 291 | End: 2023-07-22
Payer: MEDICARE

## 2023-07-22 DIAGNOSIS — E66.2 OBESITY HYPOVENTILATION SYNDROME: ICD-10-CM

## 2023-07-22 DIAGNOSIS — I89.0 LYMPHEDEMA OF BOTH LOWER EXTREMITIES: ICD-10-CM

## 2023-07-22 DIAGNOSIS — E66.01 MORBID OBESITY WITH BMI OF 60.0-69.9, ADULT: ICD-10-CM

## 2023-07-22 DIAGNOSIS — J96.01 ACUTE RESPIRATORY FAILURE WITH HYPOXIA AND HYPERCAPNIA: Primary | ICD-10-CM

## 2023-07-22 DIAGNOSIS — E87.29 RESPIRATORY ACIDOSIS: ICD-10-CM

## 2023-07-22 DIAGNOSIS — E11.65 TYPE 2 DIABETES MELLITUS WITH HYPERGLYCEMIA, WITHOUT LONG-TERM CURRENT USE OF INSULIN: ICD-10-CM

## 2023-07-22 DIAGNOSIS — J96.02 ACUTE RESPIRATORY FAILURE WITH HYPOXIA AND HYPERCAPNIA: Primary | ICD-10-CM

## 2023-07-22 DIAGNOSIS — L03.119 CELLULITIS OF LOWER EXTREMITY, UNSPECIFIED LATERALITY: ICD-10-CM

## 2023-07-22 PROBLEM — I10 ACCELERATED HYPERTENSION: Status: RESOLVED | Noted: 2023-07-22 | Resolved: 2023-07-22

## 2023-07-22 PROBLEM — I35.1 AORTIC REGURGITATION: Status: RESOLVED | Noted: 2022-10-26 | Resolved: 2023-07-22

## 2023-07-22 PROBLEM — L03.116 CELLULITIS OF LEFT LOWER EXTREMITY: Status: RESOLVED | Noted: 2021-07-05 | Resolved: 2023-07-22

## 2023-07-22 PROBLEM — J18.9 CAP (COMMUNITY ACQUIRED PNEUMONIA): Status: RESOLVED | Noted: 2022-10-22 | Resolved: 2023-07-22

## 2023-07-22 PROBLEM — I10 ACCELERATED HYPERTENSION: Status: ACTIVE | Noted: 2023-07-22

## 2023-07-22 PROBLEM — I50.810 RIGHT-SIDED CONGESTIVE HEART FAILURE: Status: ACTIVE | Noted: 2022-10-26

## 2023-07-22 PROBLEM — J96.11 CHRONIC RESPIRATORY FAILURE WITH HYPOXIA: Status: RESOLVED | Noted: 2023-04-03 | Resolved: 2023-07-22

## 2023-07-22 PROBLEM — I50.814: Status: ACTIVE | Noted: 2022-10-26

## 2023-07-22 PROBLEM — I35.0 AORTIC STENOSIS: Status: RESOLVED | Noted: 2022-10-26 | Resolved: 2023-07-22

## 2023-07-22 PROBLEM — R73.03 PREDIABETES: Status: RESOLVED | Noted: 2021-07-05 | Resolved: 2023-07-22

## 2023-07-22 LAB
ALBUMIN SERPL-MCNC: 3.3 G/DL (ref 3.5–5.2)
ALBUMIN/GLOB SERPL: 0.8 G/DL
ALP SERPL-CCNC: 76 U/L (ref 39–117)
ALT SERPL W P-5'-P-CCNC: 14 U/L (ref 1–33)
ANION GAP SERPL CALCULATED.3IONS-SCNC: 6 MMOL/L (ref 5–15)
ANION GAP SERPL CALCULATED.3IONS-SCNC: 8 MMOL/L (ref 5–15)
ARTERIAL PATENCY WRIST A: ABNORMAL
AST SERPL-CCNC: 22 U/L (ref 1–32)
ATMOSPHERIC PRESS: ABNORMAL MM[HG]
BASE EXCESS BLDA CALC-SCNC: 9.8 MMOL/L (ref 0–2)
BASE EXCESS BLDV CALC-SCNC: 2.9 MMOL/L (ref -2–2)
BASE EXCESS BLDV CALC-SCNC: 8.8 MMOL/L (ref -2–2)
BASOPHILS # BLD AUTO: 0.03 10*3/MM3 (ref 0–0.2)
BASOPHILS # BLD AUTO: 0.03 10*3/MM3 (ref 0–0.2)
BASOPHILS NFR BLD AUTO: 0.4 % (ref 0–1.5)
BASOPHILS NFR BLD AUTO: 0.4 % (ref 0–1.5)
BDY SITE: ABNORMAL
BILIRUB SERPL-MCNC: 0.7 MG/DL (ref 0–1.2)
BILIRUB UR QL STRIP: NEGATIVE
BODY TEMPERATURE: 37 C
BUN SERPL-MCNC: 13 MG/DL (ref 6–20)
BUN SERPL-MCNC: 16 MG/DL (ref 6–20)
BUN/CREAT SERPL: 21 (ref 7–25)
BUN/CREAT SERPL: 21.9 (ref 7–25)
CALCIUM SPEC-SCNC: 8.5 MG/DL (ref 8.6–10.5)
CALCIUM SPEC-SCNC: 9 MG/DL (ref 8.6–10.5)
CHLORIDE SERPL-SCNC: 99 MMOL/L (ref 98–107)
CHLORIDE SERPL-SCNC: 99 MMOL/L (ref 98–107)
CK SERPL-CCNC: 94 U/L (ref 20–180)
CLARITY UR: CLEAR
CO2 BLDA-SCNC: 35.6 MMOL/L (ref 22–33)
CO2 BLDA-SCNC: 38.2 MMOL/L (ref 22–33)
CO2 BLDA-SCNC: 39.7 MMOL/L (ref 22–33)
CO2 SERPL-SCNC: 33 MMOL/L (ref 22–29)
CO2 SERPL-SCNC: 36 MMOL/L (ref 22–29)
COHGB MFR BLD: 1.4 %
COHGB MFR BLD: 1.4 % (ref 0–2)
COHGB MFR BLD: 1.6 %
COLOR UR: YELLOW
CREAT SERPL-MCNC: 0.62 MG/DL (ref 0.57–1)
CREAT SERPL-MCNC: 0.73 MG/DL (ref 0.57–1)
CRP SERPL-MCNC: 7 MG/DL (ref 0–0.5)
D DIMER PPP FEU-MCNC: 3.22 MCGFEU/ML (ref 0–0.56)
D-LACTATE SERPL-SCNC: 1.2 MMOL/L (ref 0.5–2)
DEPRECATED RDW RBC AUTO: 57 FL (ref 37–54)
DEPRECATED RDW RBC AUTO: 57.1 FL (ref 37–54)
EGFRCR SERPLBLD CKD-EPI 2021: 104.7 ML/MIN/1.73
EGFRCR SERPLBLD CKD-EPI 2021: 96.7 ML/MIN/1.73
EOSINOPHIL # BLD AUTO: 0.03 10*3/MM3 (ref 0–0.4)
EOSINOPHIL # BLD AUTO: 0.15 10*3/MM3 (ref 0–0.4)
EOSINOPHIL NFR BLD AUTO: 0.4 % (ref 0.3–6.2)
EOSINOPHIL NFR BLD AUTO: 2.2 % (ref 0.3–6.2)
EPAP: 6
EPAP: 8
EPAP: 8
ERYTHROCYTE [DISTWIDTH] IN BLOOD BY AUTOMATED COUNT: 17 % (ref 12.3–15.4)
ERYTHROCYTE [DISTWIDTH] IN BLOOD BY AUTOMATED COUNT: 17 % (ref 12.3–15.4)
GLOBULIN UR ELPH-MCNC: 4.4 GM/DL
GLUCOSE BLDC GLUCOMTR-MCNC: 130 MG/DL (ref 70–130)
GLUCOSE BLDC GLUCOMTR-MCNC: 140 MG/DL (ref 70–130)
GLUCOSE BLDC GLUCOMTR-MCNC: 146 MG/DL (ref 70–130)
GLUCOSE SERPL-MCNC: 141 MG/DL (ref 65–99)
GLUCOSE SERPL-MCNC: 143 MG/DL (ref 65–99)
GLUCOSE UR STRIP-MCNC: NEGATIVE MG/DL
HBA1C MFR BLD: 7 % (ref 4.8–5.6)
HCO3 BLDA-SCNC: 36.4 MMOL/L (ref 20–26)
HCO3 BLDV-SCNC: 33.1 MMOL/L (ref 22–28)
HCO3 BLDV-SCNC: 37.5 MMOL/L (ref 22–28)
HCT VFR BLD AUTO: 40.5 % (ref 34–46.6)
HCT VFR BLD AUTO: 40.7 % (ref 34–46.6)
HCT VFR BLD CALC: 38.6 % (ref 38–51)
HGB BLD-MCNC: 11.7 G/DL (ref 12–15.9)
HGB BLD-MCNC: 11.8 G/DL (ref 12–15.9)
HGB BLDA-MCNC: 12.6 G/DL (ref 14–18)
HGB BLDA-MCNC: 12.8 G/DL (ref 14–18)
HGB BLDA-MCNC: 12.9 G/DL (ref 14–18)
HGB UR QL STRIP.AUTO: NEGATIVE
HOLD SPECIMEN: NORMAL
IMM GRANULOCYTES # BLD AUTO: 0.04 10*3/MM3 (ref 0–0.05)
IMM GRANULOCYTES # BLD AUTO: 0.07 10*3/MM3 (ref 0–0.05)
IMM GRANULOCYTES NFR BLD AUTO: 0.5 % (ref 0–0.5)
IMM GRANULOCYTES NFR BLD AUTO: 1 % (ref 0–0.5)
INHALED O2 CONCENTRATION: 50 %
INHALED O2 CONCENTRATION: 60 %
INHALED O2 CONCENTRATION: 80 %
IPAP: 18
IPAP: 20
IPAP: 20
KETONES UR QL STRIP: NEGATIVE
LEUKOCYTE ESTERASE UR QL STRIP.AUTO: NEGATIVE
LYMPHOCYTES # BLD AUTO: 0.71 10*3/MM3 (ref 0.7–3.1)
LYMPHOCYTES # BLD AUTO: 1.06 10*3/MM3 (ref 0.7–3.1)
LYMPHOCYTES NFR BLD AUTO: 15.6 % (ref 19.6–45.3)
LYMPHOCYTES NFR BLD AUTO: 8.7 % (ref 19.6–45.3)
Lab: ABNORMAL
Lab: ABNORMAL
MAGNESIUM SERPL-MCNC: 2.2 MG/DL (ref 1.6–2.6)
MCH RBC QN AUTO: 26.2 PG (ref 26.6–33)
MCH RBC QN AUTO: 26.8 PG (ref 26.6–33)
MCHC RBC AUTO-ENTMCNC: 28.7 G/DL (ref 31.5–35.7)
MCHC RBC AUTO-ENTMCNC: 29.1 G/DL (ref 31.5–35.7)
MCV RBC AUTO: 91.3 FL (ref 79–97)
MCV RBC AUTO: 91.8 FL (ref 79–97)
METHGB BLD QL: 0.2 %
METHGB BLD QL: 0.2 % (ref 0–1.5)
METHGB BLD QL: 0.4 %
MODALITY: ABNORMAL
MONOCYTES # BLD AUTO: 0.84 10*3/MM3 (ref 0.1–0.9)
MONOCYTES # BLD AUTO: 0.88 10*3/MM3 (ref 0.1–0.9)
MONOCYTES NFR BLD AUTO: 10.8 % (ref 5–12)
MONOCYTES NFR BLD AUTO: 12.4 % (ref 5–12)
NEUTROPHILS NFR BLD AUTO: 4.64 10*3/MM3 (ref 1.7–7)
NEUTROPHILS NFR BLD AUTO: 6.46 10*3/MM3 (ref 1.7–7)
NEUTROPHILS NFR BLD AUTO: 68.4 % (ref 42.7–76)
NEUTROPHILS NFR BLD AUTO: 79.2 % (ref 42.7–76)
NITRITE UR QL STRIP: NEGATIVE
NOTE: ABNORMAL
NOTIFIED BY: ABNORMAL
NOTIFIED BY: ABNORMAL
NOTIFIED WHO: ABNORMAL
NOTIFIED WHO: ABNORMAL
NRBC BLD AUTO-RTO: 0 /100 WBC (ref 0–0.2)
NRBC BLD AUTO-RTO: 0 /100 WBC (ref 0–0.2)
NT-PROBNP SERPL-MCNC: 427 PG/ML (ref 0–900)
OXYHGB MFR BLDV: 42.9 %
OXYHGB MFR BLDV: 58.5 %
OXYHGB MFR BLDV: 96 % (ref 94–99)
PAW @ PEAK INSP FLOW SETTING VENT: 0 CMH2O
PAW @ PEAK INSP FLOW SETTING VENT: 0 CMH2O
PAW @ PEAK INSP FLOW SETTING VENT: 21 CMH2O
PCO2 BLDA: 57.3 MM HG (ref 35–45)
PCO2 BLDV: 72.1 MM HG (ref 41–51)
PCO2 BLDV: 81.4 MM HG (ref 41–51)
PCO2 TEMP ADJ BLD: 57.3 MM HG (ref 35–45)
PH BLDA: 7.41 PH UNITS (ref 7.35–7.45)
PH BLDV: 7.22 PH UNITS (ref 7.31–7.41)
PH BLDV: 7.32 PH UNITS (ref 7.31–7.41)
PH UR STRIP.AUTO: 5.5 [PH] (ref 5–8)
PH, TEMP CORRECTED: 7.41 PH UNITS
PHOSPHATE SERPL-MCNC: 3.3 MG/DL (ref 2.5–4.5)
PLATELET # BLD AUTO: 250 10*3/MM3 (ref 140–450)
PLATELET # BLD AUTO: 250 10*3/MM3 (ref 140–450)
PMV BLD AUTO: 12.1 FL (ref 6–12)
PMV BLD AUTO: 13.2 FL (ref 6–12)
PO2 BLDA: 95.4 MM HG (ref 83–108)
PO2 BLDV: 29.4 MM HG (ref 27–53)
PO2 BLDV: 40.8 MM HG (ref 27–53)
PO2 TEMP ADJ BLD: 95.4 MM HG (ref 83–108)
POTASSIUM SERPL-SCNC: 4.1 MMOL/L (ref 3.5–5.2)
POTASSIUM SERPL-SCNC: 4.3 MMOL/L (ref 3.5–5.2)
PROCALCITONIN SERPL-MCNC: 0.03 NG/ML (ref 0–0.25)
PROT SERPL-MCNC: 7.7 G/DL (ref 6–8.5)
PROT UR QL STRIP: NEGATIVE
PSV: 12 CMH2O
QT INTERVAL: 348 MS
QTC INTERVAL: 441 MS
RBC # BLD AUTO: 4.41 10*6/MM3 (ref 3.77–5.28)
RBC # BLD AUTO: 4.46 10*6/MM3 (ref 3.77–5.28)
SODIUM SERPL-SCNC: 140 MMOL/L (ref 136–145)
SODIUM SERPL-SCNC: 141 MMOL/L (ref 136–145)
SP GR UR STRIP: 1.01 (ref 1–1.03)
T4 FREE SERPL-MCNC: 1.25 NG/DL (ref 0.93–1.7)
TOTAL RATE: 0 BREATHS/MINUTE
TOTAL RATE: 0 BREATHS/MINUTE
TOTAL RATE: 22 BREATHS/MINUTE
TROPONIN T SERPL HS-MCNC: 16 NG/L
TROPONIN T SERPL HS-MCNC: 36 NG/L
TROPONIN T SERPL HS-MCNC: 42 NG/L
TSH SERPL DL<=0.05 MIU/L-ACNC: 1.66 UIU/ML (ref 0.27–4.2)
UROBILINOGEN UR QL STRIP: NORMAL
VENTILATOR MODE: ABNORMAL
VT ON VENT VENT: 0.6 ML
WBC NRBC COR # BLD: 6.79 10*3/MM3 (ref 3.4–10.8)
WBC NRBC COR # BLD: 8.15 10*3/MM3 (ref 3.4–10.8)
WHOLE BLOOD HOLD COAG: NORMAL
WHOLE BLOOD HOLD SPECIMEN: NORMAL

## 2023-07-22 PROCEDURE — 94640 AIRWAY INHALATION TREATMENT: CPT

## 2023-07-22 PROCEDURE — 25010000002 CEFTRIAXONE PER 250 MG: Performed by: STUDENT IN AN ORGANIZED HEALTH CARE EDUCATION/TRAINING PROGRAM

## 2023-07-22 PROCEDURE — 94761 N-INVAS EAR/PLS OXIMETRY MLT: CPT

## 2023-07-22 PROCEDURE — 84439 ASSAY OF FREE THYROXINE: CPT | Performed by: STUDENT IN AN ORGANIZED HEALTH CARE EDUCATION/TRAINING PROGRAM

## 2023-07-22 PROCEDURE — 84443 ASSAY THYROID STIM HORMONE: CPT | Performed by: STUDENT IN AN ORGANIZED HEALTH CARE EDUCATION/TRAINING PROGRAM

## 2023-07-22 PROCEDURE — 25010000002 FUROSEMIDE PER 20 MG: Performed by: NURSE PRACTITIONER

## 2023-07-22 PROCEDURE — 83036 HEMOGLOBIN GLYCOSYLATED A1C: CPT | Performed by: NURSE PRACTITIONER

## 2023-07-22 PROCEDURE — 25010000002 NITROGLYCERIN 200 MCG/ML SOLUTION: Performed by: STUDENT IN AN ORGANIZED HEALTH CARE EDUCATION/TRAINING PROGRAM

## 2023-07-22 PROCEDURE — 85025 COMPLETE CBC W/AUTO DIFF WBC: CPT | Performed by: NURSE PRACTITIONER

## 2023-07-22 PROCEDURE — 94799 UNLISTED PULMONARY SVC/PX: CPT

## 2023-07-22 PROCEDURE — 25010000002 HYDROMORPHONE PER 4 MG: Performed by: STUDENT IN AN ORGANIZED HEALTH CARE EDUCATION/TRAINING PROGRAM

## 2023-07-22 PROCEDURE — 83605 ASSAY OF LACTIC ACID: CPT | Performed by: STUDENT IN AN ORGANIZED HEALTH CARE EDUCATION/TRAINING PROGRAM

## 2023-07-22 PROCEDURE — 85379 FIBRIN DEGRADATION QUANT: CPT | Performed by: STUDENT IN AN ORGANIZED HEALTH CARE EDUCATION/TRAINING PROGRAM

## 2023-07-22 PROCEDURE — 25010000002 ENOXAPARIN PER 10 MG: Performed by: NURSE PRACTITIONER

## 2023-07-22 PROCEDURE — 36415 COLL VENOUS BLD VENIPUNCTURE: CPT

## 2023-07-22 PROCEDURE — 82550 ASSAY OF CK (CPK): CPT

## 2023-07-22 PROCEDURE — 93970 EXTREMITY STUDY: CPT | Performed by: INTERNAL MEDICINE

## 2023-07-22 PROCEDURE — 99233 SBSQ HOSP IP/OBS HIGH 50: CPT | Performed by: INTERNAL MEDICINE

## 2023-07-22 PROCEDURE — 83050 HGB METHEMOGLOBIN QUAN: CPT

## 2023-07-22 PROCEDURE — 83735 ASSAY OF MAGNESIUM: CPT | Performed by: STUDENT IN AN ORGANIZED HEALTH CARE EDUCATION/TRAINING PROGRAM

## 2023-07-22 PROCEDURE — 93970 EXTREMITY STUDY: CPT

## 2023-07-22 PROCEDURE — 85025 COMPLETE CBC W/AUTO DIFF WBC: CPT | Performed by: STUDENT IN AN ORGANIZED HEALTH CARE EDUCATION/TRAINING PROGRAM

## 2023-07-22 PROCEDURE — 93005 ELECTROCARDIOGRAM TRACING: CPT | Performed by: STUDENT IN AN ORGANIZED HEALTH CARE EDUCATION/TRAINING PROGRAM

## 2023-07-22 PROCEDURE — 81003 URINALYSIS AUTO W/O SCOPE: CPT | Performed by: STUDENT IN AN ORGANIZED HEALTH CARE EDUCATION/TRAINING PROGRAM

## 2023-07-22 PROCEDURE — 84484 ASSAY OF TROPONIN QUANT: CPT | Performed by: STUDENT IN AN ORGANIZED HEALTH CARE EDUCATION/TRAINING PROGRAM

## 2023-07-22 PROCEDURE — 82805 BLOOD GASES W/O2 SATURATION: CPT

## 2023-07-22 PROCEDURE — 25010000002 FUROSEMIDE PER 20 MG: Performed by: STUDENT IN AN ORGANIZED HEALTH CARE EDUCATION/TRAINING PROGRAM

## 2023-07-22 PROCEDURE — 86140 C-REACTIVE PROTEIN: CPT | Performed by: STUDENT IN AN ORGANIZED HEALTH CARE EDUCATION/TRAINING PROGRAM

## 2023-07-22 PROCEDURE — 99223 1ST HOSP IP/OBS HIGH 75: CPT | Performed by: INTERNAL MEDICINE

## 2023-07-22 PROCEDURE — 99285 EMERGENCY DEPT VISIT HI MDM: CPT

## 2023-07-22 PROCEDURE — 82375 ASSAY CARBOXYHB QUANT: CPT

## 2023-07-22 PROCEDURE — 84100 ASSAY OF PHOSPHORUS: CPT | Performed by: STUDENT IN AN ORGANIZED HEALTH CARE EDUCATION/TRAINING PROGRAM

## 2023-07-22 PROCEDURE — 25510000001 IOPAMIDOL 61 % SOLUTION: Performed by: STUDENT IN AN ORGANIZED HEALTH CARE EDUCATION/TRAINING PROGRAM

## 2023-07-22 PROCEDURE — 84145 PROCALCITONIN (PCT): CPT | Performed by: STUDENT IN AN ORGANIZED HEALTH CARE EDUCATION/TRAINING PROGRAM

## 2023-07-22 PROCEDURE — 25010000002 DAPTOMYCIN PER 1 MG: Performed by: STUDENT IN AN ORGANIZED HEALTH CARE EDUCATION/TRAINING PROGRAM

## 2023-07-22 PROCEDURE — 99222 1ST HOSP IP/OBS MODERATE 55: CPT | Performed by: INTERNAL MEDICINE

## 2023-07-22 PROCEDURE — 82948 REAGENT STRIP/BLOOD GLUCOSE: CPT

## 2023-07-22 PROCEDURE — 94660 CPAP INITIATION&MGMT: CPT

## 2023-07-22 PROCEDURE — 80053 COMPREHEN METABOLIC PANEL: CPT | Performed by: STUDENT IN AN ORGANIZED HEALTH CARE EDUCATION/TRAINING PROGRAM

## 2023-07-22 PROCEDURE — 87040 BLOOD CULTURE FOR BACTERIA: CPT | Performed by: STUDENT IN AN ORGANIZED HEALTH CARE EDUCATION/TRAINING PROGRAM

## 2023-07-22 PROCEDURE — 36600 WITHDRAWAL OF ARTERIAL BLOOD: CPT

## 2023-07-22 PROCEDURE — 73701 CT LOWER EXTREMITY W/DYE: CPT

## 2023-07-22 PROCEDURE — 71045 X-RAY EXAM CHEST 1 VIEW: CPT

## 2023-07-22 PROCEDURE — 83880 ASSAY OF NATRIURETIC PEPTIDE: CPT | Performed by: STUDENT IN AN ORGANIZED HEALTH CARE EDUCATION/TRAINING PROGRAM

## 2023-07-22 RX ORDER — ALBUTEROL SULFATE 2.5 MG/3ML
2.5 SOLUTION RESPIRATORY (INHALATION) EVERY 4 HOURS PRN
Status: DISCONTINUED | OUTPATIENT
Start: 2023-07-22 | End: 2023-08-01

## 2023-07-22 RX ORDER — ENOXAPARIN SODIUM 100 MG/ML
60 INJECTION SUBCUTANEOUS EVERY 12 HOURS SCHEDULED
Status: DISCONTINUED | OUTPATIENT
Start: 2023-07-22 | End: 2023-08-02 | Stop reason: HOSPADM

## 2023-07-22 RX ORDER — HYDROMORPHONE HYDROCHLORIDE 1 MG/ML
0.25 INJECTION, SOLUTION INTRAMUSCULAR; INTRAVENOUS; SUBCUTANEOUS ONCE
Status: COMPLETED | OUTPATIENT
Start: 2023-07-22 | End: 2023-07-22

## 2023-07-22 RX ORDER — INSULIN LISPRO 100 [IU]/ML
2-7 INJECTION, SOLUTION INTRAVENOUS; SUBCUTANEOUS
Status: DISCONTINUED | OUTPATIENT
Start: 2023-07-22 | End: 2023-08-02 | Stop reason: HOSPADM

## 2023-07-22 RX ORDER — SPIRONOLACTONE 25 MG/1
50 TABLET ORAL DAILY
Status: DISCONTINUED | OUTPATIENT
Start: 2023-07-22 | End: 2023-08-02 | Stop reason: HOSPADM

## 2023-07-22 RX ORDER — BISACODYL 10 MG
10 SUPPOSITORY, RECTAL RECTAL DAILY PRN
Status: DISCONTINUED | OUTPATIENT
Start: 2023-07-22 | End: 2023-08-02 | Stop reason: HOSPADM

## 2023-07-22 RX ORDER — FENOFIBRATE 160 MG/1
160 TABLET ORAL DAILY
COMMUNITY
Start: 2023-05-18

## 2023-07-22 RX ORDER — FUROSEMIDE 10 MG/ML
40 INJECTION INTRAMUSCULAR; INTRAVENOUS ONCE
Status: COMPLETED | OUTPATIENT
Start: 2023-07-22 | End: 2023-07-22

## 2023-07-22 RX ORDER — BUMETANIDE 0.25 MG/ML
2 INJECTION INTRAMUSCULAR; INTRAVENOUS ONCE
Status: COMPLETED | OUTPATIENT
Start: 2023-07-23 | End: 2023-07-23

## 2023-07-22 RX ORDER — EPINEPHRINE 0.3 MG/.3ML
0.3 INJECTION SUBCUTANEOUS AS NEEDED
COMMUNITY
Start: 2023-04-05

## 2023-07-22 RX ORDER — FUROSEMIDE 10 MG/ML
80 INJECTION INTRAMUSCULAR; INTRAVENOUS ONCE
Status: COMPLETED | OUTPATIENT
Start: 2023-07-22 | End: 2023-07-22

## 2023-07-22 RX ORDER — FUROSEMIDE 20 MG/1
20 TABLET ORAL DAILY
COMMUNITY
End: 2023-08-02 | Stop reason: HOSPADM

## 2023-07-22 RX ORDER — LOSARTAN POTASSIUM 50 MG/1
100 TABLET ORAL DAILY
Status: DISCONTINUED | OUTPATIENT
Start: 2023-07-22 | End: 2023-07-27

## 2023-07-22 RX ORDER — ACETAMINOPHEN 650 MG/1
650 SUPPOSITORY RECTAL EVERY 4 HOURS PRN
Status: DISCONTINUED | OUTPATIENT
Start: 2023-07-22 | End: 2023-08-02 | Stop reason: HOSPADM

## 2023-07-22 RX ORDER — ACETAMINOPHEN 160 MG/5ML
650 SOLUTION ORAL EVERY 4 HOURS PRN
Status: DISCONTINUED | OUTPATIENT
Start: 2023-07-22 | End: 2023-08-02 | Stop reason: HOSPADM

## 2023-07-22 RX ORDER — ACETAMINOPHEN 325 MG/1
650 TABLET ORAL EVERY 4 HOURS PRN
Status: DISCONTINUED | OUTPATIENT
Start: 2023-07-22 | End: 2023-08-02 | Stop reason: HOSPADM

## 2023-07-22 RX ORDER — FAMOTIDINE 40 MG/1
40 TABLET, FILM COATED ORAL DAILY
COMMUNITY
Start: 2023-05-18

## 2023-07-22 RX ORDER — IPRATROPIUM BROMIDE AND ALBUTEROL SULFATE 2.5; .5 MG/3ML; MG/3ML
3 SOLUTION RESPIRATORY (INHALATION) ONCE
Status: COMPLETED | OUTPATIENT
Start: 2023-07-22 | End: 2023-07-22

## 2023-07-22 RX ORDER — SODIUM CHLORIDE 0.9 % (FLUSH) 0.9 %
10 SYRINGE (ML) INJECTION EVERY 12 HOURS SCHEDULED
Status: DISCONTINUED | OUTPATIENT
Start: 2023-07-22 | End: 2023-08-02 | Stop reason: HOSPADM

## 2023-07-22 RX ORDER — BISACODYL 5 MG/1
5 TABLET, DELAYED RELEASE ORAL DAILY PRN
Status: DISCONTINUED | OUTPATIENT
Start: 2023-07-22 | End: 2023-08-02 | Stop reason: HOSPADM

## 2023-07-22 RX ORDER — SODIUM CHLORIDE 9 MG/ML
40 INJECTION, SOLUTION INTRAVENOUS AS NEEDED
Status: DISCONTINUED | OUTPATIENT
Start: 2023-07-22 | End: 2023-08-02 | Stop reason: HOSPADM

## 2023-07-22 RX ORDER — L.ACID,PARA/B.BIFIDUM/S.THERM 8B CELL
1 CAPSULE ORAL DAILY
Status: DISCONTINUED | OUTPATIENT
Start: 2023-07-22 | End: 2023-07-28

## 2023-07-22 RX ORDER — NITROGLYCERIN 20 MG/100ML
5-200 INJECTION INTRAVENOUS
Status: DISCONTINUED | OUTPATIENT
Start: 2023-07-22 | End: 2023-07-22

## 2023-07-22 RX ORDER — BUMETANIDE 0.25 MG/ML
2 INJECTION INTRAMUSCULAR; INTRAVENOUS EVERY 12 HOURS
Status: DISCONTINUED | OUTPATIENT
Start: 2023-07-22 | End: 2023-07-22

## 2023-07-22 RX ORDER — IBUPROFEN 600 MG/1
1 TABLET ORAL
Status: DISCONTINUED | OUTPATIENT
Start: 2023-07-22 | End: 2023-08-02 | Stop reason: HOSPADM

## 2023-07-22 RX ORDER — SPIRONOLACTONE 25 MG/1
25 TABLET ORAL DAILY
Status: DISCONTINUED | OUTPATIENT
Start: 2023-07-22 | End: 2023-07-22

## 2023-07-22 RX ORDER — SODIUM CHLORIDE 0.9 % (FLUSH) 0.9 %
10 SYRINGE (ML) INJECTION AS NEEDED
Status: DISCONTINUED | OUTPATIENT
Start: 2023-07-22 | End: 2023-08-02 | Stop reason: HOSPADM

## 2023-07-22 RX ORDER — DEXTROSE MONOHYDRATE 25 G/50ML
25 INJECTION, SOLUTION INTRAVENOUS
Status: DISCONTINUED | OUTPATIENT
Start: 2023-07-22 | End: 2023-08-02 | Stop reason: HOSPADM

## 2023-07-22 RX ORDER — AMLODIPINE BESYLATE 2.5 MG/1
2.5 TABLET ORAL
Status: DISCONTINUED | OUTPATIENT
Start: 2023-07-22 | End: 2023-07-22

## 2023-07-22 RX ORDER — POLYETHYLENE GLYCOL 3350 17 G/17G
17 POWDER, FOR SOLUTION ORAL DAILY PRN
Status: DISCONTINUED | OUTPATIENT
Start: 2023-07-22 | End: 2023-08-02 | Stop reason: HOSPADM

## 2023-07-22 RX ORDER — HYDROCHLOROTHIAZIDE 12.5 MG/1
12.5 CAPSULE, GELATIN COATED ORAL DAILY
Status: DISCONTINUED | OUTPATIENT
Start: 2023-07-22 | End: 2023-07-22

## 2023-07-22 RX ORDER — METOPROLOL SUCCINATE 100 MG/1
100 TABLET, EXTENDED RELEASE ORAL DAILY
Status: DISCONTINUED | OUTPATIENT
Start: 2023-07-22 | End: 2023-08-02 | Stop reason: HOSPADM

## 2023-07-22 RX ORDER — NYSTATIN 100000 U/G
OINTMENT TOPICAL
Status: ON HOLD | COMMUNITY
Start: 2023-05-18 | End: 2023-07-22

## 2023-07-22 RX ORDER — NICOTINE POLACRILEX 4 MG
15 LOZENGE BUCCAL
Status: DISCONTINUED | OUTPATIENT
Start: 2023-07-22 | End: 2023-08-02 | Stop reason: HOSPADM

## 2023-07-22 RX ORDER — CALCIUM CITRATE/VITAMIN D3 200MG-6.25
TABLET ORAL
Status: ON HOLD | COMMUNITY
Start: 2023-04-29 | End: 2023-07-22

## 2023-07-22 RX ORDER — AMOXICILLIN 250 MG
2 CAPSULE ORAL 2 TIMES DAILY
Status: DISCONTINUED | OUTPATIENT
Start: 2023-07-22 | End: 2023-08-02 | Stop reason: HOSPADM

## 2023-07-22 RX ORDER — NITROGLYCERIN 0.4 MG/1
0.4 TABLET SUBLINGUAL
Status: DISCONTINUED | OUTPATIENT
Start: 2023-07-22 | End: 2023-08-02 | Stop reason: HOSPADM

## 2023-07-22 RX ORDER — BUMETANIDE 0.25 MG/ML
2 INJECTION INTRAMUSCULAR; INTRAVENOUS ONCE
Status: COMPLETED | OUTPATIENT
Start: 2023-07-22 | End: 2023-07-22

## 2023-07-22 RX ORDER — BUSPIRONE HYDROCHLORIDE 10 MG/1
10 TABLET ORAL 2 TIMES DAILY
COMMUNITY
Start: 2023-04-14 | End: 2023-08-02 | Stop reason: HOSPADM

## 2023-07-22 RX ADMIN — DAPTOMYCIN 750 MG: 500 INJECTION, POWDER, LYOPHILIZED, FOR SOLUTION INTRAVENOUS at 06:23

## 2023-07-22 RX ADMIN — Medication 10 ML: at 20:55

## 2023-07-22 RX ADMIN — ALBUTEROL SULFATE 2.5 MG: 2.5 SOLUTION RESPIRATORY (INHALATION) at 16:25

## 2023-07-22 RX ADMIN — EMPAGLIFLOZIN 10 MG: 10 TABLET, FILM COATED ORAL at 17:55

## 2023-07-22 RX ADMIN — METOPROLOL SUCCINATE 100 MG: 100 TABLET, EXTENDED RELEASE ORAL at 12:27

## 2023-07-22 RX ADMIN — ENOXAPARIN SODIUM 60 MG: 60 INJECTION SUBCUTANEOUS at 12:26

## 2023-07-22 RX ADMIN — ENOXAPARIN SODIUM 60 MG: 60 INJECTION SUBCUTANEOUS at 20:55

## 2023-07-22 RX ADMIN — FUROSEMIDE 40 MG: 10 INJECTION, SOLUTION INTRAMUSCULAR; INTRAVENOUS at 12:28

## 2023-07-22 RX ADMIN — Medication 1 CAPSULE: at 15:51

## 2023-07-22 RX ADMIN — ALBUTEROL SULFATE 2.5 MG: 2.5 SOLUTION RESPIRATORY (INHALATION) at 21:42

## 2023-07-22 RX ADMIN — IOPAMIDOL 50 ML: 612 INJECTION, SOLUTION INTRAVENOUS at 02:16

## 2023-07-22 RX ADMIN — NITROGLYCERIN 5 MCG/MIN: 20 INJECTION INTRAVENOUS at 02:50

## 2023-07-22 RX ADMIN — AMLODIPINE BESYLATE 2.5 MG: 2.5 TABLET ORAL at 13:05

## 2023-07-22 RX ADMIN — SODIUM CHLORIDE 2000 MG: 900 INJECTION INTRAVENOUS at 02:44

## 2023-07-22 RX ADMIN — IOPAMIDOL 100 ML: 612 INJECTION, SOLUTION INTRAVENOUS at 02:16

## 2023-07-22 RX ADMIN — ACETAMINOPHEN 650 MG: 325 TABLET, FILM COATED ORAL at 20:55

## 2023-07-22 RX ADMIN — SENNOSIDES AND DOCUSATE SODIUM 2 TABLET: 50; 8.6 TABLET ORAL at 20:55

## 2023-07-22 RX ADMIN — LOSARTAN POTASSIUM 100 MG: 50 TABLET, FILM COATED ORAL at 12:27

## 2023-07-22 RX ADMIN — HYDROMORPHONE HYDROCHLORIDE 0.25 MG: 1 INJECTION, SOLUTION INTRAMUSCULAR; INTRAVENOUS; SUBCUTANEOUS at 02:45

## 2023-07-22 RX ADMIN — IPRATROPIUM BROMIDE AND ALBUTEROL SULFATE 3 ML: .5; 3 SOLUTION RESPIRATORY (INHALATION) at 02:42

## 2023-07-22 RX ADMIN — HYDROCHLOROTHIAZIDE 12.5 MG: 12.5 CAPSULE ORAL at 13:05

## 2023-07-22 RX ADMIN — BUMETANIDE 2 MG: 0.25 INJECTION, SOLUTION INTRAMUSCULAR; INTRAVENOUS at 17:55

## 2023-07-22 RX ADMIN — FUROSEMIDE 80 MG: 10 INJECTION, SOLUTION INTRAMUSCULAR; INTRAVENOUS at 02:42

## 2023-07-22 NOTE — CONSULTS
Inpatient Cardiology Consult  Consult performed by: Lisandra Benitez APRN  Consult ordered by: Hansa García APRN  Reason for consult: Right-sided heart failure        [facility]  Cardiovascular Consultation Note           58-year-old female who is massively obese and has history of obesity hypoventilation syndrome.  She presented to [facility] ER this morning with shortness of breath and worsening lower extremity swelling.  In the emergency room, she was taken for CT scan and CODE BLUE was called for respiratory arrest.  She was placed on BiPAP and given IV Lasix.  On presentation, she was severely hypertensive with blood pressure of 196/106 mmHg.  Chest x-ray revealed pulmonary edema.  She was admitted to the hospitalist and the hospitalist of assess to evaluate for congestive heart failure    Echocardiogram performed 10/22 demonstrated normal LVEF, moderately elevated RVSP, and no significant valvular abnormality.      Cardiac risk factors: Obesity, type 2 diabetes mellitus    Past medical and surgical history, social and family history reviewed in EMR.    REVIEW OF SYSTEMS:   H&P ROS reviewed and pertinent CV ROS as noted in HPI.         Vital Sign Min/Max for last 24 hours  Temp  Min: 97.7 øF (36.5 øC)  Max: 99.1 øF (37.3 øC)   BP  Min: 111/65  Max: 198/106   Pulse  Min: 79  Max: 125   Resp  Min: 18  Max: 24   SpO2  Min: 81 %  Max: 100 %   No data recorded      Intake/Output Summary (Last 24 hours) at 7/22/2023 2118  Last data filed at 7/22/2023 2050  Gross per 24 hour   Intake 150 ml   Output 3600 ml   Net -3450 ml             Constitutional:       Appearance: Healthy appearance. Morbidly obese.   Eyes:      General: Lids are normal. No scleral icterus.     Conjunctiva/sclera: Conjunctivae normal.   HENT:      Head: Normocephalic and atraumatic.   Neck:      Thyroid: No thyromegaly.      Vascular: No carotid bruit or JVD.   Pulmonary:      Effort: Pulmonary  effort is normal.      Breath sounds: Normal breath sounds. No wheezing. No rhonchi. No rales.   Cardiovascular:      Normal rate. Regular rhythm.      Murmurs: There is no murmur.      No gallop.  No rub.   Pulses:     Intact distal pulses.   Edema:     Peripheral edema present.     Pretibial: bilateral 3+ edema of the pretibial area.     Ankle: bilateral 3+ edema of the ankle.  Abdominal:      General: There is no distension.      Palpations: Abdomen is soft. There is no abdominal mass.   Musculoskeletal:      Cervical back: Normal range of motion. Skin:     General: Skin is warm and dry.      Findings: No rash.   Neurological:      General: No focal deficit present.      Mental Status: Alert and oriented to person, place, and time.   Psychiatric:         Attention and Perception: Attention normal.         Mood and Affect: Mood normal.         Behavior: Behavior normal.      EKG (7/22/2023): normal sinus rhythm    Lab Review:   Labs reviewed in the electronic medical record.  Pertinent findings include:  Lab Results   Component Value Date    GLUCOSE 143 (H) 07/22/2023    BUN 13 07/22/2023    CREATININE 0.62 07/22/2023    EGFR 104.7 07/22/2023    BCR 21.0 07/22/2023    K 4.1 07/22/2023    CO2 36.0 (H) 07/22/2023    CALCIUM 8.5 (L) 07/22/2023    ALBUMIN 3.3 (L) 07/22/2023    BILITOT 0.7 07/22/2023    AST 22 07/22/2023    ALT 14 07/22/2023     Lab Results   Component Value Date    WBC 8.15 07/22/2023    HGB 11.7 (L) 07/22/2023    HCT 40.7 07/22/2023    MCV 91.3 07/22/2023     07/22/2023     Lab Results   Component Value Date    CHOL 134 03/31/2023    TRIG 89 03/31/2023    HDL 33 (L) 03/31/2023    LDL 84 03/31/2023     Results from last 7 days   Lab Units 07/22/23  1204   HEMOGLOBIN A1C % 7.00*            Active Hospital Problems    Diagnosis     **Lower extremity cellulitis     Right-sided congestive heart failure      Echo (7/6/2021): Test difficult due to body habitus.  LVEF 55%.  Mild AI.  No significant  aortic stenosis with mean gradient 12 mmHg.  Mild dilation of ascending aorta 4 cm.  Echo (10/23/2022): LVEF 55%..  Mild AS/AR.  RVSP 45-55 mmHg      Lymphedema of both lower extremities     Bilateral cellulitis of lower leg     Obesity hypoventilation syndrome     Morbid obesity with BMI of 60.0-69.9, adult     Acute on chronic respiratory failure with hypoxia and hypercapnia     Type 2 diabetes mellitus     Elevated d-dimer     Essential hypertension                Right-sided heart failure  Discontinue amlodipine due to pre-existing swelling  Start spironolactone 50 mg daily  Bumex 2 mg IV now and tomorrow Bumex infusion for 6 hours  Give dose of 500 mg IV Diuril on Sunday 30 minutes prior to Bumex infusion tomorrow  Start Jardiance 10 mg daily  Obtain echocardiogram    Hypertension  Discontinue amlodipine due to pre-existing swelling  We will reassess with IV diuresis    Lower extremity cellulitis/lymphedema  Management per hospitalist  Infectious disease consultant    Massive obesity  We discussed that her obesity is leading to much of her present illness i.e. right-sided heart failure, lymphedema/cellulitis  Semaglutide should be started after discharge for weight loss    Elevated D-dimer  VQ scan pending      Electronically signed by Jorge Puckett IV, MD, 07/22/23, 9:18 PM EDT.

## 2023-07-22 NOTE — PROGRESS NOTES
[facility] Medicine Services  PROGRESS NOTE    Patient Name: Kerry Leal  : 1965  MRN: [id number]    Date of Admission: 2023  Primary Care Physician: Gopal Kong MD    Subjective   Subjective     CC:  Legs swollen beyond baseline     HPI:  Still on NTG gtt.  Breathing is comfortable on her usual 2L.  States the RLE is not painful and the redness is chronic/intermittent.  The LLE is worse than baseline but better than yesterday.      First appointment with lymphedema clinic soon    ROS:  - no chest pain   BP always difficult to control      Objective   Objective     Vital Signs:   Temp:  [97.7 øF (36.5 øC)-99.1 øF (37.3 øC)] 97.7 øF (36.5 øC)  Heart Rate:  [] 88  Resp:  [18-24] 18  BP: (133-198)/() 143/77  Flow (L/min):  [6] 6     Physical Exam:  Gen:  in bed, alert and smiling, looks debilitated   Neuro: alert and oriented, clear speech, follows commands, grossly nonfocal  HEENT:  NC/AT  Neck:  Supple, no LAD  Heart RRR   Lungs nonlabored.  CTA anteriorly   Abd:  Soft, nontender,  Extrem:  RLE w lymphedema and redness.  LLE with lymphedema, severe stasis changes, some erythema.        Results Reviewed:  LAB RESULTS:      Lab 23  1204 23  0015   WBC 8.15 6.79   HEMOGLOBIN 11.7* 11.8*   HEMATOCRIT 40.7 40.5   PLATELETS 250 250   NEUTROS ABS 6.46 4.64   IMMATURE GRANS (ABS) 0.04 0.07*   LYMPHS ABS 0.71 1.06   MONOS ABS 0.88 0.84   EOS ABS 0.03 0.15   MCV 91.3 91.8   CRP  --  7.00*   PROCALCITONIN  --  0.03   LACTATE  --  1.2   D DIMER QUANT  --  3.22*         Lab 23  1204 23  0015   SODIUM 141 140   POTASSIUM 4.1 4.3   CHLORIDE 99 99   CO2 36.0* 33.0*   ANION GAP 6.0 8.0   BUN 13 16   CREATININE 0.62 0.73   EGFR 104.7 96.7   GLUCOSE 143* 141*   CALCIUM 8.5* 9.0   MAGNESIUM  --  2.2   PHOSPHORUS  --  3.3   HEMOGLOBIN A1C 7.00*  --    TSH  --  1.660         Lab 23  0015   TOTAL PROTEIN 7.7   ALBUMIN 3.3*   GLOBULIN 4.4   ALT  (SGPT) 14   AST (SGOT) 22   BILIRUBIN 0.7   ALK PHOS 76         Lab 07/22/23  0625 07/22/23  0427 07/22/23  0015   PROBNP  --   --  427.0   HSTROP T 42* 36* 16*                 Lab 07/22/23  0638 07/22/23  0431 07/22/23  0244   PH, ARTERIAL 7.411  --   --    PCO2, ARTERIAL 57.3*  --   --    PO2 ART 95.4  --   --    FIO2 50 60 80   HCO3 ART 36.4*  --   --    BASE EXCESS ART 9.8*  --   --    CARBOXYHEMOGLOBIN 1.4  --   --    CARBOXYHEMOGLOBIN (VENOUS)  --  1.4 1.6     Brief Urine Lab Results  (Last result in the past 365 days)        Color   Clarity   Blood   Leuk Est   Nitrite   Protein   CREAT   Urine HCG        07/22/23 0426 Yellow   Clear   Negative   Negative   Negative   Negative                   Microbiology Results Abnormal       None            XR Chest 1 View    Result Date: 7/22/2023  XR CHEST 1 VW Date of Exam: 7/22/2023 12:54 AM EDT Indication: hypoxia, BL wet rhonchi Comparison: 4/3/2023. Findings: The heart appears enlarged. There is indistinctness of the pulmonary vasculature. Probable small left-sided pleural effusion present. Patchy airspace changes present within the lung bases bilaterally. No pneumothorax. No acute osseous abnormality identified.     Impression: Impression: Mild to moderate pulmonary edema pattern with small left-sided pleural effusion with bibasilar atelectasis. Stable cardiomegaly. Electronically Signed: Naomi Freitas  7/22/2023 1:08 AM EDT  Workstation ID: [id number]    CT Lower Extremity Bilateral With Contrast    Result Date: 7/22/2023  CT LOWER EXTREMITY BILATERAL W CONTRAST Date of Exam: 7/22/2023 1:56 AM EDT Indication: edema. Comparison: 7/5/2021. Technique: Axial CT images were obtained of the bilateral lower extremities after the uneventful intravenous administration of intravenous contrast.  Reconstructed coronal and sagittal images were also obtained. Automated exposure control and iterative construction methods were used. Findings: Diffuse soft tissue swelling is present  bilaterally with skin thickening, left greater than right. No focal subcutaneous collection identified. No evidence of focal mass. No knee joint or ankle joint effusions identified. Musculature appears unremarkable. No evidence of myofascial defect identified. The osseous structures appear intact. No evidence of fracture. No evidence of dislocation. No evidence of periostitis. No evidence of focal lesions. Limited evaluation of the lower pelvis demonstrates no acute process. Limited evaluation due to body habitus. No significant free fluid identified. No evidence of focal osseous lesions. Moderate degenerative changes are present within the bilateral knee joints and hip joints. Linear artifact seen extending through the proximal left tibial diaphysis related to motion artifact. Mild degenerative changes are present within the ankle joints bilaterally. Limited evaluation of the vascular structures due to nonangiographic technique demonstrates no focal abnormalities. Varicosities are present. No obvious arterial thrombus identified. Venous structures are not well evaluated.     Impression: Impression: Diffuse subcutaneous edema present bilaterally, left greater than right likely related to third spacing of fluid. No focal drainable collection identified. No acute osseous abnormality. No suspicious joint effusion. Nonangiographic evaluation of the arterial structures demonstrates no obvious abnormalities. Venous structures are not well evaluated though multiple varicosities are present. Electronically Signed: Naomi Freitas  7/22/2023 2:35 AM EDT  Workstation ID: [id number]     Results for orders placed during the hospital encounter of 10/22/22    Adult Transthoracic Echo Complete W/ Cont if Necessary Per Protocol    Interpretation Summary    Left ventricular ejection fraction appears to be 51 - 55%.    Left ventricular wall thickness is consistent with mild concentric hypertrophy.    Mild aortic valve regurgitation is  present. Mild aortic valve stenosis is present.    Mild tricuspid valve regurgitation is present. Estimated right ventricular systolic pressure from tricuspid regurgitation is moderately elevated (45-55 mmHg).      Current medications:  Scheduled Meds:amLODIPine, 2.5 mg, Oral, Q24H  [START ON 7/23/2023] cefTRIAXone, 1,000 mg, Intravenous, Q24H  [START ON 7/23/2023] DAPTOmycin, 8 mg/kg (Adjusted), Intravenous, Q24H  enoxaparin, 60 mg, Subcutaneous, Q12H  hydroCHLOROthiazide Oral, 12.5 mg, Oral, Daily  insulin lispro, 2-7 Units, Subcutaneous, 4x Daily AC & at Bedtime  losartan, 100 mg, Oral, Daily  metoprolol succinate XL, 100 mg, Oral, Daily  senna-docusate sodium, 2 tablet, Oral, BID  sodium chloride, 10 mL, Intravenous, Q12H      Continuous Infusions:nitroglycerin, 5-200 mcg/min, Last Rate: 45 mcg/min (07/22/23 1112)      PRN Meds:.  acetaminophen **OR** acetaminophen **OR** acetaminophen    albuterol    senna-docusate sodium **AND** polyethylene glycol **AND** bisacodyl **AND** bisacodyl    dextrose    dextrose    glucagon (human recombinant)    nitroglycerin    sodium chloride    sodium chloride    sodium chloride    Assessment & Plan   Assessment & Plan     Active Hospital Problems    Diagnosis  POA    **Lower extremity cellulitis [L03.119]  Yes    Accelerated hypertension [I10]  Yes    Bilateral cellulitis of lower leg [L03.116, L03.115]  Yes    Obesity hypoventilation syndrome [E66.2]  Yes    Secondary pulmonary arterial hypertension [I27.21]  Yes    Morbid obesity with BMI of 60.0-69.9, adult [E66.01, Z68.44]  Not Applicable    Acute on chronic respiratory failure with hypoxia and hypercapnia [J96.21, J96.22]  Yes    Type 2 diabetes mellitus [E11.9]  Yes    Elevated d-dimer [R79.89]  Yes    Essential hypertension [I10]  Yes    Lymphedema of both lower extremities [I89.0]  Yes      Resolved Hospital Problems   No resolved problems to display.        Brief Hospital Course to date:  Kerry Leal is a 58 y.o.  female w pmh of DM HTN, BLE lymphedema, anxiety and asthma.  Admitted w LLE swelling and pain.  A 'code blue' was called during CT of LLE but she did not lose pulse.  CHF and hypercapnia noted.      Acute on chronic respiratory failure with hypoxia and hypercapnia   Pulmonary hypertension  CHF  - baseline 2L ox; wore Bipap initially due to acidosis - ABG much improved  - ef 55% with mod elev RVSP  - diuretics given but output not recorded   - control BP :  add lowdose norvasc, add HCTZ      bilateral lower extremity cellulitis  Lymphedema   ? Panniculitis   - CT BLE:  edema   -ID was consulted ; in April they treated her with outpt CTX   - Continue daptomycin and Rocephin  -Blood cultures x2 pending  - Pain control     accelerated hypertension  - Pressure on arrival 193/116  - better, try to wean NTG gtt, adding new orals      5) diabetes mellitus type 2  - Check hemoglobin A1c  - Start low-dose sliding scale insulin  - Fingersticks before meals and at bedtime        Expected Discharge Location and Transportation: home by car  Expected Discharge   Expected Discharge Date: 7/24/2023; Expected Discharge Time:      DVT prophylaxis:  Medical DVT prophylaxis orders are present.          CODE STATUS:   Code Status and Medical Interventions:   Ordered at: 07/22/23 0558     Level Of Support Discussed With:    Patient     Code Status (Patient has no pulse and is not breathing):    CPR (Attempt to Resuscitate)     Medical Interventions (Patient has pulse or is breathing):    Full Support     Release to patient:    Routine Release       Pily Rodriguez MD  07/22/23

## 2023-07-22 NOTE — CONSULTS
Patient Name: Kerry Leal  : 1965  MRN: [id number]    Date of Consult: 2023  Admission Date: 2023    Requesting Provider: Dixie  Evaluating Physician: Markos Dalton MD    Chief Complaint: cellulitis    Reason for Consultation: cellulitis    Subjective   Patient is a 58 y.o. female with history of hypertension, asthma and chronic lymphedema.  Previous seen by Dr. Shine Rodriguez and previously treated with dalbavancin and then suppressive Keflex.  Last seen in the hospital 2023 and at that time treated with daptomycin and ceftriaxone and discharged to outpatient follow-up.  Seen in clinic in May with plans to continue chronic suppressive Keflex.  At some point after last office visit patient called [facility] saying that she had a rash under her chin and she has been off antibiotics since then.    Admitted to [facility] 2023 with complaints of worsening left lower extremity swelling and pain.  Denied fever.  Ongoing for CT emergency department [facility] was called however per H&P patient did not lose a pulse and no CPR was performed.  No drainable fluid collection.  Started on daptomycin and ceftriaxone and admitted.  Feels like she is improving slightly compared to admission    ROS:  No fevers or chills. No n/v/d. No rash. No new ADR to Abx.         Past Medical History:   Diagnosis Date    Anxiety     Asthma     Heart murmur     Hypertension        Past Surgical History:   Procedure Laterality Date    APPENDECTOMY       SECTION      HYSTERECTOMY      NECK SURGERY         Social History     Socioeconomic History    Marital status:    Tobacco Use    Smoking status: Never     Passive exposure: Past    Smokeless tobacco: Never   Vaping Use    Vaping Use: Never used   Substance and Sexual Activity    Alcohol use: No    Drug use: No    Sexual activity: Defer        Family History   Problem Relation Age of Onset    Diabetes Mother     Heart disease Mother      Breast cancer Neg Hx     Ovarian cancer Neg Hx        Allergies   Allergen Reactions    Diclofenac Swelling    Benadryl [Diphenhydramine Hcl] Other (See Comments)     UNSURE    Clindamycin/Lincomycin Other (See Comments)     UNSURE    Doxycycline Other (See Comments)     UNSURE    Fluticasone Other (See Comments)     NOSE BLEEDS    Fluvoxamine Other (See Comments)     UNSURE    Montelukast Swelling    Symbicort [Budesonide-Formoterol Fumarate] Other (See Comments)     UNSURE    Lisinopril Dizziness    Penicillins Other (See Comments)     MOTHER TOLD HER SHE WAS ALLERGIC  *has tolerated ceftriaxone    Smz-Tmp Ds [Sulfamethoxazole-Trimethoprim] GI Intolerance    Sulfa Antibiotics GI Intolerance       Objective   Antibiotics:  Anti-Infectives (From admission, onward)      Ordered     Dose/Rate Route Frequency Start Stop    07/22/23 1002  DAPTOmycin (CUBICIN) 750 mg in sodium chloride 0.9 % 50 mL IVPB        Ordering Provider: Raquel Hansa, APRN    8 mg/kg x 95 kg (Adjusted)  100 mL/hr over 30 Minutes Intravenous Every 24 Hours 07/23/23 1100 07/26/23 1059    07/22/23 1002  cefTRIAXone (ROCEPHIN) 1000 mg/100 mL 0.9% NS (MBP)        Ordering Provider: Raquel, Hansa, APRN    1,000 mg  over 30 Minutes Intravenous Every 24 Hours 07/23/23 0900 07/26/23 0859    07/22/23 0053  cefTRIAXone (ROCEPHIN) 2000 mg/100 mL 0.9% NS IVPB (MBP)        Ordering Provider: Darion Simental MD    2,000 mg  over 30 Minutes Intravenous Once 07/22/23 0109 07/22/23 0333            Other Medications:  Current Facility-Administered Medications   Medication Dose Route Frequency Provider Last Rate Last Admin    acetaminophen (TYLENOL) tablet 650 mg  650 mg Oral Q4H PRN Raquel, Hansa, APRN        Or    acetaminophen (TYLENOL) 160 MG/5ML solution 650 mg  650 mg Oral Q4H PRN Raquel, Hansa, APRN        Or    acetaminophen (TYLENOL) suppository 650 mg  650 mg Rectal Q4H PRN Raquel, Hansa, APRN        albuterol (PROVENTIL) nebulizer  solution 0.083% 2.5 mg/3mL  2.5 mg Nebulization Q4H PRN Raquel, Hansa, APRN        amLODIPine (NORVASC) tablet 2.5 mg  2.5 mg Oral Q24H Pily Rodriguez MD   2.5 mg at 07/22/23 1305    sennosides-docusate (PERICOLACE) 8.6-50 MG per tablet 2 tablet  2 tablet Oral BID Raquel, Hansa, APRN        And    polyethylene glycol (MIRALAX) packet 17 g  17 g Oral Daily PRN Raquel, Hansa, APRN        And    bisacodyl (DULCOLAX) EC tablet 5 mg  5 mg Oral Daily PRN Raquel, Hansa, APRN        And    bisacodyl (DULCOLAX) suppository 10 mg  10 mg Rectal Daily PRN Raquel, Hansa, APRN        [START ON 7/23/2023] cefTRIAXone (ROCEPHIN) 1000 mg/100 mL 0.9% NS (MBP)  1,000 mg Intravenous Q24H Raquel, Hansa, APRN        [START ON 7/23/2023] DAPTOmycin (CUBICIN) 750 mg in sodium chloride 0.9 % 50 mL IVPB  8 mg/kg (Adjusted) Intravenous Q24H Raquel, Hansa, APRN        dextrose (D50W) (25 g/50 mL) IV injection 25 g  25 g Intravenous Q15 Min PRN Raquel, Hansa, APRN        dextrose (GLUTOSE) oral gel 15 g  15 g Oral Q15 Min PRN Raquel, Hansa, APRN        Enoxaparin Sodium (LOVENOX) syringe 60 mg  60 mg Subcutaneous Q12H Raquel, Hansa, APRN   60 mg at 07/22/23 1226    glucagon (GLUCAGEN) injection 1 mg  1 mg Intramuscular Q15 Min PRN Raquel, Hansa, APRN        hydroCHLOROthiazide (MICROZIDE) capsule 12.5 mg  12.5 mg Oral Daily Pily Rodriguez MD   12.5 mg at 07/22/23 1305    Insulin Lispro (humaLOG) injection 2-7 Units  2-7 Units Subcutaneous 4x Daily AC & at Bedtime Raquel, Hansa, APRN        losartan (COZAAR) tablet 100 mg  100 mg Oral Daily Raquel, Hansa, APRN   100 mg at 07/22/23 1227    metoprolol succinate XL (TOPROL-XL) 24 hr tablet 100 mg  100 mg Oral Daily Raquel, Hansa, APRN   100 mg at 07/22/23 1227    nitroglycerin (NITROSTAT) SL tablet 0.4 mg  0.4 mg Sublingual Q5 Min PRN Raquel, Hansa, APRN        nitroglycerin (TRIDIL) 200 mcg/ml infusion  5-200 mcg/min Intravenous Titrated Raquel,  "Hansa, APRN 10.5 mL/hr at 07/22/23 1306 35 mcg/min at 07/22/23 1306    sodium chloride 0.9 % flush 10 mL  10 mL Intravenous PRN Darion Simental MD        sodium chloride 0.9 % flush 10 mL  10 mL Intravenous Q12H Raquel, Hansa, APRN        sodium chloride 0.9 % flush 10 mL  10 mL Intravenous PRN Raquel, Hansa, APRN        sodium chloride 0.9 % infusion 40 mL  40 mL Intravenous PRN Raquel, Hansa, APRN           Physical Exam:   Vital Signs   /61   Pulse 90   Temp 97.7 øF (36.5 øC) (Oral)   Resp 18   Ht 160 cm (62.99\")   Wt (!) 159 kg (350 lb)   SpO2 90%   BMI 62.02 kg/mý     GENERAL: Awake and alert, chronically ill-appearing but not acutely toxic  HEENT: Normocephalic, atraumatic.  PERRL. No conjunctival injection. No icterus.  Very poor dentition with many missing teeth  NECK: Supple without nuchal rigidity.    HEART: RRR; No murmur.  LUNGS: Clear to auscultation from anterior position   ABDOMEN: morbidly obese but soft  EXT: Chronic bilateral venous stasis dermatitis changes.  Some diffuse erythema on the left.  More focal erythema on right anterior leg.  No deep ulcerations, drainage  : Without Perdomo catheter.  SKIN: No diffuse rash  NEURO: Oriented to PPT. No focal deficits on motor/sensory exam at arms/legs.  PSYCHIATRIC: Normal insight and judgement. Cooperative with PE    Laboratory Data  Lab Results   Component Value Date    WBC 8.15 07/22/2023    HGB 11.7 (L) 07/22/2023    HCT 40.7 07/22/2023    MCV 91.3 07/22/2023     07/22/2023     Lab Results   Component Value Date    GLUCOSE 143 (H) 07/22/2023    CALCIUM 8.5 (L) 07/22/2023     07/22/2023    K 4.1 07/22/2023    CO2 36.0 (H) 07/22/2023    CL 99 07/22/2023    BUN 13 07/22/2023    CREATININE 0.62 07/22/2023     Estimated Creatinine Clearance: 151.9 mL/min (by C-G formula based on SCr of 0.62 mg/dL).  Lab Results   Component Value Date    ALT 14 07/22/2023    AST 22 07/22/2023    ALKPHOS 76 07/22/2023    BILITOT " 0.7 07/22/2023     Lab Results   Component Value Date    CRP 7.00 (H) 07/22/2023     Lab Results   Component Value Date    SEDRATE 120 (H) 04/13/2023       The above labs were reviewed.     Microbiology:  Microbiology Results (last 10 days)       ** No results found for the last 240 hours. **            Radiology:  XR Chest 1 View    Result Date: 7/22/2023  Impression: Mild to moderate pulmonary edema pattern with small left-sided pleural effusion with bibasilar atelectasis. Stable cardiomegaly. Electronically Signed: Naomi Freitas  7/22/2023 1:08 AM EDT  Workstation ID: [id number]    CT Lower Extremity Bilateral With Contrast    Result Date: 7/22/2023  Impression: Diffuse subcutaneous edema present bilaterally, left greater than right likely related to third spacing of fluid. No focal drainable collection identified. No acute osseous abnormality. No suspicious joint effusion. Nonangiographic evaluation of the arterial structures demonstrates no obvious abnormalities. Venous structures are not well evaluated though multiple varicosities are present. Electronically Signed: Naomi Freitas  7/22/2023 2:35 AM EDT  Workstation ID: [id number]     I personally reviewed the above CT left leg: no fluid collection noted    Chest x-ray with bibasilar pulmonary edema    Assessment   Acute on chronic bilateral lower extremity venous stasis swelling and dermatitis  Left lower extremity swelling, erythema, tenderness, cellulitis  Right lower extremity focal erythema, possible cellulitis  Chronic bilateral lower extremity lymphedema  Morbid obesity  Bilateral pulmonary edema on x-ray  Chronic CHFpEF  HTN  Listed allergies to clindamycin, doxycycline, penicillin and sulfa  Patient reported recent rash with Keflex: Recently treated with ceftriaxone and got another dose in ED with no side effects thus far.    PLAN:   - Microbiology data reviewed.  Follow-up pending blood cultures  - Follow CBC, CMP, CK, CP    Empiric daptomycin pending further  work-up, clinical improvement.  Dose decreased to 550 mg daily    Stop ceftriaxone due to recent reported rash with Keflex.    Probiotic    I will follow over the weekend.  Dr. Shine Rodriguez to resume care on Monday    Markos Dalton MD  7/22/2023

## 2023-07-22 NOTE — H&P
"    [facility] Medicine Services  HISTORY AND PHYSICAL    Patient Name: Kerry Leal  : 1965  MRN: [id number]  Primary Care Physician: Gopal Kong MD  Date of admission: 2023    Subjective   Subjective     Chief Complaint:  Left lower extremity swelling/pain    HPI:  Kerry Leal is a 58 y.o. female past medical history significant for diabetes mellitus type 2, essential hypertension, BLE lymphedema, anxiety and asthma presents the ED with complaints of worsening left lower extremity swelling and pain that started a couple days ago.  Patient is ambulatory however she notes with ambulation the pain in her left lower extremity is worse.  She denies any known injury.  She denies any fever, cough, worsening shortness of breath, chest pain, chest tightness, abdominal pain, nausea, vomiting, diarrhea or dysuria.  In the ED patient was taken for a CT of her left lower extremity with contrast.  A CODE BLUE was called however patient did not lose pulse and no CPR was initiated.  Patient notes she \"got anxious.\"  VBG was obtained that revealed pH of 7.218, PCO2 81.4 and bicarb of 33.1.  Patient was placed on BiPAP and given 80 mg of IV Lasix.  Repeat ABG shows pH 7.32, PCO2 72.1 and bicarb of 37.5.  Chest x-ray showed mild to moderate pulmonary edema pattern with small left-sided pleural effusion with bibasilar atelectasis.  Stable cardiomegaly.  Patient will be admitted to [facility] under the care of the hospitalist for further evaluation and treatment.        Review of Systems   Constitutional:  Positive for activity change and chills. Negative for appetite change, diaphoresis, fatigue, fever and unexpected weight change.   HENT: Negative.     Eyes:  Negative for photophobia and visual disturbance.   Respiratory:  Negative for cough and shortness of breath.    Cardiovascular:  Positive for leg swelling. Negative for chest pain and palpitations. "   Gastrointestinal:  Negative for abdominal distention, abdominal pain, blood in stool, constipation, diarrhea, nausea and vomiting.   Genitourinary:  Negative for difficulty urinating, dysuria, flank pain and frequency.   Musculoskeletal:  Negative for back pain, neck pain and neck stiffness.   Skin:  Positive for color change. Negative for wound.   Neurological:  Negative for dizziness, speech difficulty, weakness, light-headedness, numbness and headaches.   Psychiatric/Behavioral: Negative.            Personal History     Past Medical History:   Diagnosis Date    Anxiety     Asthma     Heart murmur     Hypertension              Past Surgical History:   Procedure Laterality Date    APPENDECTOMY       SECTION      HYSTERECTOMY      NECK SURGERY         Family History:  family history includes Diabetes in her mother; Heart disease in her mother.     Social History:  reports that she has never smoked. She has been exposed to tobacco smoke. She has never used smokeless tobacco. She reports that she does not drink alcohol and does not use drugs.  Social History     Social History Narrative    Not on file       Medications:  acetaminophen, albuterol, furosemide, losartan, and metoprolol succinate XL    Allergies   Allergen Reactions    Diclofenac Swelling    Benadryl [Diphenhydramine Hcl] Other (See Comments)     UNSURE    Clindamycin/Lincomycin Other (See Comments)     UNSURE    Doxycycline Other (See Comments)     UNSURE    Fluticasone Other (See Comments)     NOSE BLEEDS    Fluvoxamine Other (See Comments)     UNSURE    Montelukast Swelling    Symbicort [Budesonide-Formoterol Fumarate] Other (See Comments)     UNSURE    Lisinopril Dizziness    Penicillins Other (See Comments)     MOTHER TOLD HER SHE WAS ALLERGIC  *has tolerated ceftriaxone    Smz-Tmp Ds [Sulfamethoxazole-Trimethoprim] GI Intolerance    Sulfa Antibiotics GI Intolerance       Objective   Objective     Vital Signs:   Heart Rate:  [108-125]  110  Resp:  [20-24] 20  BP: (154-198)/() 154/83    Physical Exam  Vitals and nursing note reviewed.   Constitutional:       General: She is not in acute distress.     Appearance: Normal appearance. She is obese. She is not ill-appearing, toxic-appearing or diaphoretic.   HENT:      Head: Normocephalic and atraumatic.      Nose: Nose normal.      Mouth/Throat:      Mouth: Mucous membranes are dry.      Pharynx: Oropharynx is clear.   Eyes:      Extraocular Movements: Extraocular movements intact.      Conjunctiva/sclera: Conjunctivae normal.      Pupils: Pupils are equal, round, and reactive to light.   Cardiovascular:      Rate and Rhythm: Regular rhythm. Tachycardia present.      Pulses: Normal pulses.      Heart sounds: Murmur heard.   Pulmonary:      Effort: Pulmonary effort is normal.      Comments: Decreased to LLL   Abdominal:      General: Bowel sounds are normal. There is no distension.      Palpations: Abdomen is soft. There is no mass.      Tenderness: There is no abdominal tenderness. There is no right CVA tenderness, left CVA tenderness, guarding or rebound.      Hernia: No hernia is present.   Musculoskeletal:         General: No swelling, tenderness, deformity or signs of injury.      Cervical back: Normal range of motion and neck supple.      Right lower leg: Edema present.      Left lower leg: Edema present.      Comments: Significant edema to BLE with noted erythema, warm to touch    Skin:     General: Skin is warm.      Findings: Erythema present.   Neurological:      General: No focal deficit present.      Mental Status: She is alert and oriented to person, place, and time. Mental status is at baseline.   Psychiatric:         Mood and Affect: Mood normal.         Behavior: Behavior normal.         Thought Content: Thought content normal.         Judgment: Judgment normal.          Result Review:  I have personally reviewed the results from the time of this admission to 7/22/2023 05:39 EDT  and agree with these findings:  [x]  Laboratory list / accordion  [x]  Microbiology  [x]  Radiology  [x]  EKG/Telemetry   []  Cardiology/Vascular   []  Pathology  [x]  Old records  []  Other:  Most notable findings include:     LAB RESULTS:      Lab 07/22/23  0015   WBC 6.79   HEMOGLOBIN 11.8*   HEMATOCRIT 40.5   PLATELETS 250   NEUTROS ABS 4.64   IMMATURE GRANS (ABS) 0.07*   LYMPHS ABS 1.06   MONOS ABS 0.84   EOS ABS 0.15   MCV 91.8   CRP 7.00*   PROCALCITONIN 0.03   LACTATE 1.2   D DIMER QUANT 3.22*         Lab 07/22/23  0015   SODIUM 140   POTASSIUM 4.3   CHLORIDE 99   CO2 33.0*   ANION GAP 8.0   BUN 16   CREATININE 0.73   EGFR 96.7   GLUCOSE 141*   CALCIUM 9.0   MAGNESIUM 2.2   PHOSPHORUS 3.3   TSH 1.660         Lab 07/22/23  0015   TOTAL PROTEIN 7.7   ALBUMIN 3.3*   GLOBULIN 4.4   ALT (SGPT) 14   AST (SGOT) 22   BILIRUBIN 0.7   ALK PHOS 76         Lab 07/22/23  0427 07/22/23  0015   PROBNP  --  427.0   HSTROP T 36* 16*                 Lab 07/22/23  0431 07/22/23  0244   FIO2 60 80   CARBOXYHEMOGLOBIN (VENOUS) 1.4 1.6     Brief Urine Lab Results  (Last result in the past 365 days)        Color   Clarity   Blood   Leuk Est   Nitrite   Protein   CREAT   Urine HCG        07/22/23 0426 Yellow   Clear   Negative   Negative   Negative   Negative                 Microbiology Results (last 10 days)       ** No results found for the last 240 hours. **            XR Chest 1 View    Result Date: 7/22/2023  XR CHEST 1 VW Date of Exam: 7/22/2023 12:54 AM EDT Indication: hypoxia, BL wet rhonchi Comparison: 4/3/2023. Findings: The heart appears enlarged. There is indistinctness of the pulmonary vasculature. Probable small left-sided pleural effusion present. Patchy airspace changes present within the lung bases bilaterally. No pneumothorax. No acute osseous abnormality identified.     Impression: Impression: Mild to moderate pulmonary edema pattern with small left-sided pleural effusion with bibasilar atelectasis. Stable  cardiomegaly. Electronically Signed: Naomi Freitas  7/22/2023 1:08 AM EDT  Workstation ID: [id number]    CT Lower Extremity Bilateral With Contrast    Result Date: 7/22/2023  CT LOWER EXTREMITY BILATERAL W CONTRAST Date of Exam: 7/22/2023 1:56 AM EDT Indication: edema. Comparison: 7/5/2021. Technique: Axial CT images were obtained of the bilateral lower extremities after the uneventful intravenous administration of intravenous contrast.  Reconstructed coronal and sagittal images were also obtained. Automated exposure control and iterative construction methods were used. Findings: Diffuse soft tissue swelling is present bilaterally with skin thickening, left greater than right. No focal subcutaneous collection identified. No evidence of focal mass. No knee joint or ankle joint effusions identified. Musculature appears unremarkable. No evidence of myofascial defect identified. The osseous structures appear intact. No evidence of fracture. No evidence of dislocation. No evidence of periostitis. No evidence of focal lesions. Limited evaluation of the lower pelvis demonstrates no acute process. Limited evaluation due to body habitus. No significant free fluid identified. No evidence of focal osseous lesions. Moderate degenerative changes are present within the bilateral knee joints and hip joints. Linear artifact seen extending through the proximal left tibial diaphysis related to motion artifact. Mild degenerative changes are present within the ankle joints bilaterally. Limited evaluation of the vascular structures due to nonangiographic technique demonstrates no focal abnormalities. Varicosities are present. No obvious arterial thrombus identified. Venous structures are not well evaluated.     Impression: Impression: Diffuse subcutaneous edema present bilaterally, left greater than right likely related to third spacing of fluid. No focal drainable collection identified. No acute osseous abnormality. No suspicious joint  effusion. Nonangiographic evaluation of the arterial structures demonstrates no obvious abnormalities. Venous structures are not well evaluated though multiple varicosities are present. Electronically Signed: Naomi Freitas  7/22/2023 2:35 AM EDT  Workstation ID: [id number]     Results for orders placed during the hospital encounter of 10/22/22    Adult Transthoracic Echo Complete W/ Cont if Necessary Per Protocol    Interpretation Summary    Left ventricular ejection fraction appears to be 51 - 55%.    Left ventricular wall thickness is consistent with mild concentric hypertrophy.    Mild aortic valve regurgitation is present. Mild aortic valve stenosis is present.    Mild tricuspid valve regurgitation is present. Estimated right ventricular systolic pressure from tricuspid regurgitation is moderately elevated (45-55 mmHg).      Assessment & Plan   Assessment & Plan       Essential hypertension    Lymphedema of both lower extremities    Elevated d-dimer    Acute on chronic respiratory failure with hypoxia and hypercapnia    Type 2 diabetes mellitus    Morbid obesity with BMI of 60.0-69.9, adult    Secondary pulmonary arterial hypertension    Obesity hypoventilation syndrome    Bilateral cellulitis of lower leg    Accelerated hypertension      58 year old female presents to the ED with LLE swelling and pain.    1) acute on chronic respiratory failure with hypoxia and hypercapnia      Pulmonary hypertension  -Per patient she wears 2 L of oxygen at home at all times  - Currently requiring BiPAP  - ABG as noted above  - O2 saturation currently 98%  - Last echocardiogram 10/23/2022, EF 51 to 55%.  Left ventricular wall thickness is consistent with mild concentric hypertrophy.  Mild aortic valve regurgitation, mild aortic valve stenosis, mild tricuspid valve regurgitation.  Estimated right ventricular systolic pressure from tricuspid regurgitation is moderately elevated at 45-55 MHD.  - Status post 80 mg of IV Lasix with 1 L  urinary output  - Strict I's and O's  - Daily weight  - repeat echo in a.m.  - Consult cardiology  -Repeat ABG now  -Repeat Lasix dose in a.m.  -Continue nitroglycerin drip    2) bilateral lower extremity cellulitis      ? Paniculitis   - CT lower extremity bilaterally showed diffuse subcutaneous edema present bilaterally left greater than right likely related to third spacing of fluid.  No focal drainable collection.  No acute osseous abnormality.  - Has seen infectious disease in the past, will consult in a.m.  - Continue daptomycin and Rocephin  -Blood cultures x2 pending  - Pain control    3) elevated D-dimer  - patient had difficulties with laying flat with prior CT of BLE, will hold on CTA and obtain VQ scan in am   - Obtain bilateral lower extremity duplex in a.m.    4) accelerated hypertension  - Pressure on arrival 193/116  - Continue nitroglycerin drip  - Continue home Cozaar and metoprolol    5) diabetes mellitus type 2  - Check hemoglobin A1c  - Start low-dose sliding scale insulin  - Fingersticks before meals and at bedtime          DVT prophylaxis:  lovenox    CODE STATUS:  Full Code        Expected Discharge  TBD    This note has been completed as part of a split-shared workflow.     Signature: Electronically signed by RAD Sheridan, 07/22/23, 5:53 AM EDT.    Total time spent: 65 mins  Time spent includes time reviewing chart, face-to-face time, counseling patient/family/caregiver, ordering medications/tests/procedures, communicating with other health care professionals, documenting clinical information in the electronic health record, and coordination of care.      Attending   Admission Attestation       I have performed an independent face-to-face diagnostic evaluation including performing an independent physical examination as documented here.  The documented plan of care above was reviewed and developed with the advanced practice clinician (APC).      Brief Summary Statement:   Kerry KEARNEY  "Elvis is a 58 y.o. female who states she has had increased bilateral lower extremity swelling, increased erythema, increased pain over the last 2 days.  She has history of chronic left lower extremity lymphedema and confirms she always \"has a little redness\" but has been worse over the last 48-72 hours, along with increased anterior erythema of the RLE.  She confirms increased pain with any ambulation.  As part of the ED work-up, a CT of the left lower extremity was ordered and the patient was taken to the CT scanner.  She states she became nervous, got acutely short of breath, and notes that she \"just got really nervous.\"  She had significant drop of her O2 saturation and a CODE BLUE was actually called, though she never had a pulse or any documented dysrhythmia.  She states she was lightheaded and weak until BiPAP was started.  Initial ABG showed significant acidosis which improved with use of the BiPAP.  Chest x-ray showed pulmonary edema.  She denies any prior diagnosis of CHF.    Remainder of detailed HPI is as noted by APC and has been reviewed and/or edited by me for completeness.    Attending Physical Exam:  Heart Rate:  [105-125] 105  Resp:  [18-24] 18  BP: (154-198)/() 158/103    Total time spent: 24 minutes  Time spent includes time reviewing chart, face-to-face time, counseling patient/family/caregiver, ordering medications/tests/procedures, communicating with other health care professionals, documenting clinical information in the electronic health record, and coordination of care.     Brief Assessment/Plan :  See detailed assessment and plan developed with APC which I have reviewed and/or edited for completeness.            Kyle Colin III, DO  07/22/23                       "

## 2023-07-22 NOTE — Clinical Note
Level of Care: Telemetry [5]   Diagnosis: Lower extremity cellulitis [[id number]]   Admitting Physician: SARAH BLAKE III [[id number]]   Attending Physician: SARAH BLAKE III [[id number]]   Bed Request Comments: tele obs

## 2023-07-23 ENCOUNTER — APPOINTMENT (OUTPATIENT)
Dept: CARDIOLOGY | Facility: HOSPITAL | Age: 58
DRG: 291 | End: 2023-07-23
Payer: MEDICARE

## 2023-07-23 LAB
ANION GAP SERPL CALCULATED.3IONS-SCNC: 6 MMOL/L (ref 5–15)
ANION GAP SERPL CALCULATED.3IONS-SCNC: 7 MMOL/L (ref 5–15)
BH CV ECHO MEAS - AO MAX PG: 15 MMHG
BH CV ECHO MEAS - AO MEAN PG: 8 MMHG
BH CV ECHO MEAS - AO ROOT DIAM: 2.9 CM
BH CV ECHO MEAS - AO V2 MAX: 193 CM/SEC
BH CV ECHO MEAS - AO V2 VTI: 29.1 CM
BH CV ECHO MEAS - AVA(I,D): 2.39 CM2
BH CV ECHO MEAS - EDV(CUBED): 68.9 ML
BH CV ECHO MEAS - EDV(MOD-SP2): 97.2 ML
BH CV ECHO MEAS - EDV(MOD-SP4): 132 ML
BH CV ECHO MEAS - EF(MOD-BP): 54.4 %
BH CV ECHO MEAS - EF(MOD-SP2): 54.8 %
BH CV ECHO MEAS - EF(MOD-SP4): 53.1 %
BH CV ECHO MEAS - ESV(CUBED): 22 ML
BH CV ECHO MEAS - ESV(MOD-SP2): 43.9 ML
BH CV ECHO MEAS - ESV(MOD-SP4): 61.9 ML
BH CV ECHO MEAS - FS: 31.7 %
BH CV ECHO MEAS - IVS/LVPW: 1 CM
BH CV ECHO MEAS - IVSD: 1.4 CM
BH CV ECHO MEAS - LA DIMENSION: 3.4 CM
BH CV ECHO MEAS - LAT PEAK E' VEL: 12.5 CM/SEC
BH CV ECHO MEAS - LV MASS(C)D: 216.6 GRAMS
BH CV ECHO MEAS - LV MAX PG: 5.3 MMHG
BH CV ECHO MEAS - LV MEAN PG: 3 MMHG
BH CV ECHO MEAS - LV V1 MAX: 115.5 CM/SEC
BH CV ECHO MEAS - LV V1 VTI: 20.1 CM
BH CV ECHO MEAS - LVIDD: 4.1 CM
BH CV ECHO MEAS - LVIDS: 2.8 CM
BH CV ECHO MEAS - LVOT AREA: 3.5 CM2
BH CV ECHO MEAS - LVOT DIAM: 2.1 CM
BH CV ECHO MEAS - LVPWD: 1.4 CM
BH CV ECHO MEAS - MED PEAK E' VEL: 8.3 CM/SEC
BH CV ECHO MEAS - MR MAX PG: 8.3 MMHG
BH CV ECHO MEAS - MR MAX VEL: 144 CM/SEC
BH CV ECHO MEAS - MV A MAX VEL: 84.4 CM/SEC
BH CV ECHO MEAS - MV DEC SLOPE: 537 CM/SEC2
BH CV ECHO MEAS - MV DEC TIME: 0.19 MSEC
BH CV ECHO MEAS - MV E MAX VEL: 105 CM/SEC
BH CV ECHO MEAS - MV E/A: 1.24
BH CV ECHO MEAS - MV MAX PG: 5.8 MMHG
BH CV ECHO MEAS - MV MEAN PG: 3 MMHG
BH CV ECHO MEAS - MV P1/2T: 67.1 MSEC
BH CV ECHO MEAS - MV V2 VTI: 33.9 CM
BH CV ECHO MEAS - MVA(P1/2T): 3.3 CM2
BH CV ECHO MEAS - MVA(VTI): 2.05 CM2
BH CV ECHO MEAS - PA ACC TIME: 0.12 SEC
BH CV ECHO MEAS - PA V2 MAX: 124 CM/SEC
BH CV ECHO MEAS - RAP SYSTOLE: 15 MMHG
BH CV ECHO MEAS - RVSP: 57 MMHG
BH CV ECHO MEAS - SV(LVOT): 69.4 ML
BH CV ECHO MEAS - SV(MOD-SP2): 53.3 ML
BH CV ECHO MEAS - SV(MOD-SP4): 70.1 ML
BH CV ECHO MEAS - TAPSE (>1.6): 1.83 CM
BH CV ECHO MEAS - TR MAX PG: 42 MMHG
BH CV ECHO MEAS - TR MAX VEL: 309.5 CM/SEC
BH CV ECHO MEASUREMENTS AVERAGE E/E' RATIO: 10.1
BH CV LOWER VASCULAR LEFT COMMON FEMORAL AUGMENT: NORMAL
BH CV LOWER VASCULAR LEFT COMMON FEMORAL COMPRESS: NORMAL
BH CV LOWER VASCULAR LEFT COMMON FEMORAL PHASIC: NORMAL
BH CV LOWER VASCULAR LEFT COMMON FEMORAL SPONT: NORMAL
BH CV LOWER VASCULAR LEFT MID FEMORAL AUGMENT: NORMAL
BH CV LOWER VASCULAR LEFT MID FEMORAL COMPRESS: NORMAL
BH CV LOWER VASCULAR LEFT MID FEMORAL PHASIC: NORMAL
BH CV LOWER VASCULAR LEFT MID FEMORAL SPONT: NORMAL
BH CV LOWER VASCULAR LEFT POPLITEAL AUGMENT: NORMAL
BH CV LOWER VASCULAR LEFT POPLITEAL COMPRESS: NORMAL
BH CV LOWER VASCULAR LEFT POPLITEAL PHASIC: NORMAL
BH CV LOWER VASCULAR LEFT POPLITEAL SPONT: NORMAL
BH CV LOWER VASCULAR LEFT PROFUNDA FEMORAL COMPRESS: NORMAL
BH CV LOWER VASCULAR LEFT PROXIMAL FEMORAL COMPRESS: NORMAL
BH CV LOWER VASCULAR LEFT SAPHENOFEMORAL JUNCTION COMPRESS: NORMAL
BH CV LOWER VASCULAR RIGHT COMMON FEMORAL AUGMENT: NORMAL
BH CV LOWER VASCULAR RIGHT COMMON FEMORAL COMPRESS: NORMAL
BH CV LOWER VASCULAR RIGHT COMMON FEMORAL PHASIC: NORMAL
BH CV LOWER VASCULAR RIGHT COMMON FEMORAL SPONT: NORMAL
BH CV LOWER VASCULAR RIGHT MID FEMORAL AUGMENT: NORMAL
BH CV LOWER VASCULAR RIGHT MID FEMORAL COMPRESS: NORMAL
BH CV LOWER VASCULAR RIGHT MID FEMORAL PHASIC: NORMAL
BH CV LOWER VASCULAR RIGHT MID FEMORAL SPONT: NORMAL
BH CV LOWER VASCULAR RIGHT POPLITEAL AUGMENT: NORMAL
BH CV LOWER VASCULAR RIGHT POPLITEAL COMPRESS: NORMAL
BH CV LOWER VASCULAR RIGHT POPLITEAL PHASIC: NORMAL
BH CV LOWER VASCULAR RIGHT POPLITEAL SPONT: NORMAL
BH CV LOWER VASCULAR RIGHT PROFUNDA FEMORAL COMPRESS: NORMAL
BH CV LOWER VASCULAR RIGHT PROXIMAL FEMORAL COMPRESS: NORMAL
BH CV LOWER VASCULAR RIGHT SAPHENOFEMORAL JUNCTION COMPRESS: NORMAL
BH CV XLRA - RV BASE: 3.9 CM
BH CV XLRA - RV LENGTH: 9.8 CM
BH CV XLRA - RV MID: 3.5 CM
BH CV XLRA - TDI S': 11.3 CM/SEC
BUN SERPL-MCNC: 15 MG/DL (ref 6–20)
BUN SERPL-MCNC: 19 MG/DL (ref 6–20)
BUN/CREAT SERPL: 23.1 (ref 7–25)
BUN/CREAT SERPL: 25 (ref 7–25)
CALCIUM SPEC-SCNC: 8.9 MG/DL (ref 8.6–10.5)
CALCIUM SPEC-SCNC: 9.1 MG/DL (ref 8.6–10.5)
CHLORIDE SERPL-SCNC: 89 MMOL/L (ref 98–107)
CHLORIDE SERPL-SCNC: 94 MMOL/L (ref 98–107)
CO2 SERPL-SCNC: 39 MMOL/L (ref 22–29)
CO2 SERPL-SCNC: 43 MMOL/L (ref 22–29)
CREAT SERPL-MCNC: 0.65 MG/DL (ref 0.57–1)
CREAT SERPL-MCNC: 0.76 MG/DL (ref 0.57–1)
EGFRCR SERPLBLD CKD-EPI 2021: 103.5 ML/MIN/1.73
EGFRCR SERPLBLD CKD-EPI 2021: 92.1 ML/MIN/1.73
GLUCOSE BLDC GLUCOMTR-MCNC: 115 MG/DL (ref 70–130)
GLUCOSE BLDC GLUCOMTR-MCNC: 132 MG/DL (ref 70–130)
GLUCOSE BLDC GLUCOMTR-MCNC: 154 MG/DL (ref 70–130)
GLUCOSE BLDC GLUCOMTR-MCNC: 331 MG/DL (ref 70–130)
GLUCOSE SERPL-MCNC: 111 MG/DL (ref 65–99)
GLUCOSE SERPL-MCNC: 150 MG/DL (ref 65–99)
LEFT ATRIUM VOLUME INDEX: 34 ML/M2
LV EF 2D ECHO EST: 60 %
MAGNESIUM SERPL-MCNC: 2.2 MG/DL (ref 1.6–2.6)
POTASSIUM SERPL-SCNC: 3.7 MMOL/L (ref 3.5–5.2)
POTASSIUM SERPL-SCNC: 4 MMOL/L (ref 3.5–5.2)
SODIUM SERPL-SCNC: 139 MMOL/L (ref 136–145)
SODIUM SERPL-SCNC: 139 MMOL/L (ref 136–145)

## 2023-07-23 PROCEDURE — 94799 UNLISTED PULMONARY SVC/PX: CPT

## 2023-07-23 PROCEDURE — 93306 TTE W/DOPPLER COMPLETE: CPT

## 2023-07-23 PROCEDURE — 99232 SBSQ HOSP IP/OBS MODERATE 35: CPT | Performed by: INTERNAL MEDICINE

## 2023-07-23 PROCEDURE — 63710000001 INSULIN LISPRO (HUMAN) PER 5 UNITS: Performed by: NURSE PRACTITIONER

## 2023-07-23 PROCEDURE — 80048 BASIC METABOLIC PNL TOTAL CA: CPT | Performed by: NURSE PRACTITIONER

## 2023-07-23 PROCEDURE — 25010000002 ENOXAPARIN PER 10 MG: Performed by: NURSE PRACTITIONER

## 2023-07-23 PROCEDURE — 93306 TTE W/DOPPLER COMPLETE: CPT | Performed by: INTERNAL MEDICINE

## 2023-07-23 PROCEDURE — 25010000002 DAPTOMYCIN PER 1 MG

## 2023-07-23 PROCEDURE — 97161 PT EVAL LOW COMPLEX 20 MIN: CPT

## 2023-07-23 PROCEDURE — 80048 BASIC METABOLIC PNL TOTAL CA: CPT | Performed by: INTERNAL MEDICINE

## 2023-07-23 PROCEDURE — 83735 ASSAY OF MAGNESIUM: CPT | Performed by: INTERNAL MEDICINE

## 2023-07-23 PROCEDURE — 25010000002 CHLOROTHIAZIDE PER 500 MG: Performed by: INTERNAL MEDICINE

## 2023-07-23 PROCEDURE — 97530 THERAPEUTIC ACTIVITIES: CPT

## 2023-07-23 PROCEDURE — 82948 REAGENT STRIP/BLOOD GLUCOSE: CPT

## 2023-07-23 PROCEDURE — 94664 DEMO&/EVAL PT USE INHALER: CPT

## 2023-07-23 RX ORDER — HYDROXYZINE HYDROCHLORIDE 25 MG/1
25 TABLET, FILM COATED ORAL 3 TIMES DAILY PRN
Status: DISCONTINUED | OUTPATIENT
Start: 2023-07-23 | End: 2023-08-02 | Stop reason: HOSPADM

## 2023-07-23 RX ADMIN — ALBUTEROL SULFATE 2.5 MG: 2.5 SOLUTION RESPIRATORY (INHALATION) at 05:13

## 2023-07-23 RX ADMIN — EMPAGLIFLOZIN 10 MG: 10 TABLET, FILM COATED ORAL at 08:31

## 2023-07-23 RX ADMIN — METOPROLOL SUCCINATE 100 MG: 100 TABLET, EXTENDED RELEASE ORAL at 08:31

## 2023-07-23 RX ADMIN — CHLOROTHIAZIDE SODIUM 500 MG: 500 INJECTION, POWDER, LYOPHILIZED, FOR SOLUTION INTRAVENOUS at 07:44

## 2023-07-23 RX ADMIN — INSULIN LISPRO 5 UNITS: 100 INJECTION, SOLUTION INTRAVENOUS; SUBCUTANEOUS at 12:02

## 2023-07-23 RX ADMIN — DAPTOMYCIN 550 MG: 500 INJECTION, POWDER, LYOPHILIZED, FOR SOLUTION INTRAVENOUS at 13:59

## 2023-07-23 RX ADMIN — LOSARTAN POTASSIUM 100 MG: 50 TABLET, FILM COATED ORAL at 08:31

## 2023-07-23 RX ADMIN — Medication 10 ML: at 20:26

## 2023-07-23 RX ADMIN — HYDROXYZINE HYDROCHLORIDE 25 MG: 25 TABLET, FILM COATED ORAL at 20:25

## 2023-07-23 RX ADMIN — ACETAMINOPHEN 650 MG: 325 TABLET, FILM COATED ORAL at 03:05

## 2023-07-23 RX ADMIN — Medication 10 ML: at 08:32

## 2023-07-23 RX ADMIN — SENNOSIDES AND DOCUSATE SODIUM 2 TABLET: 50; 8.6 TABLET ORAL at 20:26

## 2023-07-23 RX ADMIN — SPIRONOLACTONE 50 MG: 25 TABLET ORAL at 08:31

## 2023-07-23 RX ADMIN — Medication 1 CAPSULE: at 08:31

## 2023-07-23 RX ADMIN — BUMETANIDE 0.5 MG/HR: 0.25 INJECTION, SOLUTION INTRAMUSCULAR; INTRAVENOUS at 08:45

## 2023-07-23 RX ADMIN — SENNOSIDES AND DOCUSATE SODIUM 2 TABLET: 50; 8.6 TABLET ORAL at 08:31

## 2023-07-23 RX ADMIN — ENOXAPARIN SODIUM 60 MG: 60 INJECTION SUBCUTANEOUS at 20:26

## 2023-07-23 RX ADMIN — ENOXAPARIN SODIUM 60 MG: 60 INJECTION SUBCUTANEOUS at 08:31

## 2023-07-23 RX ADMIN — BUMETANIDE 2 MG: 0.25 INJECTION, SOLUTION INTRAMUSCULAR; INTRAVENOUS at 08:31

## 2023-07-23 RX ADMIN — ALBUTEROL SULFATE 2.5 MG: 2.5 SOLUTION RESPIRATORY (INHALATION) at 19:11

## 2023-07-23 NOTE — THERAPY EVALUATION
Patient Name: Kerry Leal  : 1965    MRN: [id number]                              Today's Date: 2023       Admit Date: 2023    Visit Dx:     ICD-10-CM ICD-9-CM   1. Acute respiratory failure with hypoxia and hypercapnia  J96.01 518.81    J96.02    2. Respiratory acidosis  E87.29 276.2   3. Cellulitis of lower extremity, unspecified laterality  L03.119 682.6   4. Obesity hypoventilation syndrome  E66.2 278.03   5. Lymphedema of both lower extremities  I89.0 457.1   6. Type 2 diabetes mellitus with hyperglycemia, without long-term current use of insulin  E11.65 250.00     790.29   7. Morbid obesity with BMI of 60.0-69.9, adult  E66.01 278.01    Z68.44 V85.44     Patient Active Problem List   Diagnosis    Essential hypertension    Lymphedema of both lower extremities    Elevated d-dimer    Acute on chronic respiratory failure with hypoxia and hypercapnia    Type 2 diabetes mellitus    Morbid obesity with BMI of 60.0-69.9, adult    Right-sided congestive heart failure    Obesity hypoventilation syndrome    Bilateral cellulitis of lower leg    Lower extremity cellulitis     Past Medical History:   Diagnosis Date    Anxiety     Asthma     Heart murmur     Hypertension      Past Surgical History:   Procedure Laterality Date    APPENDECTOMY       SECTION      HYSTERECTOMY      NECK SURGERY        General Information       Row Name 23 1104          Physical Therapy Time and Intention    Document Type evaluation  -LO     Mode of Treatment individual therapy;physical therapy  -LO       Row Name 23 1104          General Information    Patient Profile Reviewed yes  -LO     Prior Level of Function independent:;gait;transfer;bed mobility  reports ambulated short distances with SPC  -LO     Existing Precautions/Restrictions fall;oxygen therapy device and L/min;other (see comments)  monitor O2, significant swelling BLE limiting mobility, BLE cellulitis  -LO     Barriers to Rehab medically  complex;previous functional deficit;impaired sensation  -LO       Row Name 07/23/23 1104          Living Environment    People in Home child(lesley), adult;grandchild(lesley)  -LO       Row Name 07/23/23 1104          Home Main Entrance    Number of Stairs, Main Entrance four  -LO     Stair Railings, Main Entrance railings safe and in good condition;railing on left side (ascending)  -LO       Row Name 07/23/23 1104          Stairs Within Home, Primary    Number of Stairs, Within Home, Primary none  -LO       Row Name 07/23/23 1104          Cognition    Orientation Status (Cognition) oriented x 4  -LO       Row Name 07/23/23 1104          Safety Issues, Functional Mobility    Safety Issues Affecting Function (Mobility) friction/shear risk  -LO     Impairments Affecting Function (Mobility) balance;endurance/activity tolerance;pain;range of motion (ROM);sensation/sensory awareness;shortness of breath;strength  -LO     Comment, Safety Issues/Impairments (Mobility) severe BLE swelling limiting mobility  -LO               User Key  (r) = Recorded By, (t) = Taken By, (c) = Cosigned By      Initials Name Provider Type    Anamaria Shankar, PT Physical Therapist                   Mobility       Row Name 07/23/23 1107          Bed Mobility    Comment, (Bed Mobility) unable to assess this date, patient SOA with conversation on 5 liters of O2 with O2 sats at 90%  -LO       Row Name 07/23/23 1107          Transfers    Comment, (Transfers) unable to assess this date, patient SOA with conversation on 5 liters of O2 with O2 sats at 90%  -LO       Row Name 07/23/23 1107          Bed-Chair Transfer    Bed-Chair [place] (Transfers) unable to assess  -LO       Row Name 07/23/23 1107          Sit-Stand Transfer    Sit-Stand [place] (Transfers) unable to assess  -LO       Row Name 07/23/23 1107          Gait/Stairs (Locomotion)    [place] Level (Gait) unable to assess  -LO     [place] Level (Stairs) unable to assess  -LO      Comment, (Gait/Stairs) unable to assess this date, patient SOA with conversation on 5 liters of O2 with O2 sats at 90%  -               User Key  (r) = Recorded By, (t) = Taken By, (c) = Cosigned By      Initials Name Provider Type    Anamaria Shankar PT Physical Therapist                   Obj/Interventions       Row Name 07/23/23 1109          Range of Motion Comprehensive    General Range of Motion lower extremity range of motion deficits identified  -     Comment, General Range of Motion BLE ROM limited by swelling, ROM at RLE hip/knee/ankle limited by approx 25%, LLE hip, knee, ankle by 50%  -       Row Name 07/23/23 1109          Strength Comprehensive (MMT)    General Manual Muscle Testing (MMT) Assessment lower extremity strength deficits identified  -LO     Comment, General Manual Muscle Testing (MMT) Assessment limited by extent of swelling, BLE grossly 3-/5  -LO       Row Name 07/23/23 1109          Motor Skills    Motor Skills functional endurance  -     Functional Endurance poor, SOA with conversation on 5 liters of O2 with sats at 90%  -       Row Name 07/23/23 1109          Sensory Assessment (Somatosensory)    Sensory Assessment (Somatosensory) unable/difficult to assess  -               User Key  (r) = Recorded By, (t) = Taken By, (c) = Cosigned By      Initials Name Provider Type    Anamaria Shankar PT Physical Therapist                   Goals/Plan       Row Name 07/23/23 1114          Bed Mobility Goal 1 (PT)    Activity/Assistive Device (Bed Mobility Goal 1, PT) rolling to left;rolling to right;scooting;sit to supine;supine to sit  -     [place] Level/Cues Needed (Bed Mobility Goal 1, PT) moderate assist (50-74% patient effort)  -     Time Frame (Bed Mobility Goal 1, PT) long term goal (LTG);10 days  -       Row Name 07/23/23 1114          Transfer Goal 1 (PT)    Activity/Assistive Device (Transfer Goal 1, PT) sit-to-stand/stand-to-sit;bed-to-chair/chair-to-bed;car  transfer  -LO     [place] Level/Cues Needed (Transfer Goal 1, PT) moderate assist (50-74% patient effort)  -LO     Time Frame (Transfer Goal 1, PT) long term goal (LTG);10 days  -LO       Row Name 07/23/23 1114          Gait Training Goal 1 (PT)    Activity/Assistive Device (Gait Training Goal 1, PT) gait (walking locomotion);increase endurance/gait distance;improve balance and speed;forward stepping;diminish gait deviation;decrease fall risk;walker, rolling  -LO     [place] Level (Gait Training Goal 1, PT) moderate assist (50-74% patient effort)  -LO     Distance (Gait Training Goal 1, PT) 10  -LO     Time Frame (Gait Training Goal 1, PT) long term goal (LTG);10 days  -       Row Name 07/23/23 1114          Stairs Goal 1 (PT)    Activity/Assistive Device (Stairs Goal 1, PT) ascending stairs;descending stairs;using handrail, left  -LO     [place] Level/Cues Needed (Stairs Goal 1, PT) moderate assist (50-74% patient effort)  -LO     Number of Stairs (Stairs Goal 1, PT) 4  -LO     Time Frame (Stairs Goal 1, PT) long term goal (LTG);10 days  -LO       Row Name 07/23/23 1114          Therapy Assessment/Plan (PT)    Planned Therapy Interventions (PT) balance training;bed mobility training;gait training;home exercise program;neuromuscular re-education;patient/family education;ROM (range of motion);stair training;strengthening;transfer training  -LO               User Key  (r) = Recorded By, (t) = Taken By, (c) = Cosigned By      Initials Name Provider Type    Anamaria Shankar, PT Physical Therapist                   Clinical Impression       Row Name 07/23/23 1110          Pain    Additional Documentation Pain Scale: FACES Pre/Post-Treatment (Group)  -       Row Name 07/23/23 1110          Pain Scale: FACES Pre/Post-Treatment    Pain: FACES Scale, Pretreatment 2-->hurts little bit  -LO     Posttreatment Pain Rating 2-->hurts little bit  -LO     Pain Location - Side/Orientation Bilateral  -LO     Pain  Location lower  -LO     Pain Location - extremity  -LO       Row Name 07/23/23 1110          Plan of Care Review    Plan of Care Reviewed With patient  -LO     Outcome Evaluation PT eval completed. Patient alert and oriented x4. Presenting with deficits in general BLE strength/ROM, balance, and functional endurance effecting functional mobility below baseline. Very limited at this time by SOA and extent of BLE swelling. Patient will benefit from skilled IP PT services in order to address impairments for return to [facility]. Recommend SNF at [place].  -LO       Row Name 07/23/23 1110          Therapy Assessment/Plan (PT)    Rehab Potential (PT) good, to achieve stated therapy goals  -LO     Criteria for Skilled Interventions Met (PT) yes;meets criteria;skilled treatment is necessary  -LO     Therapy Frequency (PT) daily  -LO       Row Name 07/23/23 1110          Vital Signs    Pre Systolic BP Rehab 118  -LO     Pre Treatment Diastolic BP 65  -LO     Pretreatment Heart Rate (beats/min) 79  -LO     Pre SpO2 (%) 89  -LO     O2 Delivery Pre Treatment supplemental O2  -LO     Intra SpO2 (%) 90  -LO     O2 Delivery Intra Treatment supplemental O2  -LO     O2 Delivery Post Treatment supplemental O2  -LO     Pre Patient Position Supine  -LO     Intra Patient Position Supine  -LO     Post Patient Position Supine  -LO       Row Name 07/23/23 1110          Positioning and Restraints    Pre-Treatment Position in bed  -LO     Post Treatment Position bed  -LO     In Bed notified nsg;supine;fowlers;call light within reach;encouraged to call for assist;exit alarm on;legs elevated  -LO               User Key  (r) = Recorded By, (t) = Taken By, (c) = Cosigned By      Initials Name Provider Type    Anamaria Shankar, PT Physical Therapist                   Outcome Measures       Row Name 07/23/23 1116          How much help from another person do you currently need...    Turning from your back to your side while in flat bed without using bedrails?  2  -LO     Moving from lying on back to sitting on the side of a flat bed without bedrails? 1  -LO     Moving to and from a bed to a chair (including a wheelchair)? 1  -LO     Standing up from a chair using your arms (e.g., wheelchair, bedside chair)? 1  -LO     Climbing 3-5 steps with a railing? 1  -LO     To walk in hospital room? 1  -LO     AM-PAC 6 Clicks Score (PT) 7  -LO     Highest level of mobility 2 --> Bed activities/dependent transfer  -       Row Name 07/23/23 1116          Functional Assessment    Outcome Measure Options AM-PAC 6 Clicks Basic Mobility (PT)  -               User Key  (r) = Recorded By, (t) = Taken By, (c) = Cosigned By      Initials Name Provider Type     Anamaria Ravi, PT Physical Therapist                                 Physical Therapy Education       Title: PT OT SLP Therapies (Done)       Topic: Physical Therapy (Done)       Point: Mobility training (Done)       Learning Progress Summary             Patient Acceptance, E, VU,NR by  at 7/23/2023 1040    Comment: PT POC                         Point: Home exercise program (Done)       Learning Progress Summary             Patient Acceptance, E, VU,NR by  at 7/23/2023 1040    Comment: PT POC                         Point: Body mechanics (Done)       Learning Progress Summary             Patient Acceptance, E, VU,NR by  at 7/23/2023 1040    Comment: PT POC                         Point: Precautions (Done)       Learning Progress Summary             Patient Acceptance, E, VU,NR by  at 7/23/2023 1040    Comment: PT POC                                         User Key       Initials Effective Dates Name Provider Type Discipline     06/16/21 -  Anamaria Ravi, PT Physical Therapist PT                  PT Recommendation and Plan  Planned Therapy Interventions (PT): balance training, bed mobility training, gait training, home exercise program, neuromuscular re-education, patient/family education, ROM (range of motion), stair  training, strengthening, transfer training  Plan of Care Reviewed With: patient  Outcome Evaluation: PT eval completed. Patient alert and oriented x4. Presenting with deficits in general BLE strength/ROM, balance, and functional endurance effecting functional mobility below baseline. Very limited at this time by SOA and extent of BLE swelling. Patient will benefit from skilled IP PT services in order to address impairments for return to [facility]. Recommend SNF at [place].     Time Calculation:   PT Evaluation Complexity  History, PT Evaluation Complexity: 1-2 personal factors and/or comorbidities  Examination of Body Systems (PT Eval Complexity): 1-2 elements  Clinical Presentation (PT Evaluation Complexity): evolving  Clinical Decision Making (PT Evaluation Complexity): low complexity  Overall Complexity (PT Evaluation Complexity): low complexity     PT Charges       Row Name 07/23/23 1040             Time Calculation    Start Time 1040  -LO      PT Received On 07/23/23  -LO      PT Goal Re-Cert Due Date 08/02/23  -LO         Timed Charges    68175 - PT Therapeutic Activity Minutes 12  -LO         Untimed Charges    PT Eval/Re-eval Minutes 41  -LO         Total Minutes    Timed Charges Total Minutes 12  -LO      Untimed Charges Total Minutes 41  -LO       Total Minutes 53  -LO                User Key  (r) = Recorded By, (t) = Taken By, (c) = Cosigned By      Initials Name Provider Type    LO Anamaria Ravi, PT Physical Therapist                  Therapy Charges for Today       Code Description Service Date Service Provider Modifiers Qty    10589009060 HC PT THERAPEUTIC ACT EA 15 MIN 7/23/2023 Anamaria Ravi, PT GP 1    89218734360 HC PT EVAL LOW COMPLEXITY 3 7/23/2023 Anamaria Ravi, PT GP 1            PT G-Codes  Outcome Measure Options: AM-PAC 6 Clicks Basic Mobility (PT)  AM-PAC 6 Clicks Score (PT): 7       Anamaria Ravi PT  7/23/2023

## 2023-07-23 NOTE — PROGRESS NOTES
[facility] Medicine Services  PROGRESS NOTE    Patient Name: Kerry Leal  : 1965  MRN: [id number]    Date of Admission: 2023  Primary Care Physician: Gopal Kong MD    Subjective   Subjective     CC:  F/u heart failure    HPI:  Urinating a lot, not sure if she feels better today    ROS:  Gen- No fevers, chills  CV- No chest pain, palpitations  Resp- No cough, dyspnea  GI- No N/V/D, abd pain     Objective   Objective     Vital Signs:   Temp:  [97.7 øF (36.5 øC)-98.3 øF (36.8 øC)] 97.8 øF (36.6 øC)  Heart Rate:  [75-94] 77  Resp:  [17-22] 20  BP: (111-155)/(7-110) 118/65  Flow (L/min):  [6] 6     Physical Exam:  Constitutional: Awake, alert, morbidly obese female laying in bed in NAD  HENT: NCAT, mucous membranes moist  Respiratory: Clear to auscultation bilaterally, accessory muscle use  Cardiovascular: RRR, palpable radial pulses  Gastrointestinal: Positive bowel sounds, soft, nontender, nondistended  Musculoskeletal: Severe BL LE edema  Psychiatric: Appropriate affect, cooperative  Neurologic: Speech clear    Results Reviewed:  LAB RESULTS:      Lab 23  1204 23  0015   WBC 8.15 6.79   HEMOGLOBIN 11.7* 11.8*   HEMATOCRIT 40.7 40.5   PLATELETS 250 250   NEUTROS ABS 6.46 4.64   IMMATURE GRANS (ABS) 0.04 0.07*   LYMPHS ABS 0.71 1.06   MONOS ABS 0.88 0.84   EOS ABS 0.03 0.15   MCV 91.3 91.8   CRP  --  7.00*   PROCALCITONIN  --  0.03   LACTATE  --  1.2   D DIMER QUANT  --  3.22*         Lab 23  0539 23  1204 23  0015   SODIUM 139 141 140   POTASSIUM 3.7 4.1 4.3   CHLORIDE 94* 99 99   CO2 39.0* 36.0* 33.0*   ANION GAP 6.0 6.0 8.0   BUN 15 13 16   CREATININE 0.65 0.62 0.73   EGFR 103.5 104.7 96.7   GLUCOSE 150* 143* 141*   CALCIUM 8.9 8.5* 9.0   MAGNESIUM  --   --  2.2   PHOSPHORUS  --   --  3.3   HEMOGLOBIN A1C  --  7.00*  --    TSH  --   --  1.660         Lab 23  0015   TOTAL PROTEIN 7.7   ALBUMIN 3.3*   GLOBULIN 4.4   ALT (SGPT) 14    AST (SGOT) 22   BILIRUBIN 0.7   ALK PHOS 76         Lab 07/22/23  0625 07/22/23  0427 07/22/23  0015   PROBNP  --   --  427.0   HSTROP T 42* 36* 16*                 Lab 07/22/23  0638 07/22/23  0431 07/22/23  0244   PH, ARTERIAL 7.411  --   --    PCO2, ARTERIAL 57.3*  --   --    PO2 ART 95.4  --   --    FIO2 50 60 80   HCO3 ART 36.4*  --   --    BASE EXCESS ART 9.8*  --   --    CARBOXYHEMOGLOBIN 1.4  --   --    CARBOXYHEMOGLOBIN (VENOUS)  --  1.4 1.6     Brief Urine Lab Results  (Last result in the past 365 days)        Color   Clarity   Blood   Leuk Est   Nitrite   Protein   CREAT   Urine HCG        07/22/23 0426 Yellow   Clear   Negative   Negative   Negative   Negative                   Microbiology Results Abnormal       Procedure Component Value - Date/Time    Blood Culture - Blood, Arm, Left [[id number]]  (Normal) Collected: 07/22/23 0142    Lab Status: Preliminary result Specimen: Blood from Arm, Left Updated: 07/23/23 0800     Blood Culture No growth at 24 hours    Blood Culture - Blood, Arm, Right [[id number]]  (Normal) Collected: 07/22/23 0142    Lab Status: Preliminary result Specimen: Blood from Arm, Right Updated: 07/23/23 0800     Blood Culture No growth at 24 hours            XR Chest 1 View    Result Date: 7/22/2023  XR CHEST 1 VW Date of Exam: 7/22/2023 12:54 AM EDT Indication: hypoxia, BL wet rhonchi Comparison: 4/3/2023. Findings: The heart appears enlarged. There is indistinctness of the pulmonary vasculature. Probable small left-sided pleural effusion present. Patchy airspace changes present within the lung bases bilaterally. No pneumothorax. No acute osseous abnormality identified.     Impression: Impression: Mild to moderate pulmonary edema pattern with small left-sided pleural effusion with bibasilar atelectasis. Stable cardiomegaly. Electronically Signed: Naomi Freitas  7/22/2023 1:08 AM EDT  Workstation ID: [id number]    CT Lower Extremity Bilateral With Contrast    Result Date: 7/22/2023  CT  LOWER EXTREMITY BILATERAL W CONTRAST Date of Exam: 7/22/2023 1:56 AM EDT Indication: edema. Comparison: 7/5/2021. Technique: Axial CT images were obtained of the bilateral lower extremities after the uneventful intravenous administration of intravenous contrast.  Reconstructed coronal and sagittal images were also obtained. Automated exposure control and iterative construction methods were used. Findings: Diffuse soft tissue swelling is present bilaterally with skin thickening, left greater than right. No focal subcutaneous collection identified. No evidence of focal mass. No knee joint or ankle joint effusions identified. Musculature appears unremarkable. No evidence of myofascial defect identified. The osseous structures appear intact. No evidence of fracture. No evidence of dislocation. No evidence of periostitis. No evidence of focal lesions. Limited evaluation of the lower pelvis demonstrates no acute process. Limited evaluation due to body habitus. No significant free fluid identified. No evidence of focal osseous lesions. Moderate degenerative changes are present within the bilateral knee joints and hip joints. Linear artifact seen extending through the proximal left tibial diaphysis related to motion artifact. Mild degenerative changes are present within the ankle joints bilaterally. Limited evaluation of the vascular structures due to nonangiographic technique demonstrates no focal abnormalities. Varicosities are present. No obvious arterial thrombus identified. Venous structures are not well evaluated.     Impression: Impression: Diffuse subcutaneous edema present bilaterally, left greater than right likely related to third spacing of fluid. No focal drainable collection identified. No acute osseous abnormality. No suspicious joint effusion. Nonangiographic evaluation of the arterial structures demonstrates no obvious abnormalities. Venous structures are not well evaluated though multiple varicosities are  present. Electronically Signed: Naomi Freitas  7/22/2023 2:35 AM EDT  Workstation ID: [id number]     Results for orders placed during the hospital encounter of 10/22/22    Adult Transthoracic Echo Complete W/ Cont if Necessary Per Protocol    Interpretation Summary    Left ventricular ejection fraction appears to be 51 - 55%.    Left ventricular wall thickness is consistent with mild concentric hypertrophy.    Mild aortic valve regurgitation is present. Mild aortic valve stenosis is present.    Mild tricuspid valve regurgitation is present. Estimated right ventricular systolic pressure from tricuspid regurgitation is moderately elevated (45-55 mmHg).      Current medications:  Scheduled Meds:DAPTOmycin, 6 mg/kg (Adjusted), Intravenous, Q24H  empagliflozin, 10 mg, Oral, Daily  enoxaparin, 60 mg, Subcutaneous, Q12H  insulin lispro, 2-7 Units, Subcutaneous, 4x Daily AC & at Bedtime  lactobacillus acidophilus, 1 capsule, Oral, Daily  losartan, 100 mg, Oral, Daily  metoprolol succinate XL, 100 mg, Oral, Daily  senna-docusate sodium, 2 tablet, Oral, BID  sodium chloride, 10 mL, Intravenous, Q12H  spironolactone, 50 mg, Oral, Daily      Continuous Infusions:bumetanide, 0.5 mg/hr, Last Rate: 0.5 mg/hr (07/23/23 0845)      PRN Meds:.  acetaminophen **OR** acetaminophen **OR** acetaminophen    albuterol    senna-docusate sodium **AND** polyethylene glycol **AND** bisacodyl **AND** bisacodyl    dextrose    dextrose    glucagon (human recombinant)    nitroglycerin    sodium chloride    sodium chloride    sodium chloride    Assessment & Plan   Assessment & Plan     Active Hospital Problems    Diagnosis  POA    **Lower extremity cellulitis [L03.119]  Yes    Bilateral cellulitis of lower leg [L03.116, L03.115]  Yes    Obesity hypoventilation syndrome [E66.2]  Yes    Right-sided congestive heart failure [I50.810]  Yes    Morbid obesity with BMI of 60.0-69.9, adult [E66.01, Z68.44]  Not Applicable    Acute on chronic respiratory failure  with hypoxia and hypercapnia [J96.21, J96.22]  Yes    Type 2 diabetes mellitus [E11.9]  Yes    Elevated d-dimer [R79.89]  Yes    Essential hypertension [I10]  Yes    Lymphedema of both lower extremities [I89.0]  Yes      Resolved Hospital Problems    Diagnosis Date Resolved POA    Accelerated hypertension [I10] 07/22/2023 Yes        Brief Hospital Course to date:  Kerry Leal is a 58 y.o. female w/ super morbid obesity, DM2, HTN, chronic LE lymphedema, chronic hypoxia who presented w/ worsening LT LE pain; while having CT of her leg she had acute hypercapnic respiratory failure requiring BIPAP; she was admitted, started on Abx, and seen by cardiology for diuresis    The following problems are new to me today    Assessment/Plan    Acute/chronic hypercapnic respiratory failure  Chronic hypoxia  Suspected OHS/restrictive lung disease  Pulmonary hypertension  Acute HFpEF  -s/p BIPAP, on nasal cannula today but requiring substantial O2  -repeat TTE pending  -started on jardiance and aldactone this admit  -cards follows, managing IV diuretics    Chronic BL LE lymphedema w/ stasis dermatitis, possible superimposed LT LE cellulitis  -d/w ID, cont empiric daptomycin and monitor    HTN  -cont losartan, toprol XL, aldactone    DM type 2, A1c 7.0%, w/o long term use of insulin  -ssi    Super morbid obesity, BMI 62.11 kg/m2  -complicates all aspects of care, directly linked to primary diagnosis/problem      Expected Discharge Location and Transportation: tbd pending mobility post diuresis  Expected Discharge   Expected Discharge Date: 7/27/2023; Expected Discharge Time:      DVT prophylaxis:  Medical DVT prophylaxis orders are present.     AM-PAC 6 Clicks Score (PT): 7 (07/23/23 1526)    CODE STATUS:   Code Status and Medical Interventions:   Ordered at: 07/22/23 0516     Level Of Support Discussed With:    Patient     Code Status (Patient has no pulse and is not breathing):    CPR (Attempt to Resuscitate)     Medical  Interventions (Patient has pulse or is breathing):    Full Support     Release to patient:    Routine Release       Berry Wall,   07/23/23

## 2023-07-23 NOTE — PROGRESS NOTES
Patient Name: Kerry Leal  : 1965  MRN: [id number]    Admission Date: 2023    Requesting Provider: Dixie  Evaluating Physician: RAD Bruner    Chief Complaint: cellulitis    Reason for Consultation: cellulitis    Subjective   Patient is a 58 y.o. female with history of hypertension, asthma and chronic lymphedema.  Previous seen by Dr. Shine Rodriguez and previously treated with dalbavancin and then suppressive Keflex.  Last seen in the hospital 2023 and at that time treated with daptomycin and ceftriaxone and discharged to outpatient follow-up.  Seen in clinic in May with plans to continue chronic suppressive Keflex.  At some point after last office visit patient called [facility] saying that she had a rash under her chin and she has been off antibiotics since then.    Admitted to [facility] 2023 with complaints of worsening left lower extremity swelling and pain.  Denied fever.  Ongoing for CT emergency department CODE BLUE was called however per H&P patient did not lose a pulse and no CPR was performed.  No drainable fluid collection.  Started on daptomycin and ceftriaxone and admitted.  Feels like she is improving slightly compared to admission    23:  No fever.  Blood cultures pending this am.  Resting comfortably.  Feels like leg swelling, pain improving.  No new issues per nursing    ROS:  No fevers or chills. No n/v/d. No rash. No new ADR to Abx.       Allergies   Allergen Reactions    Diclofenac Swelling    Benadryl [Diphenhydramine Hcl] Other (See Comments)     UNSURE    Clindamycin/Lincomycin Other (See Comments)     UNSURE    Doxycycline Other (See Comments)     UNSURE    Fluticasone Other (See Comments)     NOSE BLEEDS    Fluvoxamine Other (See Comments)     UNSURE    Montelukast Swelling    Symbicort [Budesonide-Formoterol Fumarate] Other (See Comments)     UNSURE    Lisinopril Dizziness    Penicillins Other (See Comments)     MOTHER TOLD HER SHE WAS  ALLERGIC  *has tolerated ceftriaxone    Smz-Tmp Ds [Sulfamethoxazole-Trimethoprim] GI Intolerance    Sulfa Antibiotics GI Intolerance       Objective   Antibiotics:  Anti-Infectives (From admission, onward)      Ordered     Dose/Rate Route Frequency Start Stop    07/22/23 1344  DAPTOmycin (CUBICIN) 550 mg in sodium chloride 0.9 % 50 mL IVPB        Ordering Provider: Markos Dalton MD    6 mg/kg x 95 kg (Adjusted)  100 mL/hr over 30 Minutes Intravenous Every 24 Hours 07/23/23 1100 07/26/23 1059    07/22/23 0053  cefTRIAXone (ROCEPHIN) 2000 mg/100 mL 0.9% NS IVPB (MBP)        Ordering Provider: Darion Simental MD    2,000 mg  over 30 Minutes Intravenous Once 07/22/23 0109 07/22/23 0333            Other Medications:  Current Facility-Administered Medications   Medication Dose Route Frequency Provider Last Rate Last Admin    acetaminophen (TYLENOL) tablet 650 mg  650 mg Oral Q4H PRN Raquel, Hansa, APRN   650 mg at 07/23/23 0305    Or    acetaminophen (TYLENOL) 160 MG/5ML solution 650 mg  650 mg Oral Q4H PRN Raquel, Hansa, APRN        Or    acetaminophen (TYLENOL) suppository 650 mg  650 mg Rectal Q4H PRN Raquel, Hansa, APRN        albuterol (PROVENTIL) nebulizer solution 0.083% 2.5 mg/3mL  2.5 mg Nebulization Q4H PRN Raquel, Hansa, APRN   2.5 mg at 07/23/23 0513    sennosides-docusate (PERICOLACE) 8.6-50 MG per tablet 2 tablet  2 tablet Oral BID Raquel, Hansa, APRN   2 tablet at 07/22/23 2055    And    polyethylene glycol (MIRALAX) packet 17 g  17 g Oral Daily PRN Raquel, Hansa, APRN        And    bisacodyl (DULCOLAX) EC tablet 5 mg  5 mg Oral Daily PRN Raquel, Hansa, APRN        And    bisacodyl (DULCOLAX) suppository 10 mg  10 mg Rectal Daily PRN Raquel, Hansa, APRN        bumetanide (BUMEX) 25 mg/100mL (0.25 mg/mL) infusion  0.5 mg/hr Intravenous Continuous Puckett, Jorge Jonel T IV, MD        bumetanide (BUMEX) injection 2 mg  2 mg Intravenous Once Jorge Puckett  CARMEN, MD        chlorothiazide (DIURIL) 500 mg in sterile water (preservative free) 18 mL injection  500 mg Intravenous Once Jorge Puckett IV, MD        DAPTOmycin (CUBICIN) 550 mg in sodium chloride 0.9 % 50 mL IVPB  6 mg/kg (Adjusted) Intravenous Q24H Markos Dalton MD        dextrose (D50W) (25 g/50 mL) IV injection 25 g  25 g Intravenous Q15 Min PRN Raquel, Hansa, APRN        dextrose (GLUTOSE) oral gel 15 g  15 g Oral Q15 Min PRN Raquel, Hansa, APRN        empagliflozin (JARDIANCE) tablet 10 mg  10 mg Oral Daily Jorge Puckett IV, MD   10 mg at 07/22/23 1755    Enoxaparin Sodium (LOVENOX) syringe 60 mg  60 mg Subcutaneous Q12H Raquel, Hansa, APRN   60 mg at 07/22/23 2055    glucagon (GLUCAGEN) injection 1 mg  1 mg Intramuscular Q15 Min PRN Raquel, Hansa, APRN        Insulin Lispro (humaLOG) injection 2-7 Units  2-7 Units Subcutaneous 4x Daily AC & at Bedtime Raquel, Hansa, APRN        lactobacillus acidophilus (RISAQUAD) capsule 1 capsule  1 capsule Oral Daily Markos Dalton MD   1 capsule at 07/22/23 1551    losartan (COZAAR) tablet 100 mg  100 mg Oral Daily Raquel, Hansa, APRN   100 mg at 07/22/23 1227    metoprolol succinate XL (TOPROL-XL) 24 hr tablet 100 mg  100 mg Oral Daily Raquel, Hansa, APRN   100 mg at 07/22/23 1227    nitroglycerin (NITROSTAT) SL tablet 0.4 mg  0.4 mg Sublingual Q5 Min PRN Raquel, Hansa, APRN        sodium chloride 0.9 % flush 10 mL  10 mL Intravenous PRN Darion Simental MD        sodium chloride 0.9 % flush 10 mL  10 mL Intravenous Q12H Raquel, Hansa, APRN   10 mL at 07/22/23 2055    sodium chloride 0.9 % flush 10 mL  10 mL Intravenous PRN Raquel, Hansa, APRN        sodium chloride 0.9 % infusion 40 mL  40 mL Intravenous PRN Raquel, Hansa, APRN        spironolactone (ALDACTONE) tablet 50 mg  50 mg Oral Daily Jorge Puckett IV, MD           Physical Exam:   Vital Signs   /79 (BP Location: Left  "arm, Patient Position: Lying)   Pulse 85   Temp 98 øF (36.7 øC) (Oral)   Resp 20   Ht 160 cm (62.99\")   Wt (!) 159 kg (350 lb 8.5 oz)   SpO2 92%   BMI 62.11 kg/mý     GENERAL:  sleeping , chronically ill-appearing  HEENT: Normocephalic, atraumatic.  PERRL. No conjunctival injection. No icterus.  Very poor dentition with many missing teeth  NECK: Supple   HEART: RRR; No murmur.  LUNGS: Clear to auscultation from anterior position   ABDOMEN: morbidly obese but soft  EXT: Chronic bilateral venous stasis dermatitis changes.  Some diffuse erythema on the left.  More focal linear area of erythema on right anterior leg/dermatitis.   No deep ulcerations, drainage.  Overall slight improvement bilaterally today   : Without Perdomo catheter.  SKIN: No diffuse rash  NEURO: AOx3    Laboratory Data  Lab Results   Component Value Date    WBC 8.15 07/22/2023    HGB 11.7 (L) 07/22/2023    HCT 40.7 07/22/2023    MCV 91.3 07/22/2023     07/22/2023     Lab Results   Component Value Date    GLUCOSE 150 (H) 07/23/2023    CALCIUM 8.9 07/23/2023     07/23/2023    K 3.7 07/23/2023    CO2 39.0 (H) 07/23/2023    CL 94 (L) 07/23/2023    BUN 15 07/23/2023    CREATININE 0.65 07/23/2023     Estimated Creatinine Clearance: 144.9 mL/min (by C-G formula based on SCr of 0.65 mg/dL).  Lab Results   Component Value Date    ALT 14 07/22/2023    AST 22 07/22/2023    ALKPHOS 76 07/22/2023    BILITOT 0.7 07/22/2023     Lab Results   Component Value Date    CRP 7.00 (H) 07/22/2023     Lab Results   Component Value Date    SEDRATE 120 (H) 04/13/2023       The above labs were reviewed.     Microbiology:  Microbiology Results (last 10 days)       ** No results found for the last 240 hours. **            Radiology:  XR Chest 1 View    Result Date: 7/22/2023  Impression: Mild to moderate pulmonary edema pattern with small left-sided pleural effusion with bibasilar atelectasis. Stable cardiomegaly. Electronically Signed: Naomi Freitas  7/22/2023 " 1:08 AM EDT  Workstation ID: [id number]    CT Lower Extremity Bilateral With Contrast    Result Date: 7/22/2023  Impression: Diffuse subcutaneous edema present bilaterally, left greater than right likely related to third spacing of fluid. No focal drainable collection identified. No acute osseous abnormality. No suspicious joint effusion. Nonangiographic evaluation of the arterial structures demonstrates no obvious abnormalities. Venous structures are not well evaluated though multiple varicosities are present. Electronically Signed: Naomi Freitas  7/22/2023 2:35 AM EDT  Workstation ID: [id number]     I personally reviewed the above CT left leg: no fluid collection noted    Chest x-ray with bibasilar pulmonary edema      Assessment   Acute on chronic bilateral lower extremity venous stasis swelling and dermatitis: Improving with diuresis and antibiotics  Left lower extremity swelling, erythema, tenderness, cellulitis  Right lower extremity focal erythema, possible cellulitis  Chronic bilateral lower extremity lymphedema  Morbid obesity  Bilateral pulmonary edema on x-ray  Chronic CHFpEF  HTN  Listed allergies to clindamycin, doxycycline, penicillin and sulfa  Patient reported rash with Keflex: about 2 months ago. Recently treated with ceftriaxone and had  another dose in ED with no side effects thus far.    PLAN:   - Microbiology data reviewed.  Follow-up pending blood cultures, so far negative  - Follow CBC, CMP, CK, CP    - Empiric daptomycin pending further work-up, clinical improvement.  Dose decreased to 550 mg daily  - Probiotic    Discussed with Dr. Wall.  Dr. Shine Rodriguez to resume care on Monday    Dr. Dalton obtained the history, performed the PE, formulated the above treatment miguel.     Natali Martinez, APRN  7/23/2023    I have edited this note to reflect my history, exam, assessment and plan. I alone have made the medical decision making for this complex patient.   Markos Dalton MD

## 2023-07-23 NOTE — PROGRESS NOTES
Cardiology Progress Note      Reason for visit:    Right-sided heart failure    IDENTIFICATION: 58-year-old female who resides in [place], [place]    Active Hospital Problems    Diagnosis  POA    **Acute on chronic respiratory failure with hypoxia and hypercapnia [J96.21, J96.22]  Yes    Lower extremity cellulitis [L03.119]  Yes     Priority: High    Right-sided congestive heart failure [I50.810]  Yes     Priority: High     Echo (7/6/2021): Test difficult due to body habitus.  LVEF 55%.  Mild AI.  No significant aortic stenosis with mean gradient 12 mmHg.  Mild dilation of ascending aorta 4 cm.  Echo (10/23/2022): LVEF 55%..  Mild AS/AR.  RVSP 45-55 mmHg  Echo (7/23/2023): LVEF 65%.  No significant valve abnormality.  RVSP 57 mmHg      Lymphedema of both lower extremities [I89.0]  Yes     Priority: Medium    Bilateral cellulitis of lower leg [L03.116, L03.115]  Yes    Obesity hypoventilation syndrome [E66.2]  Yes    Morbid obesity with BMI of 60.0-69.9, adult [E66.01, Z68.44]  Not Applicable    Type 2 diabetes mellitus [E11.9]  Yes    Elevated d-dimer [R79.89]  Yes    Essential hypertension [I10]  Yes          Breathing and lower extremity swelling improving  Presently on Bumex infusion         Vital Sign Min/Max for last 24 hours  Temp  Min: 97.7 øF (36.5 øC)  Max: 98.3 øF (36.8 øC)   BP  Min: 111/65  Max: 151/70   Pulse  Min: 75  Max: 94   Resp  Min: 17  Max: 22   SpO2  Min: 81 %  Max: 95 %   Flow (L/min)  Min: 6  Max: 6      Intake/Output Summary (Last 24 hours) at 7/23/2023 1354  Last data filed at 7/23/2023 1300  Gross per 24 hour   Intake --   Output 8750 ml   Net -8750 ml           Physical Exam  Constitutional:       Appearance: She is obese.   Cardiovascular:      Rate and Rhythm: Normal rate and regular rhythm.      Heart sounds: No murmur heard.  Pulmonary:      Effort: Pulmonary effort is normal.   Neurological:      Mental Status: She is alert.       Tele: Normal sinus rhythm    Results Review  (reviewed the patient's recent labs in the electronic medical record):     EKG (7/22/2023): Normal sinus rhythm, low voltage QRS ST depression lateral leads    CXR (7/22/2023): Mild to moderate pulmonary edema with small left-sided pleural effusion    ECHO (7/23/2023): Results pending    Results from last 7 days   Lab Units 07/23/23  0539 07/22/23  1204 07/22/23  0015   SODIUM mmol/L 139 141 140   POTASSIUM mmol/L 3.7 4.1 4.3   CHLORIDE mmol/L 94* 99 99   BUN mg/dL 15 13 16   CREATININE mg/dL 0.65 0.62 0.73   MAGNESIUM mg/dL  --   --  2.2     Results from last 7 days   Lab Units 07/22/23  0625 07/22/23  0427 07/22/23  0015   HSTROP T ng/L 42* 36* 16*     Results from last 7 days   Lab Units 07/22/23  1204 07/22/23  0015   WBC 10*3/mm3 8.15 6.79   HEMOGLOBIN g/dL 11.7* 11.8*   HEMATOCRIT % 40.7 40.5   PLATELETS 10*3/mm3 250 250       Lab Results   Component Value Date    HGBA1C 7.00 (H) 07/22/2023       Lab Results   Component Value Date    CHOL 134 03/31/2023    TRIG 89 03/31/2023    HDL 33 (L) 03/31/2023    LDL 84 03/31/2023              Acute on chronic diastolic and right-sided heart failure  Discontinue amlodipine due to swelling  IV Bumex today until 2100 hrs.     Hypertension  No CCB due to edema  Improving with diuresis     Lower extremity cellulitis/lymphedema  Improving with diuresis     Massive obesity  We discussed that her obesity is leading to much of her present illness i.e. right-sided heart failure, lymphedema/cellulitis  Semaglutide should be started after discharge for weight loss     Elevated D-dimer  VQ scan pending  Venous duplex of lower extremities preliminary negative for PE                Continue IV Bumex drip until 2100 hrs.  Reassess renal function in a.m.  Repeat IV Bumex infusion tomorrow if renal function stable  Semaglutide for weight loss at discharge strongly recommended    Electronically signed by Jorge Puckett IV, MD, 07/23/23, 1:56 PM EDT.

## 2023-07-23 NOTE — ED PROVIDER NOTES
EMERGENCY DEPARTMENT ENCOUNTER    Pt Name: Kerry Leal  MRN: [id number]  Pt :   1965  Room Number:  S335/1  Date of encounter:  2023  PCP: Gopal Kong MD  ED Provider: Darion Simental MD    Historian: EMS, patient      HPI:  Chief Complaint: Cellulitis        Context: Kerry Leal is a 58-year-old woman with morbid obesity, acquired lymphedema, history of recurrent cellulitis previously treated by infectious disease with daptomycin and ceftriaxone who presents because of 1 week of worsening cellulitis over her lower extremities.  She is having it on her right lower leg starting from about the mid calf down to the ankle.  She is also having it on the left extending from the knee to ankle and she also has some redness under her pannus and extending around her vagina.  She has not appreciated any fevers or systemic symptoms but was convinced to come here by her daughter because of the worsening infection.  She is agreeable to hospital admission for IV antibiotics and full infectious work-up.  Describes moderate pain.  She says she has been started on a water pill by the hospital when she was discharged last time but does not remember what it is or what the doses.  No other complaints at this time.      PAST MEDICAL HISTORY  Past Medical History:   Diagnosis Date    Anxiety     Asthma     Heart murmur     Hypertension          PAST SURGICAL HISTORY  Past Surgical History:   Procedure Laterality Date    APPENDECTOMY       SECTION      HYSTERECTOMY      NECK SURGERY           FAMILY HISTORY  Family History   Problem Relation Age of Onset    Diabetes Mother     Heart disease Mother     Breast cancer Neg Hx     Ovarian cancer Neg Hx          SOCIAL HISTORY  Social History     Socioeconomic History    Marital status:    Tobacco Use    Smoking status: Never     Passive exposure: Past    Smokeless tobacco: Never   Vaping Use    Vaping Use: Never used   Substance and Sexual  Activity    Alcohol use: No    Drug use: No    Sexual activity: Defer         ALLERGIES  Diclofenac, Benadryl [diphenhydramine hcl], Clindamycin/lincomycin, Doxycycline, Fluticasone, Fluvoxamine, Montelukast, Symbicort [budesonide-formoterol fumarate], Lisinopril, Penicillins, Smz-tmp ds [sulfamethoxazole-trimethoprim], and Sulfa antibiotics        REVIEW OF SYSTEMS  Review of Systems       All systems reviewed and negative except for those discussed in HPI.       PHYSICAL EXAM    I have reviewed the triage vital signs and nursing notes.    ED Triage Vitals   Temp Heart Rate Resp BP SpO2   07/22/23 0623 07/22/23 0230 07/22/23 0236 07/22/23 0230 07/22/23 0230   99.1 øF (37.3 øC) (!) 125 24 (!) 193/116 (!) 89 %      Temp src Heart Rate Source Patient Position BP Location FiO2 (%)   07/22/23 0623 07/22/23 0633 07/22/23 1100 07/22/23 1100 --   Axillary Monitor Lying Left arm        Physical Exam  GENERAL:   Appears morbidly obese, ill  HENT: Nares patent.  Nasal cannula in place  EYES: No scleral icterus.  CV: Regular tachycardia without appreciated murmurs rubs or gallops  RESPIRATORY: Coarse rhonchi without appreciated focal consolidation or rails  ABDOMEN: Obese, erythema and warmth under the pannus consistent with cellulitis  MUSCULOSKELETAL: Profound impressive lymphedema  NEURO: Alert, moves all extremities, follows commands.  SKIN: Warm, dry, weeping edema with cellulitis of the left lower extremity extending from the knee to the ankle, weeping edema of the right lower extremity with circumferential cellulitis extending from the mid calf to the ankle.  Warmth and erythema consistent with cellulitis in the groin without crepitus or fluctuance.      LAB RESULTS  Recent Results (from the past 24 hour(s))   Green Top (Gel)    Collection Time: 07/22/23 12:15 AM   Result Value Ref Range    Extra Tube Hold for add-ons.    Lavender Top    Collection Time: 07/22/23 12:15 AM   Result Value Ref Range    Extra Tube hold for  add-on    Gold Top - SST    Collection Time: 07/22/23 12:15 AM   Result Value Ref Range    Extra Tube Hold for add-ons.    Gray Top    Collection Time: 07/22/23 12:15 AM   Result Value Ref Range    Extra Tube Hold for add-ons.    Light Blue Top    Collection Time: 07/22/23 12:15 AM   Result Value Ref Range    Extra Tube Hold for add-ons.    Comprehensive Metabolic Panel    Collection Time: 07/22/23 12:15 AM    Specimen: Blood   Result Value Ref Range    Glucose 141 (H) 65 - 99 mg/dL    BUN 16 6 - 20 mg/dL    Creatinine 0.73 0.57 - 1.00 mg/dL    Sodium 140 136 - 145 mmol/L    Potassium 4.3 3.5 - 5.2 mmol/L    Chloride 99 98 - 107 mmol/L    CO2 33.0 (H) 22.0 - 29.0 mmol/L    Calcium 9.0 8.6 - 10.5 mg/dL    Total Protein 7.7 6.0 - 8.5 g/dL    Albumin 3.3 (L) 3.5 - 5.2 g/dL    ALT (SGPT) 14 1 - 33 U/L    AST (SGOT) 22 1 - 32 U/L    Alkaline Phosphatase 76 39 - 117 U/L    Total Bilirubin 0.7 0.0 - 1.2 mg/dL    Globulin 4.4 gm/dL    A/G Ratio 0.8 g/dL    BUN/Creatinine Ratio 21.9 7.0 - 25.0    Anion Gap 8.0 5.0 - 15.0 mmol/L    eGFR 96.7 >60.0 mL/min/1.73   BNP    Collection Time: 07/22/23 12:15 AM    Specimen: Blood   Result Value Ref Range    proBNP 427.0 0.0 - 900.0 pg/mL   D-dimer, Quantitative    Collection Time: 07/22/23 12:15 AM    Specimen: Blood   Result Value Ref Range    D-Dimer, Quantitative 3.22 (H) 0.00 - 0.56 MCGFEU/mL   Single High Sensitivity Troponin T    Collection Time: 07/22/23 12:15 AM    Specimen: Blood   Result Value Ref Range    HS Troponin T 16 (H) <10 ng/L   Lactic Acid, Plasma    Collection Time: 07/22/23 12:15 AM    Specimen: Blood   Result Value Ref Range    Lactate 1.2 0.5 - 2.0 mmol/L   Procalcitonin    Collection Time: 07/22/23 12:15 AM    Specimen: Blood   Result Value Ref Range    Procalcitonin 0.03 0.00 - 0.25 ng/mL   C-reactive Protein    Collection Time: 07/22/23 12:15 AM    Specimen: Blood   Result Value Ref Range    C-Reactive Protein 7.00 (H) 0.00 - 0.50 mg/dL   Magnesium     Collection Time: 07/22/23 12:15 AM    Specimen: Blood   Result Value Ref Range    Magnesium 2.2 1.6 - 2.6 mg/dL   Phosphorus    Collection Time: 07/22/23 12:15 AM    Specimen: Blood   Result Value Ref Range    Phosphorus 3.3 2.5 - 4.5 mg/dL   TSH    Collection Time: 07/22/23 12:15 AM    Specimen: Blood   Result Value Ref Range    TSH 1.660 0.270 - 4.200 uIU/mL   T4, Free    Collection Time: 07/22/23 12:15 AM    Specimen: Blood   Result Value Ref Range    Free T4 1.25 0.93 - 1.70 ng/dL   CBC Auto Differential    Collection Time: 07/22/23 12:15 AM    Specimen: Blood   Result Value Ref Range    WBC 6.79 3.40 - 10.80 10*3/mm3    RBC 4.41 3.77 - 5.28 10*6/mm3    Hemoglobin 11.8 (L) 12.0 - 15.9 g/dL    Hematocrit 40.5 34.0 - 46.6 %    MCV 91.8 79.0 - 97.0 fL    MCH 26.8 26.6 - 33.0 pg    MCHC 29.1 (L) 31.5 - 35.7 g/dL    RDW 17.0 (H) 12.3 - 15.4 %    RDW-SD 57.0 (H) 37.0 - 54.0 fl    MPV 13.2 (H) 6.0 - 12.0 fL    Platelets 250 140 - 450 10*3/mm3    Neutrophil % 68.4 42.7 - 76.0 %    Lymphocyte % 15.6 (L) 19.6 - 45.3 %    Monocyte % 12.4 (H) 5.0 - 12.0 %    Eosinophil % 2.2 0.3 - 6.2 %    Basophil % 0.4 0.0 - 1.5 %    Immature Grans % 1.0 (H) 0.0 - 0.5 %    Neutrophils, Absolute 4.64 1.70 - 7.00 10*3/mm3    Lymphocytes, Absolute 1.06 0.70 - 3.10 10*3/mm3    Monocytes, Absolute 0.84 0.10 - 0.90 10*3/mm3    Eosinophils, Absolute 0.15 0.00 - 0.40 10*3/mm3    Basophils, Absolute 0.03 0.00 - 0.20 10*3/mm3    Immature Grans, Absolute 0.07 (H) 0.00 - 0.05 10*3/mm3    nRBC 0.0 0.0 - 0.2 /100 WBC   ECG 12 Lead QT Measurement    Collection Time: 07/22/23  1:31 AM   Result Value Ref Range    QT Interval 348 ms    QTC Interval 441 ms   Blood Gas, Venous With Co-Ox    Collection Time: 07/22/23  2:44 AM    Specimen: Venous Blood   Result Value Ref Range    Site Nurse/Dr Draw     pH, Venous 7.218 (C) 7.310 - 7.410 pH Units    pCO2, Venous 81.4 (H) 41.0 - 51.0 mm Hg    pO2, Venous 40.8 27.0 - 53.0 mm Hg    HCO3, Venous 33.1 (H) 22.0 -  28.0 mmol/L    Base Excess, Venous 2.9 (H) -2.0 - 2.0 mmol/L    Hemoglobin, Blood Gas 12.8 (L) 14 - 18 g/dL    Oxyhemoglobin Venous 58.5 %    Methemoglobin Venous 0.2 %    Carboxyhemoglobin Venous 1.6 %    CO2 Content 35.6 (H) 22 - 33 mmol/L    Temperature 37.0 C    Barometric Pressure for Blood Gas      Modality BiPap     FIO2 80 %    Rate 0 Breaths/minute    PIP 0 cmH2O    IPAP 18     EPAP 6     Note      Notified Perla HANDY MD     Notified By [id number]     Notified Time 07/22/2023 02:49    Urinalysis With Microscopic If Indicated (No Culture) - Urine, Clean Catch    Collection Time: 07/22/23  4:26 AM    Specimen: Urine, Clean Catch   Result Value Ref Range    Color, UA Yellow Yellow, Straw    Appearance, UA Clear Clear    pH, UA 5.5 5.0 - 8.0    Specific Gravity, UA 1.011 1.001 - 1.030    Glucose, UA Negative Negative    Ketones, UA Negative Negative    Bilirubin, UA Negative Negative    Blood, UA Negative Negative    Protein, UA Negative Negative    Leuk Esterase, UA Negative Negative    Nitrite, UA Negative Negative    Urobilinogen, UA 0.2 E.U./dL 0.2 - 1.0 E.U./dL   Single High Sensitivity Troponin T    Collection Time: 07/22/23  4:27 AM    Specimen: Arm, Right; Blood   Result Value Ref Range    HS Troponin T 36 (H) <10 ng/L   Blood Gas, Venous With Co-Ox    Collection Time: 07/22/23  4:31 AM    Specimen: Venous Blood   Result Value Ref Range    Site Nurse/Dr Draw     pH, Venous 7.324 7.310 - 7.410 pH Units    pCO2, Venous 72.1 (H) 41.0 - 51.0 mm Hg    pO2, Venous 29.4 27.0 - 53.0 mm Hg    HCO3, Venous 37.5 (H) 22.0 - 28.0 mmol/L    Base Excess, Venous 8.8 (H) -2.0 - 2.0 mmol/L    Hemoglobin, Blood Gas 12.9 (L) 14 - 18 g/dL    Oxyhemoglobin Venous 42.9 %    Methemoglobin Venous 0.4 %    Carboxyhemoglobin Venous 1.4 %    CO2 Content 39.7 (H) 22 - 33 mmol/L    Temperature 37.0 C    Barometric Pressure for Blood Gas      Modality BiPap     FIO2 60 %    Rate 0 Breaths/minute    PIP 0 cmH2O    IPAP 20     EPAP 8      Note      Notified [facility] ENRIKE HANDY MD     Notified By [id number]     Notified Time 07/22/2023 04:36    Single High Sensitivity Troponin T    Collection Time: 07/22/23  6:25 AM    Specimen: Blood   Result Value Ref Range    HS Troponin T 42 (H) <10 ng/L   CK    Collection Time: 07/22/23  6:25 AM    Specimen: Blood   Result Value Ref Range    Creatine Kinase 94 20 - 180 U/L   Blood Gas, Arterial With Co-Ox    Collection Time: 07/22/23  6:38 AM    Specimen: Arterial Blood   Result Value Ref Range    Site Left Radial     Speedy's Test N/A     pH, Arterial 7.411 7.350 - 7.450 pH units    pCO2, Arterial 57.3 (H) 35.0 - 45.0 mm Hg    pO2, Arterial 95.4 83.0 - 108.0 mm Hg    HCO3, Arterial 36.4 (H) 20.0 - 26.0 mmol/L    Base Excess, Arterial 9.8 (H) 0.0 - 2.0 mmol/L    Hemoglobin, Blood Gas 12.6 (L) 14 - 18 g/dL    Hematocrit, Blood Gas 38.6 38.0 - 51.0 %    Oxyhemoglobin 96.0 94 - 99 %    Methemoglobin 0.20 0.00 - 1.50 %    Carboxyhemoglobin 1.4 0 - 2 %    CO2 Content 38.2 (H) 22 - 33 mmol/L    Temperature 37.0 C    Barometric Pressure for Blood Gas      Modality BiPap     FIO2 50 %    Ventilator Mode BiPAP     Set Tidal Volume 0.60     Rate 22 Breaths/minute    PSV 12.0 cmH2O    PIP 21 cmH2O    IPAP 20     EPAP 8     Note      pH, Temp Corrected 7.411 pH Units    pCO2, Temperature Corrected 57.3 (H) 35 - 45 mm Hg    pO2, Temperature Corrected 95.4 83 - 108 mm Hg   CBC Auto Differential    Collection Time: 07/22/23 12:04 PM    Specimen: Blood   Result Value Ref Range    WBC 8.15 3.40 - 10.80 10*3/mm3    RBC 4.46 3.77 - 5.28 10*6/mm3    Hemoglobin 11.7 (L) 12.0 - 15.9 g/dL    Hematocrit 40.7 34.0 - 46.6 %    MCV 91.3 79.0 - 97.0 fL    MCH 26.2 (L) 26.6 - 33.0 pg    MCHC 28.7 (L) 31.5 - 35.7 g/dL    RDW 17.0 (H) 12.3 - 15.4 %    RDW-SD 57.1 (H) 37.0 - 54.0 fl    MPV 12.1 (H) 6.0 - 12.0 fL    Platelets 250 140 - 450 10*3/mm3    Neutrophil % 79.2 (H) 42.7 - 76.0 %    Lymphocyte % 8.7 (L) 19.6 - 45.3 %    Monocyte % 10.8 5.0 - 12.0  %    Eosinophil % 0.4 0.3 - 6.2 %    Basophil % 0.4 0.0 - 1.5 %    Immature Grans % 0.5 0.0 - 0.5 %    Neutrophils, Absolute 6.46 1.70 - 7.00 10*3/mm3    Lymphocytes, Absolute 0.71 0.70 - 3.10 10*3/mm3    Monocytes, Absolute 0.88 0.10 - 0.90 10*3/mm3    Eosinophils, Absolute 0.03 0.00 - 0.40 10*3/mm3    Basophils, Absolute 0.03 0.00 - 0.20 10*3/mm3    Immature Grans, Absolute 0.04 0.00 - 0.05 10*3/mm3    nRBC 0.0 0.0 - 0.2 /100 WBC   Basic Metabolic Panel    Collection Time: 07/22/23 12:04 PM    Specimen: Blood   Result Value Ref Range    Glucose 143 (H) 65 - 99 mg/dL    BUN 13 6 - 20 mg/dL    Creatinine 0.62 0.57 - 1.00 mg/dL    Sodium 141 136 - 145 mmol/L    Potassium 4.1 3.5 - 5.2 mmol/L    Chloride 99 98 - 107 mmol/L    CO2 36.0 (H) 22.0 - 29.0 mmol/L    Calcium 8.5 (L) 8.6 - 10.5 mg/dL    BUN/Creatinine Ratio 21.0 7.0 - 25.0    Anion Gap 6.0 5.0 - 15.0 mmol/L    eGFR 104.7 >60.0 mL/min/1.73   Hemoglobin A1c    Collection Time: 07/22/23 12:04 PM    Specimen: Blood   Result Value Ref Range    Hemoglobin A1C 7.00 (H) 4.80 - 5.60 %   POC Glucose Once    Collection Time: 07/22/23 12:06 PM    Specimen: Blood   Result Value Ref Range    Glucose 146 (H) 70 - 130 mg/dL   Duplex Venous Lower Extremity - Bilateral CAR    Collection Time: 07/22/23  4:19 PM   Result Value Ref Range    Right Common Femoral Spont Y     Right Common Femoral Phasic Y     Right Common Femoral Compress C     Right Common Femoral Augment Y     Right Saphenofemoral Junction Compress C     Right Profunda Femoral Compress C     Right Proximal Femoral Compress C     Right Mid Femoral Spont Y     Right Mid Femoral Phasic Y     Right Mid Femoral Compress C     Right Mid Femoral Augment Y     Right Popliteal Spont Y     Right Popliteal Phasic Y     Right Popliteal Compress C     Right Popliteal Augment Y     Left Common Femoral Spont Y     Left Common Femoral Phasic Y     Left Common Femoral Compress C     Left Common Femoral Augment Y     Left  Saphenofemoral Junction Compress C     Left Profunda Femoral Compress C     Left Proximal Femoral Compress C     Left Mid Femoral Spont Y     Left Mid Femoral Phasic Y     Left Mid Femoral Compress C     Left Mid Femoral Augment Y     Left Popliteal Spont Y     Left Popliteal Phasic Y     Left Popliteal Compress C     Left Popliteal Augment Y    POC Glucose Once    Collection Time: 07/22/23  5:15 PM    Specimen: Blood   Result Value Ref Range    Glucose 130 70 - 130 mg/dL       If labs were ordered, I independently reviewed the results and considered them in treating the patient.        RADIOLOGY  XR Chest 1 View    Result Date: 7/22/2023  XR CHEST 1 VW Date of Exam: 7/22/2023 12:54 AM EDT Indication: hypoxia, BL wet rhonchi Comparison: 4/3/2023. Findings: The heart appears enlarged. There is indistinctness of the pulmonary vasculature. Probable small left-sided pleural effusion present. Patchy airspace changes present within the lung bases bilaterally. No pneumothorax. No acute osseous abnormality identified.     Impression: Mild to moderate pulmonary edema pattern with small left-sided pleural effusion with bibasilar atelectasis. Stable cardiomegaly. Electronically Signed: Naomi Freitas  7/22/2023 1:08 AM EDT  Workstation ID: [id number]    CT Lower Extremity Bilateral With Contrast    Result Date: 7/22/2023  CT LOWER EXTREMITY BILATERAL W CONTRAST Date of Exam: 7/22/2023 1:56 AM EDT Indication: edema. Comparison: 7/5/2021. Technique: Axial CT images were obtained of the bilateral lower extremities after the uneventful intravenous administration of intravenous contrast.  Reconstructed coronal and sagittal images were also obtained. Automated exposure control and iterative construction methods were used. Findings: Diffuse soft tissue swelling is present bilaterally with skin thickening, left greater than right. No focal subcutaneous collection identified. No evidence of focal mass. No knee joint or ankle joint effusions  identified. Musculature appears unremarkable. No evidence of myofascial defect identified. The osseous structures appear intact. No evidence of fracture. No evidence of dislocation. No evidence of periostitis. No evidence of focal lesions. Limited evaluation of the lower pelvis demonstrates no acute process. Limited evaluation due to body habitus. No significant free fluid identified. No evidence of focal osseous lesions. Moderate degenerative changes are present within the bilateral knee joints and hip joints. Linear artifact seen extending through the proximal left tibial diaphysis related to motion artifact. Mild degenerative changes are present within the ankle joints bilaterally. Limited evaluation of the vascular structures due to nonangiographic technique demonstrates no focal abnormalities. Varicosities are present. No obvious arterial thrombus identified. Venous structures are not well evaluated.     Impression: Diffuse subcutaneous edema present bilaterally, left greater than right likely related to third spacing of fluid. No focal drainable collection identified. No acute osseous abnormality. No suspicious joint effusion. Nonangiographic evaluation of the arterial structures demonstrates no obvious abnormalities. Venous structures are not well evaluated though multiple varicosities are present. Electronically Signed: Naomi Freitas  7/22/2023 2:35 AM EDT  Workstation ID: [id number]     I ordered and independently reviewed the above noted radiographic studies.      I viewed images of chest x-ray showing bilateral haziness consistent with pulmonary edema.  CT scan bilateral lower extremity which shows the known impressive edema but no gas or fluid collections that would suggest drainable abscess or necrotizing soft tissue infection    See radiologist's dictation for official interpretation.        PROCEDURES    Procedures    ECG 12 Lead QT Measurement   Preliminary Result   Test Reason : QT Measurement   Blood  Pressure :   */*   mmHG   Vent. Rate :  97 BPM     Atrial Rate :  97 BPM      P-R Int : 162 ms          QRS Dur :  78 ms       QT Int : 348 ms       P-R-T Axes :  45  20  92 degrees      QTc Int : 441 ms      ** Poor data quality, interpretation may be adversely affected   Normal sinus rhythm   Low voltage QRS   Cannot rule out Anterior infarct , age undetermined   Abnormal ECG   When compared with ECG of 01-APR-2023 14:35,   ST now depressed in Lateral leads      Referred By:            Confirmed By:           MEDICATIONS GIVEN IN ER    Medications   sodium chloride 0.9 % flush 10 mL (has no administration in time range)   albuterol (PROVENTIL) nebulizer solution 0.083% 2.5 mg/3mL (2.5 mg Nebulization Given 7/22/23 1625)   losartan (COZAAR) tablet 100 mg (100 mg Oral Given 7/22/23 1227)   metoprolol succinate XL (TOPROL-XL) 24 hr tablet 100 mg (100 mg Oral Given 7/22/23 1227)   sodium chloride 0.9 % flush 10 mL ( Intravenous Canceled Entry 7/22/23 1228)   sodium chloride 0.9 % flush 10 mL (has no administration in time range)   sodium chloride 0.9 % infusion 40 mL (has no administration in time range)   sennosides-docusate (PERICOLACE) 8.6-50 MG per tablet 2 tablet (2 tablets Oral Not Given 7/22/23 1227)     And   polyethylene glycol (MIRALAX) packet 17 g (has no administration in time range)     And   bisacodyl (DULCOLAX) EC tablet 5 mg (has no administration in time range)     And   bisacodyl (DULCOLAX) suppository 10 mg (has no administration in time range)   Enoxaparin Sodium (LOVENOX) syringe 60 mg (60 mg Subcutaneous Given 7/22/23 1226)   nitroglycerin (NITROSTAT) SL tablet 0.4 mg (has no administration in time range)   acetaminophen (TYLENOL) tablet 650 mg (has no administration in time range)     Or   acetaminophen (TYLENOL) 160 MG/5ML solution 650 mg (has no administration in time range)     Or   acetaminophen (TYLENOL) suppository 650 mg (has no administration in time range)   dextrose (GLUTOSE) oral gel  15 g (has no administration in time range)   dextrose (D50W) (25 g/50 mL) IV injection 25 g (has no administration in time range)   glucagon (GLUCAGEN) injection 1 mg (has no administration in time range)   Insulin Lispro (humaLOG) injection 2-7 Units (2 Units Subcutaneous Not Given 7/22/23 1750)   lactobacillus acidophilus (RISAQUAD) capsule 1 capsule (1 capsule Oral Given 7/22/23 1551)   DAPTOmycin (CUBICIN) 550 mg in sodium chloride 0.9 % 50 mL IVPB (has no administration in time range)   chlorothiazide (DIURIL) 500 mg in sterile water (preservative free) 18 mL injection (has no administration in time range)   empagliflozin (JARDIANCE) tablet 10 mg (10 mg Oral Given 7/22/23 1755)   spironolactone (ALDACTONE) tablet 50 mg (50 mg Oral Not Given 7/22/23 1755)   bumetanide (BUMEX) 25 mg/100mL (0.25 mg/mL) infusion (has no administration in time range)   bumetanide (BUMEX) injection 2 mg (has no administration in time range)   furosemide (LASIX) injection 80 mg (80 mg Intravenous Given 7/22/23 0242)   cefTRIAXone (ROCEPHIN) 2000 mg/100 mL 0.9% NS IVPB (MBP) (0 mg Intravenous Stopped 7/22/23 0333)   HYDROmorphone (DILAUDID) injection 0.25 mg (0.25 mg Intravenous Given 7/22/23 0245)   iopamidol (ISOVUE-300) 61 % injection 100 mL (50 mL Intravenous Given 7/22/23 0216)   iopamidol (ISOVUE-300) 61 % injection 100 mL (100 mL Intravenous Given 7/22/23 0216)   ipratropium-albuterol (DUO-NEB) nebulizer solution 3 mL (3 mL Nebulization Given 7/22/23 0242)   furosemide (LASIX) injection 40 mg (40 mg Intravenous Given 7/22/23 1228)   bumetanide (BUMEX) injection 2 mg (2 mg Intravenous Given 7/22/23 1755)         MEDICAL DECISION MAKING, PROGRESS, and CONSULTS    All labs have been independently reviewed by me.  All radiology studies have been reviewed by me and the radiologist dictating the report.  All EKG's have been independently viewed and interpreted by me/my attending physician.      Discussion below represents my analysis  of pertinent findings related to patient's condition, differential diagnosis, treatment plan and final disposition.      Differential diagnosis:    Sepsis, necrotizing soft tissue infection, Cj's gangrene, bacteremia, acute respiratory failure, cellulitis, abscess, pneumonia, pulmonary embolism, urinary tract infection, anemia, electrolyte abnormality      Additional sources:    - Discussed/ obtained information from independent historians: EMS    - External (non-ED) record review: Previous hospitalization for similar presentation of lymphedema and cellulitis a few months ago at this hospital.  At the time she was treated with ceftriaxone and daptomycin per infectious disease so we have started that here.  Her hypoxia is reported to be chronic.    - Chronic or social conditions impacting care: Chronic hypoxia, morbid obesity, lymphedema, recurrent cellulitis    - Shared decision making: Agreeable to hospital admission      Orders placed during this visit:  Orders Placed This Encounter   Procedures    Blood Culture - Blood,    Blood Culture - Blood,    XR Chest 1 View    CT Lower Extremity Bilateral With Contrast    NM Lung Quantitative Differential Function Perfusion    [place] Draw    Comprehensive Metabolic Panel    Urinalysis With Microscopic If Indicated (No Culture) - Urine, Clean Catch    BNP    D-dimer, Quantitative    Single High Sensitivity Troponin T    Blood Gas, Venous -With Co-Ox Panel: Yes    Lactic Acid, Plasma    Procalcitonin    C-reactive Protein    Magnesium    Phosphorus    TSH    T4, Free    CBC Auto Differential    Blood Gas, Venous With Co-Ox    Single High Sensitivity Troponin T    Blood Gas, Venous -With Co-Ox Panel: Yes    Blood Gas, Venous With Co-Ox    Single High Sensitivity Troponin T    Blood Gas, Arterial -With Co-Ox Panel: Yes    CBC Auto Differential    Basic Metabolic Panel    Hemoglobin A1c    Blood Gas, Arterial With Co-Ox    CK    Basic Metabolic Panel    Diet: Cardiac  Diets, Diabetic Diets; Healthy Heart (2-3 Na+); Consistent Carbohydrate; Texture: Regular Texture (IDDSI 7); Fluid Consistency: Thin (IDDSI 0)    Monitor Blood Pressure    Vital Signs    Intake & Output    Weigh Patient    Oral Care    Telemetry - Maintain IV Access    Telemetry - Place Orders & Notify Provider of Results When Patient Experiences Acute Chest Pain, Dysrhythmia or Respiratory Distress    May Be Off Telemetry for Tests    Pulse Oximetry, Continuous    Strict Intake & Output    Follow Standard Precautions    Notify Provider (With Default Parameters)    Code Status and Medical Interventions:    Inpatient Cardiology Consult    Inpatient Infectious Diseases Consult    Inpatient Case Management  Consult    OT Consult: Eval & Treat    PT Consult: Eval & Treat Functional Mobility Below Baseline    NIPPV (CPAP or BIPAP)    POC Glucose 4x Daily AC & at Bedtime    POC Glucose Once    POC Glucose Once    ECG 12 Lead QT Measurement    Adult Transthoracic Echo Complete W/ Cont if Necessary Per Protocol    Consult to Wound / Ostomy Care    Insert peripheral IV    Insert Peripheral IV    Initiate Observation Status    Inpatient Admission    ED Bed Request    Green Top (Gel)    Lavender Top    Gold Top - SST    Gray Top    Light Blue Top    CBC & Differential         Additional orders considered but not ordered:      ED Course:    Consultants:      ED Course as of 07/22/23 2005   Sat Jul 22, 2023   0053 In summary is a 58-year-old woman with morbid obesity, acquired lymphedema, history of recurrent cellulitis previously treated by infectious disease with daptomycin and ceftriaxone who presents because of 1 week of worsening cellulitis over her lower extremities.  She is having it on her right lower leg starting from about the mid calf down to the ankle.  She is also having it on the left extending from the knee to ankle and she also has some redness under her pannus and extending around her vagina.  She  has not appreciated any fevers or systemic symptoms but was convinced to come here by her daughter because of the worsening infection.  She is agreeable to hospital admission for IV antibiotics and full infectious work-up.  Describes moderate pain.  She says she has been started on a water pill by the hospital when she was discharged last time but does not remember what it is or what the doses.  No other complaints at this time. [CC]   0055 She arrived awake and alert but has impressive pitting lymphedema of the bilateral lower extremities with profound weeping erythema and warmth consistent with severe cellulitis.  Chart review from previous hospital admission shows she was treated with daptomycin and ceftriaxone starting her on that today.  Looks like chart review shows that she was started on furosemide starting her on 80 of IV Lasix.  Obtaining full infectious work-up and blood cultures.  Will reevaluate pending initial work-up. [CC]      ED Course User Index  [CC] Darion Simental MD     Patient went to CT scanner satting well on 3 L nasal cannula the CT technicians reported when being moved from the table she started holding her breath and became apneic.  I was contacted to a rapid response in the CT scanner where she was found to be cyanotic and hypoxic but is now awake and alert has not lost a pulse she was placed on a nonrebreather and oxygen came back up into the 80s she has already gotten her images so was immediately return to her ED room and started on BiPAP after a quick blood pressure check.    Stat VBG after her episode of respiratory failure shows acute respiratory acidosis though her acidosis is milder than her hypercapnia and I presume there is a pickwickian component to this.  After an hour on BiPAP the acidosis has resolved and she is tolerating BiPAP well.  Continuing broad-spectrum antibiotics.  Labs show mild anemia which is stable with prior values.  No leukocytosis or elevation  procalcitonin.  Metabolic panel has mild hyperglycemia and hypoalbuminemia.  Repeat troponin is showing an upward delta I think this is probably from her acute episode of respiratory distress she denies chest pain at this time we will continue trending.  At this point she clearly needs hospital admission for continued IV antibiotics and septic work-up.  Medicine team consulted for admission.    100 minutes of critical care provided. This time excludes other billable procedures. Time does include preparation of documents, medical consultations, review of old records, and direct bedside care. Patient is at high risk for life-threatening deterioration due to acute on chronic respiratory failure with respiratory acidosis requiring positive pressure ventilation, pulmonary edema, diffuse cellulitis concerning for sepsis and requiring stat broad-spectrum IV antibiotics.           Shared Decision Making:  After my consideration of clinical presentation and any laboratory/radiology studies obtained, I discussed the findings with the patient/patient representative who is in agreement with the treatment plan and the final disposition.   Risks and benefits of discharge and/or observation/admission were discussed.       AS OF 20:05 EDT VITALS:    BP - 148/94  HR - 86  TEMP - 98.2 øF (36.8 øC) (Oral)  O2 SATS - 92%                  DIAGNOSIS  Final diagnoses:   Acute respiratory failure with hypoxia and hypercapnia   Respiratory acidosis   Cellulitis of lower extremity, unspecified laterality   Obesity hypoventilation syndrome   Lymphedema of both lower extremities   Type 2 diabetes mellitus with hyperglycemia, without long-term current use of insulin   Morbid obesity with BMI of 60.0-69.9, adult         DISPOSITION  Admit      Please note that portions of this document were completed with voice recognition software.        Darion Simental MD  07/22/23 2013

## 2023-07-24 ENCOUNTER — APPOINTMENT (OUTPATIENT)
Dept: GENERAL RADIOLOGY | Facility: HOSPITAL | Age: 58
DRG: 291 | End: 2023-07-24
Payer: MEDICARE

## 2023-07-24 ENCOUNTER — APPOINTMENT (OUTPATIENT)
Dept: NUCLEAR MEDICINE | Facility: HOSPITAL | Age: 58
DRG: 291 | End: 2023-07-24
Payer: MEDICARE

## 2023-07-24 LAB
ANION GAP SERPL CALCULATED.3IONS-SCNC: 5 MMOL/L (ref 5–15)
BUN SERPL-MCNC: 19 MG/DL (ref 6–20)
BUN/CREAT SERPL: 28.8 (ref 7–25)
CALCIUM SPEC-SCNC: 9 MG/DL (ref 8.6–10.5)
CHLORIDE SERPL-SCNC: 88 MMOL/L (ref 98–107)
CO2 SERPL-SCNC: 46 MMOL/L (ref 22–29)
CREAT SERPL-MCNC: 0.66 MG/DL (ref 0.57–1)
EGFRCR SERPLBLD CKD-EPI 2021: 103.1 ML/MIN/1.73
GEN 5 2HR TROPONIN T REFLEX: 18 NG/L
GLUCOSE BLDC GLUCOMTR-MCNC: 134 MG/DL (ref 70–130)
GLUCOSE BLDC GLUCOMTR-MCNC: 136 MG/DL (ref 70–130)
GLUCOSE BLDC GLUCOMTR-MCNC: 139 MG/DL (ref 70–130)
GLUCOSE BLDC GLUCOMTR-MCNC: 143 MG/DL (ref 70–130)
GLUCOSE SERPL-MCNC: 130 MG/DL (ref 65–99)
MAGNESIUM SERPL-MCNC: 2.2 MG/DL (ref 1.6–2.6)
POTASSIUM SERPL-SCNC: 3.5 MMOL/L (ref 3.5–5.2)
POTASSIUM SERPL-SCNC: 4.1 MMOL/L (ref 3.5–5.2)
SODIUM SERPL-SCNC: 139 MMOL/L (ref 136–145)
TROPONIN T DELTA: -1 NG/L
TROPONIN T SERPL HS-MCNC: 19 NG/L

## 2023-07-24 PROCEDURE — 99232 SBSQ HOSP IP/OBS MODERATE 35: CPT | Performed by: INTERNAL MEDICINE

## 2023-07-24 PROCEDURE — 80048 BASIC METABOLIC PNL TOTAL CA: CPT | Performed by: INTERNAL MEDICINE

## 2023-07-24 PROCEDURE — 97110 THERAPEUTIC EXERCISES: CPT

## 2023-07-24 PROCEDURE — 84132 ASSAY OF SERUM POTASSIUM: CPT | Performed by: INTERNAL MEDICINE

## 2023-07-24 PROCEDURE — 94660 CPAP INITIATION&MGMT: CPT

## 2023-07-24 PROCEDURE — 94799 UNLISTED PULMONARY SVC/PX: CPT

## 2023-07-24 PROCEDURE — 25010000002 DAPTOMYCIN PER 1 MG

## 2023-07-24 PROCEDURE — 94761 N-INVAS EAR/PLS OXIMETRY MLT: CPT

## 2023-07-24 PROCEDURE — 99232 SBSQ HOSP IP/OBS MODERATE 35: CPT | Performed by: PHYSICIAN ASSISTANT

## 2023-07-24 PROCEDURE — 83735 ASSAY OF MAGNESIUM: CPT | Performed by: INTERNAL MEDICINE

## 2023-07-24 PROCEDURE — 25010000002 ENOXAPARIN PER 10 MG: Performed by: NURSE PRACTITIONER

## 2023-07-24 PROCEDURE — 78580 LUNG PERFUSION IMAGING: CPT

## 2023-07-24 PROCEDURE — 97166 OT EVAL MOD COMPLEX 45 MIN: CPT

## 2023-07-24 PROCEDURE — 84484 ASSAY OF TROPONIN QUANT: CPT | Performed by: NURSE PRACTITIONER

## 2023-07-24 PROCEDURE — A9540 TC99M MAA: HCPCS | Performed by: INTERNAL MEDICINE

## 2023-07-24 PROCEDURE — 82948 REAGENT STRIP/BLOOD GLUCOSE: CPT

## 2023-07-24 PROCEDURE — 97164 PT RE-EVAL EST PLAN CARE: CPT

## 2023-07-24 PROCEDURE — 71045 X-RAY EXAM CHEST 1 VIEW: CPT

## 2023-07-24 PROCEDURE — 97535 SELF CARE MNGMENT TRAINING: CPT

## 2023-07-24 PROCEDURE — 0 TECHNETIUM ALBUMIN AGGREGATED: Performed by: INTERNAL MEDICINE

## 2023-07-24 PROCEDURE — 29581 APPL MULTLAYER CMPRN SYS LEG: CPT

## 2023-07-24 RX ORDER — QUETIAPINE FUMARATE 25 MG/1
12.5 TABLET, FILM COATED ORAL ONCE
Status: COMPLETED | OUTPATIENT
Start: 2023-07-24 | End: 2023-07-24

## 2023-07-24 RX ORDER — LORAZEPAM 2 MG/ML
0.5 INJECTION INTRAMUSCULAR EVERY 4 HOURS PRN
Status: DISPENSED | OUTPATIENT
Start: 2023-07-24 | End: 2023-07-27

## 2023-07-24 RX ORDER — POTASSIUM CHLORIDE 20 MEQ/1
40 TABLET, EXTENDED RELEASE ORAL EVERY 4 HOURS
Status: COMPLETED | OUTPATIENT
Start: 2023-07-24 | End: 2023-07-24

## 2023-07-24 RX ORDER — ALBUTEROL SULFATE 2.5 MG/3ML
2.5 SOLUTION RESPIRATORY (INHALATION)
Status: DISCONTINUED | OUTPATIENT
Start: 2023-07-24 | End: 2023-07-27

## 2023-07-24 RX ADMIN — BUMETANIDE 1 MG/HR: 0.25 INJECTION INTRAMUSCULAR; INTRAVENOUS at 10:17

## 2023-07-24 RX ADMIN — SENNOSIDES AND DOCUSATE SODIUM 2 TABLET: 50; 8.6 TABLET ORAL at 08:45

## 2023-07-24 RX ADMIN — QUETIAPINE FUMARATE 12.5 MG: 25 TABLET ORAL at 01:29

## 2023-07-24 RX ADMIN — Medication 10 ML: at 20:39

## 2023-07-24 RX ADMIN — ENOXAPARIN SODIUM 60 MG: 60 INJECTION SUBCUTANEOUS at 20:37

## 2023-07-24 RX ADMIN — ALBUTEROL SULFATE 2.5 MG: 2.5 SOLUTION RESPIRATORY (INHALATION) at 05:10

## 2023-07-24 RX ADMIN — KIT FOR THE PREPARATION OF TECHNETIUM TC 99M ALBUMIN AGGREGATED 1 DOSE: 2.5 INJECTION, POWDER, FOR SOLUTION INTRAVENOUS at 11:45

## 2023-07-24 RX ADMIN — LOSARTAN POTASSIUM 100 MG: 50 TABLET, FILM COATED ORAL at 08:47

## 2023-07-24 RX ADMIN — ALBUTEROL SULFATE 2.5 MG: 2.5 SOLUTION RESPIRATORY (INHALATION) at 10:21

## 2023-07-24 RX ADMIN — METOPROLOL SUCCINATE 100 MG: 100 TABLET, EXTENDED RELEASE ORAL at 08:46

## 2023-07-24 RX ADMIN — POTASSIUM CHLORIDE 40 MEQ: 1500 TABLET, EXTENDED RELEASE ORAL at 08:47

## 2023-07-24 RX ADMIN — ALBUTEROL SULFATE 2.5 MG: 2.5 SOLUTION RESPIRATORY (INHALATION) at 15:58

## 2023-07-24 RX ADMIN — Medication 10 ML: at 08:50

## 2023-07-24 RX ADMIN — SPIRONOLACTONE 50 MG: 25 TABLET ORAL at 08:47

## 2023-07-24 RX ADMIN — EMPAGLIFLOZIN 10 MG: 10 TABLET, FILM COATED ORAL at 08:45

## 2023-07-24 RX ADMIN — ALBUTEROL SULFATE 2.5 MG: 2.5 SOLUTION RESPIRATORY (INHALATION) at 19:07

## 2023-07-24 RX ADMIN — NITROGLYCERIN 0.4 MG: 0.4 TABLET SUBLINGUAL at 06:30

## 2023-07-24 RX ADMIN — ENOXAPARIN SODIUM 60 MG: 60 INJECTION SUBCUTANEOUS at 08:47

## 2023-07-24 RX ADMIN — Medication 1 CAPSULE: at 08:46

## 2023-07-24 RX ADMIN — POTASSIUM CHLORIDE 40 MEQ: 1500 TABLET, EXTENDED RELEASE ORAL at 12:36

## 2023-07-24 RX ADMIN — DAPTOMYCIN 550 MG: 500 INJECTION, POWDER, LYOPHILIZED, FOR SOLUTION INTRAVENOUS at 10:17

## 2023-07-24 NOTE — CASE MANAGEMENT/SOCIAL WORK
Discharge Planning Assessment  [facility]     Patient Name: Kerry Leal  MRN: [id number]  Today's Date: 7/24/2023    Admit Date: 7/22/2023    Plan: Initial   Discharge Needs Assessment       Row Name 07/24/23 1302       Living Environment    People in Home spouse    Name(s) of People in Home Ray, spouse    Current Living Arrangements home    Potentially Unsafe Housing Conditions unable to assess    Primary Care Provided by self    Provides Primary Care For no one    Family Caregiver if Needed spouse    Family Caregiver Names Ray, spouse    Quality of Family Relationships unable to assess       Resource/Environmental Concerns    Resource/Environmental Concerns none    Transportation Concerns none       Food Insecurity    Within the past 12 months, you worried that your food would run out before you got the money to buy more. Never true    Within the past 12 months, the food you bought just didn't last and you didn't have money to get more. Never true       Transition Planning    Patient/Family Anticipates Transition to home with help/services    Patient/Family Anticipated Services at Transition        Discharge Needs Assessment    Equipment Currently Used at Home cane, straight;shower chair;oxygen;walker, rolling  home O2 from [employer], uses 2L    Concerns to be Addressed discharge planning                   Discharge Plan       Row Name 07/24/23 1303       Plan    Plan Initial    Patient/Family in Agreement with Plan yes    Plan Comments Spoke with patient at bedside to initiate discharge planning.  Patient confirms she and her  live in [facility]; PCP is Gopal Kong; primary insurance is [employer] and secondary is [place] Medicaid.  Patient states she is independent with ADLs and has cane and RW for ambulation.  Patient reports having home oxygen from [employer] and uses 2 L continuously.  Spoke with patient about therapy recommendations for SNF at discharge and she requests referral  to [facility].  Referral called to [facility].  CM will continue to follow and assist with discharge needs.    Final Discharge Disposition Code 03 - skilled nursing facility (SNF)                  Continued Care and Services - Admitted Since 7/22/2023    Coordination has not been started for this encounter.       Expected Discharge Date and Time       Expected Discharge Date Expected Discharge Time    Jul 27, 2023            Demographic Summary       Row Name 07/24/23 1301       General Information    Arrived From home    Referral Source admission list    Reason for Consult discharge planning    Preferred Language English       Contact Information    Permission Granted to Share Info With                    Functional Status       Row Name 07/24/23 1302       Functional Status    Usual Activity Tolerance moderate    Current Activity Tolerance other (see comments)  see therapy notes       Functional Status, IADL    Medications independent    Meal Preparation assistive equipment and person    Housekeeping assistive equipment and person    Laundry assistive equipment and person    Shopping assistive equipment and person                   Psychosocial    No documentation.                  Abuse/Neglect    No documentation.                  Legal    No documentation.                  Substance Abuse    No documentation.                  Patient Forms    No documentation.                     Caro Wilson RN

## 2023-07-24 NOTE — THERAPY EVALUATION
Patient Name: Kerry Leal  : 1965    MRN: [id number]                              Today's Date: 2023       Admit Date: 2023    Visit Dx:     ICD-10-CM ICD-9-CM   1. Acute respiratory failure with hypoxia and hypercapnia  J96.01 518.81    J96.02    2. Respiratory acidosis  E87.29 276.2   3. Cellulitis of lower extremity, unspecified laterality  L03.119 682.6   4. Obesity hypoventilation syndrome  E66.2 278.03   5. Lymphedema of both lower extremities  I89.0 457.1   6. Type 2 diabetes mellitus with hyperglycemia, without long-term current use of insulin  E11.65 250.00     790.29   7. Morbid obesity with BMI of 60.0-69.9, adult  E66.01 278.01    Z68.44 V85.44     Patient Active Problem List   Diagnosis    Essential hypertension    Lymphedema of both lower extremities    Elevated d-dimer    Acute on chronic respiratory failure with hypoxia and hypercapnia    Type 2 diabetes mellitus    Morbid obesity with BMI of 60.0-69.9, adult    Right-sided congestive heart failure    Obesity hypoventilation syndrome    Bilateral cellulitis of lower leg    Lower extremity cellulitis     Past Medical History:   Diagnosis Date    Anxiety     Asthma     Heart murmur     Hypertension      Past Surgical History:   Procedure Laterality Date    APPENDECTOMY       SECTION      HYSTERECTOMY      NECK SURGERY        General Information       Row Name 23 0835          OT Time and Intention    Document Type evaluation  -CS     Mode of Treatment occupational therapy  -CS       Row Name 23 0835          General Information    Patient Profile Reviewed yes  -CS     Prior Level of Function independent:;ADL's;all household mobility;using stairs;work  Pt reports use of QC for short distances, works as  when able, shower chair for safe bathing transfers. Primarily wears slip-ons for footwear. 2Lnc supplemental O2 at baseline.  -CS     Existing Precautions/Restrictions fall;oxygen therapy device and  L/min;other (see comments)  HFNC, BLE edema/cellulitis  -CS     Barriers to Rehab medically complex;previous functional deficit;impaired sensation  -CS       Row Name 07/24/23 0835          Living Environment    People in Home spouse  -CS       Row Name 07/24/23 0835          Home Main Entrance    Number of Stairs, Main Entrance four  -CS     Stair Railings, Main Entrance railing on left side (ascending);railings safe and in good condition  -CS       Row Name 07/24/23 0835          Stairs Within Home, Primary    Number of Stairs, Within Home, Primary none  -CS       Row Name 07/24/23 0835          Cognition    Orientation Status (Cognition) oriented x 4  -CS       Row Name 07/24/23 0835          Safety Issues, Functional Mobility    Safety Issues Affecting Function (Mobility) insight into deficits/self-awareness;safety precaution awareness;safety precautions follow-through/compliance  -CS     Impairments Affecting Function (Mobility) balance;endurance/activity tolerance;range of motion (ROM);sensation/sensory awareness;shortness of breath;strength  -CS               User Key  (r) = Recorded By, (t) = Taken By, (c) = Cosigned By      Initials Name Provider Type    CS Jersey Omer OT Occupational Therapist                     Mobility/ADL's       Row Name 07/24/23 0839          Bed Mobility    Bed Mobility supine-sit;scooting/bridging  -CS     Scooting/Bridging [place] (Bed Mobility) contact guard  -CS     Supine-Sit [place] (Bed Mobility) minimum assist (75% patient effort)  -CS     Bed Mobility, Safety Issues decreased use of legs for bridging/pushing  -CS     Assistive Device (Bed Mobility) head of bed elevated;bed rails  -CS     Comment, (Bed Mobility) appropriate sequencing intact, mild RUE assist for trunk to reach sitting posture, mild dizziness upon sitting - resolved with time and cues for PLB  -CS       Row Name 07/24/23 0839          Transfers    Transfers sit-stand transfer;bed-chair  transfer  -CS     Comment, (Transfers) side-steps to recliner, cues for UE reach back and controlled/eccentric lowering  -CS       Row Name 07/24/23 0839          Bed-Chair Transfer    Bed-Chair [place] (Transfers) contact guard;verbal cues  -CS     Assistive Device (Bed-Chair Transfers) cane, quad  -CS       Row Name 07/24/23 0839          Sit-Stand Transfer    Sit-Stand [place] (Transfers) contact guard;verbal cues  -CS     Assistive Device (Sit-Stand Transfers) cane, quad  -CS       Row Name 07/24/23 0839          Functional Mobility    Functional Mobility- Comment defer to PT for specifics, ADL related distances limited this date w/ endurance deficit  -CS       Row Name 07/24/23 0839          Activities of Daily Living    BADL Assessment/Intervention upper body dressing;lower body dressing;grooming;feeding;toileting  -CS       Row Name 07/24/23 0839          Upper Body Dressing Assessment/Training    [place] Level (Upper Body Dressing) don;pajama/robe;minimum assist (75% patient effort)  -CS     Comment, (Upper Body Dressing) line mngt  -CS       Row Name 07/24/23 0839          Lower Body Dressing Assessment/Training    [place] Level (Lower Body Dressing) don;socks;dependent (less than 25% patient effort);pants/bottoms;shoes/slippers;other (see comments)  -CS     Assistive Devices (Lower Body Dressing) sock-aid;reacher  -CS     Position (Lower Body Dressing) edge of bed sitting  -CS     Comment, (Lower Body Dressing) reach to distal BLEs limited by body habitus, issued AE and initiated training - required follow-up  -CS       Row Name 07/24/23 0839          Grooming Assessment/Training    [place] Level (Grooming) hair care, combing/brushing;wash face, hands;set up  -CS     Position (Grooming) supported sitting  -CS       Row Name 07/24/23 0839          Self-Feeding Assessment/Training    [place] Level (Feeding) feeding skills;liquids to mouth;prepare tray/open items;scoop food and  bring to mouth;independent  -     Position (Self-Feeding) supported sitting  -       Row Name 07/24/23 0839          Toileting Assessment/Training    [place] Level (Toileting) adjust/manage clothing;perform perineal hygiene;maximum assist (25% patient effort)  -               User Key  (r) = Recorded By, (t) = Taken By, (c) = Cosigned By      Initials Name Provider Type     Jersey Omer OT Occupational Therapist                   Obj/Interventions       Row Name 07/24/23 0842          Sensory Assessment (Somatosensory)    Sensory Assessment (Somatosensory) UE sensation intact;other (see comments)  -     Sensory Assessment BLE neuropathy (distal > proximal)  -CS       Row Name 07/24/23 0842          Vision Assessment/Intervention    Visual Impairment/Limitations WFL;corrective lenses full-time  -       Row Name 07/24/23 0842          Range of Motion Comprehensive    General Range of Motion bilateral upper extremity ROM WFL  -       Row Name 07/24/23 0842          Strength Comprehensive (MMT)    Comment, General Manual Muscle Testing (MMT) Assessment BUE grossly 4-/5  -       Row Name 07/24/23 0842          Shoulder (Therapeutic Exercise)    Shoulder (Therapeutic Exercise) AROM (active range of motion)  -     Shoulder AROM (Therapeutic Exercise) bilateral;flexion;extension;aBduction;aDduction;2 sets;5 repetitions  -       Row Name 07/24/23 0842          Elbow/Forearm (Therapeutic Exercise)    Elbow/Forearm (Therapeutic Exercise) AROM (active range of motion)  -     Elbow/Forearm AROM (Therapeutic Exercise) bilateral;flexion;extension;2 sets;5 repetitions  -       Row Name 07/24/23 0842          Motor Skills    Motor Skills coordination;functional endurance  -     Coordination bimanual skills;WFL  -     Functional Endurance poor, SOA with sitting/standing activity, desat to 86% on HFNC (55%/10L)  -     Therapeutic Exercise shoulder;elbow/forearm;other (see comments)  Pt educated  on simple BUE AROM ther-ex activities paired w/ PLB  -CS       Row Name 07/24/23 0842          Balance    Balance Assessment sitting static balance;sitting dynamic balance;standing static balance;standing dynamic balance  -CS     Static Sitting Balance supervision  -CS     Dynamic Sitting Balance contact guard  -CS     Position, Sitting Balance sitting edge of bed;unsupported  -CS     Static Standing Balance contact guard  -CS     Dynamic Standing Balance minimal assist  -CS     Position/Device Used, Standing Balance supported;cane, quad  -CS     Balance Interventions sitting;sit to stand;standing;occupation based/functional task  -CS               User Key  (r) = Recorded By, (t) = Taken By, (c) = Cosigned By      Initials Name Provider Type    CS Jersey Omer, OT Occupational Therapist                   Goals/Plan       Row Name 07/24/23 0850          Bed Mobility Goal 1 (OT)    Activity/Assistive Device (Bed Mobility Goal 1, OT) sit to supine;supine to sit;scooting  -CS     [place] Level/Cues Needed (Bed Mobility Goal 1, OT) standby assist  -CS     Time Frame (Bed Mobility Goal 1, OT) long term goal (LTG);10 days  -CS     Progress/Outcomes (Bed Mobility Goal 1, OT) new goal  -CS       Row Name 07/24/23 0850          Transfer Goal 1 (OT)    Activity/Assistive Device (Transfer Goal 1, OT) bed-to-chair/chair-to-bed;toilet;commode, bedside without drop arms  -CS     [place] Level/Cues Needed (Transfer Goal 1, OT) supervision required;modified independence  -CS     Time Frame (Transfer Goal 1, OT) long term goal (LTG)  -CS     Strategies/Barriers (Transfers Goal 1, OT) AD recs per PT  -CS     Progress/Outcome (Transfer Goal 1, OT) new goal  -CS       Row Name 07/24/23 0850          Dressing Goal 1 (OT)    Activity/Device (Dressing Goal 1, OT) lower body dressing;long-handled shoe horn;reacher;sock-aid  -CS     [place]/Cues Needed (Dressing Goal 1, OT) set-up required;modified independence  -CS      Time Frame (Dressing Goal 1, OT) long term goal (LTG);10 days  -CS     Progress/Outcome (Dressing Goal 1, OT) new goal  -CS       Row Name 07/24/23 0850          Toileting Goal 1 (OT)    Activity/Device (Toileting Goal 1, OT) adjust/manage clothing;perform perineal hygiene  -CS     [place] Level/Cues Needed (Toileting Goal 1, OT) standby assist  -CS     Time Frame (Toileting Goal 1, OT) long term goal (LTG)  -CS     Progress/Outcome (Toileting Goal 1, OT) new goal  -CS       Row Name 07/24/23 0850          Therapy Assessment/Plan (OT)    Planned Therapy Interventions (OT) activity tolerance training;functional balance retraining;occupation/activity based interventions;ROM/therapeutic exercise;strengthening exercise;transfer/mobility retraining;patient/caregiver education/training;adaptive equipment training  -CS               User Key  (r) = Recorded By, (t) = Taken By, (c) = Cosigned By      Initials Name Provider Type    CS Jersey Omer, OT Occupational Therapist                   Clinical Impression       Row Name 07/24/23 0845          Pain Assessment    Pretreatment Pain Rating 0/10 - no pain  -CS     Posttreatment Pain Rating 0/10 - no pain  -CS       Row Name 07/24/23 0845          Plan of Care Review    Plan of Care Reviewed With patient  -CS     Progress no change  -CS     Outcome Evaluation Baseline ADL completion limited by decreased functional endurance, BLE weakness w/ increased edema and pain, and standing balance deficit warranting skilled IP OT services to promote return to PLOF. O2 sats ranged from 86-92% on HFNC (55%/10L), grossly CGA for transfers and MaxA for LB tasks. Issued AE and initiated training to mitigate limited reach to distal BLEs, educated on simple BUE ther-ex to promote improved pulmonary function. Rec d/c to SNF rehab for best functional outcomes.  -CS       Row Name 07/24/23 0821          Therapy Assessment/Plan (OT)    Criteria for Skilled Therapeutic Interventions Met  (OT) yes;meets criteria;skilled treatment is necessary  -CS     Therapy Frequency (OT) daily  -CS       Row Name 07/24/23 0845          Therapy Plan Review/Discharge Plan (OT)    Anticipated Discharge Disposition (OT) skilled nursing facility;other (see comments)  rehab.  -CS       Row Name 07/24/23 0845          Vital Signs    Pre Systolic BP Rehab 128  RN cleared for eval, VSS on RA  -CS     Pre Treatment Diastolic BP 70  -CS     Post Systolic BP Rehab 139  -CS     Post Treatment Diastolic BP 72  -CS     Posttreatment Heart Rate (beats/min) 80  -CS     Pre SpO2 (%) 94  -CS     O2 Delivery Pre Treatment hi-flow  -CS     Intra SpO2 (%) 86  -CS     O2 Delivery Intra Treatment hi-flow  -CS     Post SpO2 (%) 92  -CS     O2 Delivery Post Treatment hi-flow  -CS     Pre Patient Position Supine  -CS     Intra Patient Position Standing  -CS     Post Patient Position Sitting  -CS       Row Name 07/24/23 0845          Positioning and Restraints    Pre-Treatment Position in bed  -CS     Post Treatment Position chair  -CS     In Chair notified nsg;sitting;reclined;call light within reach;encouraged to call for assist;exit alarm on;waffle cushion;legs elevated;on mechanical lift sling;with nsg  -CS               User Key  (r) = Recorded By, (t) = Taken By, (c) = Cosigned By      Initials Name Provider Type    CS Jersey Omer, OT Occupational Therapist                   Outcome Measures       Row Name 07/24/23 0852          How much help from another is currently needed...    Putting on and taking off regular lower body clothing? 2  -CS     Bathing (including washing, rinsing, and drying) 2  -CS     Toileting (which includes using toilet bed pan or urinal) 2  -CS     Putting on and taking off regular upper body clothing 3  -CS     Taking care of personal grooming (such as brushing teeth) 3  -CS     Eating meals 4  -CS     AM-PAC 6 Clicks Score (OT) 16  -CS       Row Name 07/24/23 0852          Functional Assessment    Outcome  Measure Options AM-PAC 6 Clicks Daily Activity (OT)  -               User Key  (r) = Recorded By, (t) = Taken By, (c) = Cosigned By      Initials Name Provider Type    Jersey Bauer OT Occupational Therapist                    Occupational Therapy Education       Title: PT OT SLP Therapies (Done)       Topic: Occupational Therapy (Done)       Point: ADL training (Done)       Description:   Instruct learner(s) on proper safety adaptation and remediation techniques during self care or transfers.   Instruct in proper use of assistive devices.                  Learning Progress Summary             Patient Acceptance, E,D, DU,VU by  at 7/24/2023 0852                         Point: Home exercise program (Done)       Description:   Instruct learner(s) on appropriate technique for monitoring, assisting and/or progressing therapeutic exercises/activities.                  Learning Progress Summary             Patient Acceptance, E,D, DU,VU by  at 7/24/2023 0852                         Point: Precautions (Done)       Description:   Instruct learner(s) on prescribed precautions during self-care and functional transfers.                  Learning Progress Summary             Patient Acceptance, E,D, DU,VU by  at 7/24/2023 0852                         Point: Body mechanics (Done)       Description:   Instruct learner(s) on proper positioning and spine alignment during self-care, functional mobility activities and/or exercises.                  Learning Progress Summary             Patient Acceptance, E,D, DU,VU by  at 7/24/2023 0852                                         User Key       Initials Effective Dates Name Provider Type [facility] 06/16/21 -  Jersey Omer OT Occupational Therapist OT                  OT Recommendation and Plan  Planned Therapy Interventions (OT): activity tolerance training, functional balance retraining, occupation/activity based interventions, ROM/therapeutic exercise,  strengthening exercise, transfer/mobility retraining, patient/caregiver education/training, adaptive equipment training  Therapy Frequency (OT): daily  Plan of Care Review  Plan of Care Reviewed With: patient  Progress: no change  Outcome Evaluation: Baseline ADL completion limited by decreased functional endurance, BLE weakness w/ increased edema and pain, and standing balance deficit warranting skilled IP OT services to promote return to PLOF. O2 sats ranged from 86-92% on HFNC (55%/10L), grossly CGA for transfers and MaxA for LB tasks. Issued AE and initiated training to mitigate limited reach to distal BLEs, educated on simple BUE ther-ex to promote improved pulmonary function. Rec d/c to SNF rehab for best functional outcomes.     Time Calculation:   Evaluation Complexity (OT)  Review Occupational Profile/Medical/Therapy History Complexity: expanded/moderate complexity  Assessment, Occupational Performance/Identification of Deficit Complexity: 3-5 performance deficits  Clinical Decision Making Complexity (OT): detailed assessment/moderate complexity  Overall Complexity of Evaluation (OT): high complexity     Time Calculation- OT       Row Name 07/24/23 0852             Time Calculation- OT    OT Start Time 0742  -CS      OT Received On 07/24/23  -CS      OT Goal Re-Cert Due Date 08/03/23  -CS         Timed Charges    42173 - OT Therapeutic Exercise Minutes 6  -CS      24962 - OT Therapeutic Activity Minutes 6  -CS      06554 - OT Self Care/Mgmt Minutes 12  -CS         Untimed Charges    OT Eval/Re-eval Minutes 36  -CS         Total Minutes    Timed Charges Total Minutes 24  -CS      Untimed Charges Total Minutes 36  -CS       Total Minutes 60  -CS                User Key  (r) = Recorded By, (t) = Taken By, (c) = Cosigned By      Initials Name Provider Type    Jersey Bauer, OT Occupational Therapist                  Therapy Charges for Today       Code Description Service Date Service Provider Modifiers  Qty    90846097935 HC OT THER PROC EA 15 MIN 7/24/2023 Jersey Omer OT GO 1    84710928801 HC OT SELF CARE/MGMT/TRAIN EA 15 MIN 7/24/2023 Jersey Omer OT GO 1    19595882878 HC OT EVAL MOD COMPLEXITY 3 7/24/2023 Jersey Omer OT GO 1                 Jersey Omer OT  7/24/2023

## 2023-07-24 NOTE — NURSING NOTE
"Patient placed on BIPAP for one hour before refusing BIPAP saying \"I can't do it anymore\". Patient educated on importance of wearing BIPAP with no evidence of learning and still refusing. Respiratory therapy notified to place patient back on high flow nasal cannula.     0600- Patient with c/o chest pressure. EKG performed. Hospitalist and cardiology notified.     0630- Cardiology informed to administer sub q nitro. Medication administered as ordered.   "

## 2023-07-24 NOTE — THERAPY RE-EVALUATION
Acute Care - Wound/Debridement Initial Evaluation and Treatment Note  [facility]     Patient Name: Kerry Leal  : 1965  MRN: [id number]  Today's Date: 2023                Admit Date: 2023    Visit Dx:    ICD-10-CM ICD-9-CM   1. Acute respiratory failure with hypoxia and hypercapnia  J96.01 518.81    J96.02    2. Respiratory acidosis  E87.29 276.2   3. Cellulitis of lower extremity, unspecified laterality  L03.119 682.6   4. Obesity hypoventilation syndrome  E66.2 278.03   5. Lymphedema of both lower extremities  I89.0 457.1   6. Type 2 diabetes mellitus with hyperglycemia, without long-term current use of insulin  E11.65 250.00     790.29   7. Morbid obesity with BMI of 60.0-69.9, adult  E66.01 278.01    Z68.44 V85.44       Patient Active Problem List   Diagnosis    Essential hypertension    Lymphedema of both lower extremities    Elevated d-dimer    Acute on chronic respiratory failure with hypoxia and hypercapnia    Type 2 diabetes mellitus    Morbid obesity with BMI of 60.0-69.9, adult    Right-sided congestive heart failure    Obesity hypoventilation syndrome    Bilateral cellulitis of lower leg    Lower extremity cellulitis        Past Medical History:   Diagnosis Date    Anxiety     Asthma     Heart murmur     Hypertension         Past Surgical History:   Procedure Laterality Date    APPENDECTOMY       SECTION      HYSTERECTOMY      NECK SURGERY             Wound Left medial gluteal Pressure Injury (Active)   Dressing Appearance open to air 23   Closure Open to air 23   Base non-blanchable;red 23   Periwound non-blanchable;redness 23   Care, Wound barrier applied 23       Wound Bilateral lower leg Other (comment) (Active)   Dressing Appearance open to air 23   Closure Open to air 23   Base red;scab 23   Periwound blistered;edematous;excoriated;gray;redness;swelling 23   Periwound  Temperature warm 07/24/23 0930   Periwound Skin Turgor firm 07/24/23 0930   Drainage Amount none 07/24/23 0930   Care, Wound cleansed with;soap and water 07/24/23 0930   Dressing Care petroleum-based 07/24/23 0930       Wound 07/22/23 1017 Bilateral anterior groin MASD (Moisture associated skin damage) (Active)   Closure Open to air 07/23/23 1951   Base moist;red;non-blanchable;maroon/purple 07/23/23 1951   Periwound blistered;edematous;swelling;redness;moist 07/23/23 1951   Drainage Amount none 07/23/23 1951   Care, Wound other (see comments) 07/23/23 1951   Dressing Care open to air 07/23/23 1951      Lymphedema       Row Name 07/24/23 0930             Lymphedema Edema Assessment    Ptting Edema Category By severity  -      Pitting Edema Very Severe;Severe  L>R  -         Skin Changes/Observations    Location/Assessment Lower Extremity  -      Lower Extremity Conditions bilateral:;scaly;crust  -      Lower Extremity Color/Pigment bilateral:;hyperpigmented  -         Lymphedema Pulses/Capillary Refill    Lymphedema Pulses/Capillary Refill lower extremity pulses;capillary refill  -      Dorsalis Pedis Pulse right:;left:;+2 normal  -      Posterior Tibialis Pulse right:;left:;+1 diminished  -      Capillary Refill lower extremity capillary refill  -      Lower Extremity Capillary Refill right:;left:;less than 3 seconds  -         Lymphedema Measurements    Measurement Type(s) Quick Girth  -      Quick Girth Areas Lower extremities  -         LLE Quick Girth (cm)    Mid foot 27 cm  -      Smallest ankle 34.5 cm  -      Largest calf 75 cm  -MF         RLE Quick Girth (cm)    Mid foot 26.5 cm  -MF      Smallest ankle 33 cm  -MF      Largest calf 65.5 cm  -      RLE Quick Girth Total 125  -MF         Compression/Skin Care    Compression/Skin Care skin care;wrapping location;bandaging  -      Skin Care washed/dried;lotion applied;moisturizing lotion applied  -MF      Wrapping Location lower  extremity  -MF      Wrapping Location LE bilateral:;foot to knee  -      Wrapping Comments xeroform with cast padding to secure. size 6/10 to LLE and size 6/8 to RLE  -                User Key  (r) = Recorded By, (t) = Taken By, (c) = Cosigned By      Initials Name Provider Type    MF Louis Adler, PT Physical Therapist                    WOUND DEBRIDEMENT                     PT Assessment (last 12 hours)       PT Evaluation and Treatment       Row Name 07/24/23 0930          Physical Therapy Time and Intention    Subjective Information complains of;weakness;fatigue;pain  -MF     Document Type evaluation;therapy note (daily note);wound care  -     Mode of Treatment individual therapy;physical therapy  -       Row Name 07/24/23 0930          General Information    Patient Profile Reviewed yes  -     Patient Observations alert;cooperative;agree to therapy  -     Pertinent History of Current Functional Problem BLE lymphedema  -     Risks Reviewed patient:;increased discomfort  -     Benefits Reviewed patient:;improve skin integrity  -     Barriers to Rehab medically complex;previous functional deficit  -       Row Name 07/24/23 0930          Pain    Pretreatment Pain Rating 0/10 - no pain  -     Posttreatment Pain Rating 0/10 - no pain  -       Row Name 07/24/23 0930          Wound Bilateral lower leg Other (comment)    Wound - Properties Group Side: Bilateral  -DE Orientation: lower  -DE Location: leg  -DE Primary Wound Type: Other  -DE, lymphedema     Dressing Appearance open to air  -     Base red;scab  -MF     Periwound blistered;edematous;excoriated;gray;redness;swelling  -     Periwound Temperature warm  -     Periwound Skin Turgor firm  -     Wound Length (cm) --  several scattered ulcerations to BLEs.  -     Drainage Amount none  -     Care, Wound cleansed with;soap and water  -     Dressing Care petroleum-based  -     Retired Wound - Properties Group Side:  Bilateral  -DE Orientation: lower  -DE Location: leg  -DE Primary Wound Type: Other  -DE, lymphedema     Retired Wound - Properties Group Side: Bilateral  -DE Location: leg  -DE Primary Wound Type: Other  -DE, lymphedema       Row Name             Wound 07/22/23 1017 Bilateral anterior groin MASD (Moisture associated skin damage)    Wound - Properties Group Placement Date: 07/22/23  -DE Placement Time: 1017  -DE Present on Hospital Admission: Y  -DE Side: Bilateral  -DE Orientation: anterior  -DE Location: groin  -DE Primary Wound Type: MASD  -DE    Retired Wound - Properties Group Placement Date: 07/22/23  -DE Placement Time: 1017  -DE Present on Hospital Admission: Y  -DE Side: Bilateral  -DE Orientation: anterior  -DE Location: groin  -DE Primary Wound Type: MASD  -DE    Retired Wound - Properties Group Date first assessed: 07/22/23 -DE Time first assessed: 1017  -DE Present on Hospital Admission: Y  -DE Side: Bilateral  -DE Location: groin  -DE Primary Wound Type: MASD  -DE      Row Name             Wound Left medial gluteal Pressure Injury    Wound - Properties Group Present on Hospital Admission: Y  -TH Side: Left  -TH Orientation: medial  -TH Location: gluteal  -TH Primary Wound Type: Pressure inj  -TH    Retired Wound - Properties Group Present on Hospital Admission: Y  -TH Side: Left  -TH Orientation: medial  -TH Location: gluteal  -TH Primary Wound Type: Pressure inj  -TH    Retired Wound - Properties Group Present on Hospital Admission: Y  -TH Side: Left  -TH Location: gluteal  -TH Primary Wound Type: Pressure inj  -TH      Row Name 07/24/23 0930          Coping    Observed Emotional State cooperative  -     Trust Relationship/Rapport care explained  -       Row Name 07/24/23 0930          Plan of Care Review    Plan of Care Reviewed With patient  -MF     Outcome Evaluation Pt presents with chronic BLE lymphedema with worsening edema to LLE with significant hypertrophic crust. PT applied xeroform and  lotion to L hypertrophic crust areas to soften skin and allow for easier removal.  PT then applied multilayered compression to help increase venous return to reduce LE Edema and improve skin integrity / healing potential.  -       Row Name 07/24/23 0930          Positioning and Restraints    Pre-Treatment Position sitting in chair/recliner  -     Post Treatment Position chair  -MF     In Chair reclined;call light within reach  -       Row Name 07/24/23 0930          Therapy Assessment/Plan (PT)    Patient/Family Therapy Goals Statement (PT) decrease LE edema  -     PT Diagnosis (PT) BLE lymphedema  -     Rehab Potential (PT) fair, will monitor progress closely  -     Criteria for Skilled Interventions Met (PT) yes;meets criteria;skilled treatment is necessary  -       Row Name 07/24/23 0930          Therapy Plan Review/Discharge Plan (PT)    Therapy Plan Review (PT) evaluation/treatment results reviewed;care plan/treatment goals reviewed;risks/benefits reviewed;current/potential barriers reviewed;participants voiced agreement with care plan;participants included;patient  -       Row Name 07/24/23 0930          Physical Therapy Goals    Wound Care Goal Selection (PT) wound care, PT goal 1  -       Row Name 07/24/23 0930          Wound Care Goal 1 (PT)    Wound Care Goal 1 (PT) Decrease BLE edema by 25% as evidence of wound healing.  -     Time Frame (Wound Care Goal 1, PT) 10 days  -               User Key  (r) = Recorded By, (t) = Taken By, (c) = Cosigned By      Initials Name Provider Type     Louis Adler, PT Physical Therapist    Lele Nielson, RN Registered Nurse    Lizeth Burk RN Registered Nurse                  Physical Therapy Education       Title: PT OT SLP Therapies (Done)       Topic: Physical Therapy (Done)       Point: Mobility training (Done)       Learning Progress Summary             Patient Acceptance, E, VU,NR by SHANEL at 7/23/2023 1040    Comment: PT POC                          Point: Home exercise program (Done)       Learning Progress Summary             Patient Acceptance, E, VU,NR by  at 7/23/2023 1040    Comment: PT POC                         Point: Body mechanics (Done)       Learning Progress Summary             Patient Acceptance, E, VU,NR by  at 7/23/2023 1040    Comment: PT POC                         Point: Precautions (Done)       Learning Progress Summary             Patient Acceptance, E, VU,NR by  at 7/23/2023 1040    Comment: PT POC                                         User Key       Initials Effective Dates Name Provider Type Discipline     06/16/21 -  Anamaria Ravi, PT Physical Therapist PT                    Recommendation and Plan  Anticipated Equipment Needs at Discharge (PT): other (see comments) (TBD)  Planned Therapy Interventions (PT): wound care  Therapy Frequency (PT): daily  Plan of Care Reviewed With: patient           Outcome Evaluation: Pt presents with chronic BLE lymphedema with worsening edema to LLE with significant hypertrophic crust. PT applied xeroform and lotion to L hypertrophic crust areas to soften skin and allow for easier removal.  PT then applied multilayered compression to help increase venous return to reduce LE Edema and improve skin integrity / healing potential.  Plan of Care Reviewed With: patient            Time Calculation   PT Charges       Row Name 07/24/23 0930             Time Calculation    Start Time 0930  -MF      PT Goal Re-Cert Due Date 08/02/23  -MF         Untimed Charges    PT Eval/Re-eval Minutes 25  -MF      Wound Care [id number] Multilayer comp below knee  -MF      [id number] comp below knee 25  -MF         Total Minutes    Untimed Charges Total Minutes 50  -MF       Total Minutes 50  -MF                User Key  (r) = Recorded By, (t) = Taken By, (c) = Cosigned By      Initials Name Provider Type    Louis Ibarra, PT Physical Therapist                            PT G-Codes  Outcome  Measure Options: AM-PAC 6 Clicks Daily Activity (OT)  AM-PAC 6 Clicks Score (PT): 7  AM-PAC 6 Clicks Score (OT): 16       Louis Adler, PT  7/24/2023

## 2023-07-24 NOTE — PLAN OF CARE
Goal Outcome Evaluation:  Plan of Care Reviewed With: patient        Progress: no change  Outcome Evaluation: Baseline ADL completion limited by decreased functional endurance, BLE weakness w/ increased edema and pain, and standing balance deficit warranting skilled IP OT services to promote return to PLOF. O2 sats ranged from 86-92% on HFNC (55%/10L), grossly CGA for transfers and MaxA for LB tasks. Issued AE and initiated training to mitigate limited reach to distal BLEs, educated on simple BUE ther-ex to promote improved pulmonary function. Rec d/c to SNF rehab for best functional outcomes.      Anticipated Discharge Disposition (OT): skilled nursing facility, other (see comments) (rehab.)

## 2023-07-24 NOTE — PROGRESS NOTES
[facility]  PROGRESS NOTE    Patient Name: Kerry Leal  : 1965  MRN: [id number]    Date of Admission: 2023  Primary Care Physician: Gopal Kong MD    Subjective   Subjective     CC:  F/u heart failure    HPI:  Significant anxiety overnight. Feeling a bit better this AM but tired. Was recently on Buspar but states she had to stop it for some side effect, she cannot recall what it was.    ROS:  Gen- No fevers, chills  CV- No chest pain, palpitations  Resp- No cough, yes dyspnea  GI- No N/V/D, abd pain     Objective   Objective     Vital Signs:   Temp:  [97.9 øF (36.6 øC)-99 øF (37.2 øC)] 98.4 øF (36.9 øC)  Heart Rate:  [76-82] 79  Resp:  [16-20] 16  BP: (123-140)/(64-72) 140/68  Flow (L/min):  [6-45] 45     Physical Exam:  Constitutional: Awake, alert, morbidly obese female, NAD, laying in bed  HENT: NCAT, mucous membranes moist  Respiratory: Clear to auscultation bilaterally, accessory muscle use  Cardiovascular: RRR, palpable radial pulses  Gastrointestinal: Positive bowel sounds, soft, nontender, nondistended  Musculoskeletal: Severe BL LE edema  Psychiatric: Appropriate affect, cooperative  Neurologic: Speech clear    Results Reviewed:  LAB RESULTS:      Lab 23  1204 23  0015   WBC 8.15 6.79   HEMOGLOBIN 11.7* 11.8*   HEMATOCRIT 40.7 40.5   PLATELETS 250 250   NEUTROS ABS 6.46 4.64   IMMATURE GRANS (ABS) 0.04 0.07*   LYMPHS ABS 0.71 1.06   MONOS ABS 0.88 0.84   EOS ABS 0.03 0.15   MCV 91.3 91.8   CRP  --  7.00*   PROCALCITONIN  --  0.03   LACTATE  --  1.2   D DIMER QUANT  --  3.22*           Lab 23  0639 23  1614 23  0539 23  1204 23  0015   SODIUM 139 139 139 141 140   POTASSIUM 3.5 4.0 3.7 4.1 4.3   CHLORIDE 88* 89* 94* 99 99   CO2 46.0* 43.0* 39.0* 36.0* 33.0*   ANION GAP 5.0 7.0 6.0 6.0 8.0   BUN 19 19 15 13 16   CREATININE 0.66 0.76 0.65 0.62 0.73   EGFR 103.1 92.1 103.5 104.7 96.7   GLUCOSE 130*  111* 150* 143* 141*   CALCIUM 9.0 9.1 8.9 8.5* 9.0   MAGNESIUM 2.2 2.2  --   --  2.2   PHOSPHORUS  --   --   --   --  3.3   HEMOGLOBIN A1C  --   --   --  7.00*  --    TSH  --   --   --   --  1.660           Lab 07/22/23  0015   TOTAL PROTEIN 7.7   ALBUMIN 3.3*   GLOBULIN 4.4   ALT (SGPT) 14   AST (SGOT) 22   BILIRUBIN 0.7   ALK PHOS 76           Lab 07/24/23  0900 07/24/23  0639 07/22/23  0625 07/22/23  0427 07/22/23  0015   PROBNP  --   --   --   --  427.0   HSTROP T 18* 19* 42* 36* 16*                   Lab 07/22/23  0638 07/22/23  0431 07/22/23  0244   PH, ARTERIAL 7.411  --   --    PCO2, ARTERIAL 57.3*  --   --    PO2 ART 95.4  --   --    FIO2 50 60 80   HCO3 ART 36.4*  --   --    BASE EXCESS ART 9.8*  --   --    CARBOXYHEMOGLOBIN 1.4  --   --    CARBOXYHEMOGLOBIN (VENOUS)  --  1.4 1.6       Brief Urine Lab Results  (Last result in the past 365 days)        Color   Clarity   Blood   Leuk Est   Nitrite   Protein   CREAT   Urine HCG        07/22/23 0426 Yellow   Clear   Negative   Negative   Negative   Negative                   Microbiology Results Abnormal       Procedure Component Value - Date/Time    Blood Culture - Blood, Arm, Left [[id number]]  (Normal) Collected: 07/22/23 0142    Lab Status: Preliminary result Specimen: Blood from Arm, Left Updated: 07/24/23 0801     Blood Culture No growth at 2 days    Blood Culture - Blood, Arm, Right [[id number]]  (Normal) Collected: 07/22/23 0142    Lab Status: Preliminary result Specimen: Blood from Arm, Right Updated: 07/24/23 0801     Blood Culture No growth at 2 days            Adult Transthoracic Echo Complete W/ Cont if Necessary Per Protocol    Result Date: 7/23/2023    Technically difficult study due to massive obesity   Left ventricular systolic function is normal. Estimated left ventricular EF = 60%   Left ventricular wall thickness is consistent with mild concentric hypertrophy.   Valves are poorly visualized.  However there is no gross valvular abnormality    Estimated right ventricular systolic pressure from tricuspid regurgitation is markedly elevated (57 mmHg).     NM Lung Scan Perfusion Particulate    Result Date: 7/24/2023  DATE OF EXAM: 7/24/2023 11:51 AM EDT PROCEDURE: NM LUNG SCAN PERFUSION PARTICULATE INDICATIONS: elevated d-dimer, hypoxia COMPARISON: Chest radiograph 7/24/2023 TECHNIQUE: 5.5 mCi of technetium 99m labeled MAA was administered intravenously for the perfusion portion of the pulmonary exam. Scintigraphic images of the lungs were obtained in multiple projections to assess perfusion. FINDINGS: Concurrent chest radiograph shows diffuse interstitial disease pattern suggesting extensive pulmonary edema, with areas of atelectasis in the lung bases. Perfusion scan mirrors this pattern more or less precisely, and accordingly is indeterminate. Areas of diminished perfusion are noted to be almost entirely perihilar, corresponding to the pulmonary edema pattern. Abnormalities are extensive, however, and could obscure/be confused with pulmonary embolic disease.     Impression: Indeterminate perfusion scan for pulmonary embolus. Extensive perfusion abnormalities, but with matching chest x-ray findings. These could all reflect the patient's extensive pulmonary edema. Electronically Signed: Tigre Phillips  7/24/2023 12:54 PM EDT  Workstation ID: [id number]    XR Chest 1 View    Result Date: 7/24/2023  XR CHEST 1 VW Date of Exam: 7/24/2023 11:48 AM EDT Indication: V/Q Comparison Comparison: 7/22/2023. Findings: Bronchiectasis is present and there is also relatively extensive peribronchial edema noted the appearance suggesting pulmonary edema. There is no definite focal lobar consolidation. There is suspected trace left pleural effusion. No distinct pneumothorax. Unchanged cardiac enlargement.     Impression: Impression: Electronically Signed: Hilton Cohen  7/24/2023 12:25 PM EDT  Workstation ID: [id number]    Duplex Venous Lower Extremity - Bilateral CAR    Result Date:  7/23/2023    Technically difficult and limited exam.   The bilateral lower extremity venous duplex scan is negative for a DVT and SVT in the areas visualized.   The distal superficial femoral and tibial level veins were not visualized.      Results for orders placed during the hospital encounter of 07/22/23    Adult Transthoracic Echo Complete W/ Cont if Necessary Per Protocol    Interpretation Summary    Technically difficult study due to massive obesity    Left ventricular systolic function is normal. Estimated left ventricular EF = 60%    Left ventricular wall thickness is consistent with mild concentric hypertrophy.    Valves are poorly visualized.  However there is no gross valvular abnormality    Estimated right ventricular systolic pressure from tricuspid regurgitation is markedly elevated (57 mmHg).      Current medications:  Scheduled Meds:Albuterol Sulfate NEB Orderable, 2.5 mg, Nebulization, 4x Daily - RT  DAPTOmycin, 6 mg/kg (Adjusted), Intravenous, Q24H  empagliflozin, 10 mg, Oral, Daily  enoxaparin, 60 mg, Subcutaneous, Q12H  insulin lispro, 2-7 Units, Subcutaneous, 4x Daily AC & at Bedtime  lactobacillus acidophilus, 1 capsule, Oral, Daily  losartan, 100 mg, Oral, Daily  metoprolol succinate XL, 100 mg, Oral, Daily  senna-docusate sodium, 2 tablet, Oral, BID  sodium chloride, 10 mL, Intravenous, Q12H  spironolactone, 50 mg, Oral, Daily      Continuous Infusions:bumetanide, 1 mg/hr, Last Rate: 1 mg/hr (07/24/23 1017)      PRN Meds:.  acetaminophen **OR** acetaminophen **OR** acetaminophen    albuterol    senna-docusate sodium **AND** polyethylene glycol **AND** bisacodyl **AND** bisacodyl    Calcium Replacement - Follow Nurse / BPA Driven Protocol    dextrose    dextrose    glucagon (human recombinant)    hydrOXYzine    Magnesium Cardiology Dose Replacement - Follow Nurse / BPA Driven Protocol    nitroglycerin    Phosphorus Replacement - Follow Nurse / BPA Driven Protocol    Potassium Replacement -  Follow Nurse / BPA Driven Protocol    sodium chloride    sodium chloride    sodium chloride    Assessment & Plan   Assessment & Plan     Active Hospital Problems    Diagnosis  POA    **Acute on chronic respiratory failure with hypoxia and hypercapnia [J96.21, J96.22]  Yes    Lower extremity cellulitis [L03.119]  Yes    Bilateral cellulitis of lower leg [L03.116, L03.115]  Yes    Obesity hypoventilation syndrome [E66.2]  Yes    Right-sided congestive heart failure [I50.810]  Yes    Morbid obesity with BMI of 60.0-69.9, adult [E66.01, Z68.44]  Not Applicable    Type 2 diabetes mellitus [E11.9]  Yes    Elevated d-dimer [R79.89]  Yes    Essential hypertension [I10]  Yes    Lymphedema of both lower extremities [I89.0]  Yes      Resolved Hospital Problems    Diagnosis Date Resolved POA    Accelerated hypertension [I10] 07/22/2023 Yes        Brief Hospital Course to date:  Kerry Leal is a 58 y.o. female w/ super morbid obesity, DM2, HTN, chronic LE lymphedema, chronic hypoxia who presented w/ worsening LT LE pain; while having CT of her leg she had acute hypercapnic respiratory failure requiring BIPAP; she was admitted, started on Abx, and seen by cardiology for diuresis    Assessment/Plan    Acute/chronic hypercapnic respiratory failure  Chronic hypoxia  Suspected OHS/restrictive lung disease  Pulmonary hypertension  Acute HFpEF  -s/p BIPAP on arrival, on NC yesterday and placed on HFNC this AM  -repeat TTE limited 2/2 body habitus, EF estimated 60% w/ RVSP 57mmHg  -started on jardiance and aldactone this admit  -cards follows, managing IV diuretics, negative 8.5L yesterday    Anxiety symptoms  -recently on buspar but discontinued 2/2 unknown adverse reaction  -cont prn atarax  -add prn low dose ativan    Chronic BL LE lymphedema w/ stasis dermatitis, possible superimposed LT LE cellulitis  -ID follows, cont empiric daptomycin and monitor    HTN  -cont losartan, toprol XL, aldactone    DM type 2, A1c 7.0%, w/o long  term use of insulin  -ssi    Super morbid obesity, BMI 62.11 kg/m2  -complicates all aspects of care, directly linked to primary diagnosis/problem      Expected Discharge Location and Transportation: tbd pending mobility post diuresis  Expected Discharge   Expected Discharge Date: 7/27/2023; Expected Discharge Time:      DVT prophylaxis:  Medical DVT prophylaxis orders are present.     AM-PAC 6 Clicks Score (PT): 7 (07/23/23 1116)    CODE STATUS:   Code Status and Medical Interventions:   Ordered at: 07/22/23 0558     Level Of Support Discussed With:    Patient     Code Status (Patient has no pulse and is not breathing):    CPR (Attempt to Resuscitate)     Medical Interventions (Patient has pulse or is breathing):    Full Support     Release to patient:    Routine Release       Berry Wall,   07/24/23

## 2023-07-24 NOTE — PROGRESS NOTES
Cardiology Progress Note      Reason for visit:    Right-sided heart failure    IDENTIFICATION: 58-year-old female who resides in [place], [place]    Active Hospital Problems    Diagnosis  POA    **Acute on chronic respiratory failure with hypoxia and hypercapnia [J96.21, J96.22]  Yes    Lower extremity cellulitis [L03.119]  Yes    Bilateral cellulitis of lower leg [L03.116, L03.115]  Yes    Obesity hypoventilation syndrome [E66.2]  Yes    Right-sided congestive heart failure [I50.810]  Yes     Echo (7/6/2021): Test difficult due to body habitus.  LVEF 55%.  Mild AI.  No significant aortic stenosis with mean gradient 12 mmHg.  Mild dilation of ascending aorta 4 cm.  Echo (10/23/2022): LVEF 55%..  Mild AS/AR.  RVSP 45-55 mmHg  Echo (7/23/2023): LVEF 65%.  No significant valve abnormality.  RVSP 57 mmHg      Morbid obesity with BMI of 60.0-69.9, adult [E66.01, Z68.44]  Not Applicable    Type 2 diabetes mellitus [E11.9]  Yes    Elevated d-dimer [R79.89]  Yes    Essential hypertension [I10]  Yes    Lymphedema of both lower extremities [I89.0]  Yes          Patient complained of some mild chest pressure this morning.  Was given nitroglycerin.  Troponins 19-18 and upon evaluation in the room patient states that she no longer feels that chest pressure.  She just feels short of breath.  She does feel slightly better from yesterday.         Vital Sign Min/Max for last 24 hours  Temp  Min: 97.8 øF (36.6 øC)  Max: 99 øF (37.2 øC)   BP  Min: 118/65  Max: 139/72   Pulse  Min: 77  Max: 82   Resp  Min: 16  Max: 20   SpO2  Min: 88 %  Max: 93 %   Flow (L/min)  Min: 6  Max: 45      Intake/Output Summary (Last 24 hours) at 7/24/2023 1015  Last data filed at 7/24/2023 0924  Gross per 24 hour   Intake 600 ml   Output 6350 ml   Net -5750 ml             Physical Exam  Constitutional:       Appearance: She is obese.   Cardiovascular:      Rate and Rhythm: Normal rate and regular rhythm.      Heart sounds: No murmur heard.     Comments:  Wrinkles noted to bilateral feet consistent with improving lower extremity edema.  Pulmonary:      Effort: Pulmonary effort is normal.   Skin:     Comments: Cellulitic changes to bilateral lower extremities, improving   Neurological:      Mental Status: She is alert.       Tele: Normal sinus rhythm    Results Review (reviewed the patient's recent labs in the electronic medical record):     EKG (7/22/2023): Normal sinus rhythm, low voltage QRS ST depression lateral leads    CXR (7/22/2023): Mild to moderate pulmonary edema with small left-sided pleural effusion    Results for orders placed during the hospital encounter of 07/22/23    Adult Transthoracic Echo Complete W/ Cont if Necessary Per Protocol    Interpretation Summary    Technically difficult study due to massive obesity    Left ventricular systolic function is normal. Estimated left ventricular EF = 60%    Left ventricular wall thickness is consistent with mild concentric hypertrophy.    Valves are poorly visualized.  However there is no gross valvular abnormality    Estimated right ventricular systolic pressure from tricuspid regurgitation is markedly elevated (57 mmHg).        Results from last 7 days   Lab Units 07/24/23  0639 07/23/23  1614 07/23/23  0539 07/22/23  1204 07/22/23  0015   SODIUM mmol/L 139 139 139 141 140   POTASSIUM mmol/L 3.5 4.0 3.7 4.1 4.3   CHLORIDE mmol/L 88* 89* 94* 99 99   BUN mg/dL 19 19 15 13 16   CREATININE mg/dL 0.66 0.76 0.65 0.62 0.73   MAGNESIUM mg/dL 2.2 2.2  --   --  2.2       Results from last 7 days   Lab Units 07/24/23  0900 07/24/23  0639 07/22/23  0625   HSTROP T ng/L 18* 19* 42*       Results from last 7 days   Lab Units 07/22/23  1204 07/22/23  0015   WBC 10*3/mm3 8.15 6.79   HEMOGLOBIN g/dL 11.7* 11.8*   HEMATOCRIT % 40.7 40.5   PLATELETS 10*3/mm3 250 250         Lab Results   Component Value Date    HGBA1C 7.00 (H) 07/22/2023       Lab Results   Component Value Date    CHOL 134 03/31/2023    TRIG 89 03/31/2023     HDL 33 (L) 03/31/2023    LDL 84 03/31/2023              Acute on chronic diastolic and right-sided heart failure  IV Bumex 1 mg/h x 6 hours  Given patient's CO2 can consider Diamox  in the future.     Hypertension  No CCB due to edema  Improving with diuresis  Continue metoprolol succinate 100 mg daily, losartan 100 mg daily and spironolactone 50 mg daily.     Lower extremity cellulitis/lymphedema  Improving with diuresis     Massive obesity  We discussed that her obesity is leading to much of her present illness i.e. right-sided heart failure, lymphedema/cellulitis  Semaglutide should be started after discharge for weight loss     Elevated D-dimer  VQ scan pending  Venous duplex of lower extremities preliminary negative for PE                Restarting Bumex drip for 6 hours today.  CO2 slightly elevated, can consider Diamox in the future.  Remain on current medications for blood pressure.  Monitor kidney function as well as potassium.    Electronically signed by Natalya Adkins PA-C, 07/24/23, 10:28 AM EDT.

## 2023-07-24 NOTE — PLAN OF CARE
Goal Outcome Evaluation:  Plan of Care Reviewed With: patient           Outcome Evaluation: Pt presents with chronic BLE lymphedema with worsening edema to LLE with significant hypertrophic crust. PT applied xeroform and lotion to L hypertrophic crust areas to soften skin and allow for easier removal.  PT then applied multilayered compression to help increase venous return to reduce LE Edema and improve skin integrity / healing potential.

## 2023-07-25 LAB
ANION GAP SERPL CALCULATED.3IONS-SCNC: 4 MMOL/L (ref 5–15)
BUN SERPL-MCNC: 26 MG/DL (ref 6–20)
BUN/CREAT SERPL: 36.1 (ref 7–25)
CALCIUM SPEC-SCNC: 9 MG/DL (ref 8.6–10.5)
CHLORIDE SERPL-SCNC: 90 MMOL/L (ref 98–107)
CO2 SERPL-SCNC: 47 MMOL/L (ref 22–29)
CREAT SERPL-MCNC: 0.72 MG/DL (ref 0.57–1)
EGFRCR SERPLBLD CKD-EPI 2021: 98.3 ML/MIN/1.73
GLUCOSE BLDC GLUCOMTR-MCNC: 124 MG/DL (ref 70–130)
GLUCOSE BLDC GLUCOMTR-MCNC: 164 MG/DL (ref 70–130)
GLUCOSE BLDC GLUCOMTR-MCNC: 189 MG/DL (ref 70–130)
GLUCOSE BLDC GLUCOMTR-MCNC: 284 MG/DL (ref 70–130)
GLUCOSE SERPL-MCNC: 112 MG/DL (ref 65–99)
MAGNESIUM SERPL-MCNC: 2.3 MG/DL (ref 1.6–2.6)
POTASSIUM SERPL-SCNC: 4.2 MMOL/L (ref 3.5–5.2)
SODIUM SERPL-SCNC: 141 MMOL/L (ref 136–145)

## 2023-07-25 PROCEDURE — 94761 N-INVAS EAR/PLS OXIMETRY MLT: CPT

## 2023-07-25 PROCEDURE — 99231 SBSQ HOSP IP/OBS SF/LOW 25: CPT | Performed by: INTERNAL MEDICINE

## 2023-07-25 PROCEDURE — 97116 GAIT TRAINING THERAPY: CPT

## 2023-07-25 PROCEDURE — 82948 REAGENT STRIP/BLOOD GLUCOSE: CPT

## 2023-07-25 PROCEDURE — 94799 UNLISTED PULMONARY SVC/PX: CPT

## 2023-07-25 PROCEDURE — 83735 ASSAY OF MAGNESIUM: CPT | Performed by: INTERNAL MEDICINE

## 2023-07-25 PROCEDURE — 80048 BASIC METABOLIC PNL TOTAL CA: CPT | Performed by: INTERNAL MEDICINE

## 2023-07-25 PROCEDURE — 25010000002 CHLOROTHIAZIDE PER 500 MG: Performed by: INTERNAL MEDICINE

## 2023-07-25 PROCEDURE — 99232 SBSQ HOSP IP/OBS MODERATE 35: CPT | Performed by: INTERNAL MEDICINE

## 2023-07-25 PROCEDURE — 25010000002 DAPTOMYCIN PER 1 MG

## 2023-07-25 PROCEDURE — 97530 THERAPEUTIC ACTIVITIES: CPT

## 2023-07-25 PROCEDURE — 94664 DEMO&/EVAL PT USE INHALER: CPT

## 2023-07-25 PROCEDURE — 25010000002 ENOXAPARIN PER 10 MG: Performed by: NURSE PRACTITIONER

## 2023-07-25 RX ORDER — BUMETANIDE 0.25 MG/ML
1 INJECTION INTRAMUSCULAR; INTRAVENOUS ONCE
Status: COMPLETED | OUTPATIENT
Start: 2023-07-25 | End: 2023-07-25

## 2023-07-25 RX ADMIN — METOPROLOL SUCCINATE 100 MG: 100 TABLET, EXTENDED RELEASE ORAL at 09:29

## 2023-07-25 RX ADMIN — ALBUTEROL SULFATE 2.5 MG: 2.5 SOLUTION RESPIRATORY (INHALATION) at 15:37

## 2023-07-25 RX ADMIN — BUMETANIDE 1 MG: 0.25 INJECTION, SOLUTION INTRAMUSCULAR; INTRAVENOUS at 09:29

## 2023-07-25 RX ADMIN — BUMETANIDE 0.5 MG/HR: 0.25 INJECTION INTRAMUSCULAR; INTRAVENOUS at 09:32

## 2023-07-25 RX ADMIN — EMPAGLIFLOZIN 10 MG: 10 TABLET, FILM COATED ORAL at 09:30

## 2023-07-25 RX ADMIN — Medication 10 ML: at 21:07

## 2023-07-25 RX ADMIN — ALBUTEROL SULFATE 2.5 MG: 2.5 SOLUTION RESPIRATORY (INHALATION) at 12:05

## 2023-07-25 RX ADMIN — Medication 10 ML: at 09:32

## 2023-07-25 RX ADMIN — DAPTOMYCIN 550 MG: 500 INJECTION, POWDER, LYOPHILIZED, FOR SOLUTION INTRAVENOUS at 12:20

## 2023-07-25 RX ADMIN — ENOXAPARIN SODIUM 60 MG: 60 INJECTION SUBCUTANEOUS at 21:07

## 2023-07-25 RX ADMIN — LOSARTAN POTASSIUM 100 MG: 50 TABLET, FILM COATED ORAL at 09:30

## 2023-07-25 RX ADMIN — SPIRONOLACTONE 50 MG: 25 TABLET ORAL at 09:30

## 2023-07-25 RX ADMIN — CHLOROTHIAZIDE SODIUM 500 MG: 500 INJECTION, POWDER, LYOPHILIZED, FOR SOLUTION INTRAVENOUS at 09:26

## 2023-07-25 RX ADMIN — ACETAMINOPHEN 650 MG: 325 TABLET, FILM COATED ORAL at 22:23

## 2023-07-25 RX ADMIN — ENOXAPARIN SODIUM 60 MG: 60 INJECTION SUBCUTANEOUS at 09:30

## 2023-07-25 RX ADMIN — ALBUTEROL SULFATE 2.5 MG: 2.5 SOLUTION RESPIRATORY (INHALATION) at 07:26

## 2023-07-25 RX ADMIN — ALBUTEROL SULFATE 2.5 MG: 2.5 SOLUTION RESPIRATORY (INHALATION) at 19:03

## 2023-07-25 RX ADMIN — Medication 1 CAPSULE: at 09:29

## 2023-07-25 NOTE — PROGRESS NOTES
Patient Name: Kerry Leal  : 1965  MRN: [id number]    Admission Date: 2023    Requesting Provider: Dixie  Evaluating Physician: Shine Rodriguez MD    Chief Complaint: cellulitis    Reason for Consultation: cellulitis    Subjective   Patient is a 58 y.o. female with history of hypertension, asthma and chronic lymphedema.  Previous seen by Dr. Shine Rodriguez and previously treated with dalbavancin and then suppressive Keflex.  Last seen in the hospital 2023 and at that time treated with daptomycin and ceftriaxone and discharged to outpatient follow-up.  Seen in clinic in May with plans to continue chronic suppressive Keflex.  At some point after last office visit patient called [facility] saying that she had a rash under her chin and she has been off antibiotics since then.    Admitted to [facility] 2023 with complaints of worsening left lower extremity swelling and pain.  Denied fever.  Ongoing for CT emergency department CODE BLUE was called however per H&P patient did not lose a pulse and no CPR was performed.  No drainable fluid collection.  Started on daptomycin and ceftriaxone and admitted.  Feels like she is improving slightly compared to admission    23:  No fever.  Blood cultures pending this am.  Resting comfortably.  Feels like leg swelling, pain improving.  No new issues per nursing    23: The patient is still short of breath and was on OptiFlow when I saw her this afternoon.  She had significant anxiety overnight.  Having some bilateral lower extremity pain and swelling.  Reports recent worsening redness over her bilateral lower extremities.  No fevers    23: Patient has ongoing pain over her bilateral lower extremity but no worse today.  Breathing may be slightly better but she was still on OptiFlow during my exam (although subsequently improved to 5-6L NC).  No nausea, vomiting, or diarrhea reported.  No fevers.    ROS:  As above      Allergies    Allergen Reactions    Diclofenac Swelling    Benadryl [Diphenhydramine Hcl] Other (See Comments)     UNSURE    Clindamycin/Lincomycin Other (See Comments)     UNSURE    Doxycycline Other (See Comments)     UNSURE    Fluticasone Other (See Comments)     NOSE BLEEDS    Fluvoxamine Other (See Comments)     UNSURE    Montelukast Swelling    Symbicort [Budesonide-Formoterol Fumarate] Other (See Comments)     UNSURE    Lisinopril Dizziness    Penicillins Other (See Comments)     MOTHER TOLD HER SHE WAS ALLERGIC  *has tolerated ceftriaxone    Smz-Tmp Ds [Sulfamethoxazole-Trimethoprim] GI Intolerance    Sulfa Antibiotics GI Intolerance       Objective   Antibiotics:  Anti-Infectives (From admission, onward)      Ordered     Dose/Rate Route Frequency Start Stop    07/22/23 1344  DAPTOmycin (CUBICIN) 550 mg in sodium chloride 0.9 % 50 mL IVPB        Ordering Provider: Valerie Gibson PharmD    6 mg/kg x 95 kg (Adjusted)  100 mL/hr over 30 Minutes Intravenous Every 24 Hours 07/23/23 1100 07/28/23 1059    07/22/23 0053  cefTRIAXone (ROCEPHIN) 2000 mg/100 mL 0.9% NS IVPB (MBP)        Ordering Provider: Darion Simental MD    2,000 mg  over 30 Minutes Intravenous Once 07/22/23 0109 07/22/23 0333            Other Medications:  Current Facility-Administered Medications   Medication Dose Route Frequency Provider Last Rate Last Admin    acetaminophen (TYLENOL) tablet 650 mg  650 mg Oral Q4H PRN Raquel, Hansa, APRN   650 mg at 07/23/23 0305    Or    acetaminophen (TYLENOL) 160 MG/5ML solution 650 mg  650 mg Oral Q4H PRN Raquel, Hansa, APRN        Or    acetaminophen (TYLENOL) suppository 650 mg  650 mg Rectal Q4H PRN Raquel, Hansa, APRN        albuterol (PROVENTIL) nebulizer solution 0.083% 2.5 mg/3mL  2.5 mg Nebulization Q4H PRN Raquel, Hansa, APRN   2.5 mg at 07/24/23 0510    albuterol (PROVENTIL) nebulizer solution 0.083% 2.5 mg/3mL  2.5 mg Nebulization 4x Daily - RT Berry Wall,    2.5 mg at  07/25/23 1537    sennosides-docusate (PERICOLACE) 8.6-50 MG per tablet 2 tablet  2 tablet Oral BID Raquel, Hansa, APRN   2 tablet at 07/24/23 0845    And    polyethylene glycol (MIRALAX) packet 17 g  17 g Oral Daily PRN Raquel, Hansa, APRN        And    bisacodyl (DULCOLAX) EC tablet 5 mg  5 mg Oral Daily PRN Raquel, Hansa, APRN        And    bisacodyl (DULCOLAX) suppository 10 mg  10 mg Rectal Daily PRN Raquel, Hansa, APRN        bumetanide (BUMEX) 25 mg/100mL (0.25 mg/mL) infusion  0.5 mg/hr Intravenous Continuous Jorge Puckett IV, MD 2 mL/hr at 07/25/23 0932 0.5 mg/hr at 07/25/23 0932    Calcium Replacement - Follow Nurse / BPA Driven Protocol   Does not apply PRN Jorge Puckett IV, MD        DAPTOmycin (CUBICIN) 550 mg in sodium chloride 0.9 % 50 mL IVPB  6 mg/kg (Adjusted) Intravenous Q24H Valerie Gibson PharmD 100 mL/hr at 07/25/23 1220 550 mg at 07/25/23 1220    dextrose (D50W) (25 g/50 mL) IV injection 25 g  25 g Intravenous Q15 Min PRN Raquel, Hansa, APRN        dextrose (GLUTOSE) oral gel 15 g  15 g Oral Q15 Min PRN Raquel, Hansa, APRN        empagliflozin (JARDIANCE) tablet 10 mg  10 mg Oral Daily Jorge Puckett IV, MD   10 mg at 07/25/23 0930    Enoxaparin Sodium (LOVENOX) syringe 60 mg  60 mg Subcutaneous Q12H Raquel, Hansa, APRN   60 mg at 07/25/23 0930    glucagon (GLUCAGEN) injection 1 mg  1 mg Intramuscular Q15 Min PRN Raquel, Hansa, APRN        hydrOXYzine (ATARAX) tablet 25 mg  25 mg Oral TID PRN Berry Wall DO   25 mg at 07/23/23 2025    Insulin Lispro (humaLOG) injection 2-7 Units  2-7 Units Subcutaneous 4x Daily AC & at Bedtime Raquel, Hansa, RAD   5 Units at 07/23/23 1202    lactobacillus acidophilus (RISAQUAD) capsule 1 capsule  1 capsule Oral Daily Markos Dalton MD   1 capsule at 07/25/23 0929    LORazepam (ATIVAN) injection 0.5 mg  0.5 mg Intravenous Q4H PRN Berry Wall DO        losartan (COZAAR)  "tablet 100 mg  100 mg Oral Daily Raquel, Hansa, APRN   100 mg at 07/25/23 0930    Magnesium Cardiology Dose Replacement - Follow Nurse / BPA Driven Protocol   Does not apply PRN Jorge Puckett IV, MD        metoprolol succinate XL (TOPROL-XL) 24 hr tablet 100 mg  100 mg Oral Daily Raquel, Hansa, APRN   100 mg at 07/25/23 0929    nitroglycerin (NITROSTAT) SL tablet 0.4 mg  0.4 mg Sublingual Q5 Min PRN Raquel, Hansa, APRN   0.4 mg at 07/24/23 0630    Phosphorus Replacement - Follow Nurse / BPA Driven Protocol   Does not apply PRN Jorge Puckett IV, MD        Potassium Replacement - Follow Nurse / BPA Driven Protocol   Does not apply PRN Jorge Puckett IV, MD        sodium chloride 0.9 % flush 10 mL  10 mL Intravenous PRN Darion Simental MD        sodium chloride 0.9 % flush 10 mL  10 mL Intravenous Q12H Raquel, Hansa, APRN   10 mL at 07/25/23 0932    sodium chloride 0.9 % flush 10 mL  10 mL Intravenous PRN Raquel, Hansa, APRN        sodium chloride 0.9 % infusion 40 mL  40 mL Intravenous PRN Raquel, Hansa, APRN        spironolactone (ALDACTONE) tablet 50 mg  50 mg Oral Daily Jorge Puckett IV, MD   50 mg at 07/25/23 0930       Physical Exam:   Vital Signs   /62 (BP Location: Left arm, Patient Position: Sitting)   Pulse 76   Temp 97.9 øF (36.6 øC) (Oral)   Resp 18   Ht 160 cm (62.99\")   Wt (!) 159 kg (350 lb 8.5 oz)   SpO2 91%   BMI 62.11 kg/mý     GENERAL:  sleeping , chronically ill-appearing  HEENT: Normocephalic, atraumatic.  Poor dentition.  No external oral lesions noted  NECK: Supple   HEART: RRR; No murmur.  LUNGS: Diminished breath sounds anteriorly.  On OptiFlow at 45 L/min with an FiO2 of 55%  ABDOMEN: morbidly obese but soft  EXT: Bilateral lower extremities with significant chronic lymphedema with dressings in place.  Dressings were partially removed and noted that she does have increased erythema from baseline, Especially on " the right side.    : External urinary catheter in place  SKIN: No diffuse rash  NEURO: Awake alert and oriented x4.  Normal speech and cognition    Laboratory Data  Lab Results   Component Value Date    WBC 8.15 07/22/2023    HGB 11.7 (L) 07/22/2023    HCT 40.7 07/22/2023    MCV 91.3 07/22/2023     07/22/2023     Lab Results   Component Value Date    GLUCOSE 112 (H) 07/25/2023    CALCIUM 9.0 07/25/2023     07/25/2023    K 4.2 07/25/2023    CO2 47.0 (H) 07/25/2023    CL 90 (L) 07/25/2023    BUN 26 (H) 07/25/2023    CREATININE 0.72 07/25/2023     Estimated Creatinine Clearance: 130.8 mL/min (by C-G formula based on SCr of 0.72 mg/dL).  Lab Results   Component Value Date    ALT 14 07/22/2023    AST 22 07/22/2023    ALKPHOS 76 07/22/2023    BILITOT 0.7 07/22/2023     Lab Results   Component Value Date    CRP 7.00 (H) 07/22/2023     Lab Results   Component Value Date    SEDRATE 120 (H) 04/13/2023       The above labs were reviewed.     Microbiology:  Microbiology Results (last 10 days)       Procedure Component Value - Date/Time    Blood Culture - Blood, Arm, Left [[id number]]  (Normal) Collected: 07/22/23 0142    Lab Status: Preliminary result Specimen: Blood from Arm, Left Updated: 07/25/23 0800     Blood Culture No growth at 3 days    Blood Culture - Blood, Arm, Right [[id number]]  (Normal) Collected: 07/22/23 0142    Lab Status: Preliminary result Specimen: Blood from Arm, Right Updated: 07/25/23 0800     Blood Culture No growth at 3 days            Radiology:  NM Lung Scan Perfusion Particulate    Result Date: 7/24/2023  Indeterminate perfusion scan for pulmonary embolus. Extensive perfusion abnormalities, but with matching chest x-ray findings. These could all reflect the patient's extensive pulmonary edema. Electronically Signed: Tigre Phillips  7/24/2023 12:54 PM EDT  Workstation ID: [id number]    XR Chest 1 View    Result Date: 7/24/2023  Impression: Electronically Signed: Hilton Cohen  7/24/2023 12:25 PM  EDT  Workstation ID: [id number]    XR Chest 1 View    Result Date: 7/22/2023  Impression: Mild to moderate pulmonary edema pattern with small left-sided pleural effusion with bibasilar atelectasis. Stable cardiomegaly. Electronically Signed: Naomi Freitas  7/22/2023 1:08 AM EDT  Workstation ID: [id number]    CT Lower Extremity Bilateral With Contrast    Result Date: 7/22/2023  Impression: Diffuse subcutaneous edema present bilaterally, left greater than right likely related to third spacing of fluid. No focal drainable collection identified. No acute osseous abnormality. No suspicious joint effusion. Nonangiographic evaluation of the arterial structures demonstrates no obvious abnormalities. Venous structures are not well evaluated though multiple varicosities are present. Electronically Signed: Naomi Freitas  7/22/2023 2:35 AM EDT  Workstation ID: [id number]         Assessment   Acute on chronic bilateral lower extremity venous stasis swelling and dermatitis: Improving with diuresis and antibiotics  Left lower extremity swelling, erythema, tenderness, cellulitis  Right lower extremity focal erythema, possible cellulitis  Chronic bilateral lower extremity lymphedema  Acute on chronic hypoxic/Hypercapnic respiratory failure  Morbid obesity  Bilateral pulmonary edema on x-ray  Acute on chronic CHFpEF  HTN  Listed allergies to clindamycin, doxycycline, penicillin and sulfa  Patient reported rash with Keflex: about 2 months ago. Recently treated with ceftriaxone and had  another dose in ED with no side effects thus far.    PLAN:   - Microbiology data reviewed.  Follow-up pending blood cultures, so far negative  - Follow CBC, CMP, CK  - Continue daptomycin for now  - Probiotic  - Diuresis per cardiology and primary team  - Wound care consultation.  Please take pictures during dressing changes    I discussed with nursing staff today.    Shine Rodriguez MD  7/25/2023    I

## 2023-07-25 NOTE — PLAN OF CARE
Goal Outcome Evaluation:  Plan of Care Reviewed With: patient           Outcome Evaluation: Patient is alert & oriented x 4. Worked with therapy this morning and ambulated down the galo today. She has been weaned off the high flow and is currently on 5L nasal cannula (humidified) and tolerating that well. Bumex IV given and Bumex continuous drip started. WOC accessed her left gluteal wound; it's red and blanchabile.  and son at bedside ealry afternoon. New IV started on right forearm.

## 2023-07-25 NOTE — PROGRESS NOTES
[facility] Medicine Services  PROGRESS NOTE    Patient Name: Kerry Leal  : 1965  MRN: [id number]    Date of Admission: 2023  Primary Care Physician: Gopal Kong MD    Subjective   Subjective     CC:  F/u heart failure    HPI:  Reports sleeping a lot more than normal. Also saying she has not had a BM in about 3 days (however I&O's report x3 BM yesterday?); remains on HFNC    ROS:  Gen- No fevers, chills  CV- No chest pain, palpitations  Resp- No cough, yes dyspnea  GI- No N/V/D, abd pain     Objective   Objective     Vital Signs:   Temp:  [98 øF (36.7 øC)-98.4 øF (36.9 øC)] 98.2 øF (36.8 øC)  Heart Rate:  [71-80] 71  Resp:  [16-20] 18  BP: (121-157)/(67-79) 143/72  Flow (L/min):  [45] 45     Physical Exam:  Constitutional: Awake, lethargic, morbidly obese female, laying in bed in NAD  HENT: NCAT, mucous membranes moist  Respiratory: Clear to auscultation bilaterally, accessory muscle use  Cardiovascular: RRR, palpable radial pulses  Gastrointestinal: Positive bowel sounds, soft, nontender, nondistended  Musculoskeletal: Severe BL LE edema  Psychiatric: Appropriate affect, cooperative  Neurologic: Speech clear    Results Reviewed:  LAB RESULTS:      Lab 23  1204 23  0015   WBC 8.15 6.79   HEMOGLOBIN 11.7* 11.8*   HEMATOCRIT 40.7 40.5   PLATELETS 250 250   NEUTROS ABS 6.46 4.64   IMMATURE GRANS (ABS) 0.04 0.07*   LYMPHS ABS 0.71 1.06   MONOS ABS 0.88 0.84   EOS ABS 0.03 0.15   MCV 91.3 91.8   CRP  --  7.00*   PROCALCITONIN  --  0.03   LACTATE  --  1.2   D DIMER QUANT  --  3.22*           Lab 23  0412 23  1618 23  0639 23  1614 23  0539 23  1204 23  0015   SODIUM 141  --  139 139 139 141 140   POTASSIUM 4.2 4.1 3.5 4.0 3.7 4.1 4.3   CHLORIDE 90*  --  88* 89* 94* 99 99   CO2 47.0*  --  46.0* 43.0* 39.0* 36.0* 33.0*   ANION GAP 4.0*  --  5.0 7.0 6.0 6.0 8.0   BUN 26*  --  19 19 15 13 16   CREATININE 0.72  --  0.66  0.76 0.65 0.62 0.73   EGFR 98.3  --  103.1 92.1 103.5 104.7 96.7   GLUCOSE 112*  --  130* 111* 150* 143* 141*   CALCIUM 9.0  --  9.0 9.1 8.9 8.5* 9.0   MAGNESIUM 2.3  --  2.2 2.2  --   --  2.2   PHOSPHORUS  --   --   --   --   --   --  3.3   HEMOGLOBIN A1C  --   --   --   --   --  7.00*  --    TSH  --   --   --   --   --   --  1.660           Lab 07/22/23  0015   TOTAL PROTEIN 7.7   ALBUMIN 3.3*   GLOBULIN 4.4   ALT (SGPT) 14   AST (SGOT) 22   BILIRUBIN 0.7   ALK PHOS 76           Lab 07/24/23  0900 07/24/23  0639 07/22/23  0625 07/22/23  0427 07/22/23  0015   PROBNP  --   --   --   --  427.0   HSTROP T 18* 19* 42* 36* 16*                   Lab 07/22/23  0638 07/22/23  0431 07/22/23  0244   PH, ARTERIAL 7.411  --   --    PCO2, ARTERIAL 57.3*  --   --    PO2 ART 95.4  --   --    FIO2 50 60 80   HCO3 ART 36.4*  --   --    BASE EXCESS ART 9.8*  --   --    CARBOXYHEMOGLOBIN 1.4  --   --    CARBOXYHEMOGLOBIN (VENOUS)  --  1.4 1.6       Brief Urine Lab Results  (Last result in the past 365 days)        Color   Clarity   Blood   Leuk Est   Nitrite   Protein   CREAT   Urine HCG        07/22/23 0426 Yellow   Clear   Negative   Negative   Negative   Negative                   Microbiology Results Abnormal       Procedure Component Value - Date/Time    Blood Culture - Blood, Arm, Left [[id number]]  (Normal) Collected: 07/22/23 0142    Lab Status: Preliminary result Specimen: Blood from Arm, Left Updated: 07/25/23 0800     Blood Culture No growth at 3 days    Blood Culture - Blood, Arm, Right [[id number]]  (Normal) Collected: 07/22/23 0142    Lab Status: Preliminary result Specimen: Blood from Arm, Right Updated: 07/25/23 0800     Blood Culture No growth at 3 days            NM Lung Scan Perfusion Particulate    Result Date: 7/24/2023  DATE OF EXAM: 7/24/2023 11:51 AM EDT PROCEDURE: NM LUNG SCAN PERFUSION PARTICULATE INDICATIONS: elevated d-dimer, hypoxia COMPARISON: Chest radiograph 7/24/2023 TECHNIQUE: 5.5 mCi of technetium 99m  labeled MAA was administered intravenously for the perfusion portion of the pulmonary exam. Scintigraphic images of the lungs were obtained in multiple projections to assess perfusion. FINDINGS: Concurrent chest radiograph shows diffuse interstitial disease pattern suggesting extensive pulmonary edema, with areas of atelectasis in the lung bases. Perfusion scan mirrors this pattern more or less precisely, and accordingly is indeterminate. Areas of diminished perfusion are noted to be almost entirely perihilar, corresponding to the pulmonary edema pattern. Abnormalities are extensive, however, and could obscure/be confused with pulmonary embolic disease.     Impression: Indeterminate perfusion scan for pulmonary embolus. Extensive perfusion abnormalities, but with matching chest x-ray findings. These could all reflect the patient's extensive pulmonary edema. Electronically Signed: Tigre Phillips  7/24/2023 12:54 PM EDT  Workstation ID: [id number]    XR Chest 1 View    Result Date: 7/24/2023  XR CHEST 1 VW Date of Exam: 7/24/2023 11:48 AM EDT Indication: V/Q Comparison Comparison: 7/22/2023. Findings: Bronchiectasis is present and there is also relatively extensive peribronchial edema noted the appearance suggesting pulmonary edema. There is no definite focal lobar consolidation. There is suspected trace left pleural effusion. No distinct pneumothorax. Unchanged cardiac enlargement.     Impression: Impression: Electronically Signed: Hilton Cohen  7/24/2023 12:25 PM EDT  Workstation ID: [id number]     Results for orders placed during the hospital encounter of 07/22/23    Adult Transthoracic Echo Complete W/ Cont if Necessary Per Protocol    Interpretation Summary    Technically difficult study due to massive obesity    Left ventricular systolic function is normal. Estimated left ventricular EF = 60%    Left ventricular wall thickness is consistent with mild concentric hypertrophy.    Valves are poorly visualized.  However  there is no gross valvular abnormality    Estimated right ventricular systolic pressure from tricuspid regurgitation is markedly elevated (57 mmHg).      Current medications:  Scheduled Meds:Albuterol Sulfate NEB Orderable, 2.5 mg, Nebulization, 4x Daily - RT  DAPTOmycin, 6 mg/kg (Adjusted), Intravenous, Q24H  empagliflozin, 10 mg, Oral, Daily  enoxaparin, 60 mg, Subcutaneous, Q12H  insulin lispro, 2-7 Units, Subcutaneous, 4x Daily AC & at Bedtime  lactobacillus acidophilus, 1 capsule, Oral, Daily  losartan, 100 mg, Oral, Daily  metoprolol succinate XL, 100 mg, Oral, Daily  senna-docusate sodium, 2 tablet, Oral, BID  sodium chloride, 10 mL, Intravenous, Q12H  spironolactone, 50 mg, Oral, Daily      Continuous Infusions:bumetanide, 0.5 mg/hr, Last Rate: 0.5 mg/hr (07/25/23 0932)      PRN Meds:.  acetaminophen **OR** acetaminophen **OR** acetaminophen    albuterol    senna-docusate sodium **AND** polyethylene glycol **AND** bisacodyl **AND** bisacodyl    Calcium Replacement - Follow Nurse / BPA Driven Protocol    dextrose    dextrose    glucagon (human recombinant)    hydrOXYzine    LORazepam    Magnesium Cardiology Dose Replacement - Follow Nurse / BPA Driven Protocol    nitroglycerin    Phosphorus Replacement - Follow Nurse / BPA Driven Protocol    Potassium Replacement - Follow Nurse / BPA Driven Protocol    sodium chloride    sodium chloride    sodium chloride    Assessment & Plan   Assessment & Plan     Active Hospital Problems    Diagnosis  POA    **Acute on chronic respiratory failure with hypoxia and hypercapnia [J96.21, J96.22]  Yes    Lower extremity cellulitis [L03.119]  Yes    Bilateral cellulitis of lower leg [L03.116, L03.115]  Yes    Obesity hypoventilation syndrome [E66.2]  Yes    Right-sided congestive heart failure [I50.810]  Yes    Morbid obesity with BMI of 60.0-69.9, adult [E66.01, Z68.44]  Not Applicable    Type 2 diabetes mellitus [E11.9]  Yes    Essential hypertension [I10]  Yes    Lymphedema  of both lower extremities [I89.0]  Yes      Resolved Hospital Problems    Diagnosis Date Resolved POA    Accelerated hypertension [I10] 07/22/2023 Yes        Brief Hospital Course to date:  Kerry Leal is a 58 y.o. female w/ super morbid obesity, DM2, HTN, chronic LE lymphedema, chronic hypoxia who presented w/ worsening LT LE pain; while having CT of her leg she had acute hypercapnic respiratory failure requiring BIPAP; she was admitted, started on Abx, and seen by cardiology for diuresis    Assessment/Plan    Acute/chronic hypercapnic respiratory failure  Chronic hypoxia  Suspected OHS/restrictive lung disease  Pulmonary hypertension  Acute HFpEF  -s/p BIPAP on arrival, currently on HFNC  -repeat TTE limited 2/2 body habitus, EF estimated 60% w/ RVSP 57mmHg  -started on jardiance and aldactone this admit  -cards follows, repeating bumex gtt again    Anxiety symptoms  -recently on buspar but discontinued 2/2 unknown adverse reaction  -cont prn atarax  -cont prn low dose ativan (did not need overnight)    Chronic BL LE lymphedema w/ stasis dermatitis, possible superimposed LT LE cellulitis  -ID follows, cont empiric daptomycin and monitor    HTN  -cont losartan, toprol XL, aldactone    DM type 2, A1c 7.0%, w/o long term use of insulin  -ssi    Super morbid obesity, BMI 62.11 kg/m2  -complicates all aspects of care, directly linked to primary diagnosis/problem      Expected Discharge Location and Transportation: tbd pending mobility post diuresis  Expected Discharge   Expected Discharge Date: 7/27/2023; Expected Discharge Time:      DVT prophylaxis:  Medical DVT prophylaxis orders are present.     AM-PAC 6 Clicks Score (PT): (P) 17 (07/25/23 0629)    CODE STATUS:   Code Status and Medical Interventions:   Ordered at: 07/22/23 0537     Level Of Support Discussed With:    Patient     Code Status (Patient has no pulse and is not breathing):    CPR (Attempt to Resuscitate)     Medical Interventions (Patient has  pulse or is breathing):    Full Support     Release to patient:    Routine Release       Berry Wall, DO  07/25/23

## 2023-07-25 NOTE — NURSING NOTE
Wocn follow up for: Pressure injury on coccyx    Assessment: Skin actually has an open area on the left gluteal.  This is not on a bony prominence.  I was able to get it to axel today.  It is slightly bleeding so some of the blood on it as coagulated which might make it hard to see that it is blanching.  There were surrounding skin however is a dull purple on bilateral buttocks indicative of chronic MASD.  Most likely a mix of perspiration and urination.    Recommendations: Recommend cleansing with pH no rinse foam and blue wipes and covering with Z guard twice daily and as needed per soiling.    Areas of concern/ new issues: Please offload heels.    Skin interventions in place.    Specialty bed: Patient is on [facility]    Pressure Injury Protocol (initiate for Art Score of 18 or less):   *Maintain good skin care, keep dry, turn q 2 hr, keep heels elevated and offloaded with heel boots.    *Apply z-guard to sacrococcygeal area/ perineal area BID or daily and PRN per incontinent episodes.  *Follow C.A.R.E protocol if medical devices (Bipap, goodwin, Ng tube, etc) are being used.     Woc will follow.    Please contact with questions or concerns.

## 2023-07-25 NOTE — PROGRESS NOTES
Patient Name: Kerry Leal  : 1965  MRN: [id number]    Admission Date: 2023    Requesting Provider: Dixie  Evaluating Physician: Shine Rodriguez MD    Chief Complaint: cellulitis    Reason for Consultation: cellulitis    Subjective   Patient is a 58 y.o. female with history of hypertension, asthma and chronic lymphedema.  Previous seen by Dr. Shine Rodriguez and previously treated with dalbavancin and then suppressive Keflex.  Last seen in the hospital 2023 and at that time treated with daptomycin and ceftriaxone and discharged to outpatient follow-up.  Seen in clinic in May with plans to continue chronic suppressive Keflex.  At some point after last office visit patient called [facility] saying that she had a rash under her chin and she has been off antibiotics since then.    Admitted to [facility] 2023 with complaints of worsening left lower extremity swelling and pain.  Denied fever.  Ongoing for CT emergency department CODE BLUE was called however per H&P patient did not lose a pulse and no CPR was performed.  No drainable fluid collection.  Started on daptomycin and ceftriaxone and admitted.  Feels like she is improving slightly compared to admission    23:  No fever.  Blood cultures pending this am.  Resting comfortably.  Feels like leg swelling, pain improving.  No new issues per nursing    23: The patient is still short of breath and was on OptiFlow when I saw her this afternoon.  She had significant anxiety overnight.  Having some bilateral lower extremity pain and swelling.  Reports recent worsening redness over her bilateral lower extremities.  No fevers    ROS:  As above      Allergies   Allergen Reactions    Diclofenac Swelling    Benadryl [Diphenhydramine Hcl] Other (See Comments)     UNSURE    Clindamycin/Lincomycin Other (See Comments)     UNSURE    Doxycycline Other (See Comments)     UNSURE    Fluticasone Other (See Comments)     NOSE BLEEDS     Fluvoxamine Other (See Comments)     UNSURE    Montelukast Swelling    Symbicort [Budesonide-Formoterol Fumarate] Other (See Comments)     UNSURE    Lisinopril Dizziness    Penicillins Other (See Comments)     MOTHER TOLD HER SHE WAS ALLERGIC  *has tolerated ceftriaxone    Smz-Tmp Ds [Sulfamethoxazole-Trimethoprim] GI Intolerance    Sulfa Antibiotics GI Intolerance       Objective   Antibiotics:  Anti-Infectives (From admission, onward)      Ordered     Dose/Rate Route Frequency Start Stop    07/22/23 1344  DAPTOmycin (CUBICIN) 550 mg in sodium chloride 0.9 % 50 mL IVPB        Ordering Provider: Valerie Gibson PharmD    6 mg/kg x 95 kg (Adjusted)  100 mL/hr over 30 Minutes Intravenous Every 24 Hours 07/23/23 1100 07/28/23 1059    07/22/23 0053  cefTRIAXone (ROCEPHIN) 2000 mg/100 mL 0.9% NS IVPB (MBP)        Ordering Provider: Darion Simental MD    2,000 mg  over 30 Minutes Intravenous Once 07/22/23 0109 07/22/23 0333            Other Medications:  Current Facility-Administered Medications   Medication Dose Route Frequency Provider Last Rate Last Admin    acetaminophen (TYLENOL) tablet 650 mg  650 mg Oral Q4H PRN Raquel, Hansa, APRN   650 mg at 07/23/23 0305    Or    acetaminophen (TYLENOL) 160 MG/5ML solution 650 mg  650 mg Oral Q4H PRN Raquel, Hansa, APRN        Or    acetaminophen (TYLENOL) suppository 650 mg  650 mg Rectal Q4H PRN Raquel, Hansa, APRN        albuterol (PROVENTIL) nebulizer solution 0.083% 2.5 mg/3mL  2.5 mg Nebulization Q4H PRN Raquel, Hansa, APRN   2.5 mg at 07/24/23 0510    albuterol (PROVENTIL) nebulizer solution 0.083% 2.5 mg/3mL  2.5 mg Nebulization 4x Daily - RT Berry Wall DO   2.5 mg at 07/24/23 1907    sennosides-docusate (PERICOLACE) 8.6-50 MG per tablet 2 tablet  2 tablet Oral BID Raquel, Hansa, APRN   2 tablet at 07/24/23 0845    And    polyethylene glycol (MIRALAX) packet 17 g  17 g Oral Daily PRN Raquel, Hansa, APRN        And    bisacodyl  (DULCOLAX) EC tablet 5 mg  5 mg Oral Daily PRN Raqeul, Hansa, APRN        And    bisacodyl (DULCOLAX) suppository 10 mg  10 mg Rectal Daily PRN Raquel, Hansa, APRN        Calcium Replacement - Follow Nurse / BPA Driven Protocol   Does not apply PRN Jorge Puckett IV, MD        DAPTOmycin (CUBICIN) 550 mg in sodium chloride 0.9 % 50 mL IVPB  6 mg/kg (Adjusted) Intravenous Q24H Valerie Gibson PharmD 100 mL/hr at 07/24/23 1017 550 mg at 07/24/23 1017    dextrose (D50W) (25 g/50 mL) IV injection 25 g  25 g Intravenous Q15 Min PRN Raquel, Hansa, APRN        dextrose (GLUTOSE) oral gel 15 g  15 g Oral Q15 Min PRN Raquel, Hansa, APRN        empagliflozin (JARDIANCE) tablet 10 mg  10 mg Oral Daily Jorge Puckett IV, MD   10 mg at 07/24/23 0845    Enoxaparin Sodium (LOVENOX) syringe 60 mg  60 mg Subcutaneous Q12H Raquel, Hansa, APRN   60 mg at 07/24/23 2037    glucagon (GLUCAGEN) injection 1 mg  1 mg Intramuscular Q15 Min PRN Raquel, Hansa, APRN        hydrOXYzine (ATARAX) tablet 25 mg  25 mg Oral TID PRN Berry Wall DO   25 mg at 07/23/23 2025    Insulin Lispro (humaLOG) injection 2-7 Units  2-7 Units Subcutaneous 4x Daily AC & at Bedtime Raquel, Hansa, APRN   5 Units at 07/23/23 1202    lactobacillus acidophilus (RISAQUAD) capsule 1 capsule  1 capsule Oral Daily Markos Dalton MD   1 capsule at 07/24/23 0846    LORazepam (ATIVAN) injection 0.5 mg  0.5 mg Intravenous Q4H PRN Berry Wall DO        losartan (COZAAR) tablet 100 mg  100 mg Oral Daily Raquel, Hansa, APRN   100 mg at 07/24/23 0847    Magnesium Cardiology Dose Replacement - Follow Nurse / BPA Driven Protocol   Does not apply PRN Jorge Puckett IV, MD        metoprolol succinate XL (TOPROL-XL) 24 hr tablet 100 mg  100 mg Oral Daily Raquel, Hansa, APRN   100 mg at 07/24/23 0846    nitroglycerin (NITROSTAT) SL tablet 0.4 mg  0.4 mg Sublingual Q5 Min PRN Raquel, Hansa, APRN    "0.4 mg at 07/24/23 0630    Phosphorus Replacement - Follow Nurse / BPA Driven Protocol   Does not apply EVANN Jorge Puckett IV, MD        Potassium Replacement - Follow Nurse / BPA Driven Protocol   Does not apply Jorge Will IV, MD        sodium chloride 0.9 % flush 10 mL  10 mL Intravenous PRN Darion Simental MD        sodium chloride 0.9 % flush 10 mL  10 mL Intravenous Q12H Raquel, Hansa, APRN   10 mL at 07/24/23 2039    sodium chloride 0.9 % flush 10 mL  10 mL Intravenous PRN Raquel, Hansa, APRN        sodium chloride 0.9 % infusion 40 mL  40 mL Intravenous PRN Raquel, Hansa, APRN        spironolactone (ALDACTONE) tablet 50 mg  50 mg Oral Daily Jorge Puckett IV, MD   50 mg at 07/24/23 0847       Physical Exam:   Vital Signs   /68 (BP Location: Right arm, Patient Position: Lying)   Pulse 75   Temp 98.3 øF (36.8 øC) (Oral)   Resp 18   Ht 160 cm (62.99\")   Wt (!) 159 kg (350 lb 8.5 oz)   SpO2 94%   BMI 62.11 kg/mý     GENERAL:  sleeping , chronically ill-appearing  HEENT: Normocephalic, atraumatic.  Poor dentition.  No external oral lesions noted  NECK: Supple   HEART: RRR; No murmur.  LUNGS: Diminished breath sounds anteriorly.  On OptiFlow at 45 L/min with an FiO2 of 55%  ABDOMEN: morbidly obese but soft  EXT: Bilateral lower extremities with significant chronic lymphedema with dressings in place.  Dressings were partially removed and noted that she does have increased erythema from baseline.  Does have chronic venous stasis dermatitis.  : External urinary catheter in place  SKIN: No diffuse rash  NEURO: Awake alert and oriented x4.  Normal speech and cognition    Laboratory Data  Lab Results   Component Value Date    WBC 8.15 07/22/2023    HGB 11.7 (L) 07/22/2023    HCT 40.7 07/22/2023    MCV 91.3 07/22/2023     07/22/2023     Lab Results   Component Value Date    GLUCOSE 130 (H) 07/24/2023    CALCIUM 9.0 07/24/2023     " 07/24/2023    K 4.1 07/24/2023    CO2 46.0 (H) 07/24/2023    CL 88 (L) 07/24/2023    BUN 19 07/24/2023    CREATININE 0.66 07/24/2023     Estimated Creatinine Clearance: 142.7 mL/min (by C-G formula based on SCr of 0.66 mg/dL).  Lab Results   Component Value Date    ALT 14 07/22/2023    AST 22 07/22/2023    ALKPHOS 76 07/22/2023    BILITOT 0.7 07/22/2023     Lab Results   Component Value Date    CRP 7.00 (H) 07/22/2023     Lab Results   Component Value Date    SEDRATE 120 (H) 04/13/2023       The above labs were reviewed.     Microbiology:  Microbiology Results (last 10 days)       Procedure Component Value - Date/Time    Blood Culture - Blood, Arm, Left [[id number]]  (Normal) Collected: 07/22/23 0142    Lab Status: Preliminary result Specimen: Blood from Arm, Left Updated: 07/24/23 0801     Blood Culture No growth at 2 days    Blood Culture - Blood, Arm, Right [[id number]]  (Normal) Collected: 07/22/23 0142    Lab Status: Preliminary result Specimen: Blood from Arm, Right Updated: 07/24/23 0801     Blood Culture No growth at 2 days            Radiology:  NM Lung Scan Perfusion Particulate    Result Date: 7/24/2023  Indeterminate perfusion scan for pulmonary embolus. Extensive perfusion abnormalities, but with matching chest x-ray findings. These could all reflect the patient's extensive pulmonary edema. Electronically Signed: Tigre Phillips  7/24/2023 12:54 PM EDT  Workstation ID: [id number]    XR Chest 1 View    Result Date: 7/24/2023  Impression: Electronically Signed: Hilton Choen  7/24/2023 12:25 PM EDT  Workstation ID: [id number]    XR Chest 1 View    Result Date: 7/22/2023  Impression: Mild to moderate pulmonary edema pattern with small left-sided pleural effusion with bibasilar atelectasis. Stable cardiomegaly. Electronically Signed: Naomi Freitas  7/22/2023 1:08 AM EDT  Workstation ID: [id number]    CT Lower Extremity Bilateral With Contrast    Result Date: 7/22/2023  Impression: Diffuse subcutaneous edema present  bilaterally, left greater than right likely related to third spacing of fluid. No focal drainable collection identified. No acute osseous abnormality. No suspicious joint effusion. Nonangiographic evaluation of the arterial structures demonstrates no obvious abnormalities. Venous structures are not well evaluated though multiple varicosities are present. Electronically Signed: Naomi Freitas  7/22/2023 2:35 AM EDT  Workstation ID: [id number]     I personally reviewed the above CT left leg: no fluid collection noted    Chest x-rays reviewed- ongoing pulmonary edema      Assessment   Acute on chronic bilateral lower extremity venous stasis swelling and dermatitis: Improving with diuresis and antibiotics  Left lower extremity swelling, erythema, tenderness, cellulitis  Right lower extremity focal erythema, possible cellulitis  Chronic bilateral lower extremity lymphedema  Acute on chronic hypoxic/Hypercapnic respiratory failure  Morbid obesity  Bilateral pulmonary edema on x-ray  Acute on chronic CHFpEF  HTN  Listed allergies to clindamycin, doxycycline, penicillin and sulfa  Patient reported rash with Keflex: about 2 months ago. Recently treated with ceftriaxone and had  another dose in ED with no side effects thus far.    PLAN:   - Microbiology data reviewed.  Follow-up pending blood cultures, so far negative  - Follow CBC, CMP, CK  - Continue daptomycin for now  - Probiotic  - Diuresis per cardiology and primary team  - Wound care consultation.  Please take pictures during dressing changes  - NM lung scan was indeterminate perfusion scan for PTE      Shine Rodriguez MD  7/24/2023    I

## 2023-07-25 NOTE — PROGRESS NOTES
Cardiology Progress Note      Reason for visit:    Right-sided heart failure    IDENTIFICATION: 58-year-old female who resides in [place], [place]    Active Hospital Problems    Diagnosis  POA    **Acute on chronic respiratory failure with hypoxia and hypercapnia [J96.21, J96.22]  Yes    Lower extremity cellulitis [L03.119]  Yes     Priority: High    Right-sided congestive heart failure [I50.810]  Yes     Priority: High     Echo (7/6/2021): Test difficult due to body habitus.  LVEF 55%.  Mild AI.  No significant aortic stenosis with mean gradient 12 mmHg.  Mild dilation of ascending aorta 4 cm.  Echo (10/23/2022): LVEF 55%..  Mild AS/AR.  RVSP 45-55 mmHg  Echo (7/23/2023): LVEF 65%.  No significant valve abnormality.  RVSP 57 mmHg      Lymphedema of both lower extremities [I89.0]  Yes     Priority: Medium    Bilateral cellulitis of lower leg [L03.116, L03.115]  Yes    Obesity hypoventilation syndrome [E66.2]  Yes    Morbid obesity with BMI of 60.0-69.9, adult [E66.01, Z68.44]  Not Applicable    Type 2 diabetes mellitus [E11.9]  Yes    Essential hypertension [I10]  Yes            Good diuretic response to IV Bumex yesterday  Improving shortness of breath, although requiring high flow nasal cannula oxygen  Patient has not ambulated since admission         Vital Sign Min/Max for last 24 hours  Temp  Min: 98 øF (36.7 øC)  Max: 98.4 øF (36.9 øC)   BP  Min: 121/75  Max: 157/79   Pulse  Min: 71  Max: 80   Resp  Min: 16  Max: 20   SpO2  Min: 90 %  Max: 96 %   Flow (L/min)  Min: 45  Max: 45      Intake/Output Summary (Last 24 hours) at 7/25/2023 0832  Last data filed at 7/25/2023 0440  Gross per 24 hour   Intake 600 ml   Output 1250 ml   Net -650 ml           Physical Exam  Constitutional:       Appearance: She is obese.   Cardiovascular:      Rate and Rhythm: Normal rate and regular rhythm.      Heart sounds: No murmur heard.     Comments: Wrinkles noted to bilateral feet consistent with improving lower extremity  edema.  Pulmonary:      Effort: Pulmonary effort is normal.   Skin:     Comments: Cellulitic changes to bilateral lower extremities, improving   Neurological:      Mental Status: She is alert.       Tele: Normal sinus rhythm    Results Review (reviewed the patient's recent labs in the electronic medical record):     EKG (7/22/2023): Normal sinus rhythm, low voltage QRS ST depression lateral leads    CXR (7/22/2023): Mild to moderate pulmonary edema with small left-sided pleural effusion    Results for orders placed during the hospital encounter of 07/22/23    Adult Transthoracic Echo Complete W/ Cont if Necessary Per Protocol    Interpretation Summary    Technically difficult study due to massive obesity    Left ventricular systolic function is normal. Estimated left ventricular EF = 60%    Left ventricular wall thickness is consistent with mild concentric hypertrophy.    Valves are poorly visualized.  However there is no gross valvular abnormality    Estimated right ventricular systolic pressure from tricuspid regurgitation is markedly elevated (57 mmHg).        Results from last 7 days   Lab Units 07/25/23  0412 07/24/23  1618 07/24/23  0639 07/23/23  1614 07/23/23  0539 07/22/23  1204 07/22/23  0015   SODIUM mmol/L 141  --  139 139 139 141 140   POTASSIUM mmol/L 4.2 4.1 3.5 4.0 3.7 4.1 4.3   CHLORIDE mmol/L 90*  --  88* 89* 94* 99 99   BUN mg/dL 26*  --  19 19 15 13 16   CREATININE mg/dL 0.72  --  0.66 0.76 0.65 0.62 0.73   MAGNESIUM mg/dL 2.3  --  2.2 2.2  --   --  2.2     Results from last 7 days   Lab Units 07/24/23  0900 07/24/23  0639 07/22/23  0625   HSTROP T ng/L 18* 19* 42*     Results from last 7 days   Lab Units 07/22/23  1204 07/22/23  0015   WBC 10*3/mm3 8.15 6.79   HEMOGLOBIN g/dL 11.7* 11.8*   HEMATOCRIT % 40.7 40.5   PLATELETS 10*3/mm3 250 250       Lab Results   Component Value Date    HGBA1C 7.00 (H) 07/22/2023       Lab Results   Component Value Date    CHOL 134 03/31/2023    TRIG 89 03/31/2023     HDL 33 (L) 03/31/2023    LDL 84 03/31/2023              Acute on chronic diastolic and right-sided heart failure  Repeat Bumex drip  Diuril 500 mg prior to initiating Bumex     Hypertension  No CCB due to edema  Improving with diuresis  Continue metoprolol succinate 100 mg daily, losartan 100 mg daily and spironolactone 50 mg daily.     Lower extremity cellulitis/lymphedema  Improving with diuresis     Massive obesity  We discussed that her obesity is leading to much of her present illness i.e. right-sided heart failure, lymphedema/cellulitis  Semaglutide should be started after discharge for weight loss     Elevated D-dimer  VQ scan pending  Venous duplex of lower extremities preliminary negative for PE                Repeat IV Bumex drip  Diuril 500 mg x 1  PT OT to ambulate patient      Electronically signed by Jorge Puckett IV, MD, 07/25/23, 8:32 AM EDT.

## 2023-07-25 NOTE — NURSING NOTE
Patient seen yesterday when she got back from her procedure.  [facility] did assist with getting the ROB mattress into the room and set up patient did receive bilateral leg wraps.  At this time she was in the chair and could not sit up so [facility] asked nursing to call when they got her back to bed however when [facility] arrived to work this morning there was a missed call at 430 right after [facility] had left for the day.  We will see today.

## 2023-07-25 NOTE — PLAN OF CARE
Goal Outcome Evaluation:  Plan of Care Reviewed With: (P) patient        Progress: (P) improving  Outcome Evaluation: (P) patient was able to ambulate down galo today with non-rebreather on 15L O2. She did well with rolling and bed mobility. patient is limited by need to urinate and fatigue. she remains below functional mobility baseline. recommend skilled nursing facility upon d/c.      Anticipated Discharge Disposition (PT): (P) skilled nursing facility

## 2023-07-25 NOTE — THERAPY TREATMENT NOTE
Patient Name: Kerry Leal  : 1965    MRN: [id number]                              Today's Date: 2023       Admit Date: 2023    Visit Dx:     ICD-10-CM ICD-9-CM   1. Acute respiratory failure with hypoxia and hypercapnia  J96.01 518.81    J96.02    2. Respiratory acidosis  E87.29 276.2   3. Cellulitis of lower extremity, unspecified laterality  L03.119 682.6   4. Obesity hypoventilation syndrome  E66.2 278.03   5. Lymphedema of both lower extremities  I89.0 457.1   6. Type 2 diabetes mellitus with hyperglycemia, without long-term current use of insulin  E11.65 250.00     790.29   7. Morbid obesity with BMI of 60.0-69.9, adult  E66.01 278.01    Z68.44 V85.44     Patient Active Problem List   Diagnosis    Essential hypertension    Lymphedema of both lower extremities    Elevated d-dimer    Acute on chronic respiratory failure with hypoxia and hypercapnia    Type 2 diabetes mellitus    Morbid obesity with BMI of 60.0-69.9, adult    Right-sided congestive heart failure    Obesity hypoventilation syndrome    Bilateral cellulitis of lower leg    Lower extremity cellulitis     Past Medical History:   Diagnosis Date    Anxiety     Asthma     Heart murmur     Hypertension      Past Surgical History:   Procedure Laterality Date    APPENDECTOMY       SECTION      HYSTERECTOMY      NECK SURGERY        General Information       Row Name 23 1126          Physical Therapy Time and Intention    Document Type therapy note (daily note) (P)   -ER     Mode of Treatment physical therapy (P)   -ER       Row Name 23 1126          General Information    Patient Profile Reviewed yes (P)   -ER     Existing Precautions/Restrictions fall;oxygen therapy device and L/min;other (see comments) (P)   HFNC, BLE edema/cellulitis  -ER       Row Name 23 1126          Cognition    Orientation Status (Cognition) oriented x 4 (P)   -ER       Row Name 23 1126          Safety Issues, Functional Mobility     Impairments Affecting Function (Mobility) balance;endurance/activity tolerance;range of motion (ROM);sensation/sensory awareness;shortness of breath;strength (P)   -ER     Comment, Safety Issues/Impairments (Mobility) no falls or LOB (P)   -ER               User Key  (r) = Recorded By, (t) = Taken By, (c) = Cosigned By      Initials Name Provider Type    ER Caroline Stewart, PT Student PT Student                   Mobility       Row Name 07/25/23 1126          Bed Mobility    Bed Mobility supine-sit;scooting/bridging;rolling left;rolling right (P)   -ER     Rolling Left [place] (Bed Mobility) minimum assist (75% patient effort) (P)   -ER     Rolling Right [place] (Bed Mobility) minimum assist (75% patient effort) (P)   -ER     Scooting/Bridging [place] (Bed Mobility) contact guard (P)   -ER     Supine-Sit [place] (Bed Mobility) minimum assist (75% patient effort) (P)   -ER     Assistive Device (Bed Mobility) head of bed elevated;bed rails;draw sheet (P)   -ER     Comment, (Bed Mobility) verbal and tactile cues for sequencing; UE support for reaching to railings (P)   -ER       Row Name 07/25/23 1126          Bed-Chair Transfer    Bed-Chair [place] (Transfers) contact guard;verbal cues (P)   -ER     Assistive Device (Bed-Chair Transfers) walker, front-wheeled (P)   -ER       Row Name 07/25/23 1126          Sit-Stand Transfer    Sit-Stand [place] (Transfers) contact guard;verbal cues (P)   -ER     Assistive Device (Sit-Stand Transfers) walker, front-wheeled (P)   -ER       Row Name 07/25/23 1126          Gait/Stairs (Locomotion)    [place] Level (Gait) contact guard (P)   -ER     Assistive Device (Gait) walker, front-wheeled (P)   -ER     Distance in Feet (Gait) 60 (P)   -ER     Deviations/Abnormal Patterns (Gait) antalgic;base of support, wide;gait speed decreased;anjelica decreased (P)   -ER     Comment, (Gait/Stairs) patient ambulated with a step through gait pattern with FWW.  close chair follow was provided. patient ambulated with a non-rebreather on 15L O2 to replace high flow. walking distance was limited by need to urinate. (P)   -ER               User Key  (r) = Recorded By, (t) = Taken By, (c) = Cosigned By      Initials Name Provider Type    KRISTEN BansalseyCaroline, PT Student PT Student                   Obj/Interventions       Row Name 07/25/23 1126          Balance    Balance Interventions sitting;standing;sit to stand;supported;static;dynamic;minimal challenge (P)   -ER               User Key  (r) = Recorded By, (t) = Taken By, (c) = Cosigned By      Initials Name Provider Type    Caroline Carrizales, PT Student PT Student                   Goals/Plan       Row Name 07/25/23 1126          Bed Mobility Goal 1 (PT)    Activity/Assistive Device (Bed Mobility Goal 1, PT) rolling to left;rolling to right;scooting;sit to supine;supine to sit (P)   -ER     [place] [place] Level/Cues Needed (Bed Mobility Goal 1, PT) modified independence (P)   -ER     Time Frame (Bed Mobility Goal 1, PT) long term goal (LTG);10 days (P)   -ER     Progress/Outcomes (Bed Mobility Goal 1, PT) goal revised this date (P)   -ER       Row Name 07/25/23 1126          Transfer Goal 1 (PT)    Activity/Assistive Device (Transfer Goal 1, PT) sit-to-stand/stand-to-sit;bed-to-chair/chair-to-bed;car transfer (P)   -ER     [facility] Level/Cues Needed (Transfer Goal 1, PT) modified independence (P)   -ER     Time Frame (Transfer Goal 1, PT) long term goal (LTG);10 days (P)   -ER     Progress/Outcome (Transfer Goal 1, PT) goal revised this date (P)   -ER       Row Name 07/25/23 1126          Gait Training Goal 1 (PT)    Activity/Assistive Device (Gait Training Goal 1, PT) gait (walking locomotion);increase endurance/gait distance;improve balance and speed;forward stepping;diminish gait deviation;decrease fall risk;walker, rolling (P)   -ER     [facility] Level (Gait Training Goal 1, PT) standby assist (P)   -ER     Distance  (Gait Training Goal 1, PT) 200 (P)   -ER     Time Frame (Gait Training Goal 1, PT) long term goal (LTG);10 days (P)   -ER     Progress/Outcome (Gait Training Goal 1, PT) goal revised this date (P)   -ER               User Key  (r) = Recorded By, (t) = Taken By, (c) = Cosigned By      Initials Name Provider Type    ER Caroline Stewart, PT Student PT Student                   Clinical Impression       Row Name 07/25/23 1321 07/25/23 1126       Pain    Pretreatment Pain Rating 0/10 - no pain (P)   -ER 0/10 - no pain (P)   -ER    Posttreatment Pain Rating -- 0/10 - no pain (P)   -ER      Row Name 07/25/23 1126          Plan of Care Review    Plan of Care Reviewed With patient (P)   -ER     Progress improving (P)   -ER     Outcome Evaluation patient was able to ambulate down galo today with non-rebreather on 15L O2. She did well with rolling and bed mobility. patient is limited by need to urinate and fatigue. she remains below functional mobility baseline. recommend skilled nursing facility upon d/c. (P)   -ER       Row Name 07/25/23 1126          Vital Signs    Pre SpO2 (%) 96 (P)   -ER     O2 Delivery Pre Treatment hi-flow (P)   -ER     Intra SpO2 (%) 98 (P)   -ER     O2 Delivery Intra Treatment non-rebreather (P)   -ER     Post SpO2 (%) 95 (P)   -ER     O2 Delivery Post Treatment hi-flow (P)   -ER     Pre Patient Position Supine (P)   -ER     Intra Patient Position Standing (P)   -ER     Post Patient Position Sitting (P)   -ER       Row Name 07/25/23 1126          Positioning and Restraints    Pre-Treatment Position in bed (P)   -ER     Post Treatment Position chair (P)   -ER     In Chair notified nsg;reclined;call light within reach;exit alarm on;encouraged to call for assist;on mechanical lift sling (P)   -ER               User Key  (r) = Recorded By, (t) = Taken By, (c) = Cosigned By      Initials Name Provider Type    ER Caroline Stewart, PT Student PT Student                   Outcome Measures       Row Name 07/25/23  1126          How much help from another person do you currently need...    Turning from your back to your side while in flat bed without using bedrails? 3 (P)   -ER     Moving from lying on back to sitting on the side of a flat bed without bedrails? 3 (P)   -ER     Moving to and from a bed to a chair (including a wheelchair)? 3 (P)   -ER     Standing up from a chair using your arms (e.g., wheelchair, bedside chair)? 3 (P)   -ER     Climbing 3-5 steps with a railing? 2 (P)   -ER     To walk in hospital room? 3 (P)   -ER     AM-PAC 6 Clicks Score (PT) 17 (P)   -ER     Highest level of mobility 5 --> Static standing (P)   -ER       Row Name 07/25/23 1126          Functional Assessment    Outcome Measure Options AM-PAC 6 Clicks Basic Mobility (PT) (P)   -ER               User Key  (r) = Recorded By, (t) = Taken By, (c) = Cosigned By      Initials Name Provider Type    ER Caroline Stewart, PT Student PT Student                                 Physical Therapy Education       Title: PT OT SLP Therapies (Done)       Topic: Physical Therapy (Done)       Point: Mobility training (Done)       Learning Progress Summary             Patient Acceptance, E, VU by ER at 7/25/2023 1325    Comment: pt educated on d/c planning, purpose of PT, safety    Acceptance, E, VU,NR by  at 7/23/2023 1040    Comment: PT POC                         Point: Home exercise program (Done)       Learning Progress Summary             Patient Acceptance, E, VU by ER at 7/25/2023 1325    Comment: pt educated on d/c planning, purpose of PT, safety    Acceptance, E, VU,NR by  at 7/23/2023 1040    Comment: PT POC                         Point: Body mechanics (Done)       Learning Progress Summary             Patient Acceptance, E, VU by ER at 7/25/2023 1325    Comment: pt educated on d/c planning, purpose of PT, safety    Acceptance, E, VU,NR by  at 7/23/2023 1040    Comment: PT POC                         Point: Precautions (Done)       Learning  Progress Summary             Patient Acceptance, E, VU by ER at 7/25/2023 1325    Comment: pt educated on d/c planning, purpose of PT, safety    Acceptance, E, VU,NR by LO at 7/23/2023 1040    Comment: PT POC                                         User Key       Initials Effective Dates Name Provider Type Discipline     06/16/21 -  Anamaria Ravi, PT Physical Therapist PT    ER 04/20/23 -  Caroline Stewart, PT Student PT Student PT                  PT Recommendation and Plan     Plan of Care Reviewed With: (P) patient  Progress: (P) improving  Outcome Evaluation: (P) patient was able to ambulate down galo today with non-rebreather on 15L O2. She did well with rolling and bed mobility. patient is limited by need to urinate and fatigue. she remains below functional mobility baseline. recommend skilled nursing facility upon d/c.     Time Calculation:         PT Charges       Row Name 07/25/23 1126             Time Calculation    Start Time 1126 (P)   -ER      PT Received On 07/25/23 (P)   -ER         Timed Charges    73289 - Gait Training Minutes  15 (P)   -ER      60007 - PT Therapeutic Activity Minutes 14 (P)   -ER         Total Minutes    Timed Charges Total Minutes 29 (P)   -ER       Total Minutes 29 (P)   -ER                User Key  (r) = Recorded By, (t) = Taken By, (c) = Cosigned By      Initials Name Provider Type    ER Caroline Stewart, PT Student PT Student                  Therapy Charges for Today       Code Description Service Date Service Provider Modifiers Qty    66996904681 HC GAIT TRAINING EA 15 MIN 7/25/2023 Caroline Stewart, PT Student GP 1    82602114562 HC PT THERAPEUTIC ACT EA 15 MIN 7/25/2023 Caroline Stewart, PT Student GP 1    32092574164 HC PT THER SUPP EA 15 MIN 7/25/2023 Caroline Stewart, PT Student GP 2            PT G-Codes  Outcome Measure Options: (P) AM-PAC 6 Clicks Basic Mobility (PT)  AM-PAC 6 Clicks Score (PT): (P) 17  AM-PAC 6 Clicks Score (OT): 16  PT Discharge Summary  Anticipated  Discharge Disposition (PT): (P) skilled nursing facility    Caroline Stewart, PT Student  7/25/2023

## 2023-07-26 LAB
ANION GAP SERPL CALCULATED.3IONS-SCNC: 7 MMOL/L (ref 5–15)
BUN SERPL-MCNC: 34 MG/DL (ref 6–20)
BUN/CREAT SERPL: 40 (ref 7–25)
CALCIUM SPEC-SCNC: 9.4 MG/DL (ref 8.6–10.5)
CHLORIDE SERPL-SCNC: 87 MMOL/L (ref 98–107)
CO2 SERPL-SCNC: 46 MMOL/L (ref 22–29)
CREAT SERPL-MCNC: 0.85 MG/DL (ref 0.57–1)
EGFRCR SERPLBLD CKD-EPI 2021: 80.5 ML/MIN/1.73
GLUCOSE BLDC GLUCOMTR-MCNC: 131 MG/DL (ref 70–130)
GLUCOSE BLDC GLUCOMTR-MCNC: 136 MG/DL (ref 70–130)
GLUCOSE BLDC GLUCOMTR-MCNC: 176 MG/DL (ref 70–130)
GLUCOSE BLDC GLUCOMTR-MCNC: 177 MG/DL (ref 70–130)
GLUCOSE BLDC GLUCOMTR-MCNC: 184 MG/DL (ref 70–130)
GLUCOSE SERPL-MCNC: 135 MG/DL (ref 65–99)
MAGNESIUM SERPL-MCNC: 2.3 MG/DL (ref 1.6–2.6)
POTASSIUM SERPL-SCNC: 3.9 MMOL/L (ref 3.5–5.2)
SODIUM SERPL-SCNC: 140 MMOL/L (ref 136–145)

## 2023-07-26 PROCEDURE — 83735 ASSAY OF MAGNESIUM: CPT | Performed by: INTERNAL MEDICINE

## 2023-07-26 PROCEDURE — 25010000002 ENOXAPARIN PER 10 MG: Performed by: NURSE PRACTITIONER

## 2023-07-26 PROCEDURE — 25010000002 CHLOROTHIAZIDE PER 500 MG: Performed by: INTERNAL MEDICINE

## 2023-07-26 PROCEDURE — 94799 UNLISTED PULMONARY SVC/PX: CPT

## 2023-07-26 PROCEDURE — 97597 DBRDMT OPN WND 1ST 20 CM/<: CPT

## 2023-07-26 PROCEDURE — 94761 N-INVAS EAR/PLS OXIMETRY MLT: CPT

## 2023-07-26 PROCEDURE — 97530 THERAPEUTIC ACTIVITIES: CPT

## 2023-07-26 PROCEDURE — 97116 GAIT TRAINING THERAPY: CPT

## 2023-07-26 PROCEDURE — 25010000002 DAPTOMYCIN PER 1 MG: Performed by: INTERNAL MEDICINE

## 2023-07-26 PROCEDURE — 99231 SBSQ HOSP IP/OBS SF/LOW 25: CPT | Performed by: INTERNAL MEDICINE

## 2023-07-26 PROCEDURE — 29581 APPL MULTLAYER CMPRN SYS LEG: CPT

## 2023-07-26 PROCEDURE — 94664 DEMO&/EVAL PT USE INHALER: CPT

## 2023-07-26 PROCEDURE — 25010000002 LORAZEPAM PER 2 MG: Performed by: INTERNAL MEDICINE

## 2023-07-26 PROCEDURE — 82948 REAGENT STRIP/BLOOD GLUCOSE: CPT

## 2023-07-26 PROCEDURE — 80048 BASIC METABOLIC PNL TOTAL CA: CPT | Performed by: INTERNAL MEDICINE

## 2023-07-26 PROCEDURE — 97598 DBRDMT OPN WND ADDL 20CM/<: CPT

## 2023-07-26 PROCEDURE — 99232 SBSQ HOSP IP/OBS MODERATE 35: CPT | Performed by: NURSE PRACTITIONER

## 2023-07-26 RX ORDER — DOXYCYCLINE 100 MG/1
100 CAPSULE ORAL EVERY 12 HOURS SCHEDULED
Status: DISCONTINUED | OUTPATIENT
Start: 2023-07-27 | End: 2023-08-02 | Stop reason: HOSPADM

## 2023-07-26 RX ORDER — BUMETANIDE 0.25 MG/ML
1 INJECTION INTRAMUSCULAR; INTRAVENOUS ONCE
Status: COMPLETED | OUTPATIENT
Start: 2023-07-26 | End: 2023-07-26

## 2023-07-26 RX ADMIN — ENOXAPARIN SODIUM 60 MG: 60 INJECTION SUBCUTANEOUS at 08:59

## 2023-07-26 RX ADMIN — ALBUTEROL SULFATE 2.5 MG: 2.5 SOLUTION RESPIRATORY (INHALATION) at 15:39

## 2023-07-26 RX ADMIN — SENNOSIDES AND DOCUSATE SODIUM 2 TABLET: 50; 8.6 TABLET ORAL at 20:29

## 2023-07-26 RX ADMIN — SPIRONOLACTONE 50 MG: 25 TABLET ORAL at 08:59

## 2023-07-26 RX ADMIN — ENOXAPARIN SODIUM 60 MG: 60 INJECTION SUBCUTANEOUS at 20:29

## 2023-07-26 RX ADMIN — ALBUTEROL SULFATE 2.5 MG: 2.5 SOLUTION RESPIRATORY (INHALATION) at 21:07

## 2023-07-26 RX ADMIN — Medication 1 CAPSULE: at 08:58

## 2023-07-26 RX ADMIN — BUMETANIDE 1 MG: 0.25 INJECTION, SOLUTION INTRAMUSCULAR; INTRAVENOUS at 15:20

## 2023-07-26 RX ADMIN — CHLOROTHIAZIDE SODIUM 500 MG: 500 INJECTION, POWDER, LYOPHILIZED, FOR SOLUTION INTRAVENOUS at 15:20

## 2023-07-26 RX ADMIN — ALBUTEROL SULFATE 2.5 MG: 2.5 SOLUTION RESPIRATORY (INHALATION) at 07:28

## 2023-07-26 RX ADMIN — HYDROXYZINE HYDROCHLORIDE 25 MG: 25 TABLET, FILM COATED ORAL at 20:35

## 2023-07-26 RX ADMIN — LORAZEPAM 0.5 MG: 2 INJECTION INTRAMUSCULAR; INTRAVENOUS at 19:34

## 2023-07-26 RX ADMIN — SENNOSIDES AND DOCUSATE SODIUM 2 TABLET: 50; 8.6 TABLET ORAL at 09:00

## 2023-07-26 RX ADMIN — EMPAGLIFLOZIN 10 MG: 10 TABLET, FILM COATED ORAL at 08:58

## 2023-07-26 RX ADMIN — DAPTOMYCIN 550 MG: 500 INJECTION, POWDER, LYOPHILIZED, FOR SOLUTION INTRAVENOUS at 11:53

## 2023-07-26 RX ADMIN — Medication 10 ML: at 09:04

## 2023-07-26 RX ADMIN — ALBUTEROL SULFATE 2.5 MG: 2.5 SOLUTION RESPIRATORY (INHALATION) at 12:11

## 2023-07-26 NOTE — PLAN OF CARE
Goal Outcome Evaluation:  Plan of Care Reviewed With: patient           Outcome Evaluation: BLE wraps changed today with mild decrease in edema noted and only minimal erythema remaining.  PT was able to debride a moderate amount of hypertrophic crust to LLE to help improve skin integrity and healing potential, but pt cont to have a moderate amount of build up remaining. PT will cont with multilayered compression wrapping with xeroform and lotion to LLE to help further reduce LE edema and improve venous return to centralize fluid to improve pt's response to diuretics.

## 2023-07-26 NOTE — CASE MANAGEMENT/SOCIAL WORK
Continued Stay Note  [facility]     Patient Name: Kerry Leal  MRN: [id number]  Today's Date: 7/26/2023    Admit Date: 7/22/2023    Plan: [facility] Subacute   Discharge Plan       Row Name 07/26/23 1106       Plan    Plan [facility]    Patient/Family in Agreement with Plan other (see comments)    Plan Comments D/C plan is subacute rehab at [facility]. Mendoza/KESHAV left CM a voicemail stating she will need updated PT/OT notes where pt is wearing a NC O2, pt had high flow NC on yesterday while working with therapy. I sent an email request to  rehab tomorrow asking them to see pt tomorrow.  will continue to follow.    Final Discharge Disposition Code 03 - skilled nursing facility (SNF)                   Discharge Codes    No documentation.                 Expected Discharge Date and Time       Expected Discharge Date Expected Discharge Time    Jul 27, 2023               Kandy Garcias RN

## 2023-07-26 NOTE — PROGRESS NOTES
[facility] Medicine Services  PROGRESS NOTE    Patient Name: Kerry Leal  : 1965  MRN: [id number]    Date of Admission: 2023  Primary Care Physician: Gopal Kong MD    Subjective   Subjective     CC:  F/u heart failure    HPI:  Down to nasal cannula today, continues to diurese. Sacral area sore from sitting    ROS:  Gen- No fevers, chills  CV- No chest pain, palpitations  Resp- No cough, yes dyspnea  GI- No N/V/D, abd pain     Objective   Objective     Vital Signs:   Temp:  [97.7 øF (36.5 øC)-98.4 øF (36.9 øC)] 97.7 øF (36.5 øC)  Heart Rate:  [73-81] 77  Resp:  [0-19] 17  BP: (103-133)/(43-69) 132/60  Flow (L/min):  [3-6] 3     Physical Exam:  Constitutional: Awake, alert, morbidly obese female sitting up in bedside chair  HENT: NCAT, mucous membranes moist  Respiratory: Clear to auscultation bilaterally, respiratory effort normal  Cardiovascular: RRR, palpable radial pulses  Gastrointestinal: Positive bowel sounds, soft, nontender, nondistended  Musculoskeletal: Severe BL LE edema  Psychiatric: Appropriate affect, cooperative  Neurologic: Speech clear    Results Reviewed:  LAB RESULTS:      Lab 23  1204 23  0015   WBC 8.15 6.79   HEMOGLOBIN 11.7* 11.8*   HEMATOCRIT 40.7 40.5   PLATELETS 250 250   NEUTROS ABS 6.46 4.64   IMMATURE GRANS (ABS) 0.04 0.07*   LYMPHS ABS 0.71 1.06   MONOS ABS 0.88 0.84   EOS ABS 0.03 0.15   MCV 91.3 91.8   CRP  --  7.00*   PROCALCITONIN  --  0.03   LACTATE  --  1.2   D DIMER QUANT  --  3.22*           Lab 23  0414 23  0412 23  1618 23  0639 23  1614 23  0539 23  1204 23  0015   SODIUM 140 141  --  139 139 139 141 140   POTASSIUM 3.9 4.2 4.1 3.5 4.0 3.7 4.1 4.3   CHLORIDE 87* 90*  --  88* 89* 94* 99 99   CO2 46.0* 47.0*  --  46.0* 43.0* 39.0* 36.0* 33.0*   ANION GAP 7.0 4.0*  --  5.0 7.0 6.0 6.0 8.0   BUN 34* 26*  --  19 19 15 13 16   CREATININE 0.85 0.72  --  0.66 0.76 0.65 0.62  0.73   EGFR 80.5 98.3  --  103.1 92.1 103.5 104.7 96.7   GLUCOSE 135* 112*  --  130* 111* 150* 143* 141*   CALCIUM 9.4 9.0  --  9.0 9.1 8.9 8.5* 9.0   MAGNESIUM 2.3 2.3  --  2.2 2.2  --   --  2.2   PHOSPHORUS  --   --   --   --   --   --   --  3.3   HEMOGLOBIN A1C  --   --   --   --   --   --  7.00*  --    TSH  --   --   --   --   --   --   --  1.660           Lab 07/22/23  0015   TOTAL PROTEIN 7.7   ALBUMIN 3.3*   GLOBULIN 4.4   ALT (SGPT) 14   AST (SGOT) 22   BILIRUBIN 0.7   ALK PHOS 76           Lab 07/24/23  0900 07/24/23  0639 07/22/23  0625 07/22/23  0427 07/22/23  0015   PROBNP  --   --   --   --  427.0   HSTROP T 18* 19* 42* 36* 16*                   Lab 07/22/23  0638 07/22/23  0431 07/22/23  0244   PH, ARTERIAL 7.411  --   --    PCO2, ARTERIAL 57.3*  --   --    PO2 ART 95.4  --   --    FIO2 50 60 80   HCO3 ART 36.4*  --   --    BASE EXCESS ART 9.8*  --   --    CARBOXYHEMOGLOBIN 1.4  --   --    CARBOXYHEMOGLOBIN (VENOUS)  --  1.4 1.6       Brief Urine Lab Results  (Last result in the past 365 days)        Color   Clarity   Blood   Leuk Est   Nitrite   Protein   CREAT   Urine HCG        07/22/23 0426 Yellow   Clear   Negative   Negative   Negative   Negative                   Microbiology Results Abnormal       Procedure Component Value - Date/Time    Blood Culture - Blood, Arm, Left [[id number]]  (Normal) Collected: 07/22/23 0142    Lab Status: Preliminary result Specimen: Blood from Arm, Left Updated: 07/26/23 0800     Blood Culture No growth at 4 days    Blood Culture - Blood, Arm, Right [[id number]]  (Normal) Collected: 07/22/23 0142    Lab Status: Preliminary result Specimen: Blood from Arm, Right Updated: 07/26/23 0800     Blood Culture No growth at 4 days            No radiology results from the last 24 hrs    Results for orders placed during the hospital encounter of 07/22/23    Adult Transthoracic Echo Complete W/ Cont if Necessary Per Protocol    Interpretation Summary    Technically difficult study  due to massive obesity    Left ventricular systolic function is normal. Estimated left ventricular EF = 60%    Left ventricular wall thickness is consistent with mild concentric hypertrophy.    Valves are poorly visualized.  However there is no gross valvular abnormality    Estimated right ventricular systolic pressure from tricuspid regurgitation is markedly elevated (57 mmHg).      Current medications:  Scheduled Meds:Albuterol Sulfate NEB Orderable, 2.5 mg, Nebulization, 4x Daily - RT  bumetanide, 1 mg, Intravenous, Once  chlorothiazide sodium (DIURIL) injection, 500 mg, Intravenous, Once  DAPTOmycin, 6 mg/kg (Adjusted), Intravenous, Q24H  empagliflozin, 10 mg, Oral, Daily  enoxaparin, 60 mg, Subcutaneous, Q12H  insulin lispro, 2-7 Units, Subcutaneous, 4x Daily AC & at Bedtime  lactobacillus acidophilus, 1 capsule, Oral, Daily  losartan, 100 mg, Oral, Daily  metoprolol succinate XL, 100 mg, Oral, Daily  senna-docusate sodium, 2 tablet, Oral, BID  sodium chloride, 10 mL, Intravenous, Q12H  spironolactone, 50 mg, Oral, Daily      Continuous Infusions:bumetanide, 0.5 mg/hr      PRN Meds:.  acetaminophen **OR** acetaminophen **OR** acetaminophen    albuterol    senna-docusate sodium **AND** polyethylene glycol **AND** bisacodyl **AND** bisacodyl    Calcium Replacement - Follow Nurse / BPA Driven Protocol    dextrose    dextrose    glucagon (human recombinant)    hydrOXYzine    LORazepam    Magnesium Cardiology Dose Replacement - Follow Nurse / BPA Driven Protocol    nitroglycerin    Phosphorus Replacement - Follow Nurse / BPA Driven Protocol    Potassium Replacement - Follow Nurse / BPA Driven Protocol    sodium chloride    sodium chloride    sodium chloride    Assessment & Plan   Assessment & Plan     Active Hospital Problems    Diagnosis  POA    **Acute on chronic respiratory failure with hypoxia and hypercapnia [J96.21, J96.22]  Yes    Lower extremity cellulitis [L03.119]  Yes    Bilateral cellulitis of lower leg  [L03.116, L03.115]  Yes    Obesity hypoventilation syndrome [E66.2]  Yes    Right-sided congestive heart failure [I50.810]  Yes    Morbid obesity with BMI of 60.0-69.9, adult [E66.01, Z68.44]  Not Applicable    Type 2 diabetes mellitus [E11.9]  Yes    Essential hypertension [I10]  Yes    Lymphedema of both lower extremities [I89.0]  Yes      Resolved Hospital Problems    Diagnosis Date Resolved POA    Accelerated hypertension [I10] 07/22/2023 Yes        Brief Hospital Course to date:  Kerry Leal is a 58 y.o. female w/ super morbid obesity, DM2, HTN, chronic LE lymphedema, chronic hypoxia who presented w/ worsening LT LE pain; while having CT of her leg she had acute hypercapnic respiratory failure requiring BIPAP; she was admitted, started on Abx, and seen by cardiology for diuresis    Assessment/Plan    Acute/chronic hypercapnic respiratory failure  Chronic hypoxia  Suspected OHS/restrictive lung disease  Pulmonary hypertension  Acute HFpEF  -s/p BIPAP on arrival, down to NC today  -repeat TTE limited 2/2 body habitus, EF estimated 60% w/ RVSP 57mmHg  -started on jardiance and aldactone this admit  -cards follows, continues to diurese well    Anxiety symptoms  -recently on buspar but discontinued 2/2 unknown adverse reaction  -cont prn atarax  -cont prn low dose ativan    Chronic BL LE lymphedema w/ stasis dermatitis, possible superimposed LT LE cellulitis (less likely)  -ID follows, cont empiric daptomycin, duration per ID    HTN  -cont losartan, toprol XL, aldactone    DM type 2, A1c 7.0%, w/o long term use of insulin  -ssi    Super morbid obesity, BMI 62.11 kg/m2  -complicates all aspects of care, directly linked to primary diagnosis/problem      Expected Discharge Location and Transportation: likely home  Expected Discharge   Expected Discharge Date: 7/28/2023; Expected Discharge Time:      DVT prophylaxis:  Medical DVT prophylaxis orders are present.     AM-PAC 6 Clicks Score (PT): 17 (07/25/23  1126)    CODE STATUS:   Code Status and Medical Interventions:   Ordered at: 07/22/23 0558     Level Of Support Discussed With:    Patient     Code Status (Patient has no pulse and is not breathing):    CPR (Attempt to Resuscitate)     Medical Interventions (Patient has pulse or is breathing):    Full Support     Release to patient:    Routine Release       Berry Wall, DO  07/26/23

## 2023-07-26 NOTE — THERAPY TREATMENT NOTE
Patient Name: Kerry Leal  : 1965    MRN: [id number]                              Today's Date: 2023       Admit Date: 2023    Visit Dx:     ICD-10-CM ICD-9-CM   1. Acute respiratory failure with hypoxia and hypercapnia  J96.01 518.81    J96.02    2. Respiratory acidosis  E87.29 276.2   3. Cellulitis of lower extremity, unspecified laterality  L03.119 682.6   4. Obesity hypoventilation syndrome  E66.2 278.03   5. Lymphedema of both lower extremities  I89.0 457.1   6. Type 2 diabetes mellitus with hyperglycemia, without long-term current use of insulin  E11.65 250.00     790.29   7. Morbid obesity with BMI of 60.0-69.9, adult  E66.01 278.01    Z68.44 V85.44     Patient Active Problem List   Diagnosis    Essential hypertension    Lymphedema of both lower extremities    Elevated d-dimer    Acute on chronic respiratory failure with hypoxia and hypercapnia    Type 2 diabetes mellitus    Morbid obesity with BMI of 60.0-69.9, adult    Right-sided congestive heart failure    Obesity hypoventilation syndrome    Bilateral cellulitis of lower leg    Lower extremity cellulitis     Past Medical History:   Diagnosis Date    Anxiety     Asthma     Heart murmur     Hypertension      Past Surgical History:   Procedure Laterality Date    APPENDECTOMY       SECTION      HYSTERECTOMY      NECK SURGERY        General Information       Row Name 23 133          Physical Therapy Time and Intention    Document Type therapy note (daily note)  -KR     Mode of Treatment individual therapy;physical therapy  -KR       Row Name 23 133          General Information    Patient Profile Reviewed yes  -KR     Existing Precautions/Restrictions fall;oxygen therapy device and L/min;other (see comments)  BLE edema/cellulitis  -KR     Barriers to Rehab medically complex;previous functional deficit  -KR       Row Name 23 133          Cognition    Orientation Status (Cognition) oriented x 4  -KR       Row  Name 07/26/23 1331          Safety Issues, Functional Mobility    Safety Issues Affecting Function (Mobility) insight into deficits/self-awareness;safety precaution awareness;safety precautions follow-through/compliance  -KR     Impairments Affecting Function (Mobility) balance;endurance/activity tolerance;range of motion (ROM);sensation/sensory awareness;shortness of breath;strength  -KR               User Key  (r) = Recorded By, (t) = Taken By, (c) = Cosigned By      Initials Name Provider Type    KR Jaqueline Tran, PT Physical Therapist                   Mobility       Row Name 07/26/23 1547          Bed Mobility    Bed Mobility sit-supine  -KR     Sit-Supine [place] (Bed Mobility) minimum assist (75% patient effort)  -KR     Assistive Device (Bed Mobility) head of bed elevated;bed rails  -KR       Row Name 07/26/23 1547          Bed-Chair Transfer    Bed-Chair [place] (Transfers) contact guard;verbal cues  -KR     Assistive Device (Bed-Chair Transfers) walker, front-wheeled  -KR     Comment, (Bed-Chair Transfer) lateral steps chair to bed  -KR       Row Name 07/26/23 1547          Sit-Stand Transfer    Sit-Stand Me[place] (Transfers) standby assist  -KR     Assistive Device (Sit-Stand Transfers) walker, front-wheeled  -KR     Comment, (Sit-Stand Transfer) 3x from chair, 1x from toilet  -KR       Row Name 07/26/23 1547          Gait/Stairs (Locomotion)    [place] Level (Gait) standby assist  -KR     Assistive Device (Gait) walker, front-wheeled  -KR     Distance in Feet (Gait) 15 + 15 + 20 + 20 + 20 + 20  -KR     Deviations/Abnormal Patterns (Gait) antalgic;base of support, wide;gait speed decreased;anjelica decreased  -KR     Comment, (Gait/Stairs) pt with elevated shoulders, increased WB through UEs during ambulation. Cues for relaxed posture and breathing throughout. Farther distance limited by UE fatigue. O2 93% on 3 L.  -KR               User Key  (r) = Recorded By, (t) = Taken By, (c) =  Cosigned By      Initials Name Provider Type    Jaqueline Montanez PT Physical Therapist                   Obj/Interventions       Row Name 07/26/23 1549          Motor Skills    Therapeutic Exercise hip  -KR       Row Name 07/26/23 1549          Hip (Therapeutic Exercise)    Hip (Therapeutic Exercise) strengthening exercise  -KR     Hip Strengthening (Therapeutic Exercise) 10 repetitions;bilateral;marching while standing  -KR       Row Name 07/26/23 1549          Balance    Balance Assessment sitting static balance;sitting dynamic balance;standing static balance;standing dynamic balance  -KR     Static Sitting Balance standby assist  -KR     Dynamic Sitting Balance contact guard  -KR     Position, Sitting Balance unsupported;sitting edge of bed;sitting in chair;other (see comments)  toilet  -KR     Static Standing Balance standby assist  -KR     Dynamic Standing Balance contact guard  -KR     Position/Device Used, Standing Balance supported;walker, front-wheeled  -KR     Balance Interventions sitting;standing;sit to stand;supported;static;dynamic  -KR     Comment, Balance pt stood supported with FWW while requiring depA for pericare.  -KR               User Key  (r) = Recorded By, (t) = Taken By, (c) = Cosigned By      Initials Name Provider Type    Jaqueline Montanez PT Physical Therapist                   Goals/Plan    No documentation.                  Clinical Impression       Row Name 07/26/23 1551          Pain    Pretreatment Pain Rating 0/10 - no pain  -KR     Posttreatment Pain Rating 0/10 - no pain  -KR       Row Name 07/26/23 1551          Plan of Care Review    Plan of Care Reviewed With patient  -KR     Progress no change  -KR     Outcome Evaluation Pt continues to demo decreased strength, balance, and endurance requiring frequent rest breaks during ambulation d/t UE fatigue. O2 maintained > 90% on 3 L/min NC throughout entirety of PT session. Pt will benefit from PT to address ongoing deficits and  return to PLOF.  -KR       Row Name 07/26/23 1551          Vital Signs    Pre Systolic BP Rehab --  VSS, RN approved tx.  -KR     O2 Delivery Pre Treatment nasal cannula  -KR     Intra SpO2 (%) 92  -KR     O2 Delivery Intra Treatment nasal cannula  -KR     Post SpO2 (%) 95  -KR     O2 Delivery Post Treatment nasal cannula  -KR     Pre Patient Position Sitting  -KR     Intra Patient Position Standing  -KR     Post Patient Position Supine  -KR       Row Name 07/26/23 1551          Positioning and Restraints    Pre-Treatment Position sitting in chair/recliner  -KR     Post Treatment Position bed  -KR     In Bed notified nsg;fowlers;call light within reach;encouraged to call for assist;exit alarm on;with nsg;side rails up x2  -KR               User Key  (r) = Recorded By, (t) = Taken By, (c) = Cosigned By      Initials Name Provider Type    Jaqueline Montanez PT Physical Therapist                   Outcome Measures       Row Name 07/26/23 1554          How much help from another person do you currently need...    Turning from your back to your side while in flat bed without using bedrails? 3  -KR     Moving from lying on back to sitting on the side of a flat bed without bedrails? 3  -KR     Moving to and from a bed to a chair (including a wheelchair)? 3  -KR     Standing up from a chair using your arms (e.g., wheelchair, bedside chair)? 3  -KR     Climbing 3-5 steps with a railing? 2  -KR     To walk in hospital room? 3  -KR     AM-PAC 6 Clicks Score (PT) 17  -KR     Highest level of mobility 5 --> Static standing  -KR               User Key  (r) = Recorded By, (t) = Taken By, (c) = Cosigned By      Initials Name Provider Type    Jaqueline Montanez PT Physical Therapist                                 Physical Therapy Education       Title: PT OT SLP Therapies (Done)       Topic: Physical Therapy (Done)       Point: Mobility training (Done)       Learning Progress Summary             Patient Acceptance, E, VU by TRU at  7/26/2023 1554    Acceptance, E, VU by ER at 7/25/2023 1325    Comment: pt educated on d/c planning, purpose of PT, safety    Acceptance, E, VU,NR by  at 7/23/2023 1040    Comment: PT POC                         Point: Home exercise program (Done)       Learning Progress Summary             Patient Acceptance, E, VU by KR at 7/26/2023 1554    Acceptance, E, VU by ER at 7/25/2023 1325    Comment: pt educated on d/c planning, purpose of PT, safety    Acceptance, E, VU,NR by  at 7/23/2023 1040    Comment: PT POC                         Point: Body mechanics (Done)       Learning Progress Summary             Patient Acceptance, E, VU by KR at 7/26/2023 1554    Acceptance, E, VU by ER at 7/25/2023 1325    Comment: pt educated on d/c planning, purpose of PT, safety    Acceptance, E, VU,NR by  at 7/23/2023 1040    Comment: PT POC                         Point: Precautions (Done)       Learning Progress Summary             Patient Acceptance, E, VU by KR at 7/26/2023 1554    Acceptance, E, VU by ER at 7/25/2023 1325    Comment: pt educated on d/c planning, purpose of PT, safety    Acceptance, E, VU,NR by  at 7/23/2023 1040    Comment: PT POC                                         User Key       Initials Effective Dates Name Provider Type Discipline     06/16/21 -  Anamaria Ravi, PT Physical Therapist PT    ER 04/20/23 -  Caroline Stewart, PT Student PT Student PT     12/30/22 -  Jaqueline Tran PT Physical Therapist PT                  PT Recommendation and Plan     Plan of Care Reviewed With: patient  Progress: no change  Outcome Evaluation: Pt continues to demo decreased strength, balance, and endurance requiring frequent rest breaks during ambulation d/t UE fatigue. O2 maintained > 90% on 3 L/min NC throughout entirety of PT session. Pt will benefit from PT to address ongoing deficits and return to PLOF.     Time Calculation:         PT Charges       Row Name 07/26/23 1555 07/26/23 0900          Time  Calculation    Start Time 1331  -KR 0900  -MF     PT Received On 07/26/23  -KR --     PT Goal Re-Cert Due Date 08/02/23  -KR 08/02/23  -MF        Timed Charges    58558 - PT Therapeutic Exercise Minutes 5  -KR --     71107 - Gait Training Minutes  10  -KR --     15355 - PT Therapeutic Activity Minutes 30  -KR --        Untimed Charges    Wound Care -- 09607 Selective debridement;32306 Add't debridement ea 20 cm  -MF     00834-Shtglhdirr comp below knee -- 25  -MF     11783-Mayslmwbr debridement -- 25  -MF     97598-Add't debridement ea 20 cm -- 10  -MF        Total Minutes    Timed Charges Total Minutes 45  -KR --     Untimed Charges Total Minutes -- 60  -MF      Total Minutes 45  -KR 60  -MF               User Key  (r) = Recorded By, (t) = Taken By, (c) = Cosigned By      Initials Name Provider Type     Louis Adler, PT Physical Therapist    KR Jaqueline Tran, PT Physical Therapist                  Therapy Charges for Today       Code Description Service Date Service Provider Modifiers Qty    44997968554 HC GAIT TRAINING EA 15 MIN 7/26/2023 Jaqueline Tran, PT GP 1    81539180383 HC PT THERAPEUTIC ACT EA 15 MIN 7/26/2023 Jaqueline Tran, PT GP 2            PT G-Codes  Outcome Measure Options: AM-PAC 6 Clicks Basic Mobility (PT)  AM-PAC 6 Clicks Score (PT): 17  AM-PAC 6 Clicks Score (OT): 16  PT Discharge Summary  Anticipated Discharge Disposition (PT): skilled nursing facility    Jaqueline Tran PT  7/26/2023

## 2023-07-26 NOTE — PROGRESS NOTES
Patient Name: Kerry Leal  : 1965  MRN: [id number]    Admission Date: 2023    Requesting Provider: Dixie  Evaluating Physician: Shine Rodriguez MD    Chief Complaint: cellulitis    Reason for Consultation: cellulitis    Subjective   Patient is a 58 y.o. female with history of hypertension, asthma and chronic lymphedema.  Previous seen by Dr. Shine Rodriguez and previously treated with dalbavancin and then suppressive Keflex.  Last seen in the hospital 2023 and at that time treated with daptomycin and ceftriaxone and discharged to outpatient follow-up.  Seen in clinic in May with plans to continue chronic suppressive Keflex.  At some point after last office visit patient called [facility] saying that she had a rash under her chin and she has been off antibiotics since then.    Admitted to [facility] 2023 with complaints of worsening left lower extremity swelling and pain.  Denied fever.  Ongoing for CT emergency department CODE BLUE was called however per H&P patient did not lose a pulse and no CPR was performed.  No drainable fluid collection.  Started on daptomycin and ceftriaxone and admitted.  Feels like she is improving slightly compared to admission    23:  No fever.  Blood cultures pending this am.  Resting comfortably.  Feels like leg swelling, pain improving.  No new issues per nursing    23: The patient is still short of breath and was on OptiFlow when I saw her this afternoon.  She had significant anxiety overnight.  Having some bilateral lower extremity pain and swelling.  Reports recent worsening redness over her bilateral lower extremities.  No fevers    23: Patient has ongoing pain over her bilateral lower extremity but no worse today.  Breathing may be slightly better but she was still on OptiFlow during my exam (although subsequently improved to 5-6L NC).  No nausea, vomiting, or diarrhea reported.  No fevers.    23: Respiratory status has  improved and she is now breathing well on 3 L nasal cannula. She is not having any fevers.  No increased leg pain today.  No nausea or vomiting or diarrhea    ROS:  As above      Allergies   Allergen Reactions    Diclofenac Swelling    Benadryl [Diphenhydramine Hcl] Other (See Comments)     UNSURE    Clindamycin/Lincomycin Other (See Comments)     UNSURE    Doxycycline Other (See Comments)     UNSURE    Fluticasone Other (See Comments)     NOSE BLEEDS    Fluvoxamine Other (See Comments)     UNSURE    Montelukast Swelling    Symbicort [Budesonide-Formoterol Fumarate] Other (See Comments)     UNSURE    Lisinopril Dizziness    Penicillins Other (See Comments)     MOTHER TOLD HER SHE WAS ALLERGIC  *has tolerated ceftriaxone    Smz-Tmp Ds [Sulfamethoxazole-Trimethoprim] GI Intolerance    Sulfa Antibiotics GI Intolerance       Objective   Antibiotics:  Anti-Infectives (From admission, onward)      Ordered     Dose/Rate Route Frequency Start Stop    07/22/23 1344  DAPTOmycin (CUBICIN) 550 mg in sodium chloride 0.9 % 50 mL IVPB        Ordering Provider: Shine Rodriguez MD    6 mg/kg x 95 kg (Adjusted)  100 mL/hr over 30 Minutes Intravenous Every 24 Hours 07/23/23 1100 07/30/23 1059    07/22/23 0053  cefTRIAXone (ROCEPHIN) 2000 mg/100 mL 0.9% NS IVPB (MBP)        Ordering Provider: Darion Simental MD    2,000 mg  over 30 Minutes Intravenous Once 07/22/23 0109 07/22/23 0333            Other Medications:  Current Facility-Administered Medications   Medication Dose Route Frequency Provider Last Rate Last Admin    acetaminophen (TYLENOL) tablet 650 mg  650 mg Oral Q4H PRN Raquel, Hansa, APRN   650 mg at 07/25/23 2223    Or    acetaminophen (TYLENOL) 160 MG/5ML solution 650 mg  650 mg Oral Q4H PRN Raquel, Hansa, APRN        Or    acetaminophen (TYLENOL) suppository 650 mg  650 mg Rectal Q4H PRN Raquel, Hansa, APRN        albuterol (PROVENTIL) nebulizer solution 0.083% 2.5 mg/3mL  2.5 mg Nebulization Q4H PRN  Raquel, Hansa, APRN   2.5 mg at 07/24/23 0510    albuterol (PROVENTIL) nebulizer solution 0.083% 2.5 mg/3mL  2.5 mg Nebulization 4x Daily - RT Berry Wall, DO   2.5 mg at 07/26/23 1539    sennosides-docusate (PERICOLACE) 8.6-50 MG per tablet 2 tablet  2 tablet Oral BID Raquel, Hansa, APRN   2 tablet at 07/26/23 0900    And    polyethylene glycol (MIRALAX) packet 17 g  17 g Oral Daily PRN Raquel, Hansa, APRN        And    bisacodyl (DULCOLAX) EC tablet 5 mg  5 mg Oral Daily PRN Raquel, Hansa, APRN        And    bisacodyl (DULCOLAX) suppository 10 mg  10 mg Rectal Daily PRN Raquel, Hansa, APRN        bumetanide (BUMEX) 25 mg/100mL (0.25 mg/mL) infusion  0.5 mg/hr Intravenous Continuous Jorge Puckett IV, MD 2 mL/hr at 07/26/23 1522 0.5 mg/hr at 07/26/23 1522    Calcium Replacement - Follow Nurse / BPA Driven Protocol   Does not apply PRN Jorge Puckett IV, MD        DAPTOmycin (CUBICIN) 550 mg in sodium chloride 0.9 % 50 mL IVPB  6 mg/kg (Adjusted) Intravenous Q24H Shine Rodriguez  mL/hr at 07/26/23 1153 550 mg at 07/26/23 1153    dextrose (D50W) (25 g/50 mL) IV injection 25 g  25 g Intravenous Q15 Min PRN Raquel, Hansa, APRN        dextrose (GLUTOSE) oral gel 15 g  15 g Oral Q15 Min PRN Raquel, Hansa, APRN        empagliflozin (JARDIANCE) tablet 10 mg  10 mg Oral Daily Jorge Puckett IV, MD   10 mg at 07/26/23 0858    Enoxaparin Sodium (LOVENOX) syringe 60 mg  60 mg Subcutaneous Q12H Raquel, Hansa, APRN   60 mg at 07/26/23 0859    glucagon (GLUCAGEN) injection 1 mg  1 mg Intramuscular Q15 Min PRN Raquel, Hansa, APRN        hydrOXYzine (ATARAX) tablet 25 mg  25 mg Oral TID PRN Berry Wall DO   25 mg at 07/23/23 2025    Insulin Lispro (humaLOG) injection 2-7 Units  2-7 Units Subcutaneous 4x Daily AC & at Bedtime Hansa García APRN   5 Units at 07/23/23 1202    lactobacillus acidophilus (RISAQUAD) capsule 1 capsule  1 capsule  "Oral Daily Markos Dalton MD   1 capsule at 07/26/23 0858    LORazepam (ATIVAN) injection 0.5 mg  0.5 mg Intravenous Q4H PRN Berry Wall DO        losartan (COZAAR) tablet 100 mg  100 mg Oral Daily Raquel, Hansa, APRN   100 mg at 07/25/23 0930    Magnesium Cardiology Dose Replacement - Follow Nurse / BPA Driven Protocol   Does not apply PRN Jorge Puckett IV, MD        metoprolol succinate XL (TOPROL-XL) 24 hr tablet 100 mg  100 mg Oral Daily Raquel, Hansa, APRN   100 mg at 07/25/23 0929    nitroglycerin (NITROSTAT) SL tablet 0.4 mg  0.4 mg Sublingual Q5 Min PRN Raquel, Hansa, APRN   0.4 mg at 07/24/23 0630    Phosphorus Replacement - Follow Nurse / BPA Driven Protocol   Does not apply PRN Jorge Puckett IV, MD        Potassium Replacement - Follow Nurse / BPA Driven Protocol   Does not apply PRN Jorge Puckett IV, MD        sodium chloride 0.9 % flush 10 mL  10 mL Intravenous PRN Darion Simental MD        sodium chloride 0.9 % flush 10 mL  10 mL Intravenous Q12H Raquel, Hansa, APRN   10 mL at 07/26/23 0904    sodium chloride 0.9 % flush 10 mL  10 mL Intravenous PRN Raquel, Hansa, APRN        sodium chloride 0.9 % infusion 40 mL  40 mL Intravenous PRN Raquel, Hansa, APRN        spironolactone (ALDACTONE) tablet 50 mg  50 mg Oral Daily Jorge Puckett IV, MD   50 mg at 07/26/23 0859       Physical Exam:   Vital Signs   /69 (BP Location: Left arm, Patient Position: Lying)   Pulse 88   Temp 97.8 øF (36.6 øC) (Oral)   Resp 17   Ht 160 cm (62.99\")   Wt (!) 150 kg (331 lb 3.2 oz)   SpO2 (!) 89%   BMI 58.68 kg/mý     GENERAL: Awake and alert.  No acute distress  HEENT: Normocephalic, atraumatic.  Poor dentition.  No external oral lesions noted  NECK: Supple   HEART: RRR; No murmur.  LUNGS: Claude auscultation bilaterally.  Nonlabored breathing on 3 L nasal cannula  ABDOMEN: morbidly obese but soft  EXT: Bilateral lower " extremities with significant chronic lymphedema with dressings in place.  Dressings in place.  Wound care pictures From 7/26 were reviewed.  Does have some venous stasis dermatitis over the right shin region with significant erythema and possible cellulitis. Left lower extremity With significant ongoing changes from lymphedema and venous stasis with crusting and wounds to the skin layer. No signs of increased erythema from her baseline skin discoloration/no signs of cellulitis.  : External urinary catheter in place  SKIN: No diffuse rash  NEURO: Awake alert and oriented x4.  Normal speech and cognition    Laboratory Data  Lab Results   Component Value Date    WBC 8.15 07/22/2023    HGB 11.7 (L) 07/22/2023    HCT 40.7 07/22/2023    MCV 91.3 07/22/2023     07/22/2023     Lab Results   Component Value Date    GLUCOSE 135 (H) 07/26/2023    CALCIUM 9.4 07/26/2023     07/26/2023    K 3.9 07/26/2023    CO2 46.0 (H) 07/26/2023    CL 87 (L) 07/26/2023    BUN 34 (H) 07/26/2023    CREATININE 0.85 07/26/2023     Estimated Creatinine Clearance: 106.6 mL/min (by C-G formula based on SCr of 0.85 mg/dL).  Lab Results   Component Value Date    ALT 14 07/22/2023    AST 22 07/22/2023    ALKPHOS 76 07/22/2023    BILITOT 0.7 07/22/2023     Lab Results   Component Value Date    CRP 7.00 (H) 07/22/2023     Lab Results   Component Value Date    SEDRATE 120 (H) 04/13/2023       The above labs were reviewed.     Microbiology:  Microbiology Results (last 10 days)       Procedure Component Value - Date/Time    Blood Culture - Blood, Arm, Left [[id number]]  (Normal) Collected: 07/22/23 0142    Lab Status: Preliminary result Specimen: Blood from Arm, Left Updated: 07/26/23 0800     Blood Culture No growth at 4 days    Blood Culture - Blood, Arm, Right [[id number]]  (Normal) Collected: 07/22/23 0142    Lab Status: Preliminary result Specimen: Blood from Arm, Right Updated: 07/26/23 0800     Blood Culture No growth at 4 days             Radiology:  NM Lung Scan Perfusion Particulate    Result Date: 7/24/2023  Indeterminate perfusion scan for pulmonary embolus. Extensive perfusion abnormalities, but with matching chest x-ray findings. These could all reflect the patient's extensive pulmonary edema. Electronically Signed: Tigre Phillips  7/24/2023 12:54 PM EDT  Workstation ID: [id number]    XR Chest 1 View    Result Date: 7/24/2023  Impression: Electronically Signed: Hilton Cohen  7/24/2023 12:25 PM EDT  Workstation ID: [id number]    XR Chest 1 View    Result Date: 7/22/2023  Impression: Mild to moderate pulmonary edema pattern with small left-sided pleural effusion with bibasilar atelectasis. Stable cardiomegaly. Electronically Signed: Naomi Freitas  7/22/2023 1:08 AM EDT  Workstation ID: [id number]    CT Lower Extremity Bilateral With Contrast    Result Date: 7/22/2023  Impression: Diffuse subcutaneous edema present bilaterally, left greater than right likely related to third spacing of fluid. No focal drainable collection identified. No acute osseous abnormality. No suspicious joint effusion. Nonangiographic evaluation of the arterial structures demonstrates no obvious abnormalities. Venous structures are not well evaluated though multiple varicosities are present. Electronically Signed: Naomi Freitas  7/22/2023 2:35 AM EDT  Workstation ID: [id number]         Assessment   Acute on chronic bilateral lower extremity venous stasis swelling and dermatitis: Improving with diuresis and antibiotics  Left lower extremity swelling, erythema, tenderness, cellulitis-Improved back to baseline based on images on 7/26  Right lower extremity focal erythema, possible cellulitis-Ongoing and increased from baseline but may be improving somewhat.  Chronic bilateral lower extremity lymphedema  Acute on chronic hypoxic/Hypercapnic respiratory failure  Morbid obesity  Bilateral pulmonary edema on x-ray  Acute on chronic CHFpEF  HTN  Listed allergies to clindamycin, doxycycline,  penicillin and sulfa  Patient reported rash with Keflex: about 2 months ago. Recently treated with ceftriaxone and had  another dose in ED with no side effects thus far.    PLAN:   - Microbiology data reviewed.  Follow-up pending blood cultures, so far negative  - Follow CBC, CMP, CK  - Continue daptomycin for now. Plan to switch to Doxycycline 100 mg PO BID tomorrow. She has a listed allergy but she thinks this may have been due to nausea and denies anaphylaxis. Could plan to discharge her with a couple weeks of doxycycline and I could see her back in clinic. If she tolerates doxycycline well then we could consider chronic PO suppression of her recurrent cellulitis with doxycycline.  - Probiotic  - Diuresis per cardiology and primary team  - Wound care consultation.  Please take pictures during dressing changes. Pictures from 7/26 reviewed.    I discussed with Louis Adler PT/wound care today    Shine Rodriguez MD  7/26/2023    I

## 2023-07-26 NOTE — THERAPY WOUND CARE TREATMENT
Acute Care - Wound/Debridement Treatment Note  [facility]     Patient Name: Kerry Leal  : 1965  MRN: [id number]  Today's Date: 2023                Admit Date: 2023    Visit Dx:    ICD-10-CM ICD-9-CM   1. Acute respiratory failure with hypoxia and hypercapnia  J96.01 518.81    J96.02    2. Respiratory acidosis  E87.29 276.2   3. Cellulitis of lower extremity, unspecified laterality  L03.119 682.6   4. Obesity hypoventilation syndrome  E66.2 278.03   5. Lymphedema of both lower extremities  I89.0 457.1   6. Type 2 diabetes mellitus with hyperglycemia, without long-term current use of insulin  E11.65 250.00     790.29   7. Morbid obesity with BMI of 60.0-69.9, adult  E66.01 278.01    Z68.44 V85.44             Patient Active Problem List   Diagnosis    Essential hypertension    Lymphedema of both lower extremities    Elevated d-dimer    Acute on chronic respiratory failure with hypoxia and hypercapnia    Type 2 diabetes mellitus    Morbid obesity with BMI of 60.0-69.9, adult    Right-sided congestive heart failure    Obesity hypoventilation syndrome    Bilateral cellulitis of lower leg    Lower extremity cellulitis        Past Medical History:   Diagnosis Date    Anxiety     Asthma     Heart murmur     Hypertension         Past Surgical History:   Procedure Laterality Date    APPENDECTOMY       SECTION      HYSTERECTOMY      NECK SURGERY             Wound Bilateral lower leg Other (comment) (Active)   Wound Image      23   Dressing Appearance intact;moist drainage 23   Closure WILLIAM 23 0423   Base other (see comments) 23   Periwound redness;swelling 23   Periwound Temperature warm 23   Periwound Skin Turgor firm 23   Drainage Characteristics/Odor serosanguineous 23   Drainage Amount small 23   Care, Wound cleansed with;soap and water;debrided 23   Dressing Care dressing  changed;foam;low-adherent;silver impregnated 07/26/23 0900   Periwound Care cleansed with pH balanced cleanser 07/26/23 0900       Wound 07/22/23 1017 Bilateral anterior groin MASD (Moisture associated skin damage) (Active)   Closure Open to air 07/26/23 0423   Base moist;red;non-blanchable;maroon/purple 07/26/23 0423   Periwound blistered;edematous;swelling;redness;moist 07/26/23 0058   Drainage Amount none 07/25/23 2035   Care, Wound cleansed with;soap and water 07/25/23 2035   Dressing Care other (see comments) 07/25/23 1600   Periwound Care dry periwound area maintained 07/26/23 0423       Wound 07/25/23 1130 Left medial gluteal MASD (Moisture associated skin damage) (Active)   Base blanchable;bleeding;red;dermis 07/26/23 0058   Wound Length (cm) 1.6 cm 07/25/23 1130   Wound Width (cm) 1 cm 07/25/23 1130   Wound Depth (cm) 0.01 cm 07/25/23 1130   Wound Surface Area (cm^2) 1.6 cm^2 07/25/23 1130   Wound Volume (cm^3) 0.016 cm^3 07/25/23 1130   Drainage Characteristics/Odor sanguineous 07/25/23 2035   Drainage Amount scant 07/25/23 2035   Care, Wound cleansed with;barrier applied 07/25/23 2035      Lymphedema       Row Name 07/26/23 0900             Lymphedema Edema Assessment    Ptting Edema Category By severity  -      Pitting Edema Severe;Moderate  L>R  -         Compression/Skin Care    Compression/Skin Care skin care;wrapping location;bandaging  -      Skin Care washed/dried;lotion applied;moisturizing lotion applied  -      Wrapping Location lower extremity  -      Wrapping Location LE bilateral:;foot to knee  -      Wrapping Comments xeroform with cast padding to secure to LLE with mepilex Ag foam to R ant shin. size 8/10 to LLE and size 6/8 to RLE  -                User Key  (r) = Recorded By, (t) = Taken By, (c) = Cosigned By      Initials Name Provider Type    Louis Ibarra, PT Physical Therapist                    WOUND DEBRIDEMENT     Debridement Site 1  Location- Site 1:  BLEs  Selective Debridement- Site 1: Wound Surface >20cmsq  Instruments- Site 1: tweezers  Excised Tissue Description- Site 1: moderate, other (comment) (hypertrophic skin and crusted nonviable skin from BLEs)  Bleeding- Site 1: none               PT Assessment (last 12 hours)       PT Evaluation and Treatment       Row Name 07/26/23 0900          Physical Therapy Time and Intention    Subjective Information complains of;weakness;fatigue;pain  -MF     Document Type wound care;therapy note (daily note)  -     Mode of Treatment individual therapy;physical therapy  -       Row Name 07/26/23 0900          Pain    Additional Documentation Pain Scale: FACES Pre/Post-Treatment (Group)  -       Row Name 07/26/23 0900          Pain Scale: FACES Pre/Post-Treatment    Pain: FACES Scale, Pretreatment 4-->hurts little more  -MF     Posttreatment Pain Rating 4-->hurts little more  -MF     Pain Location - Side/Orientation Left  -MF     Pain Location lower  -MF     Pain Location - extremity  -MF     Pre/Posttreatment Pain Comment Mild c/o of cramping to R hand  -       Row Name 07/26/23 0900          Wound Bilateral lower leg Other (comment)    Wound - Properties Group Side: Bilateral  -DE Orientation: lower  -DE Location: leg  -DE Primary Wound Type: Other  -DE, lymphedema     Wound Image Images linked: 4  -MF     Dressing Appearance intact;moist drainage  -MF     Base other (see comments)  moderate hypertrophic crust  -MF     Periwound redness;swelling  -MF     Periwound Temperature warm  -MF     Periwound Skin Turgor firm  -MF     Drainage Characteristics/Odor serosanguineous  -MF     Drainage Amount small  -MF     Care, Wound cleansed with;soap and water;debrided  -MF     Dressing Care dressing changed;foam;low-adherent;silver impregnated  mepilex Ag foam to R ant shin  -MF     Periwound Care cleansed with pH balanced cleanser  -MF     Retired Wound - Properties Group Side: Bilateral  -DE Orientation: lower  -DE  Location: leg  -DE Primary Wound Type: Other  -DE, lymphedema     Retired Wound - Properties Group Side: Bilateral  -DE Location: leg  -DE Primary Wound Type: Other  -DE, lymphedema       Row Name             Wound 07/22/23 1017 Bilateral anterior groin MASD (Moisture associated skin damage)    Wound - Properties Group Placement Date: 07/22/23  -DE Placement Time: 1017  -DE Present on Hospital Admission: Y  -DE Side: Bilateral  -DE Orientation: anterior  -DE Location: groin  -DE Primary Wound Type: MASD  -DE    Retired Wound - Properties Group Placement Date: 07/22/23  -DE Placement Time: 1017  -DE Present on Hospital Admission: Y  -DE Side: Bilateral  -DE Orientation: anterior  -DE Location: groin  -DE Primary Wound Type: MASD  -DE    Retired Wound - Properties Group Date first assessed: 07/22/23  -DE Time first assessed: 1017  -DE Present on Hospital Admission: Y  -DE Side: Bilateral  -DE Location: groin  -DE Primary Wound Type: MASD  -DE      Row Name             Wound 07/25/23 1130 Left medial gluteal MASD (Moisture associated skin damage)    Wound - Properties Group Placement Date: 07/25/23  -TG Placement Time: 1130  -TG Present on Hospital Admission: Y  -TG Side: Left  -TG Orientation: medial  -TG Location: gluteal  -TG Primary Wound Type: MASD  -TG Additional Comments: MASD - IAD & friction  -TG    Retired Wound - Properties Group Placement Date: 07/25/23  -TG Placement Time: 1130  -TG Present on Hospital Admission: Y  -TG Side: Left  -TG Orientation: medial  -TG Location: gluteal  -TG Primary Wound Type: MASD  -TG Additional Comments: MASD - IAD & friction  -TG    Retired Wound - Properties Group Date first assessed: 07/25/23  -TG Time first assessed: 1130  -TG Present on Hospital Admission: Y  -TG Side: Left  -TG Location: gluteal  -TG Primary Wound Type: MASD  -TG Additional Comments: MASD - IAD & friction  -TG      Row Name 07/26/23 0900          Coping    Observed Emotional State cooperative  -      Verbalized Emotional State acceptance  -     Trust Relationship/Rapport care explained  -MF       Row Name 07/26/23 0900          Plan of Care Review    Plan of Care Reviewed With patient  -     Outcome Evaluation BLE wraps changed today with mild decrease in edema noted and only minimal erythema remaining.  PT was able to debride a moderate amount of hypertrophic crust to LLE to help improve skin integrity and healing potential, but pt cont to have a moderate amount of build up remaining. PT will cont with multilayered compression wrapping with xeroform and lotion to LLE to help further reduce LE edema and improve venous return to centralize fluid to improve pt's response to diuretics.  -       Row Name 07/26/23 0900          Positioning and Restraints    Pre-Treatment Position sitting in chair/recliner  -     Post Treatment Position chair  -MF     In Chair reclined;call light within reach  -               User Key  (r) = Recorded By, (t) = Taken By, (c) = Cosigned By      Initials Name Provider Type    Louis Ibarra, PT Physical Therapist    Lele Nielson, RN Registered Nurse    Thanh Delgado RN Registered Nurse                  Physical Therapy Education       Title: PT OT SLP Therapies (Done)       Topic: Physical Therapy (Done)       Point: Mobility training (Done)       Learning Progress Summary             Patient Acceptance, E, VU by ER at 7/25/2023 1325    Comment: pt educated on d/c planning, purpose of PT, safety    Acceptance, E, VU,NR by  at 7/23/2023 1040    Comment: PT POC                         Point: Home exercise program (Done)       Learning Progress Summary             Patient Acceptance, E, VU by ER at 7/25/2023 1325    Comment: pt educated on d/c planning, purpose of PT, safety    Acceptance, E, VU,NR by  at 7/23/2023 1040    Comment: PT POC                         Point: Body mechanics (Done)       Learning Progress Summary             Patient Acceptance, E,  VU by ER at 7/25/2023 1325    Comment: pt educated on d/c planning, purpose of PT, safety    Acceptance, E, VU,NR by  at 7/23/2023 1040    Comment: PT POC                         Point: Precautions (Done)       Learning Progress Summary             Patient Acceptance, E, VU by ER at 7/25/2023 1325    Comment: pt educated on d/c planning, purpose of PT, safety    Acceptance, E, VU,NR by  at 7/23/2023 1040    Comment: PT POC                                         User Key       Initials Effective Dates Name Provider Type Discipline    LO 06/16/21 -  Anamaria Ravi, PT Physical Therapist PT    ER 04/20/23 -  Caroline Stewart PT Student PT Student PT                    Recommendation and Plan  Anticipated Equipment Needs at Discharge (PT): other (see comments) (TBD)  Anticipated Discharge Disposition (PT): skilled nursing facility  Planned Therapy Interventions (PT): wound care  Therapy Frequency (PT): daily  Plan of Care Reviewed With: patient           Outcome Evaluation: BLE wraps changed today with mild decrease in edema noted and only minimal erythema remaining.  PT was able to debride a moderate amount of hypertrophic crust to LLE to help improve skin integrity and healing potential, but pt cont to have a moderate amount of build up remaining. PT will cont with multilayered compression wrapping with xeroform and lotion to LLE to help further reduce LE edema and improve venous return to centralize fluid to improve pt's response to diuretics.  Plan of Care Reviewed With: patient            Time Calculation   PT Charges       Row Name 07/26/23 0900             Time Calculation    Start Time 0900  -MF      PT Goal Re-Cert Due Date 08/02/23  -MF         Untimed Charges    Wound Care 00797 Selective debridement;85573 Add't debridement ea 20 cm  -MF      15939-Fesmmsyxgi comp below knee 25  -MF      48162-Hyavrastg debridement 25  -MF      97598-Add't debridement ea 20 cm 10  -MF         Total Minutes    Untimed  Charges Total Minutes 60  -MF       Total Minutes 60  -MF                User Key  (r) = Recorded By, (t) = Taken By, (c) = Cosigned By      Initials Name Provider Type    Louis Ibarra, PT Physical Therapist                            PT G-Codes  Outcome Measure Options: AM-PAC 6 Clicks Basic Mobility (PT)  AM-PAC 6 Clicks Score (PT): 17  AM-PAC 6 Clicks Score (OT): 16       Louis Adler, PT  7/26/2023

## 2023-07-26 NOTE — PLAN OF CARE
Goal Outcome Evaluation:  Plan of Care Reviewed With: patient        Progress: no change  Outcome Evaluation: Pt continues to demo decreased strength, balance, and endurance requiring frequent rest breaks during ambulation d/t UE fatigue. O2 maintained > 90% on 3 L/min NC throughout entirety of PT session. Pt will benefit from PT to address ongoing deficits and return to PLOF.      Anticipated Discharge Disposition (PT): skilled nursing facility

## 2023-07-26 NOTE — PROGRESS NOTES
Cardiology Progress Note      Reason for visit:    Right-sided heart failure    IDENTIFICATION: 58-year-old female who resides in [place], [place]    Active Hospital Problems    Diagnosis  POA    **Acute on chronic respiratory failure with hypoxia and hypercapnia [J96.21, J96.22]  Yes     Priority: High    Obesity hypoventilation syndrome [E66.2]  Yes     Priority: High    Right-sided congestive heart failure [I50.810]  Yes     Priority: High     Echo (7/6/2021): Test difficult due to body habitus.  LVEF 55%.  Mild AI.  No significant aortic stenosis with mean gradient 12 mmHg.  Mild dilation of ascending aorta 4 cm.  Echo (10/23/2022): LVEF 55%..  Mild AS/AR.  RVSP 45-55 mmHg  Echo (7/23/2023): LVEF 65%.  No significant valve abnormality.  RVSP 57 mmHg      Type 2 diabetes mellitus [E11.9]  Yes     Priority: High    Lower extremity cellulitis [L03.119]  Yes     Priority: Medium    Bilateral cellulitis of lower leg [L03.116, L03.115]  Yes     Priority: Medium    Essential hypertension [I10]  Yes     Priority: Medium    Lymphedema of both lower extremities [I89.0]  Yes     Priority: Medium    Morbid obesity with BMI of 60.0-69.9, adult [E66.01, Z68.44]  Not Applicable     Priority: Low            Patient sitting up in the chair breathing greatly improved.  Bilateral leg wounds are improving.  Over 18,000 mL of been diuresed since admission.  IV Bumex drip infusing.  She is breathing easy on O2 at 4 L by nasal cannula           Vital Sign Min/Max for last 24 hours  Temp  Min: 97.8 øF (36.6 øC)  Max: 98.4 øF (36.9 øC)   BP  Min: 103/55  Max: 148/72   Pulse  Min: 71  Max: 81   Resp  Min: 0  Max: 19   SpO2  Min: 87 %  Max: 96 %   Flow (L/min)  Min: 4  Max: 45      Intake/Output Summary (Last 24 hours) at 7/26/2023 0955  Last data filed at 7/26/2023 0630  Gross per 24 hour   Intake --   Output 5000 ml   Net -5000 ml           Physical Exam  Constitutional:       General: She is awake.      Appearance: She is morbidly  obese.   Cardiovascular:      Rate and Rhythm: Normal rate and regular rhythm.      Comments: Wrinkles noted on lower extremities consistent with improving edema  Pulmonary:      Effort: Pulmonary effort is normal.      Breath sounds: Decreased breath sounds present.   Musculoskeletal:      Right lower le+ Pitting Edema present.      Left lower le+ Pitting Edema present.   Skin:     General: Skin is warm and dry.   Neurological:      Mental Status: She is alert and oriented to person, place, and time.   Psychiatric:         Behavior: Behavior is cooperative.       Tele: Normal sinus rhythm     Results Review (reviewed the patient's recent labs in the electronic medical record):      EKG (2023): Normal sinus rhythm, low voltage QRS ST depression lateral leads     CXR (2023): Mild to moderate pulmonary edema with small left-sided pleural effusion    Echo (2023): LVEF 60%.  Mild LVH.  No valvular abnormality but valves poorly visualized.  RVSP 57 mmHg       Results from last 7 days   Lab Units 23  0414 23  0412 23  1618 23  0639 23  1614 23  0539 23  1204 23  0015   SODIUM mmol/L 140 141  --  139 139 139 141 140   POTASSIUM mmol/L 3.9 4.2 4.1 3.5 4.0 3.7 4.1 4.3   CHLORIDE mmol/L 87* 90*  --  88* 89* 94* 99 99   BUN mg/dL 34* 26*  --  19 19 15 13 16   CREATININE mg/dL 0.85 0.72  --  0.66 0.76 0.65 0.62 0.73   MAGNESIUM mg/dL 2.3 2.3  --  2.2 2.2  --   --  2.2     Results from last 7 days   Lab Units 23  0900 23  0639 23  0625   HSTROP T ng/L 18* 19* 42*     Results from last 7 days   Lab Units 23  1204 23  0015   WBC 10*3/mm3 8.15 6.79   HEMOGLOBIN g/dL 11.7* 11.8*   HEMATOCRIT % 40.7 40.5   PLATELETS 10*3/mm3 250 250       Lab Results   Component Value Date    HGBA1C 7.00 (H) 2023       Lab Results   Component Value Date    CHOL 134 2023    TRIG 89 2023    HDL 33 (L) 2023    LDL 84 2023               Acute on chronic diastolic and right-sided heart failure  IV Bumex drip infusing  18,150 mL diuresed since admission  Creatinine stable and within normal limits, BUN elevated 34  Jardiance 10 mg daily  Metoprolol succinate 100 mg daily  Spironolactone 50 mg daily  Losartan 100 mg daily     Hypertension  No CCB due to edema  Continue metoprolol succinate 100 mg daily, losartan 100 mg daily and spironolactone 50 mg daily  Current /55.     Lower extremity cellulitis/lymphedema  Improving with diuresis     Massive obesity  We discussed that her obesity is leading to much of her present illness i.e. right-sided heart failure, lymphedema/cellulitis  Semaglutide should be started after discharge for weight loss     Elevated D-dimer  VQ scan findings likely due to her pulmonary edema but low suspicion for PE  Venous duplex of lower extremities preliminary negative for PE                   Strict I's and O's and daily weights  Low-sodium diet  Continue IV Bumex drip for today   Ambulate with physical therapy    Electronically signed by RAD Baumann, 07/26/23, 9:02 AM EDT.

## 2023-07-27 LAB
ANION GAP SERPL CALCULATED.3IONS-SCNC: 13 MMOL/L (ref 5–15)
BACTERIA SPEC AEROBE CULT: NORMAL
BACTERIA SPEC AEROBE CULT: NORMAL
BUN SERPL-MCNC: 38 MG/DL (ref 6–20)
BUN/CREAT SERPL: 44.7 (ref 7–25)
CALCIUM SPEC-SCNC: 9.7 MG/DL (ref 8.6–10.5)
CHLORIDE SERPL-SCNC: 84 MMOL/L (ref 98–107)
CO2 SERPL-SCNC: 41 MMOL/L (ref 22–29)
CREAT SERPL-MCNC: 0.85 MG/DL (ref 0.57–1)
EGFRCR SERPLBLD CKD-EPI 2021: 80.5 ML/MIN/1.73
GLUCOSE BLDC GLUCOMTR-MCNC: 151 MG/DL (ref 70–130)
GLUCOSE BLDC GLUCOMTR-MCNC: 152 MG/DL (ref 70–130)
GLUCOSE BLDC GLUCOMTR-MCNC: 158 MG/DL (ref 70–130)
GLUCOSE BLDC GLUCOMTR-MCNC: 164 MG/DL (ref 70–130)
GLUCOSE SERPL-MCNC: 143 MG/DL (ref 65–99)
POTASSIUM SERPL-SCNC: 3.9 MMOL/L (ref 3.5–5.2)
SODIUM SERPL-SCNC: 138 MMOL/L (ref 136–145)

## 2023-07-27 PROCEDURE — 82948 REAGENT STRIP/BLOOD GLUCOSE: CPT

## 2023-07-27 PROCEDURE — 97535 SELF CARE MNGMENT TRAINING: CPT

## 2023-07-27 PROCEDURE — 97110 THERAPEUTIC EXERCISES: CPT

## 2023-07-27 PROCEDURE — 92610 EVALUATE SWALLOWING FUNCTION: CPT

## 2023-07-27 PROCEDURE — 99232 SBSQ HOSP IP/OBS MODERATE 35: CPT | Performed by: INTERNAL MEDICINE

## 2023-07-27 PROCEDURE — 80048 BASIC METABOLIC PNL TOTAL CA: CPT | Performed by: NURSE PRACTITIONER

## 2023-07-27 PROCEDURE — 25010000002 ENOXAPARIN PER 10 MG: Performed by: NURSE PRACTITIONER

## 2023-07-27 PROCEDURE — 99231 SBSQ HOSP IP/OBS SF/LOW 25: CPT | Performed by: INTERNAL MEDICINE

## 2023-07-27 PROCEDURE — 97116 GAIT TRAINING THERAPY: CPT

## 2023-07-27 RX ORDER — BUMETANIDE 0.25 MG/ML
1 INJECTION INTRAMUSCULAR; INTRAVENOUS ONCE
Status: DISCONTINUED | OUTPATIENT
Start: 2023-07-27 | End: 2023-07-28

## 2023-07-27 RX ORDER — LOSARTAN POTASSIUM 50 MG/1
50 TABLET ORAL DAILY
Status: DISCONTINUED | OUTPATIENT
Start: 2023-07-28 | End: 2023-08-02 | Stop reason: HOSPADM

## 2023-07-27 RX ORDER — ALBUTEROL SULFATE 2.5 MG/3ML
2.5 SOLUTION RESPIRATORY (INHALATION) EVERY 4 HOURS PRN
Status: DISCONTINUED | OUTPATIENT
Start: 2023-07-27 | End: 2023-08-02 | Stop reason: HOSPADM

## 2023-07-27 RX ORDER — FLUORIDE TOOTHPASTE
15 TOOTHPASTE DENTAL
Status: DISCONTINUED | OUTPATIENT
Start: 2023-07-27 | End: 2023-08-02 | Stop reason: HOSPADM

## 2023-07-27 RX ORDER — PANTOPRAZOLE SODIUM 40 MG/1
40 TABLET, DELAYED RELEASE ORAL
Status: DISCONTINUED | OUTPATIENT
Start: 2023-07-28 | End: 2023-08-02 | Stop reason: HOSPADM

## 2023-07-27 RX ADMIN — HYDROXYZINE HYDROCHLORIDE 25 MG: 25 TABLET, FILM COATED ORAL at 07:58

## 2023-07-27 RX ADMIN — LOSARTAN POTASSIUM 100 MG: 50 TABLET, FILM COATED ORAL at 08:39

## 2023-07-27 RX ADMIN — METOPROLOL SUCCINATE 100 MG: 100 TABLET, EXTENDED RELEASE ORAL at 08:41

## 2023-07-27 RX ADMIN — HYDROXYZINE HYDROCHLORIDE 25 MG: 25 TABLET, FILM COATED ORAL at 21:09

## 2023-07-27 RX ADMIN — SODIUM CHLORIDE 250 ML: 9 INJECTION, SOLUTION INTRAVENOUS at 13:51

## 2023-07-27 RX ADMIN — Medication 1 CAPSULE: at 08:41

## 2023-07-27 RX ADMIN — DOXYCYCLINE 100 MG: 100 CAPSULE ORAL at 21:09

## 2023-07-27 RX ADMIN — DOXYCYCLINE 100 MG: 100 CAPSULE ORAL at 06:36

## 2023-07-27 RX ADMIN — EMPAGLIFLOZIN 10 MG: 10 TABLET, FILM COATED ORAL at 08:41

## 2023-07-27 RX ADMIN — SENNOSIDES AND DOCUSATE SODIUM 2 TABLET: 50; 8.6 TABLET ORAL at 21:09

## 2023-07-27 RX ADMIN — SPIRONOLACTONE 50 MG: 25 TABLET ORAL at 08:41

## 2023-07-27 RX ADMIN — ENOXAPARIN SODIUM 60 MG: 60 INJECTION SUBCUTANEOUS at 21:09

## 2023-07-27 RX ADMIN — Medication 10 ML: at 21:10

## 2023-07-27 RX ADMIN — SENNOSIDES AND DOCUSATE SODIUM 2 TABLET: 50; 8.6 TABLET ORAL at 08:41

## 2023-07-27 RX ADMIN — Medication 10 ML: at 08:42

## 2023-07-27 RX ADMIN — ENOXAPARIN SODIUM 60 MG: 60 INJECTION SUBCUTANEOUS at 08:39

## 2023-07-27 NOTE — PLAN OF CARE
Goal Outcome Evaluation:  Plan of Care Reviewed With: patient        Progress: improving  Outcome Evaluation: Pt. presents below baseline function w/generalized weakness, balance deficits and decreased functional endurance. She performed bed mobility, transfers and ambulated 20' + 20' + 15' w/front wheeled walker, stand by assist. Activity limited by fatigue. Pt. tolerated ther-ex well. Continue IPPT to progress as tolerated.      Anticipated Discharge Disposition (PT): skilled nursing facility

## 2023-07-27 NOTE — PROGRESS NOTES
Patient Name: Kerry Leal  : 1965  MRN: [id number]    Admission Date: 2023    Requesting Provider: Dixie  Evaluating Physician: Shine Rodriguez MD    Chief Complaint: cellulitis    Reason for Consultation: cellulitis    Subjective   Patient is a 58 y.o. female with history of hypertension, asthma and chronic lymphedema.  Previous seen by Dr. Shine Rodriguez and previously treated with dalbavancin and then suppressive Keflex.  Last seen in the hospital 2023 and at that time treated with daptomycin and ceftriaxone and discharged to outpatient follow-up.  Seen in clinic in May with plans to continue chronic suppressive Keflex.  At some point after last office visit patient called [facility] saying that she had a rash under her chin and she has been off antibiotics since then.    Admitted to [facility] 2023 with complaints of worsening left lower extremity swelling and pain.  Denied fever.  Ongoing for CT emergency department CODE BLUE was called however per H&P patient did not lose a pulse and no CPR was performed.  No drainable fluid collection.  Started on daptomycin and ceftriaxone and admitted.  Feels like she is improving slightly compared to admission    23:  No fever.  Blood cultures pending this am.  Resting comfortably.  Feels like leg swelling, pain improving.  No new issues per nursing    23: The patient is still short of breath and was on OptiFlow when I saw her this afternoon.  She had significant anxiety overnight.  Having some bilateral lower extremity pain and swelling.  Reports recent worsening redness over her bilateral lower extremities.  No fevers    23: Patient has ongoing pain over her bilateral lower extremity but no worse today.  Breathing may be slightly better but she was still on OptiFlow during my exam (although subsequently improved to 5-6L NC).  No nausea, vomiting, or diarrhea reported.  No fevers.    23: Respiratory status has  improved and she is now breathing well on 3 L nasal cannula. She is not having any fevers.  No increased leg pain today.  No nausea or vomiting or diarrhea    7/27/23: The patient is somewhat tearful and anxious and states that she is anxious after her  brought her flowers today.  She is not having any fevers.  No increased leg pain or swelling. Breathing is stable on 3 L nasal cannula supplemental O2.    ROS:  As above      Allergies   Allergen Reactions    Diclofenac Swelling    Benadryl [Diphenhydramine Hcl] Other (See Comments)     UNSURE    Clindamycin/Lincomycin Other (See Comments)     UNSURE    Doxycycline Other (See Comments)     UNSURE    Fluticasone Other (See Comments)     NOSE BLEEDS    Fluvoxamine Other (See Comments)     UNSURE    Montelukast Swelling    Symbicort [Budesonide-Formoterol Fumarate] Other (See Comments)     UNSURE    Lisinopril Dizziness    Penicillins Other (See Comments)     MOTHER TOLD HER SHE WAS ALLERGIC  *has tolerated ceftriaxone    Smz-Tmp Ds [Sulfamethoxazole-Trimethoprim] GI Intolerance    Sulfa Antibiotics GI Intolerance       Objective   Antibiotics:  Anti-Infectives (From admission, onward)      Ordered     Dose/Rate Route Frequency Start Stop    07/26/23 1904  doxycycline (MONODOX) capsule 100 mg        Ordering Provider: Shine Rodriguez MD    100 mg Oral Every 12 Hours Scheduled 07/27/23 0700 08/10/23 0859    07/22/23 0053  cefTRIAXone (ROCEPHIN) 2000 mg/100 mL 0.9% NS IVPB (MBP)        Ordering Provider: Darion Simental MD    2,000 mg  over 30 Minutes Intravenous Once 07/22/23 0109 07/22/23 0333            Other Medications:  Current Facility-Administered Medications   Medication Dose Route Frequency Provider Last Rate Last Admin    acetaminophen (TYLENOL) tablet 650 mg  650 mg Oral Q4H PRN Raquel, Hansa, APRN   650 mg at 07/25/23 2223    Or    acetaminophen (TYLENOL) 160 MG/5ML solution 650 mg  650 mg Oral Q4H PRN Raquel, Hansa, APRN        Or     acetaminophen (TYLENOL) suppository 650 mg  650 mg Rectal Q4H PRN Raquel, Hansa, APRN        albuterol (PROVENTIL) nebulizer solution 0.083% 2.5 mg/3mL  2.5 mg Nebulization Q4H PRN Raquel, Hansa, APRN   2.5 mg at 07/24/23 0510    albuterol (PROVENTIL) nebulizer solution 0.083% 2.5 mg/3mL  2.5 mg Nebulization Q4H PRN Berry Wall DO        sennosides-docusate (PERICOLACE) 8.6-50 MG per tablet 2 tablet  2 tablet Oral BID Raquel, Hansa, APRN   2 tablet at 07/27/23 0841    And    polyethylene glycol (MIRALAX) packet 17 g  17 g Oral Daily PRN Raquel, Hansa, APRN        And    bisacodyl (DULCOLAX) EC tablet 5 mg  5 mg Oral Daily PRN Raquel, Hansa, APRN        And    bisacodyl (DULCOLAX) suppository 10 mg  10 mg Rectal Daily PRN Raquel, Hansa, APRN        bumetanide (BUMEX) 25 mg/100mL (0.25 mg/mL) infusion  0.5 mg/hr Intravenous Continuous Jorge Puckett IV, MD        bumetanide (BUMEX) injection 1 mg  1 mg Intravenous Once Jorge Puckett IV, MD        Calcium Replacement - Follow Nurse / BPA Driven Protocol   Does not apply PRN Jorge Puckett IV, MD        chlorothiazide (DIURIL) 500 mg in sterile water (preservative free) 18 mL injection  500 mg Intravenous Once Jorge Puckett IV, MD        dextrose (D50W) (25 g/50 mL) IV injection 25 g  25 g Intravenous Q15 Min PRN Raquel, Hansa, APRN        dextrose (GLUTOSE) oral gel 15 g  15 g Oral Q15 Min PRN Raquel, Hansa, APRN        doxycycline (MONODOX) capsule 100 mg  100 mg Oral Q12H Shine Rodriguez MD   100 mg at 07/27/23 0636    empagliflozin (JARDIANCE) tablet 10 mg  10 mg Oral Daily Jroge Puckett IV, MD   10 mg at 07/27/23 0841    Enoxaparin Sodium (LOVENOX) syringe 60 mg  60 mg Subcutaneous Q12H Raquel, Hansa, APRN   60 mg at 07/27/23 0839    glucagon (GLUCAGEN) injection 1 mg  1 mg Intramuscular Q15 Min PRN Raquel, Hansa, APRN        hydrOXYzine (ATARAX) tablet 25 mg  25  "mg Oral TID PRN Berry Wall DO   25 mg at 07/27/23 0758    Insulin Lispro (humaLOG) injection 2-7 Units  2-7 Units Subcutaneous 4x Daily AC & at Bedtime Raquel, Hansa, APRN   5 Units at 07/23/23 1202    lactobacillus acidophilus (RISAQUAD) capsule 1 capsule  1 capsule Oral Daily Markos Dalton MD   1 capsule at 07/27/23 0841    LORazepam (ATIVAN) injection 0.5 mg  0.5 mg Intravenous Q4H PRN Berry Wall, DO   0.5 mg at 07/26/23 1934    losartan (COZAAR) tablet 100 mg  100 mg Oral Daily Raquel, Hansa, APRN   100 mg at 07/27/23 0839    Magnesium Cardiology Dose Replacement - Follow Nurse / BPA Driven Protocol   Does not apply PRN Jorge Puckett IV, MD        metoprolol succinate XL (TOPROL-XL) 24 hr tablet 100 mg  100 mg Oral Daily Raquel, Hansa, APRN   100 mg at 07/27/23 0841    nitroglycerin (NITROSTAT) SL tablet 0.4 mg  0.4 mg Sublingual Q5 Min PRN Raquel, Hansa, APRN   0.4 mg at 07/24/23 0630    Phosphorus Replacement - Follow Nurse / BPA Driven Protocol   Does not apply PRN Jorge Puckett IV, MD        Potassium Replacement - Follow Nurse / BPA Driven Protocol   Does not apply Jorge Will IV, MD        sodium chloride 0.9 % flush 10 mL  10 mL Intravenous PRN Darion Simental MD        sodium chloride 0.9 % flush 10 mL  10 mL Intravenous Q12H Raquel, Hansa, APRN   10 mL at 07/27/23 0842    sodium chloride 0.9 % flush 10 mL  10 mL Intravenous PRN Raquel, Hansa, APRN        sodium chloride 0.9 % infusion 40 mL  40 mL Intravenous PRN Raquel, Hansa, APRN        spironolactone (ALDACTONE) tablet 50 mg  50 mg Oral Daily Jorge Puckett IV, MD   50 mg at 07/27/23 0841       Physical Exam:   Vital Signs   /82 (BP Location: Right arm, Patient Position: Lying)   Pulse 88   Temp 96.5 øF (35.8 øC) (Oral)   Resp 18   Ht 160 cm (62.99\")   Wt (!) 150 kg (331 lb 3.2 oz)   SpO2 90%   BMI 58.68 kg/mý     GENERAL: Awake " and alert.  No acute distress  HEENT: Normocephalic, atraumatic.  Poor dentition.  No external oral lesions noted  NECK: Supple   HEART: RRR; No murmur.  LUNGS: Claude auscultation bilaterally.  Nonlabored breathing on 3 L nasal cannula  ABDOMEN: morbidly obese but soft  EXT: Bilateral lower extremities with wraps in place, no erythema extending outside of the wraps  : External urinary catheter in place  SKIN: No diffuse rash  NEURO: Awake alert and oriented x4.  Normal speech and cognition    Laboratory Data  Lab Results   Component Value Date    WBC 8.15 07/22/2023    HGB 11.7 (L) 07/22/2023    HCT 40.7 07/22/2023    MCV 91.3 07/22/2023     07/22/2023     Lab Results   Component Value Date    GLUCOSE 143 (H) 07/27/2023    CALCIUM 9.7 07/27/2023     07/27/2023    K 3.9 07/27/2023    CO2 41.0 (H) 07/27/2023    CL 84 (L) 07/27/2023    BUN 38 (H) 07/27/2023    CREATININE 0.85 07/27/2023     Estimated Creatinine Clearance: 106.6 mL/min (by C-G formula based on SCr of 0.85 mg/dL).  Lab Results   Component Value Date    ALT 14 07/22/2023    AST 22 07/22/2023    ALKPHOS 76 07/22/2023    BILITOT 0.7 07/22/2023     Lab Results   Component Value Date    CRP 7.00 (H) 07/22/2023     Lab Results   Component Value Date    SEDRATE 120 (H) 04/13/2023       The above labs were reviewed.     Microbiology:  Microbiology Results (last 10 days)       Procedure Component Value - Date/Time    Blood Culture - Blood, Arm, Left [[id number]]  (Normal) Collected: 07/22/23 0142    Lab Status: Final result Specimen: Blood from Arm, Left Updated: 07/27/23 0800     Blood Culture No growth at 5 days    Blood Culture - Blood, Arm, Right [[id number]]  (Normal) Collected: 07/22/23 0142    Lab Status: Final result Specimen: Blood from Arm, Right Updated: 07/27/23 0800     Blood Culture No growth at 5 days            Radiology:  NM Lung Scan Perfusion Particulate    Result Date: 7/24/2023  Indeterminate perfusion scan for pulmonary  embolus. Extensive perfusion abnormalities, but with matching chest x-ray findings. These could all reflect the patient's extensive pulmonary edema. Electronically Signed: Tigre Phillips  7/24/2023 12:54 PM EDT  Workstation ID: [id number]    XR Chest 1 View    Result Date: 7/24/2023  Impression: Electronically Signed: Hilton Cohen  7/24/2023 12:25 PM EDT  Workstation ID: [id number]    XR Chest 1 View    Result Date: 7/22/2023  Impression: Mild to moderate pulmonary edema pattern with small left-sided pleural effusion with bibasilar atelectasis. Stable cardiomegaly. Electronically Signed: Naomi Freitas  7/22/2023 1:08 AM EDT  Workstation ID: [id number]    CT Lower Extremity Bilateral With Contrast    Result Date: 7/22/2023  Impression: Diffuse subcutaneous edema present bilaterally, left greater than right likely related to third spacing of fluid. No focal drainable collection identified. No acute osseous abnormality. No suspicious joint effusion. Nonangiographic evaluation of the arterial structures demonstrates no obvious abnormalities. Venous structures are not well evaluated though multiple varicosities are present. Electronically Signed: Naomi Freitas  7/22/2023 2:35 AM EDT  Workstation ID: [id number]         Assessment   Acute on chronic bilateral lower extremity venous stasis swelling and dermatitis: Improving with diuresis and antibiotics  Left lower extremity swelling, erythema, tenderness, cellulitis-Improved back to baseline based on images on 7/26  Right lower extremity focal erythema, possible cellulitis-Ongoing and increased from baseline but may be improving somewhat.  Chronic bilateral lower extremity lymphedema  Acute on chronic hypoxic/Hypercapnic respiratory failure  Morbid obesity  Bilateral pulmonary edema on x-ray  Acute on chronic CHFpEF  HTN  Listed allergies to clindamycin, doxycycline, penicillin and sulfa  Patient reported rash with Keflex: about 2 months ago. Recently treated with ceftriaxone and had   another dose in ED with no side effects thus far.    PLAN:     - Status post daptomycin course.  - Start doxycycline 100 mg by mouth twice a day.  If she tolerates this medication well though we could consider discharging her on it.  - Probiotic  - Diuresis per cardiology and primary team  - Continue wound care    I would be okay with the patient's discharge soon with the below plan if she tolerates her first dose of doxycycline.    UM/LY:  Doxycycline 100 mg by mouth twice a day.  Please give a 2 week supply at the time of discharge  Follow-up in my clinic in 2 weeks.  Continue to follow with wound care on an outpatient basis    I discussed with the patient and her  at bedside today    Shine Rodriguez MD  7/27/2023    I

## 2023-07-27 NOTE — PROGRESS NOTES
[facility] Medicine Services  PROGRESS NOTE    Patient Name: Kerry Leal  : 1965  MRN: [id number]    Date of Admission: 2023  Primary Care Physician: Gopal Kong MD    Subjective   Subjective     CC:  F/u heart failure    HPI:  O2 requirements continue to improve; BP borderline prior to next dose of bumex    ROS:  Gen- No fevers, chills  CV- No chest pain, palpitations  Resp- No cough, yes dyspnea  GI- No N/V/D, abd pain     Objective   Objective     Vital Signs:   Temp:  [96.5 øF (35.8 øC)-98.5 øF (36.9 øC)] 98.2 øF (36.8 øC)  Heart Rate:  [] 84  Resp:  [17-21] 18  BP: ()/(47-88) 70/47  Flow (L/min):  [2-5] 3     Physical Exam:  Constitutional: Awake, alert, morbidly obese female, sitting in bedside chair in NAD  HENT: NCAT, mucous membranes moist  Respiratory: Clear to auscultation bilaterally, respiratory effort normal  Cardiovascular: RRR, palpable radial pulses  Gastrointestinal: Positive bowel sounds, soft, nontender, nondistended  Musculoskeletal: Severe BL LE edema w/ wraps  Psychiatric: Appropriate affect, cooperative  Neurologic: Speech clear    Results Reviewed:  LAB RESULTS:      Lab 23  1204 23  0015   WBC 8.15 6.79   HEMOGLOBIN 11.7* 11.8*   HEMATOCRIT 40.7 40.5   PLATELETS 250 250   NEUTROS ABS 6.46 4.64   IMMATURE GRANS (ABS) 0.04 0.07*   LYMPHS ABS 0.71 1.06   MONOS ABS 0.88 0.84   EOS ABS 0.03 0.15   MCV 91.3 91.8   CRP  --  7.00*   PROCALCITONIN  --  0.03   LACTATE  --  1.2   D DIMER QUANT  --  3.22*           Lab 23  0413 23  0414 23  0412 23  1618 23  0639 23  1614 23  0539 23  1204 23  0015   SODIUM 138 140 141  --  139 139   < > 141 140   POTASSIUM 3.9 3.9 4.2 4.1 3.5 4.0   < > 4.1 4.3   CHLORIDE 84* 87* 90*  --  88* 89*   < > 99 99   CO2 41.0* 46.0* 47.0*  --  46.0* 43.0*   < > 36.0* 33.0*   ANION GAP 13.0 7.0 4.0*  --  5.0 7.0   < > 6.0 8.0   BUN 38* 34* 26*  --   19 19   < > 13 16   CREATININE 0.85 0.85 0.72  --  0.66 0.76   < > 0.62 0.73   EGFR 80.5 80.5 98.3  --  103.1 92.1   < > 104.7 96.7   GLUCOSE 143* 135* 112*  --  130* 111*   < > 143* 141*   CALCIUM 9.7 9.4 9.0  --  9.0 9.1   < > 8.5* 9.0   MAGNESIUM  --  2.3 2.3  --  2.2 2.2  --   --  2.2   PHOSPHORUS  --   --   --   --   --   --   --   --  3.3   HEMOGLOBIN A1C  --   --   --   --   --   --   --  7.00*  --    TSH  --   --   --   --   --   --   --   --  1.660    < > = values in this interval not displayed.           Lab 07/22/23  0015   TOTAL PROTEIN 7.7   ALBUMIN 3.3*   GLOBULIN 4.4   ALT (SGPT) 14   AST (SGOT) 22   BILIRUBIN 0.7   ALK PHOS 76           Lab 07/24/23  0900 07/24/23  0639 07/22/23  0625 07/22/23  0427 07/22/23  0015   PROBNP  --   --   --   --  427.0   HSTROP T 18* 19* 42* 36* 16*                   Lab 07/22/23  0638 07/22/23  0431 07/22/23  0244   PH, ARTERIAL 7.411  --   --    PCO2, ARTERIAL 57.3*  --   --    PO2 ART 95.4  --   --    FIO2 50 60 80   HCO3 ART 36.4*  --   --    BASE EXCESS ART 9.8*  --   --    CARBOXYHEMOGLOBIN 1.4  --   --    CARBOXYHEMOGLOBIN (VENOUS)  --  1.4 1.6       Brief Urine Lab Results  (Last result in the past 365 days)        Color   Clarity   Blood   Leuk Est   Nitrite   Protein   CREAT   Urine HCG        07/22/23 0426 Yellow   Clear   Negative   Negative   Negative   Negative                   Microbiology Results Abnormal       Procedure Component Value - Date/Time    Blood Culture - Blood, Arm, Right [[id number]]  (Normal) Collected: 07/22/23 0142    Lab Status: Final result Specimen: Blood from Arm, Right Updated: 07/27/23 0800     Blood Culture No growth at 5 days    Blood Culture - Blood, Arm, Left [[id number]]  (Normal) Collected: 07/22/23 0142    Lab Status: Final result Specimen: Blood from Arm, Left Updated: 07/27/23 0800     Blood Culture No growth at 5 days            No radiology results from the last 24 hrs    Results for orders placed during the hospital  encounter of 07/22/23    Adult Transthoracic Echo Complete W/ Cont if Necessary Per Protocol    Interpretation Summary    Technically difficult study due to massive obesity    Left ventricular systolic function is normal. Estimated left ventricular EF = 60%    Left ventricular wall thickness is consistent with mild concentric hypertrophy.    Valves are poorly visualized.  However there is no gross valvular abnormality    Estimated right ventricular systolic pressure from tricuspid regurgitation is markedly elevated (57 mmHg).      Current medications:  Scheduled Meds:bumetanide, 1 mg, Intravenous, Once  chlorothiazide sodium (DIURIL) injection, 500 mg, Intravenous, Once  doxycycline, 100 mg, Oral, Q12H  empagliflozin, 10 mg, Oral, Daily  enoxaparin, 60 mg, Subcutaneous, Q12H  insulin lispro, 2-7 Units, Subcutaneous, 4x Daily AC & at Bedtime  lactobacillus acidophilus, 1 capsule, Oral, Daily  losartan, 100 mg, Oral, Daily  metoprolol succinate XL, 100 mg, Oral, Daily  senna-docusate sodium, 2 tablet, Oral, BID  sodium chloride, 250 mL, Intravenous, Once  sodium chloride, 10 mL, Intravenous, Q12H  spironolactone, 50 mg, Oral, Daily      Continuous Infusions:bumetanide, 0.5 mg/hr, Last Rate: Stopped (07/27/23 1214)      PRN Meds:.  acetaminophen **OR** acetaminophen **OR** acetaminophen    albuterol    Albuterol Sulfate NEB Orderable    senna-docusate sodium **AND** polyethylene glycol **AND** bisacodyl **AND** bisacodyl    Calcium Replacement - Follow Nurse / BPA Driven Protocol    dextrose    dextrose    glucagon (human recombinant)    hydrOXYzine    LORazepam    Magnesium Cardiology Dose Replacement - Follow Nurse / BPA Driven Protocol    nitroglycerin    Phosphorus Replacement - Follow Nurse / BPA Driven Protocol    Potassium Replacement - Follow Nurse / BPA Driven Protocol    sodium chloride    sodium chloride    sodium chloride    Assessment & Plan   Assessment & Plan     Active Hospital Problems    Diagnosis   POA    **Acute on chronic respiratory failure with hypoxia and hypercapnia [J96.21, J96.22]  Yes    Lower extremity cellulitis [L03.119]  Yes    Bilateral cellulitis of lower leg [L03.116, L03.115]  Yes    Obesity hypoventilation syndrome [E66.2]  Yes    Right-sided congestive heart failure [I50.810]  Yes    Morbid obesity with BMI of 60.0-69.9, adult [E66.01, Z68.44]  Not Applicable    Type 2 diabetes mellitus [E11.9]  Yes    Essential hypertension [I10]  Yes    Lymphedema of both lower extremities [I89.0]  Yes      Resolved Hospital Problems    Diagnosis Date Resolved POA    Accelerated hypertension [I10] 07/22/2023 Yes        Brief Hospital Course to date:  Kerry Leal is a 58 y.o. female w/ super morbid obesity, DM2, HTN, chronic LE lymphedema, chronic hypoxia who presented w/ worsening LT LE pain; while having CT of her leg she had acute hypercapnic respiratory failure requiring BIPAP; she was admitted, started on Abx, and seen by cardiology for diuresis    Assessment/Plan    Acute/chronic hypercapnic respiratory failure  Chronic hypoxia  Suspected OHS/restrictive lung disease  Pulmonary hypertension  Acute HFpEF  -s/p BIPAP on arrival, down to NC today  -repeat TTE limited 2/2 body habitus, EF estimated 60% w/ RVSP 57mmHg  -started on jardiance and aldactone this admit  -cards follows, continues to diurese well, BP borderline this afternoon    Anxiety symptoms  -recently on buspar but discontinued 2/2 unknown adverse reaction  -cont prn atarax    Chronic BL LE lymphedema w/ stasis dermatitis, possible superimposed LT LE cellulitis (less likely)  -ID follows, cont empiric daptomycin, change to po doxycyline x2 weeks at DC and follow up w/ Dr. Shine Rodriguez 2 weeks post DC    HTN  -cont losartan, toprol XL, aldactone    DM type 2, A1c 7.0%, w/o long term use of insulin  -ssi    Super morbid obesity, BMI 62.11 kg/m2  -complicates all aspects of care, directly linked to primary  diagnosis/problem      Expected Discharge Location and Transportation: likely home  Expected Discharge   Expected Discharge Date: 7/28/2023; Expected Discharge Time:      DVT prophylaxis:  Medical DVT prophylaxis orders are present.     AM-PAC 6 Clicks Score (PT): 17 (07/26/23 2933)    CODE STATUS:   Code Status and Medical Interventions:   Ordered at: 07/22/23 0558     Level Of Support Discussed With:    Patient     Code Status (Patient has no pulse and is not breathing):    CPR (Attempt to Resuscitate)     Medical Interventions (Patient has pulse or is breathing):    Full Support     Release to patient:    Routine Release       Berry Wall, DO  07/27/23

## 2023-07-27 NOTE — PROGRESS NOTES
Cardiology Progress Note      Reason for visit:    Acute right-sided heart failure    IDENTIFICATION: 58-year-old female who is  and resides in [place], [place]    Active Hospital Problems    Diagnosis  POA    **Acute on chronic respiratory failure with hypoxia and hypercapnia [J96.21, J96.22]  Yes     Priority: High    Obesity hypoventilation syndrome [E66.2]  Yes     Priority: High    Right-sided congestive heart failure [I50.810]  Yes     Priority: High     Echo (7/6/2021): Test difficult due to body habitus.  LVEF 55%.  Mild AI.  No significant aortic stenosis with mean gradient 12 mmHg.  Mild dilation of ascending aorta 4 cm.  Echo (10/23/2022): LVEF 55%..  Mild AS/AR.  RVSP 45-55 mmHg  Echo (7/23/2023): LVEF 65%.  No significant valve abnormality.  RVSP 57 mmHg      Type 2 diabetes mellitus [E11.9]  Yes     Priority: High    Lower extremity cellulitis [L03.119]  Yes     Priority: Medium    Bilateral cellulitis of lower leg [L03.116, L03.115]  Yes     Priority: Medium    Essential hypertension [I10]  Yes     Priority: Medium    Lymphedema of both lower extremities [I89.0]  Yes     Priority: Medium    Morbid obesity with BMI of 60.0-69.9, adult [E66.01, Z68.44]  Not Applicable     Priority: Low            Patient is sitting up in the chair.  Breathing has greatly improved since admission.  O2 has been weaned from 6 L to 4 L.  Greater than 20,000 mL of been diuresed since admission.  IV Bumex was discontinued last evening.  She still has significant edema in her lower extremities but improved since admission.           Vital Sign Min/Max for last 24 hours  Temp  Min: 96.5 øF (35.8 øC)  Max: 98.5 øF (36.9 øC)   BP  Min: 117/63  Max: 141/82   Pulse  Min: 77  Max: 110   Resp  Min: 17  Max: 21   SpO2  Min: 79 %  Max: 96 %   Flow (L/min)  Min: 2  Max: 5      Intake/Output Summary (Last 24 hours) at 7/27/2023 0918  Last data filed at 7/27/2023 0600  Gross per 24 hour   Intake 970 ml   Output 2900 ml   Net  1930 ml           Physical Exam  Constitutional:       General: She is awake.      Appearance: She is morbidly obese.   Cardiovascular:      Rate and Rhythm: Normal rate and regular rhythm.      Heart sounds: No murmur heard.  Pulmonary:      Effort: Pulmonary effort is normal.      Breath sounds: Normal breath sounds.   Musculoskeletal:      Right lower le+ Pitting Edema present.      Left lower le+ Pitting Edema present.   Skin:     General: Skin is warm and dry.   Neurological:      Mental Status: She is alert.   Psychiatric:         Behavior: Behavior is cooperative.       Tele: Normal sinus rhythm     Results Review (reviewed the patient's recent labs in the electronic medical record):      EKG (2023): Normal sinus rhythm, low voltage QRS ST depression lateral leads     CXR (2023): Bronchiectasis is present with extensive peribronchial edema suggesting pulmonary edema.  Trace left pleural effusion.     Echo (2023): LVEF 60%.  Mild LVH.  No valvular abnormality but valves poorly visualized.  RVSP 57 mmHg    Results from last 7 days   Lab Units 23  0413 23  0414 23  0412 23  1618 23  0639 23  1614 23  0539 23  1204   SODIUM mmol/L 138 140 141  --  139 139 139 141   POTASSIUM mmol/L 3.9 3.9 4.2   < > 3.5 4.0 3.7 4.1   CHLORIDE mmol/L 84* 87* 90*  --  88* 89* 94* 99   BUN mg/dL 38* 34* 26*  --  19 19 15 13   CREATININE mg/dL 0.85 0.85 0.72  --  0.66 0.76 0.65 0.62   MAGNESIUM mg/dL  --  2.3 2.3  --  2.2 2.2  --   --     < > = values in this interval not displayed.     Results from last 7 days   Lab Units 23  0900 23  0639 23  0625   HSTROP T ng/L 18* 19* 42*     Results from last 7 days   Lab Units 23  1204 23  0015   WBC 10*3/mm3 8.15 6.79   HEMOGLOBIN g/dL 11.7* 11.8*   HEMATOCRIT % 40.7 40.5   PLATELETS 10*3/mm3 250 250       Lab Results   Component Value Date    HGBA1C 7.00 (H) 2023       Lab Results    Component Value Date    CHOL 134 03/31/2023    TRIG 89 03/31/2023    HDL 33 (L) 03/31/2023    LDL 84 03/31/2023              Acute on chronic diastolic and right-sided heart failure  IV Bumex drip off  Greater than 20,000 mL diuresed since admission  Creatinine stable and within normal limits, BUN elevated 34  Jardiance 10 mg daily  Metoprolol succinate 100 mg daily  Spironolactone 50 mg daily  Losartan 100 mg daily     Hypertension  No CCB due to edema  Continue metoprolol succinate 100 mg daily, losartan 100 mg daily and spironolactone 50 mg daily  Current /82     Lower extremity cellulitis/lymphedema  Improving with diuresis     Massive obesity  We discussed that her obesity is leading to much of her present illness i.e. right-sided heart failure, lymphedema/cellulitis  Semaglutide should be started after discharge for weight loss     Elevated D-dimer  VQ scan findings likely due to her pulmonary edema but low suspicion for PE  Venous duplex of lower extremities preliminary negative for PE                   Repeat IV Bumex for 10 hours at 0.5 mg/an hour  Give Diuril 500 mg x 1 dose prior to starting Bumex drip  Strict I's and O's and daily weights  Patient is responding to diuresis well    Electronically signed by RAD Baumann, 07/27/23, 9:18 AM EDT.

## 2023-07-27 NOTE — THERAPY TREATMENT NOTE
Patient Name: Kerry Leal  : 1965    MRN: [id number]                              Today's Date: 2023       Admit Date: 2023    Visit Dx:     ICD-10-CM ICD-9-CM   1. Acute respiratory failure with hypoxia and hypercapnia  J96.01 518.81    J96.02    2. Respiratory acidosis  E87.29 276.2   3. Cellulitis of lower extremity, unspecified laterality  L03.119 682.6   4. Obesity hypoventilation syndrome  E66.2 278.03   5. Lymphedema of both lower extremities  I89.0 457.1   6. Type 2 diabetes mellitus with hyperglycemia, without long-term current use of insulin  E11.65 250.00     790.29   7. Morbid obesity with BMI of 60.0-69.9, adult  E66.01 278.01    Z68.44 V85.44     Patient Active Problem List   Diagnosis    Essential hypertension    Lymphedema of both lower extremities    Elevated d-dimer    Acute on chronic respiratory failure with hypoxia and hypercapnia    Type 2 diabetes mellitus    Morbid obesity with BMI of 60.0-69.9, adult    Right-sided congestive heart failure    Obesity hypoventilation syndrome    Bilateral cellulitis of lower leg    Lower extremity cellulitis     Past Medical History:   Diagnosis Date    Anxiety     Asthma     Heart murmur     Hypertension      Past Surgical History:   Procedure Laterality Date    APPENDECTOMY       SECTION      HYSTERECTOMY      NECK SURGERY        General Information       Row Name 23 153          Physical Therapy Time and Intention    Document Type therapy note (daily note)  -SS     Mode of Treatment physical therapy  -SS       Row Name 23 153          General Information    Patient Profile Reviewed yes  -SS     Existing Precautions/Restrictions fall;oxygen therapy device and L/min;other (see comments)  BLE edema/cellulitis  -SS     Barriers to Rehab medically complex;previous functional deficit  -SS       Row Name 23 153          Cognition    Orientation Status (Cognition) oriented x 3  -SS       Row Name 23 1534           Safety Issues, Functional Mobility    Safety Issues Affecting Function (Mobility) awareness of need for assistance;insight into deficits/self-awareness;positioning of assistive device;problem-solving;safety precaution awareness;safety precautions follow-through/compliance;sequencing abilities;judgment  -     Impairments Affecting Function (Mobility) balance;endurance/activity tolerance;range of motion (ROM);sensation/sensory awareness;shortness of breath;strength;postural/trunk control  -               User Key  (r) = Recorded By, (t) = Taken By, (c) = Cosigned By      Initials Name Provider Type     Milly Alfaro, PT Physical Therapist                   Mobility       Row Name 07/27/23 1532          Bed Mobility    Bed Mobility scooting/bridging;supine-sit  -SS     Scooting/Bridging [place] (Bed Mobility) standby assist;verbal cues  -SS     Supine-Sit [place] (Bed Mobility) standby assist;verbal cues  -SS     Assistive Device (Bed Mobility) head of bed elevated;bed rails  -     Comment, (Bed Mobility) VC for sequencing  -       Row Name 07/27/23 1532          Sit-Stand Transfer    Sit-Stand [place] (Transfers) standby assist;verbal cues  -     Assistive Device (Sit-Stand Transfers) walker, front-wheeled  -SS     Comment, (Sit-Stand Transfer) VC for hand placement, appropriate alignment, lowering with eccentric control  -       Row Name 07/27/23 1532          Gait/Stairs (Locomotion)    [place] Level (Gait) standby assist;verbal cues  -     Assistive Device (Gait) walker, front-wheeled  -SS     Distance in Feet (Gait) 50  15+15+20  -     Deviations/Abnormal Patterns (Gait) base of support, wide;gait speed decreased;anjelica decreased;bilateral deviations;stride length decreased;weight shifting decreased  -     Bilateral Gait Deviations forward flexed posture;heel strike decreased  -     Comment, (Gait/Stairs) Pt. ambulated with a step through gait pattern at a  decreased speed. VC for upright posture, decreased WB through BUE support, increased heel strike. Activity limited by fatigue.  -               User Key  (r) = Recorded By, (t) = Taken By, (c) = Cosigned By      Initials Name Provider Type     Milly Alfaro PT Physical Therapist                   Obj/Interventions       Row Name 07/27/23 1537          Motor Skills    Motor Skills functional endurance  -     Functional Endurance requires frequent rest breaks secondary to fatigue: RPE 4/10 w/first two trials of ambulation  -     Therapeutic Exercise hip;knee;ankle  -       Row Name 07/27/23 1537          Hip (Therapeutic Exercise)    Hip (Therapeutic Exercise) strengthening exercise;isometric exercises  -     Hip Isometrics (Therapeutic Exercise) bilateral;gluteal sets;10 repetitions  -     Hip Strengthening (Therapeutic Exercise) bilateral;heel slides;aBduction;aDduction;external rotation;internal rotation;10 repetitions  -       Row Name 07/27/23 1537          Knee (Therapeutic Exercise)    Knee (Therapeutic Exercise) strengthening exercise;isometric exercises  -     Knee Isometrics (Therapeutic Exercise) bilateral;quad sets;10 repetitions  -     Knee Strengthening (Therapeutic Exercise) bilateral;SLR (straight leg raise);SAQ (short arc quad);LAQ (long arc quad);10 repetitions  -       Row Name 07/27/23 1537          Ankle (Therapeutic Exercise)    Ankle (Therapeutic Exercise) AROM (active range of motion)  -     Ankle AROM (Therapeutic Exercise) bilateral;dorsiflexion;plantarflexion;10 repetitions  -       Row Name 07/27/23 1537          Balance    Balance Assessment sitting static balance;sitting dynamic balance;sit to stand dynamic balance;standing dynamic balance;standing static balance  -     Static Sitting Balance standby assist  -     Dynamic Sitting Balance contact guard  -     Position, Sitting Balance unsupported;sitting edge of bed  -     Sit to Stand Dynamic Balance  "standby assist  -SS     Static Standing Balance standby assist  -SS     Dynamic Standing Balance standby assist  -     Position/Device Used, Standing Balance supported;walker, front-wheeled  -     Balance Interventions sitting;standing;sit to stand;supported;static;dynamic  -               User Key  (r) = Recorded By, (t) = Taken By, (c) = Cosigned By      Initials Name Provider Type     Milly Alfaro, PT Physical Therapist                   Goals/Plan    No documentation.                  Clinical Impression       Row Name 07/27/23 1539          Pain    Pretreatment Pain Rating 0/10 - no pain  -     Posttreatment Pain Rating 0/10 - no pain  -     Pain Location - Side/Orientation Left  -     Pain Location - shoulder  -     Pre/Posttreatment Pain Comment does c/o L shoulder discomfort and states it feels \"like a muscle that needs massaged\"  -     Pain Intervention(s) Repositioned;Ambulation/increased activity  -     Additional Documentation Pain Scale: Numbers Pre/Post-Treatment (Group)  -       Row Name 07/27/23 1539          Plan of Care Review    Plan of Care Reviewed With patient  -     Progress improving  -     Outcome Evaluation Pt. presents below baseline function w/generalized weakness, balance deficits and decreased functional endurance. She performed bed mobility, transfers and ambulated 20' + 20' + 15' w/front wheeled walker, stand by assist. Activity limited by fatigue. Pt. tolerated ther-ex well. Continue IPPT to progress as tolerated.  -       Row Name 07/27/23 1534          Therapy Assessment/Plan (PT)    Rehab Potential (PT) good, to achieve stated therapy goals  -     Criteria for Skilled Interventions Met (PT) yes;meets criteria;skilled treatment is necessary  -     Therapy Frequency (PT) daily  -       Row Name 07/27/23 1532          Vital Signs    Pre Systolic BP Rehab 118  -SS     Pre Treatment Diastolic BP 59  -SS     Pretreatment Heart Rate (beats/min) 76  -SS "     Pre SpO2 (%) 94  -SS     O2 Delivery Pre Treatment nasal cannula  3L  -SS     Pre Patient Position Supine  -SS       Row Name 07/27/23 1539          Positioning and Restraints    Pre-Treatment Position in bed  -SS     Post Treatment Position bsc  -SS     On BS commode notified nsg;sitting;call light within reach  notified CNA for assist when pt. finished w/BM  -SS               User Key  (r) = Recorded By, (t) = Taken By, (c) = Cosigned By      Initials Name Provider Type     Milly Alfaro PT Physical Therapist                   Outcome Measures       Row Name 07/27/23 1543          How much help from another person do you currently need...    Turning from your back to your side while in flat bed without using bedrails? 3  -SS     Moving from lying on back to sitting on the side of a flat bed without bedrails? 3  -SS     Moving to and from a bed to a chair (including a wheelchair)? 3  -SS     Standing up from a chair using your arms (e.g., wheelchair, bedside chair)? 3  -SS     Climbing 3-5 steps with a railing? 2  -SS     To walk in hospital room? 3  -SS     AM-PAC 6 Clicks Score (PT) 17  -SS     Highest level of mobility 5 --> Static standing  -       Row Name 07/27/23 1543 07/27/23 1205       Functional Assessment    Outcome Measure Options AM-PAC 6 Clicks Basic Mobility (PT)  - AM-PAC 6 Clicks Daily Activity (OT)  -              User Key  (r) = Recorded By, (t) = Taken By, (c) = Cosigned By      Initials Name Provider Type    Alejandra Garcia, OT Occupational Therapist     Milly Alfaro PT Physical Therapist                                 Physical Therapy Education       Title: PT OT SLP Therapies (In Progress)       Topic: Physical Therapy (Done)       Point: Mobility training (Done)       Learning Progress Summary             Patient EagerPREMA, TREY,YNES,NR by  at 7/27/2023 1543    Comment: Reviewed safety/technique w/bed mobility, transfers, ambulation, HEP, PT POC    Acceptance E, VU by KR  at 7/26/2023 1554    Acceptance, E, VU by ER at 7/25/2023 1325    Comment: pt educated on d/c planning, purpose of PT, safety    Acceptance, E, VU,NR by  at 7/23/2023 1040    Comment: PT POC                         Point: Home exercise program (Done)       Learning Progress Summary             Patient Eager, E, VU,DU,NR by  at 7/27/2023 1543    Comment: Reviewed safety/technique w/bed mobility, transfers, ambulation, HEP, PT POC    Acceptance, E, VU by KR at 7/26/2023 1554    Acceptance, E, VU by ER at 7/25/2023 1325    Comment: pt educated on d/c planning, purpose of PT, safety    Acceptance, E, VU,NR by  at 7/23/2023 1040    Comment: PT POC                         Point: Body mechanics (Done)       Learning Progress Summary             Patient Eager, E, VU,DU,NR by  at 7/27/2023 1543    Comment: Reviewed safety/technique w/bed mobility, transfers, ambulation, HEP, PT POC    Acceptance, E, VU by KR at 7/26/2023 1554    Acceptance, E, VU by ER at 7/25/2023 1325    Comment: pt educated on d/c planning, purpose of PT, safety    Acceptance, E, VU,NR by  at 7/23/2023 1040    Comment: PT POC                         Point: Precautions (Done)       Learning Progress Summary             Patient Eager, E, VU,DU,NR by  at 7/27/2023 1543    Comment: Reviewed safety/technique w/bed mobility, transfers, ambulation, HEP, PT POC    Acceptance, E, VU by KR at 7/26/2023 1554    Acceptance, E, VU by ER at 7/25/2023 1325    Comment: pt educated on d/c planning, purpose of PT, safety    Acceptance, E, VU,NR by  at 7/23/2023 1040    Comment: PT POC                                         User Key       Initials Effective Dates Name Provider Type Discipline     06/16/21 -  Anamaria Ravi, PT Physical Therapist PT    SS 06/01/21 -  Milly Alfaro, PT Physical Therapist PT    ER 04/20/23 -  Caroline Stewart, PT Student PT Student PT     12/30/22 -  Jaqueline Tran, PT Physical Therapist PT                  PT Recommendation  and Plan     Plan of Care Reviewed With: patient  Progress: improving  Outcome Evaluation: Pt. presents below baseline function w/generalized weakness, balance deficits and decreased functional endurance. She performed bed mobility, transfers and ambulated 20' + 20' + 15' w/front wheeled walker, stand by assist. Activity limited by fatigue. Pt. tolerated ther-ex well. Continue IPPT to progress as tolerated.     Time Calculation:         PT Charges       Row Name 07/27/23 1544             Time Calculation    Start Time 1458  -SS      PT Received On 07/27/23  -SS         Timed Charges    05511 - PT Therapeutic Exercise Minutes 10  -SS      77168 - Gait Training Minutes  15  -SS      66773 - PT Therapeutic Activity Minutes 5  -SS         Total Minutes    Timed Charges Total Minutes 30  -SS       Total Minutes 30  -SS                User Key  (r) = Recorded By, (t) = Taken By, (c) = Cosigned By      Initials Name Provider Type    SS Milly Alfaro, PT Physical Therapist                  Therapy Charges for Today       Code Description Service Date Service Provider Modifiers Qty    10385029535 HC PT THER PROC EA 15 MIN 7/27/2023 Milly Alfaro, PT GP 1    32748882242 HC GAIT TRAINING EA 15 MIN 7/27/2023 Milly Alfaro, PT GP 1            PT G-Codes  Outcome Measure Options: AM-PAC 6 Clicks Basic Mobility (PT)  AM-PAC 6 Clicks Score (PT): 17  AM-PAC 6 Clicks Score (OT): 16  PT Discharge Summary  Anticipated Discharge Disposition (PT): skilled nursing facility    Milly Alfaro PT  7/27/2023

## 2023-07-27 NOTE — PLAN OF CARE
Goal Outcome Evaluation:  Plan of Care Reviewed With: patient        Progress: improving  Outcome Evaluation: Pt. progressing towards baseline with ADLs and functional mobility. Demonstrates improvement with toileting and toilet t/fs. Continues to be limited by decreased activity tolerance, generalized weakness, and balance. Will progress as able to promote return to PLOF. Recommend IPR at discharge.      Anticipated Discharge Disposition (OT): inpatient rehabilitation facility

## 2023-07-27 NOTE — THERAPY DISCHARGE NOTE
Acute Care - Speech Language Pathology   Swallow Initial Evaluation/Discharge [facility]  Clinical Swallow Evaluation     Patient Name: Kerry Leal  : 1965  MRN: [id number]  Today's Date: 2023               Admit Date: 2023    Visit Dx:    ICD-10-CM ICD-9-CM   1. Acute respiratory failure with hypoxia and hypercapnia  J96.01 518.81    J96.02    2. Respiratory acidosis  E87.29 276.2   3. Cellulitis of lower extremity, unspecified laterality  L03.119 682.6   4. Obesity hypoventilation syndrome  E66.2 278.03   5. Lymphedema of both lower extremities  I89.0 457.1   6. Type 2 diabetes mellitus with hyperglycemia, without long-term current use of insulin  E11.65 250.00     790.29   7. Morbid obesity with BMI of 60.0-69.9, adult  E66.01 278.01    Z68.44 V85.44     Patient Active Problem List   Diagnosis    Essential hypertension    Lymphedema of both lower extremities    Elevated d-dimer    Acute on chronic respiratory failure with hypoxia and hypercapnia    Type 2 diabetes mellitus    Morbid obesity with BMI of 60.0-69.9, adult    Right-sided congestive heart failure    Obesity hypoventilation syndrome    Bilateral cellulitis of lower leg    Lower extremity cellulitis     Past Medical History:   Diagnosis Date    Anxiety     Asthma     Heart murmur     Hypertension      Past Surgical History:   Procedure Laterality Date    APPENDECTOMY       SECTION      HYSTERECTOMY      NECK SURGERY         SLP Recommendation and Plan  SLP Swallowing Diagnosis: swallow WFL/no suspected pharyngeal impairment, suspected esophageal dysphagia (23 143)  SLP Diet Recommendation: regular textures, thin liquids, other (see comments) (consider adding extra moisture to dry solids for increased comfort, per pt preferences) (23 143)     Monitor for Signs of Aspiration: yes, notify SLP if any concerns (23 143)     Swallow Criteria for Skilled Therapeutic Interventions Met: no problems identified  which require skilled intervention (07/27/23 1430)  Anticipated Discharge Disposition (SLP): No further SLP services warranted (07/27/23 1430)           Demonstrates Need for Referral to Another Service: dedicated esophageal assessment, other (see comments) (per physician discretion) (07/27/23 1430)        Anticipated Discharge Disposition (SLP): No further SLP services warranted (07/27/23 1430)     Demonstrates Need for Referral to Another Service: dedicated esophageal assessment, other (see comments) (per physician discretion) (07/27/23 1430)  Swallowing Considerations per Physician Discretion: medical management of suspected esophageal dysphagia, as indicated, other (see comments) (+ may benefit from Biotene or similar spray for dry mouth, per physician discretion) (07/27/23 1430)                   Plan of Care Reviewed With: patient (07/27/23 1504)    SWALLOW EVALUATION (last 72 hours)       SLP Adult Swallow Evaluation       Row Name 07/27/23 1430                   Rehab Evaluation    Document Type discharge evaluation/summary  -AC        Subjective Information no complaints  -AC        Patient Observations alert;cooperative  -AC        Patient/Family/Caregiver Comments/Observations No family present.  -AC        Patient Effort good  -AC           General Information    Patient Profile Reviewed yes  -AC        Pertinent History Of Current Problem Acute on chronic respiratory failure. Hx lymphedema, CHF, s/p c-spine fusion/mesh. Consulted 2' pt c/o dysphagia.  -AC        Current Method of Nutrition regular textures;thin liquids  -AC        Precautions/Limitations, Vision WFL;for purposes of eval  -AC        Precautions/Limitations, Hearing WFL;for purposes of eval  -AC        Prior Level of Function-Swallowing no diet consistency restrictions  -AC        Plans/Goals Discussed with patient;agreed upon  -AC        Barriers to Rehab none identified  -AC        Patient's Goals for Discharge patient did not state   -AC           Pain Scale: FACES Pre/Post-Treatment    Pain: FACES Scale, Pretreatment 0-->no hurt  -AC        Posttreatment Pain Rating 0-->no hurt  -AC           Oral Motor Structure and Function    Dentition Assessment missing teeth  -AC        Secretion Management WNL/WFL  -AC        Mucosal Quality moist, healthy  -AC           Oral Musculature and Cranial Nerve Assessment    Oral Motor General Assessment WFL  -AC           General Eating/Swallowing Observations    Respiratory Support Currently in Use nasal cannula  -AC        O2 Liters 3L  -AC        Eating/Swallowing Skills self-fed  -AC        Positioning During Eating upright in bed  -        Utensils Used spoon;cup;straw  -AC        Consistencies Trialed thin liquids;pureed;regular textures  -           Clinical Swallow Eval    Oral Prep Phase WFL  -AC        Oral Transit WFL  -AC        Oral Residue WFL  -AC        Pharyngeal Phase no overt signs/symptoms of pharyngeal impairment  -AC        Esophageal Phase suspected esophageal impairment  -        Clinical Swallow Evaluation Summary No overt clinical s/sxs oropharyngeal dysphagia. Pt does have c/o dry mouth/throat. Discussed general aspiration/reflux precautions & xerostomia management. Pt stated understanding.  -AC           Esophageal Phase Concerns    Esophageal Phase Concerns sensation of material sticking;belching  -        Belching regular consistencies  -        Sensation of Material Sticking regular consistencies  -        Esophageal Phase Concerns, Comment Reported sensation of solid cracker sticking substernally. Resolved w/ liquid wash.  -AC           Swallowing Quality of Life Assessment    Education and counseling provided Aspiration precautions  -AC           SLP Evaluation Clinical Impression    SLP Swallowing Diagnosis swallow WFL/no suspected pharyngeal impairment;suspected esophageal dysphagia  -        Swallow Criteria for Skilled Therapeutic Interventions Met no problems  identified which require skilled intervention  -AC           Recommendations    SLP Diet Recommendation regular textures;thin liquids;other (see comments)  consider adding extra moisture to dry solids for increased comfort, per pt preferences  -        Recommended Precautions and Strategies upright posture during/after eating;general aspiration precautions;reflux precautions  -        Oral Care Recommendations Oral Care BID/PRN;Toothbrush  -AC        SLP Rec. for Method of Medication Administration meds whole;with thin liquids;with puree;as tolerated  -AC        Monitor for Signs of Aspiration yes;notify SLP if any concerns  -AC        Anticipated Discharge Disposition (SLP) No further SLP services warranted  -AC        Demonstrates Need for Referral to Another Service dedicated esophageal assessment;other (see comments)  per physician discretion  -AC        Swallowing Considerations per Physician Discretion medical management of suspected esophageal dysphagia, as indicated;other (see comments)  + may benefit from Biotene or similar spray for dry mouth, per physician discretion  -                  User Key  (r) = Recorded By, (t) = Taken By, (c) = Cosigned By      Initials Name Effective Dates    Tila Ocasio MS CCC-SLP 02/03/23 -                     EDUCATION  The patient has been educated in the following areas:   Dysphagia (Swallowing Impairment).                 Time Calculation:    Time Calculation- SLP       Row Name 07/27/23 1504             Time Calculation- SLP    SLP Start Time 1430  -      SLP Received On 07/27/23  -         Untimed Charges    41720-YU Eval Oral Pharyng Swallow Minutes 39  -AC         Total Minutes    Untimed Charges Total Minutes 39  -AC       Total Minutes 39  -AC                User Key  (r) = Recorded By, (t) = Taken By, (c) = Cosigned By      Initials Name Provider Type    Tila Ocasio MS CCC-SLP Speech and Language Pathologist                    Therapy Charges  for Today       Code Description Service Date Service Provider Modifiers Qty    [id number] HC ST EVAL ORAL PHARYNG SWALLOW 3 7/27/2023 Tila Daniels, MS CCC-SLP GN 1                 SLP Discharge Summary  Anticipated Discharge Disposition (SLP): No further SLP services warranted    Tila Daniels, MS CCC-SLP  7/27/2023

## 2023-07-27 NOTE — PLAN OF CARE
Goal Outcome Evaluation:  Plan of Care Reviewed With: patient      SLP evaluation completed. Will sign-off as no overt clinical s/sxs oropharyngeal dysphagia. Suspect possible esophageal dysphagia. Please see note for further details and recommendations.

## 2023-07-27 NOTE — THERAPY TREATMENT NOTE
Patient Name: Kerry Leal  : 1965    MRN: [id number]                              Today's Date: 2023       Admit Date: 2023    Visit Dx:     ICD-10-CM ICD-9-CM   1. Acute respiratory failure with hypoxia and hypercapnia  J96.01 518.81    J96.02    2. Respiratory acidosis  E87.29 276.2   3. Cellulitis of lower extremity, unspecified laterality  L03.119 682.6   4. Obesity hypoventilation syndrome  E66.2 278.03   5. Lymphedema of both lower extremities  I89.0 457.1   6. Type 2 diabetes mellitus with hyperglycemia, without long-term current use of insulin  E11.65 250.00     790.29   7. Morbid obesity with BMI of 60.0-69.9, adult  E66.01 278.01    Z68.44 V85.44     Patient Active Problem List   Diagnosis    Essential hypertension    Lymphedema of both lower extremities    Elevated d-dimer    Acute on chronic respiratory failure with hypoxia and hypercapnia    Type 2 diabetes mellitus    Morbid obesity with BMI of 60.0-69.9, adult    Right-sided congestive heart failure    Obesity hypoventilation syndrome    Bilateral cellulitis of lower leg    Lower extremity cellulitis     Past Medical History:   Diagnosis Date    Anxiety     Asthma     Heart murmur     Hypertension      Past Surgical History:   Procedure Laterality Date    APPENDECTOMY       SECTION      HYSTERECTOMY      NECK SURGERY        General Information       Row Name 23 1200          OT Time and Intention    Document Type therapy note (daily note)  -LC     Mode of Treatment occupational therapy  -       Row Name 23 1200          General Information    Patient Profile Reviewed yes  -LC     Prior Level of Function --  See IE  -     Existing Precautions/Restrictions fall;oxygen therapy device and L/min;other (see comments)  BLE edema/cellulitis  -     Barriers to Rehab medically complex;previous functional deficit  -       Row Name 23 1200          Cognition    Orientation Status (Cognition) oriented x 3   -       Row Name 07/27/23 1200          Safety Issues, Functional Mobility    Safety Issues Affecting Function (Mobility) awareness of need for assistance;insight into deficits/self-awareness;safety precaution awareness;safety precautions follow-through/compliance  -     Impairments Affecting Function (Mobility) balance;endurance/activity tolerance;range of motion (ROM);sensation/sensory awareness;shortness of breath;strength  -               User Key  (r) = Recorded By, (t) = Taken By, (c) = Cosigned By      Initials Name Provider Type     Alejandra Quintero OT Occupational Therapist                   Lymphedema       Row Name 07/26/23 0900             Lymphedema Edema Assessment    Ptting Edema Category By severity  -      Pitting Edema Severe;Moderate  L>R  -      Recorded by [] Louis Adler, PT              Compression/Skin Care    Compression/Skin Care skin care;wrapping location;bandaging  -      Skin Care washed/dried;lotion applied;moisturizing lotion applied  -      Wrapping Location lower extremity  -      Wrapping Location LE bilateral:;foot to knee  -      Wrapping Comments xeroform with cast padding to secure to LLE with mepilex Ag foam to R ant shin. size 8/10 to LLE and size 6/8 to RLE  -      Recorded by [MF] Louis Adler, PT                User Key  (r) = Recorded By, (t) = Taken By, (c) = Cosigned By      Initials Name Effective Dates     Louis Adler, PT 02/03/23 -                    Mobility/ADL's       Row Name 07/27/23 1200          Bed Mobility    Comment, (Bed Mobility) [facility] on arrival  -       Row Name 07/27/23 1200          Transfers    Transfers sit-stand transfer;toilet transfer  -     Comment, (Transfers) VC's for hand placement and sequencing  -       Row Name 07/27/23 1200          Sit-Stand Transfer    Sit-Stand [place] (Transfers) standby assist;verbal cues  -     Assistive Device (Sit-Stand Transfers) walker, front-wheeled  -        Row Name 07/27/23 1200          Toilet Transfer    Type (Toilet Transfer) sit-stand;stand-sit  -     [place] Level (Toilet Transfer) contact guard;verbal cues  -     Assistive Device (Toilet Transfer) commode, bedside without drop arms;walker, front-wheeled  -       Row Name 07/27/23 1200          Activities of Daily Living    BADL Assessment/Intervention grooming;toileting;lower body dressing  -[facility] Name 07/27/23 1200          Lower Body Dressing Assessment/Training    [place] Level (Lower Body Dressing) don;socks;dependent (less than 25% patient effort)  -     Position (Lower Body Dressing) supported sitting  -[facility] Name 07/27/23 1200          Grooming Assessment/Training    [place] Level (Grooming) hair care, combing/brushing;wash face, hands;set up;oral care regimen  -     Position (Grooming) supported sitting  -     Comment, (Grooming) Increased time for oral care  -[facility] Name 07/27/23 1200          Toileting Assessment/Training    [place] Level (Toileting) adjust/manage clothing;perform perineal hygiene;moderate assist (50% patient effort)  -     Assistive Devices (Toileting) commode, bedside without drop arms  -     Comment, (Toileting) Increased time to complete toileting task. assist to perform hygiene, pt. able to manage clothing  -               User Key  (r) = Recorded By, (t) = Taken By, (c) = Cosigned By      Initials Name Provider Type     Alejandra Quintero OT Occupational Therapist                   Obj/Interventions       [facility] Name 07/27/23 1202          Balance    Balance Assessment sitting static balance;sitting dynamic balance;standing static balance;standing dynamic balance  -     Static Sitting Balance standby assist  -     Dynamic Sitting Balance contact guard  -     Position, Sitting Balance unsupported;sitting in chair  -     Static Standing Balance standby assist  -     Dynamic Standing Balance contact guard  -      Position/Device Used, Standing Balance supported;walker, front-wheeled  -     Balance Interventions sitting;standing;sit to stand;supported;occupation based/functional task;weight shifting activity  -     Comment, Balance CGA for safety  -               User Key  (r) = Recorded By, (t) = Taken By, (c) = Cosigned By      Initials Name Provider Type    Alejandra Garcia, YAHAIRA Occupational Therapist                   Goals/Plan    No documentation.                  Clinical Impression       Row Name 07/27/23 1203          Pain Assessment    Pretreatment Pain Rating 0/10 - no pain  -LC     Posttreatment Pain Rating 0/10 - no pain  -LC     Additional Documentation Pain Scale: Word Pre/Post-Treatment (Group)  -       Row Name 07/27/23 1203          Plan of Care Review    Plan of Care Reviewed With patient  -     Progress improving  -     Outcome Evaluation Pt. progressing towards baseline with ADLs and functional mobility. Demonstrates improvement with toileting and toilet t/fs. Continues to be limited by decreased activity tolerance, generalized weakness, and balance. Will progress as able to promote return to PLOF. Recommend IPR at discharge.  -       Row Name 07/27/23 1203          Therapy Plan Review/Discharge Plan (OT)    Anticipated Discharge Disposition (OT) inpatient rehabilitation facility  -       Row Name 07/27/23 1203          Vital Signs    Pre Systolic BP Rehab --  VSS  -     Pre Patient Position Sitting  -     Intra Patient Position Standing  -     Post Patient Position Sitting  -       Row Name 07/27/23 1203          Positioning and Restraints    Pre-Treatment Position sitting in chair/recliner  -     Post Treatment Position chair  -LC     In Chair notified nsg;reclined;call light within reach;encouraged to call for assist;exit alarm on;with nsg;waffle cushion;legs elevated  -               User Key  (r) = Recorded By, (t) = Taken By, (c) = Cosigned By      Initials Name Provider  Type    Alejandra Garcia OT Occupational Therapist                   Outcome Measures       Row Name 07/27/23 1205          How much help from another is currently needed...    Putting on and taking off regular lower body clothing? 2  -LC     Bathing (including washing, rinsing, and drying) 2  -LC     Toileting (which includes using toilet bed pan or urinal) 2  -LC     Putting on and taking off regular upper body clothing 3  -LC     Taking care of personal grooming (such as brushing teeth) 3  -LC     Eating meals 4  -     AM-PAC 6 Clicks Score (OT) 16  -       Row Name 07/27/23 1205          Functional Assessment    Outcome Measure Options AM-PAC 6 Clicks Daily Activity (OT)  -               User Key  (r) = Recorded By, (t) = Taken By, (c) = Cosigned By      Initials Name Provider Type    Alejandra Garcia OT Occupational Therapist                    Occupational Therapy Education       Title: PT OT SLP Therapies (In Progress)       Topic: Occupational Therapy (In Progress)       Point: ADL training (In Progress)       Description:   Instruct learner(s) on proper safety adaptation and remediation techniques during self care or transfers.   Instruct in proper use of assistive devices.                  Learning Progress Summary             Patient Acceptance, E, NR by  at 7/27/2023 1104    Acceptance, E,D, DU,VU by  at 7/24/2023 0852                         Point: Home exercise program (Done)       Description:   Instruct learner(s) on appropriate technique for monitoring, assisting and/or progressing therapeutic exercises/activities.                  Learning Progress Summary             Patient Acceptance, E,D, DU,VU by  at 7/24/2023 0852                         Point: Precautions (In Progress)       Description:   Instruct learner(s) on prescribed precautions during self-care and functional transfers.                  Learning Progress Summary             Patient Acceptance, E, NR by  at 7/27/2023  1104    Acceptance, E,D, DU,VU by  at 7/24/2023 0852                         Point: Body mechanics (In Progress)       Description:   Instruct learner(s) on proper positioning and spine alignment during self-care, functional mobility activities and/or exercises.                  Learning Progress Summary             Patient Acceptance, E, NR by  at 7/27/2023 1104    Acceptance, E,D, DU,VU by  at 7/24/2023 0852                                         User Key       Initials Effective Dates Name Provider Type Discipline     06/16/21 -  Alejandra Quintero OT Occupational Therapist OT    ARIN 06/16/21 -  Jersey Omer OT Occupational Therapist OT                  OT Recommendation and Plan     Plan of Care Review  Plan of Care Reviewed With: patient  Progress: improving  Outcome Evaluation: Pt. progressing towards baseline with ADLs and functional mobility. Demonstrates improvement with toileting and toilet t/fs. Continues to be limited by decreased activity tolerance, generalized weakness, and balance. Will progress as able to promote return to PLOF. Recommend IPR at discharge.     Time Calculation:         Time Calculation- OT       Row Name 07/27/23 1206             Time Calculation- OT    OT Start Time 1104  -      OT Received On 07/27/23  -      OT Goal Re-Cert Due Date 08/03/23  -         Timed Charges    67511 - OT Self Care/Mgmt Minutes 24  -LC         Total Minutes    Timed Charges Total Minutes 24  -       Total Minutes 24  -LC                User Key  (r) = Recorded By, (t) = Taken By, (c) = Cosigned By      Initials Name Provider Type     Alejandra Quintero, OT Occupational Therapist                  Therapy Charges for Today       Code Description Service Date Service Provider Modifiers Qty    80974726697  OT SELF CARE/MGMT/TRAIN EA 15 MIN 7/27/2023 Alejandra Quintero OT GO 2                 Alejandra Quintero OT  7/27/2023

## 2023-07-28 LAB
ANION GAP SERPL CALCULATED.3IONS-SCNC: 9 MMOL/L (ref 5–15)
BUN SERPL-MCNC: 58 MG/DL (ref 6–20)
BUN/CREAT SERPL: 50.9 (ref 7–25)
CALCIUM SPEC-SCNC: 9.4 MG/DL (ref 8.6–10.5)
CHLORIDE SERPL-SCNC: 87 MMOL/L (ref 98–107)
CO2 SERPL-SCNC: 40 MMOL/L (ref 22–29)
CREAT SERPL-MCNC: 1.14 MG/DL (ref 0.57–1)
EGFRCR SERPLBLD CKD-EPI 2021: 56.6 ML/MIN/1.73
GLUCOSE BLDC GLUCOMTR-MCNC: 133 MG/DL (ref 70–130)
GLUCOSE BLDC GLUCOMTR-MCNC: 152 MG/DL (ref 70–130)
GLUCOSE BLDC GLUCOMTR-MCNC: 155 MG/DL (ref 70–130)
GLUCOSE BLDC GLUCOMTR-MCNC: 163 MG/DL (ref 70–130)
GLUCOSE SERPL-MCNC: 147 MG/DL (ref 65–99)
MAGNESIUM SERPL-MCNC: 2.5 MG/DL (ref 1.6–2.6)
NT-PROBNP SERPL-MCNC: 102.5 PG/ML (ref 0–900)
POTASSIUM SERPL-SCNC: 4.1 MMOL/L (ref 3.5–5.2)
SODIUM SERPL-SCNC: 136 MMOL/L (ref 136–145)

## 2023-07-28 PROCEDURE — 80048 BASIC METABOLIC PNL TOTAL CA: CPT | Performed by: INTERNAL MEDICINE

## 2023-07-28 PROCEDURE — 82948 REAGENT STRIP/BLOOD GLUCOSE: CPT

## 2023-07-28 PROCEDURE — 83880 ASSAY OF NATRIURETIC PEPTIDE: CPT | Performed by: INTERNAL MEDICINE

## 2023-07-28 PROCEDURE — 83735 ASSAY OF MAGNESIUM: CPT | Performed by: INTERNAL MEDICINE

## 2023-07-28 PROCEDURE — 99232 SBSQ HOSP IP/OBS MODERATE 35: CPT | Performed by: INTERNAL MEDICINE

## 2023-07-28 PROCEDURE — 29581 APPL MULTLAYER CMPRN SYS LEG: CPT

## 2023-07-28 PROCEDURE — 25010000002 ENOXAPARIN PER 10 MG: Performed by: NURSE PRACTITIONER

## 2023-07-28 RX ORDER — L.ACID,PARA/B.BIFIDUM/S.THERM 8B CELL
1 CAPSULE ORAL
Status: DISCONTINUED | OUTPATIENT
Start: 2023-07-29 | End: 2023-08-02 | Stop reason: HOSPADM

## 2023-07-28 RX ADMIN — METOPROLOL SUCCINATE 100 MG: 100 TABLET, EXTENDED RELEASE ORAL at 08:37

## 2023-07-28 RX ADMIN — Medication 1 CAPSULE: at 08:38

## 2023-07-28 RX ADMIN — ENOXAPARIN SODIUM 60 MG: 60 INJECTION SUBCUTANEOUS at 08:38

## 2023-07-28 RX ADMIN — PANTOPRAZOLE SODIUM 40 MG: 40 TABLET, DELAYED RELEASE ORAL at 06:33

## 2023-07-28 RX ADMIN — SENNOSIDES AND DOCUSATE SODIUM 2 TABLET: 50; 8.6 TABLET ORAL at 20:46

## 2023-07-28 RX ADMIN — HYDROXYZINE HYDROCHLORIDE 25 MG: 25 TABLET, FILM COATED ORAL at 20:45

## 2023-07-28 RX ADMIN — ENOXAPARIN SODIUM 60 MG: 60 INJECTION SUBCUTANEOUS at 20:45

## 2023-07-28 RX ADMIN — DOXYCYCLINE 100 MG: 100 CAPSULE ORAL at 08:37

## 2023-07-28 RX ADMIN — DOXYCYCLINE 100 MG: 100 CAPSULE ORAL at 20:45

## 2023-07-28 RX ADMIN — LOSARTAN POTASSIUM 50 MG: 50 TABLET, FILM COATED ORAL at 08:38

## 2023-07-28 RX ADMIN — Medication 10 ML: at 20:46

## 2023-07-28 RX ADMIN — EMPAGLIFLOZIN 10 MG: 10 TABLET, FILM COATED ORAL at 08:38

## 2023-07-28 RX ADMIN — SPIRONOLACTONE 50 MG: 25 TABLET ORAL at 08:38

## 2023-07-28 NOTE — PLAN OF CARE
Goal Outcome Evaluation:  Plan of Care Reviewed With: patient           Outcome Evaluation: BLE edema stable with slight decrease in erythema noted and a moderate amount of hypertrophic crust removed today.  PT will cont with MLW and debridement 2-3 x/week to help further improve skin integrity and healing potential.

## 2023-07-28 NOTE — THERAPY WOUND CARE TREATMENT
Acute Care - Wound/Debridement Treatment Note  [facility]     Patient Name: Kerry Leal  : 1965  MRN: [id number]  Today's Date: 2023                Admit Date: 2023    Visit Dx:    ICD-10-CM ICD-9-CM   1. Acute respiratory failure with hypoxia and hypercapnia  J96.01 518.81    J96.02    2. Respiratory acidosis  E87.29 276.2   3. Cellulitis of lower extremity, unspecified laterality  L03.119 682.6   4. Obesity hypoventilation syndrome  E66.2 278.03   5. Lymphedema of both lower extremities  I89.0 457.1   6. Type 2 diabetes mellitus with hyperglycemia, without long-term current use of insulin  E11.65 250.00     790.29   7. Morbid obesity with BMI of 60.0-69.9, adult  E66.01 278.01    Z68.44 V85.44           Patient Active Problem List   Diagnosis    Essential hypertension    Lymphedema of both lower extremities    Elevated d-dimer    Acute on chronic respiratory failure with hypoxia and hypercapnia    Type 2 diabetes mellitus    Morbid obesity with BMI of 60.0-69.9, adult    Right-sided congestive heart failure    Obesity hypoventilation syndrome    Bilateral cellulitis of lower leg    Lower extremity cellulitis        Past Medical History:   Diagnosis Date    Anxiety     Asthma     Heart murmur     Hypertension         Past Surgical History:   Procedure Laterality Date    APPENDECTOMY       SECTION      HYSTERECTOMY      NECK SURGERY             Wound Bilateral lower leg Other (comment) (Active)   Wound Image     23 1330   Dressing Appearance intact 23 1330   Closure WILLIAM 23 0800   Base moist;pink;red 23 1330   Periwound redness;swelling 23 1330   Periwound Temperature warm 23 1330   Periwound Skin Turgor firm 23 1330   Drainage Characteristics/Odor serosanguineous 23 1330   Drainage Amount moderate 23 1330   Care, Wound cleansed with;soap and water;debrided 23 1330   Dressing Care dressing changed 23 1330       Wound  07/22/23 1017 Bilateral anterior groin MASD (Moisture associated skin damage) (Active)   Dressing Appearance open to air 07/28/23 0800   Closure Open to air 07/28/23 0800   Base moist;blanchable 07/28/23 0800   Periwound blistered;edematous 07/28/23 0800   Periwound Temperature warm 07/28/23 0800   Drainage Amount none 07/28/23 0800   Care, Wound other (see comments) 07/27/23 2000   Dressing Care open to air 07/27/23 2000   Periwound Care dry periwound area maintained 07/27/23 2000       Wound 07/25/23 1130 Left medial gluteal MASD (Moisture associated skin damage) (Active)   Dressing Appearance open to air 07/27/23 2000   Closure Open to air 07/28/23 0800   Base blanchable;red 07/28/23 0800   Periwound moist 07/28/23 0800   Periwound Temperature warm 07/28/23 0800   Drainage Amount none 07/27/23 1600   Dressing Care open to air 07/27/23 2000      Lymphedema       Row Name 07/28/23 1330             Lymphedema Edema Assessment    Ptting Edema Category By severity  -      Pitting Edema Moderate;Severe  -MF         Compression/Skin Care    Compression/Skin Care skin care;wrapping location;bandaging  -      Skin Care washed/dried;lotion applied;moisturizing lotion applied  -MF      Wrapping Location lower extremity  -MF      Wrapping Location LE bilateral:;foot to knee  -MF      Wrapping Comments mepilex Ag foam to L ant shin with zguard to BLEs with xeroform to L post calf with cast padding to secure.  size 6/8 compressogrip to RLE with size 6/10 to LLE doubled and overlapping for gradient compression.  -                User Key  (r) = Recorded By, (t) = Taken By, (c) = Cosigned By      Initials Name Provider Type    Louis Ibarra, PT Physical Therapist                    WOUND DEBRIDEMENT     Debridement Site 1  Location- Site 1: BLEs  Selective Debridement- Site 1: Wound Surface >20cmsq  Instruments- Site 1: tweezers  Excised Tissue Description- Site 1: moderate, other (comment) (hypertrophic skin and  crusted nonviable skin from BLEs)  Bleeding- Site 1: none               PT Assessment (last 12 hours)       PT Evaluation and Treatment       Row Name 07/28/23 1330          Physical Therapy Time and Intention    Subjective Information complains of;weakness;fatigue;pain  -MF     Document Type wound care;therapy note (daily note)  -     Mode of Treatment individual therapy;physical therapy  -       Row Name 07/28/23 1330          Pain    Pretreatment Pain Rating 3/10  -MF     Posttreatment Pain Rating 3/10  -     Pain Location - Side/Orientation Left  -     Pain Location - ankle  -     Pain Intervention(s) Repositioned  -       Row Name 07/28/23 1330          Wound Bilateral lower leg Other (comment)    Wound - Properties Group Side: Bilateral  -DE Orientation: lower  -DE Location: leg  -DE Primary Wound Type: Other  -DE, lymphedema     Wound Image Images linked: 3  -     Dressing Appearance intact  -     Base moist;pink;red  -     Periwound redness;swelling  -     Periwound Temperature warm  -     Periwound Skin Turgor firm  -     Drainage Characteristics/Odor serosanguineous  -     Drainage Amount moderate  -     Care, Wound cleansed with;soap and water;debrided  nonsel harvey with wash cloth  -     Dressing Care dressing changed  -     Retired Wound - Properties Group Side: Bilateral  -DE Orientation: lower  -DE Location: leg  -DE Primary Wound Type: Other  -DE, lymphedema     Retired Wound - Properties Group Side: Bilateral  -DE Location: leg  -DE Primary Wound Type: Other  -DE, lymphedema       Row Name             Wound 07/22/23 1017 Bilateral anterior groin MASD (Moisture associated skin damage)    Wound - Properties Group Placement Date: 07/22/23 -DE Placement Time: 1017  -DE Present on Hospital Admission: Y  -DE Side: Bilateral  -DE Orientation: anterior  -DE Location: groin  -DE Primary Wound Type: MASD  -DE    Retired Wound - Properties Group Placement Date: 07/22/23  -DE  Placement Time: 1017  -DE Present on Hospital Admission: Y  -DE Side: Bilateral  -DE Orientation: anterior  -DE Location: groin  -DE Primary Wound Type: MASD  -DE    Retired Wound - Properties Group Date first assessed: 07/22/23  -DE Time first assessed: 1017  -DE Present on Hospital Admission: Y  -DE Side: Bilateral  -DE Location: groin  -DE Primary Wound Type: MASD  -DE      Row Name             Wound 07/25/23 1130 Left medial gluteal MASD (Moisture associated skin damage)    Wound - Properties Group Placement Date: 07/25/23  -TG Placement Time: 1130  -TG Present on Hospital Admission: Y  -TG Side: Left  -TG Orientation: medial  -TG Location: gluteal  -TG Primary Wound Type: MASD  -TG Additional Comments: MASD - IAD & friction  -TG    Retired Wound - Properties Group Placement Date: 07/25/23  -TG Placement Time: 1130  -TG Present on Hospital Admission: Y  -TG Side: Left  -TG Orientation: medial  -TG Location: gluteal  -TG Primary Wound Type: MASD  -TG Additional Comments: MASD - IAD & friction  -TG    Retired Wound - Properties Group Date first assessed: 07/25/23  -TG Time first assessed: 1130  -TG Present on Hospital Admission: Y  -TG Side: Left  -TG Location: gluteal  -TG Primary Wound Type: MASD  -TG Additional Comments: MASD - IAD & friction  -TG      Row Name 07/28/23 1330          Coping    Observed Emotional State anxious;cooperative  -     Verbalized Emotional State acceptance  -     Trust Relationship/Rapport care explained  -       Row Name 07/28/23 1330          Plan of Care Review    Plan of Care Reviewed With patient  -MF     Outcome Evaluation BLE edema stable with slight decrease in erythema noted and a moderate amount of hypertrophic crust removed today.  PT will cont with MLW and debridement 2-3 x/week to help further improve skin integrity and healing potential.  -       Row Name 07/28/23 1330          Positioning and Restraints    Pre-Treatment Position sitting in chair/recliner  -      Post Treatment Position chair  -MF     In Chair reclined;call light within reach  -MF               User Key  (r) = Recorded By, (t) = Taken By, (c) = Cosigned By      Initials Name Provider Type    Louis Ibarra, PT Physical Therapist    Lele Nielson, RN Registered Nurse    Thanh Delgado RN Registered Nurse                  Physical Therapy Education       Title: PT OT SLP Therapies (In Progress)       Topic: Physical Therapy (Done)       Point: Mobility training (Done)       Learning Progress Summary             Patient Eager, E, VU,DU,NR by SS at 7/27/2023 1543    Comment: Reviewed safety/technique w/bed mobility, transfers, ambulation, HEP, PT POC    Acceptance, E, VU by KR at 7/26/2023 1554    Acceptance, E, VU by ER at 7/25/2023 1325    Comment: pt educated on d/c planning, purpose of PT, safety    Acceptance, E, VU,NR by LO at 7/23/2023 1040    Comment: PT POC                         Point: Home exercise program (Done)       Learning Progress Summary             Patient Eager, E, VU,DU,NR by SS at 7/27/2023 1543    Comment: Reviewed safety/technique w/bed mobility, transfers, ambulation, HEP, PT POC    Acceptance, E, VU by KR at 7/26/2023 1554    Acceptance, E, VU by ER at 7/25/2023 1325    Comment: pt educated on d/c planning, purpose of PT, safety    Acceptance, E, VU,NR by  at 7/23/2023 1040    Comment: PT POC                         Point: Body mechanics (Done)       Learning Progress Summary             Patient Eager, E, VU,DU,NR by SS at 7/27/2023 1543    Comment: Reviewed safety/technique w/bed mobility, transfers, ambulation, HEP, PT POC    Acceptance, E, VU by KR at 7/26/2023 1554    Acceptance, E, VU by ER at 7/25/2023 1325    Comment: pt educated on d/c planning, purpose of PT, safety    Acceptance, E, VU,NR by  at 7/23/2023 1040    Comment: PT POC                         Point: Precautions (Done)       Learning Progress Summary             Patient Eager, E, VU,DU,NR by SS  at 7/27/2023 1543    Comment: Reviewed safety/technique w/bed mobility, transfers, ambulation, HEP, PT POC    Acceptance, E, VU by KR at 7/26/2023 1554    Acceptance, E, VU by ER at 7/25/2023 1325    Comment: pt educated on d/c planning, purpose of PT, safety    Acceptance, E, VU,NR by LO at 7/23/2023 1040    Comment: PT POC                                         User Key       Initials Effective Dates Name Provider Type Discipline    LO 06/16/21 -  Anamaria Ravi, PT Physical Therapist PT    SS 06/01/21 -  Milly Alfaro, PT Physical Therapist PT    ER 04/20/23 -  Caroline Stewart, PT Student PT Student PT    KR 12/30/22 -  Jaqueline Tran, PT Physical Therapist PT                    Recommendation and Plan  Anticipated Equipment Needs at Discharge (PT): other (see comments) (TBD)  Anticipated Discharge Disposition (PT): skilled nursing facility  Planned Therapy Interventions (PT): wound care  Therapy Frequency (PT): daily  Plan of Care Reviewed With: patient           Outcome Evaluation: BLE edema stable with slight decrease in erythema noted and a moderate amount of hypertrophic crust removed today.  PT will cont with MLW and debridement 2-3 x/week to help further improve skin integrity and healing potential.  Plan of Care Reviewed With: patient            Time Calculation   PT Charges       Row Name 07/28/23 1330             Time Calculation    Start Time 1330  -MF      PT Goal Re-Cert Due Date 08/02/23  -MF         Untimed Charges    13203-Bvxtxoiqnc comp below knee 25  -MF         Total Minutes    Untimed Charges Total Minutes 25  -MF       Total Minutes 25  -MF                User Key  (r) = Recorded By, (t) = Taken By, (c) = Cosigned By      Initials Name Provider Type    Louis Ibarra, PT Physical Therapist                            PT G-Codes  Outcome Measure Options: AM-PAC 6 Clicks Basic Mobility (PT)  AM-PAC 6 Clicks Score (PT): 17  AM-PAC 6 Clicks Score (OT): 16       Louis Adler,  PT  7/28/2023

## 2023-07-28 NOTE — PROGRESS NOTES
"Cardiology note:    Patient appears \"dry\" based on azotemia and increasing creatinine.  No IV diuretics today.  Start oral diuretics tomorrow if renal function stays stable.    Please call if assistance needed over the weekend.  Otherwise, we will see Monday.      JEMMA Puckett MD [facility], [facility]  Interventional and General Cardiology    "

## 2023-07-28 NOTE — CASE MANAGEMENT/SOCIAL WORK
Continued Stay Note  [facility]     Patient Name: Kerry Leal  MRN: [id number]  Today's Date: 7/28/2023    Admit Date: 7/22/2023    Plan: SNF   Discharge Plan       Row Name 07/28/23 1253       Plan    Plan SNF    Plan Comments Discussed patient in MDR and spoke with patient and her son at bedside.  Patient agreeable to SNF at discharge and with referrals to [facility] and [facility].  Mendoza with [facility] is following and referral called to Claudia at [facility].   will continue to follow.                   Discharge Codes    No documentation.                          Caro Wilson RN

## 2023-07-28 NOTE — PLAN OF CARE
Goal Outcome Evaluation:           Progress: declining  Outcome Evaluation: PT is A+Ox4, NSR, 3LNC. pt is anxious at times, refusing interventions at times. pt declined sliding scale insulin this shift, despite this RN educating pt and family regarding rationale. pt has been up in the chair this am, ambulating short distances with 1-2 assist. IV diuretics and bumex drip were ordered today, however pt has become increasingly hypotensive throughout the shift, so these medications were held per cardiology. blood pressure fell as low as 70/47 today, after one time 250ml bolus blood pressures improved, though are still mildly hypotensive (see charted vitals). pt has been in bed this afternoon, however refused turns and heel boots. After education by this RN, pt signed refusal of care form this evening.

## 2023-07-28 NOTE — PROGRESS NOTES
[facility] Medicine Services  PROGRESS NOTE    Patient Name: Kerry Leal  : 1965  MRN: [id number]    Date of Admission: 2023  Primary Care Physician: Gopal Kong MD    Subjective   Subjective     CC:  F/u heart failure    HPI:  Resting in bed in no acute distress and overall feels okay.  Does not have any specific complaint.    ROS:  Gen- No fevers, chills  CV- No chest pain, palpitations  Resp- No cough, yes dyspnea  GI- No N/V/D, abd pain     Objective   Objective     Vital Signs:   Temp:  [97.7 øF (36.5 øC)-98.2 øF (36.8 øC)] 98.2 øF (36.8 øC)  Heart Rate:  [70-79] 70  Resp:  [18] 18  BP: ()/(53-70) 98/58  Flow (L/min):  [2-3] 2     Physical Exam:  Constitutional: Resting in bed in no acute distress  HENT: NCAT, mucous membranes moist  Respiratory: Clear to auscultation bilaterally, respiratory effort normal  Cardiovascular: RRR, no murmur gallop or rub audible.  Gastrointestinal: Abdomen is very obese.  Positive bowel sounds, soft, nontender, nondistended  Musculoskeletal: Severe BL LE edema w/ wraps, wounds on the shins bilaterally.  Psychiatric: Appropriate affect, cooperative  Neurologic: Awake, alert, oriented x3, no focality appreciated, speech is clear  Skin: Skin thickening and redness over the shins bilaterally, no other rash visible.    Results Reviewed:  LAB RESULTS:      Lab 23  1204 23  0015   WBC 8.15 6.79   HEMOGLOBIN 11.7* 11.8*   HEMATOCRIT 40.7 40.5   PLATELETS 250 250   NEUTROS ABS 6.46 4.64   IMMATURE GRANS (ABS) 0.04 0.07*   LYMPHS ABS 0.71 1.06   MONOS ABS 0.88 0.84   EOS ABS 0.03 0.15   MCV 91.3 91.8   CRP  --  7.00*   PROCALCITONIN  --  0.03   LACTATE  --  1.2   D DIMER QUANT  --  3.22*         Lab 23  0531 23  0413 23  0414 23  0412 23  1618 23  0639 23  1614 23  0539 23  1204 23  0015   SODIUM 136 138 140 141  --  139 139   < > 141 140   POTASSIUM 4.1 3.9 3.9  4.2 4.1 3.5 4.0   < > 4.1 4.3   CHLORIDE 87* 84* 87* 90*  --  88* 89*   < > 99 99   CO2 40.0* 41.0* 46.0* 47.0*  --  46.0* 43.0*   < > 36.0* 33.0*   ANION GAP 9.0 13.0 7.0 4.0*  --  5.0 7.0   < > 6.0 8.0   BUN 58* 38* 34* 26*  --  19 19   < > 13 16   CREATININE 1.14* 0.85 0.85 0.72  --  0.66 0.76   < > 0.62 0.73   EGFR 56.6* 80.5 80.5 98.3  --  103.1 92.1   < > 104.7 96.7   GLUCOSE 147* 143* 135* 112*  --  130* 111*   < > 143* 141*   CALCIUM 9.4 9.7 9.4 9.0  --  9.0 9.1   < > 8.5* 9.0   MAGNESIUM 2.5  --  2.3 2.3  --  2.2 2.2  --   --  2.2   PHOSPHORUS  --   --   --   --   --   --   --   --   --  3.3   HEMOGLOBIN A1C  --   --   --   --   --   --   --   --  7.00*  --    TSH  --   --   --   --   --   --   --   --   --  1.660    < > = values in this interval not displayed.         Lab 07/22/23  0015   TOTAL PROTEIN 7.7   ALBUMIN 3.3*   GLOBULIN 4.4   ALT (SGPT) 14   AST (SGOT) 22   BILIRUBIN 0.7   ALK PHOS 76         Lab 07/28/23  1403 07/24/23  0900 07/24/23  0639 07/22/23  0625 07/22/23  0427 07/22/23  0015   PROBNP 102.5  --   --   --   --  427.0   HSTROP T  --  18* 19* 42* 36* 16*                 Lab 07/22/23  0638 07/22/23  0431 07/22/23  0244   PH, ARTERIAL 7.411  --   --    PCO2, ARTERIAL 57.3*  --   --    PO2 ART 95.4  --   --    FIO2 50 60 80   HCO3 ART 36.4*  --   --    BASE EXCESS ART 9.8*  --   --    CARBOXYHEMOGLOBIN 1.4  --   --    CARBOXYHEMOGLOBIN (VENOUS)  --  1.4 1.6     Brief Urine Lab Results  (Last result in the past 365 days)        Color   Clarity   Blood   Leuk Est   Nitrite   Protein   CREAT   Urine HCG        07/22/23 0426 Yellow   Clear   Negative   Negative   Negative   Negative                   Microbiology Results Abnormal       Procedure Component Value - Date/Time    Blood Culture - Blood, Arm, Right [[id number]]  (Normal) Collected: 07/22/23 0142    Lab Status: Final result Specimen: Blood from Arm, Right Updated: 07/27/23 0800     Blood Culture No growth at 5 days    Blood Culture -  Blood, Arm, Left [[id number]]  (Normal) Collected: 07/22/23 0142    Lab Status: Final result Specimen: Blood from Arm, Left Updated: 07/27/23 0800     Blood Culture No growth at 5 days            No radiology results from the last 24 hrs    Results for orders placed during the hospital encounter of 07/22/23    Adult Transthoracic Echo Complete W/ Cont if Necessary Per Protocol    Interpretation Summary    Technically difficult study due to massive obesity    Left ventricular systolic function is normal. Estimated left ventricular EF = 60%    Left ventricular wall thickness is consistent with mild concentric hypertrophy.    Valves are poorly visualized.  However there is no gross valvular abnormality    Estimated right ventricular systolic pressure from tricuspid regurgitation is markedly elevated (57 mmHg).      Current medications:  Scheduled Meds:doxycycline, 100 mg, Oral, Q12H  empagliflozin, 10 mg, Oral, Daily  enoxaparin, 60 mg, Subcutaneous, Q12H  insulin lispro, 2-7 Units, Subcutaneous, 4x Daily AC & at Bedtime  [START ON 7/29/2023] lactobacillus acidophilus, 1 capsule, Oral, Daily With Lunch  losartan, 50 mg, Oral, Daily  metoprolol succinate XL, 100 mg, Oral, Daily  pantoprazole, 40 mg, Oral, Q AM  senna-docusate sodium, 2 tablet, Oral, BID  sodium chloride, 10 mL, Intravenous, Q12H  spironolactone, 50 mg, Oral, Daily      Continuous Infusions:     PRN Meds:.  acetaminophen **OR** acetaminophen **OR** acetaminophen    albuterol    Albuterol Sulfate NEB Orderable    Biotene dry mouth    senna-docusate sodium **AND** polyethylene glycol **AND** bisacodyl **AND** bisacodyl    Calcium Replacement - Follow Nurse / BPA Driven Protocol    dextrose    dextrose    glucagon (human recombinant)    hydrOXYzine    Magnesium Cardiology Dose Replacement - Follow Nurse / BPA Driven Protocol    nitroglycerin    Phosphorus Replacement - Follow Nurse / BPA Driven Protocol    Potassium Replacement - Follow Nurse / BPA Driven  Protocol    sodium chloride    sodium chloride    sodium chloride    Assessment & Plan   Assessment & Plan     Active Hospital Problems    Diagnosis  POA    **Acute on chronic respiratory failure with hypoxia and hypercapnia [J96.21, J96.22]  Yes    Lower extremity cellulitis [L03.119]  Yes    Bilateral cellulitis of lower leg [L03.116, L03.115]  Yes    Obesity hypoventilation syndrome [E66.2]  Yes    Right-sided congestive heart failure [I50.810]  Yes    Morbid obesity with BMI of 60.0-69.9, adult [E66.01, Z68.44]  Not Applicable    Type 2 diabetes mellitus [E11.9]  Yes    Essential hypertension [I10]  Yes    Lymphedema of both lower extremities [I89.0]  Yes      Resolved Hospital Problems    Diagnosis Date Resolved POA    Accelerated hypertension [I10] 07/22/2023 Yes        Brief Hospital Course to date:  Kerry Leal is a 58 y.o. female w/ super morbid obesity, DM2, HTN, chronic LE lymphedema, chronic hypoxia who presented w/ worsening LT LE pain; while having CT of her leg she had acute hypercapnic respiratory failure requiring BIPAP; she was admitted, started on Abx, and seen by cardiology for diuresis    Assessment/Plan    Acute/chronic hypercapnic respiratory failure  Chronic hypoxia  Suspected OHS/restrictive lung disease  Pulmonary hypertension  Acute HFpEF  -s/p BIPAP on arrival, down to NC today  -repeat TTE limited 2/2 body habitus, EF estimated 60% w/ RVSP 57mmHg  -started on jardiance and aldactone this admit  -Diuresed by cardiology with improvement of lower extremity edema to some extent.  Now creatinine went up from 0.85-1.14.    Anxiety symptoms  -recently on buspar but discontinued 2/2 unknown adverse reaction  -cont prn atarax    Chronic BL LE lymphedema w/ stasis dermatitis, possible superimposed LT LE cellulitis (less likely)  -ID follows,  doxycyline x2 weeks at DC and follow up w/ Dr. Shine Rodriguez 2 weeks post DC    HTN  -cont losartan, toprol XL, aldactone    DM type 2, A1c 7.0%, w/o  long term use of insulin  -ssi    Super morbid obesity, BMI 62.11 kg/m2  -complicates all aspects of care, directly linked to primary diagnosis/problem      Expected Discharge Location and Transportation: likely home  Expected Discharge   Expected Discharge Date: 7/29/2023; Expected Discharge Time:      DVT prophylaxis:  Medical DVT prophylaxis orders are present.     AM-PAC 6 Clicks Score (PT): 17 (07/28/23 0800)    CODE STATUS:   Code Status and Medical Interventions:   Ordered at: 07/22/23 0558     Level Of Support Discussed With:    Patient     Code Status (Patient has no pulse and is not breathing):    CPR (Attempt to Resuscitate)     Medical Interventions (Patient has pulse or is breathing):    Full Support     Release to patient:    Routine Release       Kailash Pleitez MD  07/28/23

## 2023-07-29 LAB
ANION GAP SERPL CALCULATED.3IONS-SCNC: 8 MMOL/L (ref 5–15)
BUN SERPL-MCNC: 52 MG/DL (ref 6–20)
BUN/CREAT SERPL: 48.1 (ref 7–25)
CALCIUM SPEC-SCNC: 9.2 MG/DL (ref 8.6–10.5)
CHLORIDE SERPL-SCNC: 90 MMOL/L (ref 98–107)
CO2 SERPL-SCNC: 38 MMOL/L (ref 22–29)
CREAT SERPL-MCNC: 1.08 MG/DL (ref 0.57–1)
EGFRCR SERPLBLD CKD-EPI 2021: 60.4 ML/MIN/1.73
GLUCOSE BLDC GLUCOMTR-MCNC: 137 MG/DL (ref 70–130)
GLUCOSE BLDC GLUCOMTR-MCNC: 149 MG/DL (ref 70–130)
GLUCOSE BLDC GLUCOMTR-MCNC: 156 MG/DL (ref 70–130)
GLUCOSE BLDC GLUCOMTR-MCNC: 167 MG/DL (ref 70–130)
GLUCOSE SERPL-MCNC: 146 MG/DL (ref 65–99)
POTASSIUM SERPL-SCNC: 4.4 MMOL/L (ref 3.5–5.2)
SODIUM SERPL-SCNC: 136 MMOL/L (ref 136–145)

## 2023-07-29 PROCEDURE — 82948 REAGENT STRIP/BLOOD GLUCOSE: CPT

## 2023-07-29 PROCEDURE — 99232 SBSQ HOSP IP/OBS MODERATE 35: CPT | Performed by: INTERNAL MEDICINE

## 2023-07-29 PROCEDURE — 93005 ELECTROCARDIOGRAM TRACING: CPT | Performed by: INTERNAL MEDICINE

## 2023-07-29 PROCEDURE — 93010 ELECTROCARDIOGRAM REPORT: CPT | Performed by: INTERNAL MEDICINE

## 2023-07-29 PROCEDURE — 80048 BASIC METABOLIC PNL TOTAL CA: CPT | Performed by: INTERNAL MEDICINE

## 2023-07-29 PROCEDURE — 25010000002 ENOXAPARIN PER 10 MG: Performed by: NURSE PRACTITIONER

## 2023-07-29 RX ADMIN — SPIRONOLACTONE 50 MG: 25 TABLET ORAL at 08:31

## 2023-07-29 RX ADMIN — Medication 1 CAPSULE: at 11:33

## 2023-07-29 RX ADMIN — METOPROLOL SUCCINATE 100 MG: 100 TABLET, EXTENDED RELEASE ORAL at 08:31

## 2023-07-29 RX ADMIN — PANTOPRAZOLE SODIUM 40 MG: 40 TABLET, DELAYED RELEASE ORAL at 06:27

## 2023-07-29 RX ADMIN — DOXYCYCLINE 100 MG: 100 CAPSULE ORAL at 20:58

## 2023-07-29 RX ADMIN — Medication 10 ML: at 08:31

## 2023-07-29 RX ADMIN — ENOXAPARIN SODIUM 60 MG: 60 INJECTION SUBCUTANEOUS at 08:30

## 2023-07-29 RX ADMIN — HYDROXYZINE HYDROCHLORIDE 25 MG: 25 TABLET, FILM COATED ORAL at 20:58

## 2023-07-29 RX ADMIN — Medication 10 ML: at 21:01

## 2023-07-29 RX ADMIN — LOSARTAN POTASSIUM 50 MG: 50 TABLET, FILM COATED ORAL at 08:31

## 2023-07-29 RX ADMIN — ENOXAPARIN SODIUM 60 MG: 60 INJECTION SUBCUTANEOUS at 20:57

## 2023-07-29 RX ADMIN — HYDROXYZINE HYDROCHLORIDE 25 MG: 25 TABLET, FILM COATED ORAL at 08:31

## 2023-07-29 RX ADMIN — DOXYCYCLINE 100 MG: 100 CAPSULE ORAL at 08:31

## 2023-07-29 RX ADMIN — EMPAGLIFLOZIN 10 MG: 10 TABLET, FILM COATED ORAL at 08:31

## 2023-07-29 NOTE — PROGRESS NOTES
[facility] Medicine Services  PROGRESS NOTE    Patient Name: Kerry Leal  : 1965  MRN: [id number]    Date of Admission: 2023  Primary Care Physician: Gopal Kong MD    Subjective   Subjective     CC:  F/u heart failure    HPI:  Resting in bed in no acute distress and overall feels okay.  Does not have any specific complaint.    ROS:  Gen- No fevers, chills  CV- No chest pain, palpitations  Resp- No cough, yes dyspnea  GI- No N/V/D, abd pain     Objective   Objective     Vital Signs:   Temp:  [96.4 øF (35.8 øC)-98.3 øF (36.8 øC)] 98.3 øF (36.8 øC)  Heart Rate:  [75-93] 89  Resp:  [18] 18  BP: (107-128)/(63-71) 107/67  Flow (L/min):  [2-3] 2     Physical Exam:  Constitutional: Resting in bed in no acute distress  HENT: NCAT, mucous membranes moist  Respiratory: Clear to auscultation bilaterally, respiratory effort normal  Cardiovascular: RRR, no murmur gallop or rub audible.  Gastrointestinal: Abdomen is very obese.  Positive bowel sounds, soft, nontender, nondistended  Musculoskeletal: Severe BL LE edema w/ wraps, wounds on the shins bilaterally.  Psychiatric: Appropriate affect, cooperative  Neurologic: Awake, alert, oriented x3, no focality appreciated, speech is clear  Skin: Skin thickening and redness over the shins bilaterally, no other rash visible.    Results Reviewed:  LAB RESULTS:            Lab 23  0436 23  0531 23  0413 23  0414 23  0412 23  1618 23  0639 23  1614   SODIUM 136 136 138 140 141  --  139 139   POTASSIUM 4.4 4.1 3.9 3.9 4.2   < > 3.5 4.0   CHLORIDE 90* 87* 84* 87* 90*  --  88* 89*   CO2 38.0* 40.0* 41.0* 46.0* 47.0*  --  46.0* 43.0*   ANION GAP 8.0 9.0 13.0 7.0 4.0*  --  5.0 7.0   BUN 52* 58* 38* 34* 26*  --  19 19   CREATININE 1.08* 1.14* 0.85 0.85 0.72  --  0.66 0.76   EGFR 60.4 56.6* 80.5 80.5 98.3  --  103.1 92.1   GLUCOSE 146* 147* 143* 135* 112*  --  130* 111*   CALCIUM 9.2 9.4 9.7 9.4 9.0   --  9.0 9.1   MAGNESIUM  --  2.5  --  2.3 2.3  --  2.2 2.2    < > = values in this interval not displayed.               Lab 07/28/23  1403 07/24/23  0900 07/24/23  0639   PROBNP 102.5  --   --    HSTROP T  --  18* 19*                   Brief Urine Lab Results  (Last result in the past 365 days)        Color   Clarity   Blood   Leuk Est   Nitrite   Protein   CREAT   Urine HCG        07/22/23 0426 Yellow   Clear   Negative   Negative   Negative   Negative                   Microbiology Results Abnormal       Procedure Component Value - Date/Time    Blood Culture - Blood, Arm, Right [[id number]]  (Normal) Collected: 07/22/23 0142    Lab Status: Final result Specimen: Blood from Arm, Right Updated: 07/27/23 0800     Blood Culture No growth at 5 days    Blood Culture - Blood, Arm, Left [[id number]]  (Normal) Collected: 07/22/23 0142    Lab Status: Final result Specimen: Blood from Arm, Left Updated: 07/27/23 0800     Blood Culture No growth at 5 days            No radiology results from the last 24 hrs    Results for orders placed during the hospital encounter of 07/22/23    Adult Transthoracic Echo Complete W/ Cont if Necessary Per Protocol    Interpretation Summary    Technically difficult study due to massive obesity    Left ventricular systolic function is normal. Estimated left ventricular EF = 60%    Left ventricular wall thickness is consistent with mild concentric hypertrophy.    Valves are poorly visualized.  However there is no gross valvular abnormality    Estimated right ventricular systolic pressure from tricuspid regurgitation is markedly elevated (57 mmHg).      Current medications:  Scheduled Meds:doxycycline, 100 mg, Oral, Q12H  empagliflozin, 10 mg, Oral, Daily  enoxaparin, 60 mg, Subcutaneous, Q12H  insulin lispro, 2-7 Units, Subcutaneous, 4x Daily AC & at Bedtime  lactobacillus acidophilus, 1 capsule, Oral, Daily With Lunch  losartan, 50 mg, Oral, Daily  metoprolol succinate XL, 100 mg, Oral,  Daily  pantoprazole, 40 mg, Oral, Q AM  senna-docusate sodium, 2 tablet, Oral, BID  sodium chloride, 10 mL, Intravenous, Q12H  spironolactone, 50 mg, Oral, Daily      Continuous Infusions:     PRN Meds:.  acetaminophen **OR** acetaminophen **OR** acetaminophen    albuterol    Albuterol Sulfate NEB Orderable    Biotene dry mouth    senna-docusate sodium **AND** polyethylene glycol **AND** bisacodyl **AND** bisacodyl    Calcium Replacement - Follow Nurse / BPA Driven Protocol    dextrose    dextrose    glucagon (human recombinant)    hydrOXYzine    Magnesium Cardiology Dose Replacement - Follow Nurse / BPA Driven Protocol    nitroglycerin    Phosphorus Replacement - Follow Nurse / BPA Driven Protocol    Potassium Replacement - Follow Nurse / BPA Driven Protocol    sodium chloride    sodium chloride    sodium chloride    Assessment & Plan   Assessment & Plan     Active Hospital Problems    Diagnosis  POA    **Acute on chronic respiratory failure with hypoxia and hypercapnia [J96.21, J96.22]  Yes    Lower extremity cellulitis [L03.119]  Yes    Bilateral cellulitis of lower leg [L03.116, L03.115]  Yes    Obesity hypoventilation syndrome [E66.2]  Yes    Right-sided congestive heart failure [I50.810]  Yes    Morbid obesity with BMI of 60.0-69.9, adult [E66.01, Z68.44]  Not Applicable    Type 2 diabetes mellitus [E11.9]  Yes    Essential hypertension [I10]  Yes    Lymphedema of both lower extremities [I89.0]  Yes      Resolved Hospital Problems    Diagnosis Date Resolved POA    Accelerated hypertension [I10] 07/22/2023 Yes        Brief Hospital Course to date:  Kerry Leal is a 58 y.o. female w/ super morbid obesity, DM2, HTN, chronic LE lymphedema, chronic hypoxia who presented w/ worsening LT LE pain; while having CT of her leg she had acute hypercapnic respiratory failure requiring BIPAP; she was admitted, started on Abx, and seen by cardiology for diuresis    Assessment/Plan    Acute/chronic hypercapnic respiratory  failure  Chronic hypoxia  Suspected OHS/restrictive lung disease  Pulmonary hypertension  Acute HFpEF  -s/p BIPAP on arrival, down to NC today  -repeat TTE limited 2/2 body habitus, EF estimated 60% w/ RVSP 57mmHg  -started on jardiance and aldactone this admit  -Diuresed by cardiology with improvement of lower extremity edema to some extent.  Now creatinine went up from 0.85-1.14.  -We will check creatinine tomorrow.  If so we will restart diuretics and will consider giving albumin also to help with diuresis.    Anxiety symptoms  -recently on buspar but discontinued 2/2 unknown adverse reaction  -cont prn atarax    Chronic BL LE lymphedema w/ stasis dermatitis, possible superimposed LT LE cellulitis (less likely)  -ID follows, doxycyline x2 weeks at DC and follow up w/ Dr. Shine Rodriguez 2 weeks post DC    HTN  -cont losartan, toprol XL, aldactone    DM type 2, A1c 7.0%, w/o long term use of insulin  -ssi    Super morbid obesity, BMI 62.11 kg/m2  -complicates all aspects of care, directly linked to primary diagnosis/problem      Expected Discharge Location and Transportation: likely home  Expected Discharge   Expected Discharge Date: 7/29/2023; Expected Discharge Time:      DVT prophylaxis:  Medical DVT prophylaxis orders are present.     AM-PAC 6 Clicks Score (PT): 17 (07/29/23 0800)    CODE STATUS:   Code Status and Medical Interventions:   Ordered at: 07/22/23 0558     Level Of Support Discussed With:    Patient     Code Status (Patient has no pulse and is not breathing):    CPR (Attempt to Resuscitate)     Medical Interventions (Patient has pulse or is breathing):    Full Support     Release to patient:    Routine Release       Kailash Pleitez MD  07/29/23

## 2023-07-30 LAB
ALBUMIN SERPL-MCNC: 3.6 G/DL (ref 3.5–5.2)
ALBUMIN/GLOB SERPL: 1.1 G/DL
ALP SERPL-CCNC: 64 U/L (ref 39–117)
ALT SERPL W P-5'-P-CCNC: 19 U/L (ref 1–33)
ANION GAP SERPL CALCULATED.3IONS-SCNC: 6 MMOL/L (ref 5–15)
AST SERPL-CCNC: 25 U/L (ref 1–32)
BILIRUB SERPL-MCNC: 0.6 MG/DL (ref 0–1.2)
BUN SERPL-MCNC: 41 MG/DL (ref 6–20)
BUN/CREAT SERPL: 49.4 (ref 7–25)
CALCIUM SPEC-SCNC: 9.3 MG/DL (ref 8.6–10.5)
CHLORIDE SERPL-SCNC: 93 MMOL/L (ref 98–107)
CO2 SERPL-SCNC: 39 MMOL/L (ref 22–29)
CREAT SERPL-MCNC: 0.83 MG/DL (ref 0.57–1)
EGFRCR SERPLBLD CKD-EPI 2021: 82.9 ML/MIN/1.73
GLOBULIN UR ELPH-MCNC: 3.4 GM/DL
GLUCOSE BLDC GLUCOMTR-MCNC: 141 MG/DL (ref 70–130)
GLUCOSE BLDC GLUCOMTR-MCNC: 161 MG/DL (ref 70–130)
GLUCOSE BLDC GLUCOMTR-MCNC: 175 MG/DL (ref 70–130)
GLUCOSE BLDC GLUCOMTR-MCNC: 177 MG/DL (ref 70–130)
GLUCOSE SERPL-MCNC: 136 MG/DL (ref 65–99)
POTASSIUM SERPL-SCNC: 4.5 MMOL/L (ref 3.5–5.2)
PROT SERPL-MCNC: 7 G/DL (ref 6–8.5)
SODIUM SERPL-SCNC: 138 MMOL/L (ref 136–145)

## 2023-07-30 PROCEDURE — 82948 REAGENT STRIP/BLOOD GLUCOSE: CPT

## 2023-07-30 PROCEDURE — 99232 SBSQ HOSP IP/OBS MODERATE 35: CPT | Performed by: INTERNAL MEDICINE

## 2023-07-30 PROCEDURE — 25010000002 ENOXAPARIN PER 10 MG: Performed by: NURSE PRACTITIONER

## 2023-07-30 PROCEDURE — 80053 COMPREHEN METABOLIC PANEL: CPT | Performed by: INTERNAL MEDICINE

## 2023-07-30 RX ORDER — BUMETANIDE 1 MG/1
1 TABLET ORAL DAILY
Status: DISCONTINUED | OUTPATIENT
Start: 2023-07-30 | End: 2023-07-31

## 2023-07-30 RX ADMIN — DOXYCYCLINE 100 MG: 100 CAPSULE ORAL at 08:27

## 2023-07-30 RX ADMIN — ENOXAPARIN SODIUM 60 MG: 60 INJECTION SUBCUTANEOUS at 08:26

## 2023-07-30 RX ADMIN — EMPAGLIFLOZIN 10 MG: 10 TABLET, FILM COATED ORAL at 08:27

## 2023-07-30 RX ADMIN — METOPROLOL SUCCINATE 100 MG: 100 TABLET, EXTENDED RELEASE ORAL at 08:27

## 2023-07-30 RX ADMIN — SPIRONOLACTONE 50 MG: 25 TABLET ORAL at 08:27

## 2023-07-30 RX ADMIN — LOSARTAN POTASSIUM 50 MG: 50 TABLET, FILM COATED ORAL at 08:27

## 2023-07-30 RX ADMIN — Medication 1 CAPSULE: at 12:14

## 2023-07-30 RX ADMIN — Medication 10 ML: at 08:27

## 2023-07-30 RX ADMIN — Medication 10 ML: at 21:39

## 2023-07-30 RX ADMIN — DOXYCYCLINE 100 MG: 100 CAPSULE ORAL at 21:38

## 2023-07-30 RX ADMIN — PANTOPRAZOLE SODIUM 40 MG: 40 TABLET, DELAYED RELEASE ORAL at 05:42

## 2023-07-30 RX ADMIN — BUMETANIDE 1 MG: 1 TABLET ORAL at 08:27

## 2023-07-30 RX ADMIN — SENNOSIDES AND DOCUSATE SODIUM 2 TABLET: 50; 8.6 TABLET ORAL at 08:27

## 2023-07-30 RX ADMIN — SENNOSIDES AND DOCUSATE SODIUM 2 TABLET: 50; 8.6 TABLET ORAL at 21:38

## 2023-07-30 RX ADMIN — HYDROXYZINE HYDROCHLORIDE 25 MG: 25 TABLET, FILM COATED ORAL at 21:38

## 2023-07-30 RX ADMIN — ENOXAPARIN SODIUM 60 MG: 60 INJECTION SUBCUTANEOUS at 21:39

## 2023-07-30 NOTE — PROGRESS NOTES
[facility] Medicine Services  PROGRESS NOTE    Patient Name: Kerry Leal  : 1965  MRN: [id number]    Date of Admission: 2023  Primary Care Physician: Gopal Kong MD    Subjective   Subjective     CC:  F/u heart failure    HPI:  Resting in a chair in no acute distress and feels okay.  Does not have any specific complaint except the swelling of lower extremities.  No chest pain or shortness of breath.    ROS:  Gen- No fevers, chills  CV- No chest pain, palpitations  Resp- No cough, yes dyspnea  GI- No N/V/D, abd pain     Objective   Objective     Vital Signs:   Temp:  [97.5 øF (36.4 øC)-98.8 øF (37.1 øC)] 97.5 øF (36.4 øC)  Heart Rate:  [67-96] 67  Resp:  [18-20] 20  BP: ()/(57-67) 107/60  Flow (L/min):  [2] 2     Physical Exam:  Constitutional: Resting in bed in no acute distress  HENT: NCAT, mucous membranes moist  Respiratory: Clear to auscultation bilaterally, respiratory effort normal  Cardiovascular: RRR, no murmur gallop or rub audible.  Gastrointestinal: Abdomen is very obese.  Positive bowel sounds, soft, nontender, nondistended  Musculoskeletal: Severe BL LE edema w/ wraps, wounds on the shins bilaterally.  Psychiatric: Appropriate affect, cooperative  Neurologic: Awake, alert, oriented x3, no focality appreciated, speech is clear  Skin: Skin thickening and redness over the shins bilaterally, no other rash visible.    Results Reviewed:  LAB RESULTS:            Lab 23  0457 23  0436 23  0531 23  0413 23  0414 23  0412 23  1618 23  0639 23  1614   SODIUM 138 136 136 138 140 141  --  139 139   POTASSIUM 4.5 4.4 4.1 3.9 3.9 4.2   < > 3.5 4.0   CHLORIDE 93* 90* 87* 84* 87* 90*  --  88* 89*   CO2 39.0* 38.0* 40.0* 41.0* 46.0* 47.0*  --  46.0* 43.0*   ANION GAP 6.0 8.0 9.0 13.0 7.0 4.0*  --  5.0 7.0   BUN 41* 52* 58* 38* 34* 26*  --  19 19   CREATININE 0.83 1.08* 1.14* 0.85 0.85 0.72  --  0.66 0.76   EGFR  82.9 60.4 56.6* 80.5 80.5 98.3  --  103.1 92.1   GLUCOSE 136* 146* 147* 143* 135* 112*  --  130* 111*   CALCIUM 9.3 9.2 9.4 9.7 9.4 9.0  --  9.0 9.1   MAGNESIUM  --   --  2.5  --  2.3 2.3  --  2.2 2.2    < > = values in this interval not displayed.         Lab 07/30/23  0457   TOTAL PROTEIN 7.0   ALBUMIN 3.6   GLOBULIN 3.4   ALT (SGPT) 19   AST (SGOT) 25   BILIRUBIN 0.6   ALK PHOS 64           Lab 07/28/23  1403 07/24/23  0900 07/24/23  0639   PROBNP 102.5  --   --    HSTROP T  --  18* 19*                   Brief Urine Lab Results  (Last result in the past 365 days)        Color   Clarity   Blood   Leuk Est   Nitrite   Protein   CREAT   Urine HCG        07/22/23 0426 Yellow   Clear   Negative   Negative   Negative   Negative                   Microbiology Results Abnormal       Procedure Component Value - Date/Time    Blood Culture - Blood, Arm, Right [[id number]]  (Normal) Collected: 07/22/23 0142    Lab Status: Final result Specimen: Blood from Arm, Right Updated: 07/27/23 0800     Blood Culture No growth at 5 days    Blood Culture - Blood, Arm, Left [[id number]]  (Normal) Collected: 07/22/23 0142    Lab Status: Final result Specimen: Blood from Arm, Left Updated: 07/27/23 0800     Blood Culture No growth at 5 days            No radiology results from the last 24 hrs    Results for orders placed during the hospital encounter of 07/22/23    Adult Transthoracic Echo Complete W/ Cont if Necessary Per Protocol    Interpretation Summary    Technically difficult study due to massive obesity    Left ventricular systolic function is normal. Estimated left ventricular EF = 60%    Left ventricular wall thickness is consistent with mild concentric hypertrophy.    Valves are poorly visualized.  However there is no gross valvular abnormality    Estimated right ventricular systolic pressure from tricuspid regurgitation is markedly elevated (57 mmHg).      Current medications:  Scheduled Meds:bumetanide, 1 mg, Oral,  Daily  doxycycline, 100 mg, Oral, Q12H  empagliflozin, 10 mg, Oral, Daily  enoxaparin, 60 mg, Subcutaneous, Q12H  insulin lispro, 2-7 Units, Subcutaneous, 4x Daily AC & at Bedtime  lactobacillus acidophilus, 1 capsule, Oral, Daily With Lunch  losartan, 50 mg, Oral, Daily  metoprolol succinate XL, 100 mg, Oral, Daily  pantoprazole, 40 mg, Oral, Q AM  senna-docusate sodium, 2 tablet, Oral, BID  sodium chloride, 10 mL, Intravenous, Q12H  spironolactone, 50 mg, Oral, Daily      Continuous Infusions:     PRN Meds:.  acetaminophen **OR** acetaminophen **OR** acetaminophen    albuterol    Albuterol Sulfate NEB Orderable    Biotene dry mouth    senna-docusate sodium **AND** polyethylene glycol **AND** bisacodyl **AND** bisacodyl    Calcium Replacement - Follow Nurse / BPA Driven Protocol    dextrose    dextrose    glucagon (human recombinant)    hydrOXYzine    Magnesium Cardiology Dose Replacement - Follow Nurse / BPA Driven Protocol    nitroglycerin    Phosphorus Replacement - Follow Nurse / BPA Driven Protocol    Potassium Replacement - Follow Nurse / BPA Driven Protocol    sodium chloride    sodium chloride    sodium chloride    Assessment & Plan   Assessment & Plan     Active Hospital Problems    Diagnosis  POA    **Acute on chronic respiratory failure with hypoxia and hypercapnia [J96.21, J96.22]  Yes    Lower extremity cellulitis [L03.119]  Yes    Bilateral cellulitis of lower leg [L03.116, L03.115]  Yes    Obesity hypoventilation syndrome [E66.2]  Yes    Right-sided congestive heart failure [I50.810]  Yes    Morbid obesity with BMI of 60.0-69.9, adult [E66.01, Z68.44]  Not Applicable    Type 2 diabetes mellitus [E11.9]  Yes    Essential hypertension [I10]  Yes    Lymphedema of both lower extremities [I89.0]  Yes      Resolved Hospital Problems    Diagnosis Date Resolved POA    Accelerated hypertension [I10] 07/22/2023 Yes        Brief Hospital Course to date:  Kerry Leal is a 58 y.o. female w/ super morbid  obesity, DM2, HTN, chronic LE lymphedema, chronic hypoxia who presented w/ worsening LT LE pain; while having CT of her leg she had acute hypercapnic respiratory failure requiring BIPAP; she was admitted, started on Abx, and seen by cardiology for diuresis    Assessment/Plan    Acute/chronic hypercapnic respiratory failure  Chronic hypoxia  Suspected OHS/restrictive lung disease  Pulmonary hypertension  Acute HFpEF  -s/p BIPAP on arrival, down to NC today  -repeat TTE limited 2/2 body habitus, EF estimated 60% w/ RVSP 57mmHg  -started on jardiance and aldactone this admit  -Diuresed by cardiology with improvement of lower extremity edema to some extent.  Diuresis was scaled back because of increased creatinine.  However, creatinine is back to normal today and we will restart bumetanide 1 mg daily.  Will monitor creatinine and if renal function allows we will increase bumetanide tomorrow.    Anxiety symptoms  -recently on buspar but discontinued 2/2 unknown adverse reaction  -cont prn atarax    Chronic BL LE lymphedema w/ stasis dermatitis, possible superimposed LT LE cellulitis (less likely)  -ID follows, doxycyline x2 weeks at DC and follow up w/ Dr. Shine Rodriguez 2 weeks post DC    HTN  -cont losartan, toprol XL, aldactone    DM type 2, A1c 7.0%, w/o long term use of insulin  -ssi    Super morbid obesity, BMI 62.11 kg/m2  -complicates all aspects of care, directly linked to primary diagnosis/problem      Expected Discharge Location and Transportation: likely home  Expected Discharge   Expected Discharge Date: 7/29/2023; Expected Discharge Time:      DVT prophylaxis:  Medical DVT prophylaxis orders are present.     AM-PAC 6 Clicks Score (PT): 17 (07/30/23 0800)    CODE STATUS:   Code Status and Medical Interventions:   Ordered at: 07/22/23 0580     Level Of Support Discussed With:    Patient     Code Status (Patient has no pulse and is not breathing):    CPR (Attempt to Resuscitate)     Medical Interventions  (Patient has pulse or is breathing):    Full Support     Release to patient:    Routine Release       Kailash Pleitez MD  07/30/23

## 2023-07-31 LAB
ANION GAP SERPL CALCULATED.3IONS-SCNC: 8 MMOL/L (ref 5–15)
BUN SERPL-MCNC: 35 MG/DL (ref 6–20)
BUN/CREAT SERPL: 42.7 (ref 7–25)
CALCIUM SPEC-SCNC: 9.2 MG/DL (ref 8.6–10.5)
CHLORIDE SERPL-SCNC: 95 MMOL/L (ref 98–107)
CO2 SERPL-SCNC: 36 MMOL/L (ref 22–29)
CREAT SERPL-MCNC: 0.82 MG/DL (ref 0.57–1)
EGFRCR SERPLBLD CKD-EPI 2021: 84.1 ML/MIN/1.73
GLUCOSE BLDC GLUCOMTR-MCNC: 141 MG/DL (ref 70–130)
GLUCOSE BLDC GLUCOMTR-MCNC: 164 MG/DL (ref 70–130)
GLUCOSE BLDC GLUCOMTR-MCNC: 164 MG/DL (ref 70–130)
GLUCOSE BLDC GLUCOMTR-MCNC: 219 MG/DL (ref 70–130)
GLUCOSE SERPL-MCNC: 148 MG/DL (ref 65–99)
MAGNESIUM SERPL-MCNC: 2.2 MG/DL (ref 1.6–2.6)
PHOSPHATE SERPL-MCNC: 3.6 MG/DL (ref 2.5–4.5)
POTASSIUM SERPL-SCNC: 4.7 MMOL/L (ref 3.5–5.2)
QT INTERVAL: 394 MS
QTC INTERVAL: 434 MS
SODIUM SERPL-SCNC: 139 MMOL/L (ref 136–145)

## 2023-07-31 PROCEDURE — 80048 BASIC METABOLIC PNL TOTAL CA: CPT | Performed by: INTERNAL MEDICINE

## 2023-07-31 PROCEDURE — 97530 THERAPEUTIC ACTIVITIES: CPT

## 2023-07-31 PROCEDURE — 99232 SBSQ HOSP IP/OBS MODERATE 35: CPT | Performed by: INTERNAL MEDICINE

## 2023-07-31 PROCEDURE — 25010000002 ENOXAPARIN PER 10 MG: Performed by: NURSE PRACTITIONER

## 2023-07-31 PROCEDURE — 84100 ASSAY OF PHOSPHORUS: CPT | Performed by: INTERNAL MEDICINE

## 2023-07-31 PROCEDURE — 97116 GAIT TRAINING THERAPY: CPT

## 2023-07-31 PROCEDURE — 83735 ASSAY OF MAGNESIUM: CPT | Performed by: INTERNAL MEDICINE

## 2023-07-31 PROCEDURE — 82948 REAGENT STRIP/BLOOD GLUCOSE: CPT

## 2023-07-31 RX ORDER — BUMETANIDE 2 MG/1
2 TABLET ORAL DAILY
Status: DISCONTINUED | OUTPATIENT
Start: 2023-07-31 | End: 2023-08-01

## 2023-07-31 RX ADMIN — DOXYCYCLINE 100 MG: 100 CAPSULE ORAL at 08:58

## 2023-07-31 RX ADMIN — Medication 1 CAPSULE: at 11:47

## 2023-07-31 RX ADMIN — PANTOPRAZOLE SODIUM 40 MG: 40 TABLET, DELAYED RELEASE ORAL at 05:55

## 2023-07-31 RX ADMIN — LOSARTAN POTASSIUM 50 MG: 50 TABLET, FILM COATED ORAL at 08:58

## 2023-07-31 RX ADMIN — EMPAGLIFLOZIN 10 MG: 10 TABLET, FILM COATED ORAL at 08:57

## 2023-07-31 RX ADMIN — ENOXAPARIN SODIUM 60 MG: 60 INJECTION SUBCUTANEOUS at 08:58

## 2023-07-31 RX ADMIN — METOPROLOL SUCCINATE 100 MG: 100 TABLET, EXTENDED RELEASE ORAL at 08:58

## 2023-07-31 RX ADMIN — SENNOSIDES AND DOCUSATE SODIUM 2 TABLET: 50; 8.6 TABLET ORAL at 08:58

## 2023-07-31 RX ADMIN — SPIRONOLACTONE 50 MG: 25 TABLET ORAL at 08:57

## 2023-07-31 RX ADMIN — BUMETANIDE 2 MG: 2 TABLET ORAL at 08:57

## 2023-07-31 RX ADMIN — DOXYCYCLINE 100 MG: 100 CAPSULE ORAL at 20:31

## 2023-07-31 RX ADMIN — ENOXAPARIN SODIUM 60 MG: 60 INJECTION SUBCUTANEOUS at 20:31

## 2023-07-31 RX ADMIN — Medication 10 ML: at 20:31

## 2023-07-31 NOTE — PLAN OF CARE
Goal Outcome Evaluation:  Plan of Care Reviewed With: patient        Progress: improving  Outcome Evaluation: Pt able to increase ambulation distance per trial and overall, as well as required less assist. Pt continues to demo anxiety with mobility and dyspnea upon exertion. O2 > 90% on 2 L throughout session. PT changing recs to home with assist, jackie FWW, and  PT upon dc.      Anticipated Discharge Disposition (PT): home with assist

## 2023-07-31 NOTE — PROGRESS NOTES
[facility] Medicine Services  PROGRESS NOTE    Patient Name: Kerry Leal  : 1965  MRN: [id number]    Date of Admission: 2023  Primary Care Physician: Gopal Kong MD    Subjective   Subjective     CC:  F/u heart failure    HPI:  Resting in a chair in no acute distress and feels okay.  Does not have any specific complaint except the swelling of lower extremities.  No chest pain or shortness of breath.    ROS:  Gen- No fevers, chills  CV- No chest pain, palpitations  Resp- No cough, yes dyspnea  GI- No N/V/D, abd pain     Objective   Objective     Vital Signs:   Temp:  [97.7 øF (36.5 øC)-98.4 øF (36.9 øC)] 97.7 øF (36.5 øC)  Heart Rate:  [74-93] 74  Resp:  [18-20] 18  BP: (109-151)/(57-83) 125/71  Flow (L/min):  [2] 2     Physical Exam:  Constitutional: Resting in bed in no acute distress  HENT: NCAT, mucous membranes moist  Respiratory: Clear to auscultation bilaterally, respiratory effort normal  Cardiovascular: RRR, no murmur gallop or rub audible.  Gastrointestinal: Abdomen is very obese.  Positive bowel sounds, soft, nontender, nondistended  Musculoskeletal: Severe BL LE edema w/ wraps, wounds on the shins bilaterally.  Psychiatric: Appropriate affect, cooperative  Neurologic: Awake, alert, oriented x3, no focality appreciated, speech is clear  Skin: Skin thickening and redness over the shins bilaterally, no other rash visible.    Results Reviewed:  LAB RESULTS:            Lab 23  0358 23  0457 23  0436 23  0531 23  0413 23  0414 23  0412   SODIUM 139 138 136 136 138 140 141   POTASSIUM 4.7 4.5 4.4 4.1 3.9 3.9 4.2   CHLORIDE 95* 93* 90* 87* 84* 87* 90*   CO2 36.0* 39.0* 38.0* 40.0* 41.0* 46.0* 47.0*   ANION GAP 8.0 6.0 8.0 9.0 13.0 7.0 4.0*   BUN 35* 41* 52* 58* 38* 34* 26*   CREATININE 0.82 0.83 1.08* 1.14* 0.85 0.85 0.72   EGFR 84.1 82.9 60.4 56.6* 80.5 80.5 98.3   GLUCOSE 148* 136* 146* 147* 143* 135* 112*   CALCIUM  9.2 9.3 9.2 9.4 9.7 9.4 9.0   MAGNESIUM 2.2  --   --  2.5  --  2.3 2.3   PHOSPHORUS 3.6  --   --   --   --   --   --          Lab 07/30/23  0457   TOTAL PROTEIN 7.0   ALBUMIN 3.6   GLOBULIN 3.4   ALT (SGPT) 19   AST (SGOT) 25   BILIRUBIN 0.6   ALK PHOS 64           Lab 07/28/23  1403   PROBNP 102.5                   Brief Urine Lab Results  (Last result in the past 365 days)        Color   Clarity   Blood   Leuk Est   Nitrite   Protein   CREAT   Urine HCG        07/22/23 0426 Yellow   Clear   Negative   Negative   Negative   Negative                   Microbiology Results Abnormal       Procedure Component Value - Date/Time    Blood Culture - Blood, Arm, Right [[id number]]  (Normal) Collected: 07/22/23 0142    Lab Status: Final result Specimen: Blood from Arm, Right Updated: 07/27/23 0800     Blood Culture No growth at 5 days    Blood Culture - Blood, Arm, Left [[id number]]  (Normal) Collected: 07/22/23 0142    Lab Status: Final result Specimen: Blood from Arm, Left Updated: 07/27/23 0800     Blood Culture No growth at 5 days            No radiology results from the last 24 hrs    Results for orders placed during the hospital encounter of 07/22/23    Adult Transthoracic Echo Complete W/ Cont if Necessary Per Protocol    Interpretation Summary    Technically difficult study due to massive obesity    Left ventricular systolic function is normal. Estimated left ventricular EF = 60%    Left ventricular wall thickness is consistent with mild concentric hypertrophy.    Valves are poorly visualized.  However there is no gross valvular abnormality    Estimated right ventricular systolic pressure from tricuspid regurgitation is markedly elevated (57 mmHg).      Current medications:  Scheduled Meds:bumetanide, 2 mg, Oral, Daily  doxycycline, 100 mg, Oral, Q12H  empagliflozin, 10 mg, Oral, Daily  enoxaparin, 60 mg, Subcutaneous, Q12H  insulin lispro, 2-7 Units, Subcutaneous, 4x Daily AC & at Bedtime  lactobacillus acidophilus, 1  capsule, Oral, Daily With Lunch  losartan, 50 mg, Oral, Daily  metoprolol succinate XL, 100 mg, Oral, Daily  pantoprazole, 40 mg, Oral, Q AM  senna-docusate sodium, 2 tablet, Oral, BID  sodium chloride, 10 mL, Intravenous, Q12H  spironolactone, 50 mg, Oral, Daily      Continuous Infusions:     PRN Meds:.  acetaminophen **OR** acetaminophen **OR** acetaminophen    albuterol    Albuterol Sulfate NEB Orderable    Biotene dry mouth    senna-docusate sodium **AND** polyethylene glycol **AND** bisacodyl **AND** bisacodyl    Calcium Replacement - Follow Nurse / BPA Driven Protocol    dextrose    dextrose    glucagon (human recombinant)    hydrOXYzine    Magnesium Cardiology Dose Replacement - Follow Nurse / BPA Driven Protocol    nitroglycerin    Phosphorus Replacement - Follow Nurse / BPA Driven Protocol    Potassium Replacement - Follow Nurse / BPA Driven Protocol    sodium chloride    sodium chloride    sodium chloride    Assessment & Plan   Assessment & Plan     Active Hospital Problems    Diagnosis  POA    **Acute on chronic respiratory failure with hypoxia and hypercapnia [J96.21, J96.22]  Yes    Lower extremity cellulitis [L03.119]  Yes    Bilateral cellulitis of lower leg [L03.116, L03.115]  Yes    Obesity hypoventilation syndrome [E66.2]  Yes    Right-sided congestive heart failure [I50.810]  Yes    Morbid obesity with BMI of 60.0-69.9, adult [E66.01, Z68.44]  Not Applicable    Type 2 diabetes mellitus [E11.9]  Yes    Essential hypertension [I10]  Yes    Lymphedema of both lower extremities [I89.0]  Yes      Resolved Hospital Problems    Diagnosis Date Resolved POA    Accelerated hypertension [I10] 07/22/2023 Yes        Brief Hospital Course to date:  Kerry Leal is a 58 y.o. female w/ super morbid obesity, DM2, HTN, chronic LE lymphedema, chronic hypoxia who presented w/ worsening LT LE pain; while having CT of her leg she had acute hypercapnic respiratory failure requiring BIPAP; she was admitted, started  on Abx, and seen by cardiology for diuresis    Assessment/Plan    Acute/chronic hypercapnic respiratory failure  Chronic hypoxia  Suspected OHS/restrictive lung disease  Pulmonary hypertension  Acute HFpEF  -s/p BIPAP on arrival, down to NC today  -repeat TTE limited 2/2 body habitus, EF estimated 60% w/ RVSP 57mmHg  -started on jardiance and aldactone this admit  -Diuresed by cardiology with improvement of lower extremity edema to some extent.  Diuresis was scaled back because of increased creatinine.  However, creatinine is back to normal today and we will restart bumetanide 1 mg daily.    -Will increase bumetanide to 2 mg daily and check creatinine tomorrow.  Anxiety symptoms  -recently on buspar but discontinued 2/2 unknown adverse reaction  -cont prn atarax    Chronic BL LE lymphedema w/ stasis dermatitis, possible superimposed LT LE cellulitis (less likely)  -ID follows,  doxycyline x2 weeks at DC and follow up w/ Dr. Shine Rodriguez 2 weeks post DC    HTN  -cont losartan, toprol XL, aldactone    DM type 2, A1c 7.0%, w/o long term use of insulin  -ssi    Super morbid obesity, BMI 62.11 kg/m2  -complicates all aspects of care, directly linked to primary diagnosis/problem      Expected Discharge Location and Transportation: likely home  Expected Discharge   Expected Discharge Date: 7/29/2023; Expected Discharge Time:      DVT prophylaxis:  Medical DVT prophylaxis orders are present.     AM-PAC 6 Clicks Score (PT): 17 (07/31/23 7136)    CODE STATUS:   Code Status and Medical Interventions:   Ordered at: 07/22/23 0579     Level Of Support Discussed With:    Patient     Code Status (Patient has no pulse and is not breathing):    CPR (Attempt to Resuscitate)     Medical Interventions (Patient has pulse or is breathing):    Full Support     Release to patient:    Routine Release       Kailash Pleitez MD  07/31/23

## 2023-07-31 NOTE — CASE MANAGEMENT/SOCIAL WORK
Continued Stay Note  [facility]     Patient Name: Kerry Leal  MRN: [id number]  Today's Date: 7/31/2023    Admit Date: 7/22/2023    Plan: Home with    Discharge Plan       Row Name 07/31/23 1452       Plan    Plan Home with     Patient/Family in Agreement with Plan yes    Plan Comments Spoke with patient at bedside and with son Marcus on phone.  Patient's plan is now to discharge to her daughter Elizabeth's home in [place] and have home health.  CM will set up HH and order jackie FWW, and will follow up with patient tomorrow.    Final Discharge Disposition Code 06 - home with home health care                   Discharge Codes    No documentation.                   Caro Wilson RN

## 2023-07-31 NOTE — THERAPY TREATMENT NOTE
Patient Name: Kerry Leal  : 1965    MRN: [id number]                              Today's Date: 2023       Admit Date: 2023    Visit Dx:     ICD-10-CM ICD-9-CM   1. Acute respiratory failure with hypoxia and hypercapnia  J96.01 518.81    J96.02    2. Respiratory acidosis  E87.29 276.2   3. Cellulitis of lower extremity, unspecified laterality  L03.119 682.6   4. Obesity hypoventilation syndrome  E66.2 278.03   5. Lymphedema of both lower extremities  I89.0 457.1   6. Type 2 diabetes mellitus with hyperglycemia, without long-term current use of insulin  E11.65 250.00     790.29   7. Morbid obesity with BMI of 60.0-69.9, adult  E66.01 278.01    Z68.44 V85.44     Patient Active Problem List   Diagnosis    Essential hypertension    Lymphedema of both lower extremities    Elevated d-dimer    Acute on chronic respiratory failure with hypoxia and hypercapnia    Type 2 diabetes mellitus    Morbid obesity with BMI of 60.0-69.9, adult    Right-sided congestive heart failure    Obesity hypoventilation syndrome    Bilateral cellulitis of lower leg    Lower extremity cellulitis     Past Medical History:   Diagnosis Date    Anxiety     Asthma     Heart murmur     Hypertension      Past Surgical History:   Procedure Laterality Date    APPENDECTOMY       SECTION      HYSTERECTOMY      NECK SURGERY        General Information       Row Name 23 1328          Physical Therapy Time and Intention    Document Type therapy note (daily note)  -KR     Mode of Treatment individual therapy;physical therapy  -KR       Row Name 23 1328          General Information    Patient Profile Reviewed yes  -KR     Existing Precautions/Restrictions fall;oxygen therapy device and L/min;other (see comments)  BLE edema/cellulitis  -KR     Barriers to Rehab medically complex;previous functional deficit  -KR       Row Name 23 1328          Cognition    Orientation Status (Cognition) oriented x 3  -KR       Row  Name 07/31/23 1328          Safety Issues, Functional Mobility    Safety Issues Affecting Function (Mobility) insight into deficits/self-awareness;judgment  -KR     Impairments Affecting Function (Mobility) balance;endurance/activity tolerance;range of motion (ROM);sensation/sensory awareness;shortness of breath;strength;postural/trunk control  -KR     Comment, Safety Issues/Impairments (Mobility) anxiety with mobility  -KR               User Key  (r) = Recorded By, (t) = Taken By, (c) = Cosigned By      Initials Name Provider Type    Jaqueline Montanez PT Physical Therapist                   Mobility       Row Name 07/31/23 1411          Sit-Stand Transfer    Sit-Stand [place] (Transfers) standby assist;verbal cues  -KR     Assistive Device (Sit-Stand Transfers) walker, front-wheeled;cane, quad  -KR     Comment, (Sit-Stand Transfer) multiple stands from chair and toilet with FWW. 1x from chair with R quad cane.  -KR       Row Name 07/31/23 1411          Gait/Stairs (Locomotion)    [place] Level (Gait) standby assist;verbal cues  -KR     Assistive Device (Gait) walker, front-wheeled  -KR     Distance in Feet (Gait) 20 + 20 + 40 + 20 + 60 + 20 + 20  -KR     Deviations/Abnormal Patterns (Gait) base of support, wide;gait speed decreased;anjelica decreased;bilateral deviations;stride length decreased;weight shifting decreased  -KR     Bilateral Gait Deviations forward flexed posture;heel strike decreased  -KR     Comment, (Gait/Stairs) pt ambulated short distance with R quad cane and CGA. Pt with increased anxiety, decreased anjelica, and required mod VCs for sequencing of AD and LE progression. Remainder of ambulation trials performed with FWW. Pt with increased confidence and gait speed. Farther distance limited by fatigue.  -KR               User Key  (r) = Recorded By, (t) = Taken By, (c) = Cosigned By      Initials Name Provider Type    Jaqueline Montanez PT Physical Therapist                    Obj/Interventions       Row Name 07/31/23 1413          Balance    Balance Assessment sitting static balance;sitting dynamic balance;standing static balance;standing dynamic balance  -KR     Static Sitting Balance independent  -KR     Dynamic Sitting Balance standby assist  -KR     Position, Sitting Balance unsupported;sitting in chair;other (see comments)  toilet  -KR     Static Standing Balance standby assist  -KR     Dynamic Standing Balance standby assist  -KR     Position/Device Used, Standing Balance supported;walker, front-wheeled  -KR     Balance Interventions sitting;standing;sit to stand;supported;static;dynamic  -KR               User Key  (r) = Recorded By, (t) = Taken By, (c) = Cosigned By      Initials Name Provider Type    Jaqueline Montanez, PT Physical Therapist                   Goals/Plan    No documentation.                  Clinical Impression       Row Name 07/31/23 1414          Pain    Pretreatment Pain Rating 0/10 - no pain  -KR     Posttreatment Pain Rating 0/10 - no pain  -KR       Row Name 07/31/23 1414          Plan of Care Review    Plan of Care Reviewed With patient  -KR     Progress improving  -KR     Outcome Evaluation Pt able to increase ambulation distance per trial and overall, as well as required less assist. Pt continues to demo anxiety with mobility and dyspnea upon exertion. O2 > 90% on 2 L throughout session. PT changing recs to home with assist, jackie FWW, and  PT upon dc.  -KR       Row Name 07/31/23 1414          Vital Signs    Pre Systolic BP Rehab --  VSS, RN approved tx.  -KR     O2 Delivery Pre Treatment nasal cannula  -KR     O2 Delivery Intra Treatment nasal cannula  -KR     O2 Delivery Post Treatment nasal cannula  -KR     Pre Patient Position Sitting  -KR     Intra Patient Position Standing  -KR     Post Patient Position Sitting  -KR       Row Name 07/31/23 1414          Positioning and Restraints    Pre-Treatment Position sitting in chair/recliner  -KR     Post  Treatment Position chair  -KR     In Chair notified nsg;reclined;call light within reach;encouraged to call for assist;exit alarm on;waffle cushion;with other staff  -KR               User Key  (r) = Recorded By, (t) = Taken By, (c) = Cosigned By      Initials Name Provider Type    Jaqueline Montanez PT Physical Therapist                   Outcome Measures       Row Name 07/31/23 1416          How much help from another person do you currently need...    Turning from your back to your side while in flat bed without using bedrails? 3  -KR     Moving from lying on back to sitting on the side of a flat bed without bedrails? 3  -KR     Moving to and from a bed to a chair (including a wheelchair)? 3  -KR     Standing up from a chair using your arms (e.g., wheelchair, bedside chair)? 3  -KR     Climbing 3-5 steps with a railing? 2  -KR     To walk in hospital room? 3  -KR     AM-PAC 6 Clicks Score (PT) 17  -KR     Highest level of mobility 5 --> Static standing  -KR               User Key  (r) = Recorded By, (t) = Taken By, (c) = Cosigned By      Initials Name Provider Type    Jaqueline Montanez PT Physical Therapist                                 Physical Therapy Education       Title: PT OT SLP Therapies (In Progress)       Topic: Physical Therapy (Done)       Point: Mobility training (Done)       Learning Progress Summary             Patient Acceptance, E, VU by KR at 7/31/2023 1416    Eager, E, VU,DU,NR by SS at 7/27/2023 1543    Comment: Reviewed safety/technique w/bed mobility, transfers, ambulation, HEP, PT POC    Acceptance, E, VU by KR at 7/26/2023 1554    Acceptance, E, VU by ER at 7/25/2023 1325    Comment: pt educated on d/c planning, purpose of PT, safety    Acceptance, E, VU,NR by SHANEL at 7/23/2023 1040    Comment: PT POC                         Point: Home exercise program (Done)       Learning Progress Summary             Patient Acceptance, E, VU by KR at 7/31/2023 1416    Eager, E, VU,DU,NR by SS at  7/27/2023 1543    Comment: Reviewed safety/technique w/bed mobility, transfers, ambulation, HEP, PT POC    Acceptance, E, VU by KR at 7/26/2023 1554    Acceptance, E, VU by ER at 7/25/2023 1325    Comment: pt educated on d/c planning, purpose of PT, safety    Acceptance, E, VU,NR by  at 7/23/2023 1040    Comment: PT POC                         Point: Body mechanics (Done)       Learning Progress Summary             Patient Acceptance, E, VU by KR at 7/31/2023 1416    Eager, E, VU,DU,NR by SS at 7/27/2023 1543    Comment: Reviewed safety/technique w/bed mobility, transfers, ambulation, HEP, PT POC    Acceptance, E, VU by KR at 7/26/2023 1554    Acceptance, E, VU by ER at 7/25/2023 1325    Comment: pt educated on d/c planning, purpose of PT, safety    Acceptance, E, VU,NR by  at 7/23/2023 1040    Comment: PT POC                         Point: Precautions (Done)       Learning Progress Summary             Patient Acceptance, E, VU by KR at 7/31/2023 1416    Eager, E, VU,DU,NR by  at 7/27/2023 1543    Comment: Reviewed safety/technique w/bed mobility, transfers, ambulation, HEP, PT POC    Acceptance, E, VU by KR at 7/26/2023 1554    Acceptance, E, VU by ER at 7/25/2023 1325    Comment: pt educated on d/c planning, purpose of PT, safety    Acceptance, E, VU,NR by  at 7/23/2023 1040    Comment: PT POC                                         User Key       Initials Effective Dates Name Provider Type Discipline     06/16/21 -  Anamaria Ravi, PT Physical Therapist PT     06/01/21 -  Milly Alfaro, PT Physical Therapist PT    ER 04/20/23 -  Caroline Stewart, PT Student PT Student PT     12/30/22 -  Jaqueline Tran, PT Physical Therapist PT                  PT Recommendation and Plan     Plan of Care Reviewed With: patient  Progress: improving  Outcome Evaluation: Pt able to increase ambulation distance per trial and overall, as well as required less assist. Pt continues to demo anxiety with mobility and dyspnea  upon exertion. O2 > 90% on 2 L throughout session. PT changing recs to home with assist, jackie FWW, and  PT upon dc.     Time Calculation:         PT Charges       Row Name 07/31/23 1416             Time Calculation    Start Time 1328  -KR      PT Received On 07/31/23  -KR      PT Goal Re-Cert Due Date 08/02/23  -KR         Timed Charges    44810 - Gait Training Minutes  25  -KR      85827 - PT Therapeutic Activity Minutes 13  -KR         Total Minutes    Timed Charges Total Minutes 38  -KR       Total Minutes 38  -KR                User Key  (r) = Recorded By, (t) = Taken By, (c) = Cosigned By      Initials Name Provider Type    Jaqueline Montanez, PT Physical Therapist                  Therapy Charges for Today       Code Description Service Date Service Provider Modifiers Qty    46574914635 HC GAIT TRAINING EA 15 MIN 7/31/2023 Jaqueline Tran, PT GP 2    93511057081 HC PT THERAPEUTIC ACT EA 15 MIN 7/31/2023 Jaqueline Tran, PT GP 1            PT G-Codes  Outcome Measure Options: AM-PAC 6 Clicks Basic Mobility (PT)  AM-PAC 6 Clicks Score (PT): 17  AM-PAC 6 Clicks Score (OT): 16  PT Discharge Summary  Anticipated Discharge Disposition (PT): home with assist    Jaqueline Tran PT  7/31/2023

## 2023-08-01 LAB
ANION GAP SERPL CALCULATED.3IONS-SCNC: 8 MMOL/L (ref 5–15)
BUN SERPL-MCNC: 27 MG/DL (ref 6–20)
BUN/CREAT SERPL: 42.9 (ref 7–25)
CALCIUM SPEC-SCNC: 8.9 MG/DL (ref 8.6–10.5)
CHLORIDE SERPL-SCNC: 96 MMOL/L (ref 98–107)
CO2 SERPL-SCNC: 34 MMOL/L (ref 22–29)
CREAT SERPL-MCNC: 0.63 MG/DL (ref 0.57–1)
EGFRCR SERPLBLD CKD-EPI 2021: 104.3 ML/MIN/1.73
GLUCOSE BLDC GLUCOMTR-MCNC: 135 MG/DL (ref 70–130)
GLUCOSE BLDC GLUCOMTR-MCNC: 139 MG/DL (ref 70–130)
GLUCOSE BLDC GLUCOMTR-MCNC: 139 MG/DL (ref 70–130)
GLUCOSE BLDC GLUCOMTR-MCNC: 143 MG/DL (ref 70–130)
GLUCOSE SERPL-MCNC: 132 MG/DL (ref 65–99)
POTASSIUM SERPL-SCNC: 4.1 MMOL/L (ref 3.5–5.2)
SODIUM SERPL-SCNC: 138 MMOL/L (ref 136–145)

## 2023-08-01 PROCEDURE — 25010000002 ENOXAPARIN PER 10 MG: Performed by: NURSE PRACTITIONER

## 2023-08-01 PROCEDURE — 99232 SBSQ HOSP IP/OBS MODERATE 35: CPT | Performed by: NURSE PRACTITIONER

## 2023-08-01 PROCEDURE — 99232 SBSQ HOSP IP/OBS MODERATE 35: CPT | Performed by: INTERNAL MEDICINE

## 2023-08-01 PROCEDURE — 29581 APPL MULTLAYER CMPRN SYS LEG: CPT

## 2023-08-01 PROCEDURE — 97530 THERAPEUTIC ACTIVITIES: CPT

## 2023-08-01 PROCEDURE — 80048 BASIC METABOLIC PNL TOTAL CA: CPT | Performed by: INTERNAL MEDICINE

## 2023-08-01 PROCEDURE — 82948 REAGENT STRIP/BLOOD GLUCOSE: CPT

## 2023-08-01 PROCEDURE — 97597 DBRDMT OPN WND 1ST 20 CM/<: CPT

## 2023-08-01 PROCEDURE — 97535 SELF CARE MNGMENT TRAINING: CPT

## 2023-08-01 RX ORDER — SPIRONOLACTONE 50 MG/1
50 TABLET, FILM COATED ORAL DAILY
Qty: 60 TABLET | Refills: 1 | Status: SHIPPED | OUTPATIENT
Start: 2023-08-02

## 2023-08-01 RX ORDER — BUMETANIDE 2 MG/1
2 TABLET ORAL DAILY
Qty: 90 TABLET | Refills: 1 | Status: SHIPPED | OUTPATIENT
Start: 2023-08-02

## 2023-08-01 RX ORDER — NITROGLYCERIN 0.4 MG/1
0.4 TABLET SUBLINGUAL
Qty: 25 TABLET | Refills: 0 | Status: SHIPPED | OUTPATIENT
Start: 2023-08-01

## 2023-08-01 RX ORDER — LOSARTAN POTASSIUM 50 MG/1
100 TABLET ORAL DAILY
Qty: 90 TABLET | Refills: 1 | Status: SHIPPED | OUTPATIENT
Start: 2023-08-01

## 2023-08-01 RX ADMIN — ENOXAPARIN SODIUM 60 MG: 60 INJECTION SUBCUTANEOUS at 21:10

## 2023-08-01 RX ADMIN — SENNOSIDES AND DOCUSATE SODIUM 1 TABLET: 50; 8.6 TABLET ORAL at 21:10

## 2023-08-01 RX ADMIN — DOXYCYCLINE 100 MG: 100 CAPSULE ORAL at 09:05

## 2023-08-01 RX ADMIN — HYDROXYZINE HYDROCHLORIDE 25 MG: 25 TABLET, FILM COATED ORAL at 11:11

## 2023-08-01 RX ADMIN — EMPAGLIFLOZIN 10 MG: 10 TABLET, FILM COATED ORAL at 09:05

## 2023-08-01 RX ADMIN — PANTOPRAZOLE SODIUM 40 MG: 40 TABLET, DELAYED RELEASE ORAL at 05:17

## 2023-08-01 RX ADMIN — ENOXAPARIN SODIUM 60 MG: 60 INJECTION SUBCUTANEOUS at 09:05

## 2023-08-01 RX ADMIN — SPIRONOLACTONE 50 MG: 25 TABLET ORAL at 09:05

## 2023-08-01 RX ADMIN — Medication 1 CAPSULE: at 11:12

## 2023-08-01 RX ADMIN — Medication 10 ML: at 21:10

## 2023-08-01 RX ADMIN — HYDROXYZINE HYDROCHLORIDE 25 MG: 25 TABLET, FILM COATED ORAL at 21:10

## 2023-08-01 RX ADMIN — BUMETANIDE 2 MG: 2 TABLET ORAL at 09:05

## 2023-08-01 RX ADMIN — METOPROLOL SUCCINATE 100 MG: 100 TABLET, EXTENDED RELEASE ORAL at 09:05

## 2023-08-01 RX ADMIN — DOXYCYCLINE 100 MG: 100 CAPSULE ORAL at 21:10

## 2023-08-01 RX ADMIN — LOSARTAN POTASSIUM 50 MG: 50 TABLET, FILM COATED ORAL at 09:05

## 2023-08-01 NOTE — PROGRESS NOTES
[facility] Medicine Services  PROGRESS NOTE    Patient Name: Kerry Leal  : 1965  MRN: [id number]    Date of Admission: 2023  Primary Care Physician: Gopal Kong MD    Subjective   Subjective     CC:  F/u heart failure    HPI:  Resting in a chair in no acute distress and feels okay.  Does not have any specific complaint except the swelling of lower extremities.  Urine output has increased since yesterday.  No chest pain or shortness of breath.    ROS:  Gen- No fevers, chills  CV- No chest pain, palpitations  Resp- No cough, yes dyspnea  GI- No N/V/D, abd pain     Objective   Objective     Vital Signs:   Temp:  [97.5 øF (36.4 øC)-98.4 øF (36.9 øC)] 97.5 øF (36.4 øC)  Heart Rate:  [67-94] 75  Resp:  [16-18] 18  BP: (120-151)/(60-80) 124/60  Flow (L/min):  [2] 2     Physical Exam:  Constitutional: Resting in bed in no acute distress  HENT: NCAT, mucous membranes moist  Respiratory: Clear to auscultation bilaterally, respiratory effort normal  Cardiovascular: RRR, no murmur gallop or rub audible.  Gastrointestinal: Abdomen is very obese.  Positive bowel sounds, soft, nontender, nondistended  Musculoskeletal: Severe BL LE edema w/ wraps, wounds on the shins bilaterally.  Psychiatric: Appropriate affect, cooperative  Neurologic: Awake, alert, oriented x3, no focality appreciated, speech is clear  Skin: Skin thickening and redness over the shins bilaterally, no other rash visible.    Results Reviewed:  LAB RESULTS:            Lab 23  0611 23  0358 23  0457 23  0436 23  0531 23  0413 23  0414   SODIUM 138 139 138 136 136   < > 140   POTASSIUM 4.1 4.7 4.5 4.4 4.1   < > 3.9   CHLORIDE 96* 95* 93* 90* 87*   < > 87*   CO2 34.0* 36.0* 39.0* 38.0* 40.0*   < > 46.0*   ANION GAP 8.0 8.0 6.0 8.0 9.0   < > 7.0   BUN 27* 35* 41* 52* 58*   < > 34*   CREATININE 0.63 0.82 0.83 1.08* 1.14*   < > 0.85   EGFR 104.3 84.1 82.9 60.4 56.6*   < > 80.5    GLUCOSE 132* 148* 136* 146* 147*   < > 135*   CALCIUM 8.9 9.2 9.3 9.2 9.4   < > 9.4   MAGNESIUM  --  2.2  --   --  2.5  --  2.3   PHOSPHORUS  --  3.6  --   --   --   --   --     < > = values in this interval not displayed.         Lab 07/30/23  0457   TOTAL PROTEIN 7.0   ALBUMIN 3.6   GLOBULIN 3.4   ALT (SGPT) 19   AST (SGOT) 25   BILIRUBIN 0.6   ALK PHOS 64           Lab 07/28/23  1403   PROBNP 102.5                   Brief Urine Lab Results  (Last result in the past 365 days)        Color   Clarity   Blood   Leuk Est   Nitrite   Protein   CREAT   Urine HCG        07/22/23 0426 Yellow   Clear   Negative   Negative   Negative   Negative                   Microbiology Results Abnormal       Procedure Component Value - Date/Time    Blood Culture - Blood, Arm, Right [[id number]]  (Normal) Collected: 07/22/23 0142    Lab Status: Final result Specimen: Blood from Arm, Right Updated: 07/27/23 0800     Blood Culture No growth at 5 days    Blood Culture - Blood, Arm, Left [[id number]]  (Normal) Collected: 07/22/23 0142    Lab Status: Final result Specimen: Blood from Arm, Left Updated: 07/27/23 0800     Blood Culture No growth at 5 days            No radiology results from the last 24 hrs    Results for orders placed during the hospital encounter of 07/22/23    Adult Transthoracic Echo Complete W/ Cont if Necessary Per Protocol    Interpretation Summary    Technically difficult study due to massive obesity    Left ventricular systolic function is normal. Estimated left ventricular EF = 60%    Left ventricular wall thickness is consistent with mild concentric hypertrophy.    Valves are poorly visualized.  However there is no gross valvular abnormality    Estimated right ventricular systolic pressure from tricuspid regurgitation is markedly elevated (57 mmHg).      Current medications:  Scheduled Meds:[START ON 8/2/2023] bumetanide, 2.5 mg, Oral, Daily  doxycycline, 100 mg, Oral, Q12H  empagliflozin, 10 mg, Oral,  Daily  enoxaparin, 60 mg, Subcutaneous, Q12H  insulin lispro, 2-7 Units, Subcutaneous, 4x Daily AC & at Bedtime  lactobacillus acidophilus, 1 capsule, Oral, Daily With Lunch  losartan, 50 mg, Oral, Daily  metoprolol succinate XL, 100 mg, Oral, Daily  pantoprazole, 40 mg, Oral, Q AM  senna-docusate sodium, 2 tablet, Oral, BID  sodium chloride, 10 mL, Intravenous, Q12H  spironolactone, 50 mg, Oral, Daily      Continuous Infusions:     PRN Meds:.  acetaminophen **OR** acetaminophen **OR** acetaminophen    Albuterol Sulfate NEB Orderable    Biotene dry mouth    senna-docusate sodium **AND** polyethylene glycol **AND** bisacodyl **AND** bisacodyl    Calcium Replacement - Follow Nurse / BPA Driven Protocol    dextrose    dextrose    glucagon (human recombinant)    hydrOXYzine    Magnesium Cardiology Dose Replacement - Follow Nurse / BPA Driven Protocol    nitroglycerin    Phosphorus Replacement - Follow Nurse / BPA Driven Protocol    Potassium Replacement - Follow Nurse / BPA Driven Protocol    sodium chloride    sodium chloride    sodium chloride    Assessment & Plan   Assessment & Plan     Active Hospital Problems    Diagnosis  POA    **Acute on chronic respiratory failure with hypoxia and hypercapnia [J96.21, J96.22]  Yes    Lower extremity cellulitis [L03.119]  Yes    Bilateral cellulitis of lower leg [L03.116, L03.115]  Yes    Obesity hypoventilation syndrome [E66.2]  Yes    Right-sided congestive heart failure [I50.810]  Yes    Morbid obesity with BMI of 60.0-69.9, adult [E66.01, Z68.44]  Not Applicable    Type 2 diabetes mellitus [E11.9]  Yes    Essential hypertension [I10]  Yes    Lymphedema of both lower extremities [I89.0]  Yes      Resolved Hospital Problems    Diagnosis Date Resolved POA    Accelerated hypertension [I10] 07/22/2023 Yes        Brief Hospital Course to date:  Kerry Leal is a 58 y.o. female w/ super morbid obesity, DM2, HTN, chronic LE lymphedema, chronic hypoxia who presented w/ worsening  LT LE pain; while having CT of her leg she had acute hypercapnic respiratory failure requiring BIPAP; she was admitted, started on Abx, and seen by cardiology for diuresis    Assessment/Plan    Acute/chronic hypercapnic respiratory failure  Chronic hypoxia  Suspected OHS/restrictive lung disease  Pulmonary hypertension  Acute HFpEF  -s/p BIPAP on arrival, down to NC today  -repeat TTE limited 2/2 body habitus, EF estimated 60% w/ RVSP 57mmHg  -started on jardiance and aldactone this admit  -Diuresed by cardiology with improvement of lower extremity edema to some extent.  Diuresis was scaled back because of increased creatinine.  However, creatinine is back to normal today and we will restart bumetanide 1 mg daily.  Then it was increased to 2 mg/day and patient is still tolerating it.  -Will increase bumetanide to 2.5 mg daily.    Anxiety symptoms  -recently on buspar but discontinued 2/2 unknown adverse reaction  -cont prn atarax    Chronic BL LE lymphedema w/ stasis dermatitis, possible superimposed LT LE cellulitis (less likely)  -ID follows, doxycyline x2 weeks at DC and follow up w/ Dr. Shine Rodriguez 2 weeks post DC    HTN  -cont losartan, toprol XL, aldactone    DM type 2, A1c 7.0%, w/o long term use of insulin  -ssi  -Patient was not on any medication at home.  Consider prescribing oral medication at discharge.     Super morbid obesity, BMI 62.11 kg/m2  -complicates all aspects of care, directly linked to primary diagnosis/problem    PLAN:  -Increase bumetanide to 2.5 mg p.o. daily  -Check creatinine in a.m.  -Patient can go home with diuretics, doxycycline for 2 weeks, follow-up with ID and cardiology  -Patient's A1c is 7.0 but was not on any medication at home.  Consider oral medication on discharge.      Expected Discharge Location and Transportation: likely home  Expected Discharge possibly tomorrow  Expected Discharge Date: 8/2/2023; Expected Discharge Time:      DVT prophylaxis:  Medical DVT  prophylaxis orders are present.     AM-PAC 6 Clicks Score (PT): 17 (07/31/23 6866)    CODE STATUS:   Code Status and Medical Interventions:   Ordered at: 07/22/23 0551     Level Of Support Discussed With:    Patient     Code Status (Patient has no pulse and is not breathing):    CPR (Attempt to Resuscitate)     Medical Interventions (Patient has pulse or is breathing):    Full Support     Release to patient:    Routine Release       Kailash Pleitez MD  08/01/23

## 2023-08-01 NOTE — PLAN OF CARE
Goal Outcome Evaluation:    Problem: Adult Inpatient Plan of Care  Goal: Plan of Care Review  Recent Flowsheet Documentation  Taken 8/1/2023 1332 by Majo Sumner OT  Progress: improving  Plan of Care Reviewed With: patient  Outcome Evaluation: Pt alert and participatory in OT interventions. Upon OT arrival, pt in bathroom completing initial hygiene tasks however able to tolerate gross ADL routine from graded positions with intermittent rest breaks throughout. Pt demonstrated improved ability to t/f to/from bathroom and tolerance toward standing at sink for oral hygiene, hair grooming. Pt able to tolerate sitting at toilet for toileting, UBD x 3 trials for alt gowns and UBB for ECWS and safety purposes. Pt continues to require A for distal aspects of bathing and LBD with pt likely able to complete more May when using AE, did not progress that specific training this date. Pt limited by fatigue and anxiousness with mobility requiring rest breaks and cued breathing for rest and relaxation. Pt grossly req'd SBA for STS t/fs and CGA for mobility for safety given said underlying impairments w/ cues for best AD mgmt. Recommend continued IPOT POC w/ progress as able. At this time recommend home with 24/7 A at least initially in which pt reports is available with dtr and spouse. Pt would benefit from pulse oximeter to monitor O2 sats at d/c. SPO2 grossly > 90% on RA, 88 to 89% s/p mobility where pt resumed supplemental O2.         Anticipated Discharge Disposition (OT): home with assist, home with 24/7 care

## 2023-08-01 NOTE — PLAN OF CARE
Goal Outcome Evaluation:  Plan of Care Reviewed With: patient           Outcome Evaluation: BLE edema stable with pt having increased c/o discomfort with increased compression levels.  PT added doubled layers to all sizes tohelp increase compression without decreasing stocking size. PT was also able to lightly debride the hypertrophic crust from BLEs to help improve skin integrity with no open areas noted to LEs.

## 2023-08-01 NOTE — PROGRESS NOTES
Nutrition Services    Patient Name:  Kerry Leal  YOB: 1965  MRN: [id number]  Admit Date:  7/22/2023    Pt w LOS 7 da. No c/o food taking 100% x last 9 meals recorded.   Assured pt has menu for choices.     Electronically signed by:  Madonna Mccrary RD  07/31/23 20:37 EDT

## 2023-08-01 NOTE — CONSULTS
"Diabetes Education  Assessment/Teaching    Patient Name:  Kerry Leal  YOB: 1965  MRN: [id number]  Admit Date:  7/22/2023      Assessment Date:  8/1/2023  Flowsheet Row Most Recent Value   General Information     Referral From: Database   Height 160 cm (62.99\")   Height Method Stated   Weight 150 kg (331 lb 3.2 oz)   Weight Method Bed scale   Pregnancy Assessment    Diabetes History    What type of diabetes do you have? Type 2   Length of Diabetes Diagnosis 1 - 5 years   Current DM knowledge fair   Have you had diabetes education/teaching in the past? no   Do you test your blood sugar at home? yes   Frequency of checks Daily   Who performs the test? self   Typical readings 100-120s   Have you had low blood sugar? (<70mg/dl) no   Have you had high blood sugar? (>140mg/dl) no   Education Preferences    What areas of diabetes would you like to learn about? avoiding high blood sugar, diabetes complications, diet information   Nutrition Information    Assessment Topics    Healthy Eating - Assessment Needs education   Problem Solving - Assessment Needs education   Reducing Risk - Assessment Needs education   Healthy Coping - Assessment Needs education   DM Goals             Flowsheet Row Most Recent Value   DM Education Needs    Meter Has own   Frequency of Testing 2 times a day   Blood Glucose Target Range Ranges per ADA guidelines   Medication Oral, Actions, Side effects   Problem Solving Hypoglycemia, Hyperglycemia, Signs, Symptoms, Treatment, Sick days   Reducing Risks A1C testing, Blood pressure, Foot care, Cardiovascular   Physical Activity None   Healthy Coping Appropriate   Motivation Moderate   Teaching Method Explanation, Discussion, Handouts, Teach back   Patient Response Needs reinforcement        Mrs. Leal gave permission for diabetes education. Her latest A1c was 7% on 7/22/23. She states she was diagnosed with pre diabetes years ago and T2DM approximately 2 years ago. She has not been " on any diabetes medications. Jardiance 10 mg is listed as a new medication started this admission. Reviewed this medication action, dose, side effects with patient. She has been checking her BG daily, fasting. Encouraged her to also check 2 hours post prandial to assess how the foods she's eating is affecting her BG. Suggested she record her readings to take with her to her follow up with her PCP.  Discussed and taught about type 2 diabetes self-management, risk factors, and importance of blood glucose control to reduce complications. Target blood glucose readings and A1c goals per ADA were reviewed. Reviewed current A1c and discussed its significance. Signs, symptoms and treatment of hyperglycemia and hypoglycemia were discussed. Lifestyle changes such as physical activity with MD approval and healthy eating were encouraged. Stressed the importance of BG control to to promote healing of cellulitis and prevent complications.   Discussed using the plate method for portion and carb control. Discussed what foods are carbs and instructed on the plate method.  Suggested she attend an OP diabetes and nutrition class after discharge. She prefers to see her PCP first and they obtain order to attend class. Provided her with our OP contact information. Provided handouts: What is Diabetes?, A1c goals, BG goals, Plate method. Thank you for this referral.          Electronically signed by:  Kendy García RN [facility]  08/01/23 11:52 EDT

## 2023-08-01 NOTE — THERAPY WOUND CARE TREATMENT
Acute Care - Wound/Debridement Treatment Note  [facility]     Patient Name: Kerry Leal  : 1965  MRN: [id number]  Today's Date: 2023                Admit Date: 2023    Visit Dx:    ICD-10-CM ICD-9-CM   1. Acute respiratory failure with hypoxia and hypercapnia  J96.01 518.81    J96.02    2. Respiratory acidosis  E87.29 276.2   3. Cellulitis of lower extremity, unspecified laterality  L03.119 682.6   4. Obesity hypoventilation syndrome  E66.2 278.03   5. Lymphedema of both lower extremities  I89.0 457.1   6. Type 2 diabetes mellitus with hyperglycemia, without long-term current use of insulin  E11.65 250.00     790.29   7. Morbid obesity with BMI of 60.0-69.9, adult  E66.01 278.01    Z68.44 V85.44           Patient Active Problem List   Diagnosis    Essential hypertension    Lymphedema of both lower extremities    Elevated d-dimer    Acute on chronic respiratory failure with hypoxia and hypercapnia    Type 2 diabetes mellitus    Morbid obesity with BMI of 60.0-69.9, adult    Right-sided congestive heart failure    Obesity hypoventilation syndrome    Bilateral cellulitis of lower leg    Lower extremity cellulitis        Past Medical History:   Diagnosis Date    Anxiety     Asthma     Heart murmur     Hypertension         Past Surgical History:   Procedure Laterality Date    APPENDECTOMY       SECTION      HYSTERECTOMY      NECK SURGERY             Wound Bilateral lower leg Other (comment) (Active)   Wound Image     23 0800   Dressing Appearance dry;intact 23 0830   Base epithelialization 23 0800   Periwound redness 23 0800   Periwound Temperature warm 23 0800   Periwound Skin Turgor firm 23 0800   Drainage Amount none 23 0800   Care, Wound cleansed with;soap and water 23 0800   Dressing Care petroleum-based 23 0800   Periwound Care cleansed with pH balanced cleanser 23 0800       Wound 23 1017 Bilateral anterior groin MASD  (Moisture associated skin damage) (Active)   Dressing Appearance open to air 08/01/23 0830   Closure Open to air 07/31/23 2019   Base moist;blanchable 07/31/23 2019   Drainage Amount none 07/31/23 2019   Dressing Care open to air 07/31/23 2019   Periwound Care dry periwound area maintained 07/31/23 2019       Wound 07/25/23 1130 Left medial gluteal MASD (Moisture associated skin damage) (Active)   Dressing Appearance open to air 08/01/23 0830   Closure Open to air 07/31/23 2019   Base blanchable;red 07/31/23 2019   Dressing Care open to air 07/31/23 2019      Lymphedema       Row Name 08/01/23 0800             Lymphedema Edema Assessment    Ptting Edema Category By severity  -      Pitting Edema Moderate;Severe  -         Compression/Skin Care    Compression/Skin Care skin care;wrapping location;bandaging  -      Skin Care washed/dried;lotion applied;moisturizing lotion applied  -      Wrapping Location lower extremity  -      Wrapping Location LE bilateral:;foot to knee  -      Wrapping Comments xeroform to L post calf with cast padding to secure. size 6/8 to RLE with size 6/10 to LLE  -                User Key  (r) = Recorded By, (t) = Taken By, (c) = Cosigned By      Initials Name Provider Type    Louis Ibarra, PT Physical Therapist                    WOUND DEBRIDEMENT  Total area of Debridement: ~10cm2  Debridement Site 1  Location- Site 1: BLEs  Selective Debridement- Site 1: Wound Surface <20cmsq  Instruments- Site 1: tweezers  Excised Tissue Description- Site 1: minimum, other (comment) (hyoertrophic skin)  Bleeding- Site 1: none               PT Assessment (last 12 hours)       PT Evaluation and Treatment       Row Name 08/01/23 0800          Physical Therapy Time and Intention    Subjective Information complains of;weakness;fatigue;pain  -     Document Type wound care;therapy note (daily note)  -     Mode of Treatment individual therapy;physical therapy  -       Row Name  08/01/23 0800          Pain Scale: FACES Pre/Post-Treatment    Pain: FACES Scale, Pretreatment 2-->hurts little bit  -MF     Posttreatment Pain Rating 0-->no hurt  -MF     Pain Location - Side/Orientation Bilateral  -MF     Pain Location lower  -MF     Pain Location - extremity  -MF       Row Name 08/01/23 0800          Wound Bilateral lower leg Other (comment)    Wound - Properties Group Side: Bilateral  -DE Orientation: lower  -DE Location: leg  -DE Primary Wound Type: Other  -DE, lymphedema     Wound Image Images linked: 3  -MF     Dressing Appearance dry;intact  -MF     Base epithelialization  -MF     Periwound redness  -MF     Periwound Temperature warm  -MF     Periwound Skin Turgor firm  -MF     Drainage Amount none  -MF     Care, Wound cleansed with;soap and water  -MF     Dressing Care petroleum-based  -MF     Periwound Care cleansed with pH balanced cleanser  -MF     Retired Wound - Properties Group Side: Bilateral  -DE Orientation: lower  -DE Location: leg  -DE Primary Wound Type: Other  -DE, lymphedema     Retired Wound - Properties Group Side: Bilateral  -DE Location: leg  -DE Primary Wound Type: Other  -DE, lymphedema       Row Name             Wound 07/22/23 1017 Bilateral anterior groin MASD (Moisture associated skin damage)    Wound - Properties Group Placement Date: 07/22/23  -DE Placement Time: 1017  -DE Present on Hospital Admission: Y  -DE Side: Bilateral  -DE Orientation: anterior  -DE Location: groin  -DE Primary Wound Type: MASD  -DE    Retired Wound - Properties Group Placement Date: 07/22/23  -DE Placement Time: 1017  -DE Present on Hospital Admission: Y  -DE Side: Bilateral  -DE Orientation: anterior  -DE Location: groin  -DE Primary Wound Type: MASD  -DE    Retired Wound - Properties Group Date first assessed: 07/22/23  -DE Time first assessed: 1017  -DE Present on Hospital Admission: Y  -DE Side: Bilateral  -DE Location: groin  -DE Primary Wound Type: MASD  -DE      Row Name              Wound 07/25/23 1130 Left medial gluteal MASD (Moisture associated skin damage)    Wound - Properties Group Placement Date: 07/25/23  -TG Placement Time: 1130  -TG Present on Hospital Admission: Y  -TG Side: Left  -TG Orientation: medial  -TG Location: gluteal  -TG Primary Wound Type: MASD  -TG Additional Comments: MASD - IAD & friction  -TG    Retired Wound - Properties Group Placement Date: 07/25/23  -TG Placement Time: 1130  -TG Present on Hospital Admission: Y  -TG Side: Left  -TG Orientation: medial  -TG Location: gluteal  -TG Primary Wound Type: MASD  -TG Additional Comments: MASD - IAD & friction  -TG    Retired Wound - Properties Group Date first assessed: 07/25/23  -TG Time first assessed: 1130  -TG Present on Hospital Admission: Y  -TG Side: Left  -TG Location: gluteal  -TG Primary Wound Type: MASD  -TG Additional Comments: MASD - IAD & friction  -TG      Row Name 08/01/23 0800          Coping    Observed Emotional State calm;cooperative  -     Verbalized Emotional State acceptance  -     Trust Relationship/Rapport care explained  -       Row Name 08/01/23 0800          Plan of Care Review    Plan of Care Reviewed With patient  -MF     Outcome Evaluation BLE edema stable with pt having increased c/o discomfort with increased compression levels.  PT added doubled layers to all sizes tohelp increase compression without decreasing stocking size. PT was also able to lightly debride the hypertrophic crust from BLEs to help improve skin integrity with no open areas noted to LEs.  -       Row Name 08/01/23 0800          Positioning and Restraints    Pre-Treatment Position sitting in chair/recliner  -     Post Treatment Position chair  -MF     In Chair reclined;call light within reach  -               User Key  (r) = Recorded By, (t) = Taken By, (c) = Cosigned By      Initials Name Provider Type    Louis Ibarra, PT Physical Therapist    Lele Nielson, RN Registered Nurse    BEATRICE Nails  Thanh, RN Registered Nurse                  Physical Therapy Education       Title: PT OT SLP Therapies (In Progress)       Topic: Physical Therapy (Done)       Point: Mobility training (Done)       Learning Progress Summary             Patient Acceptance, E, VU by KR at 7/31/2023 1416    Eager, E, VU,DU,NR by SS at 7/27/2023 1543    Comment: Reviewed safety/technique w/bed mobility, transfers, ambulation, HEP, PT POC    Acceptance, E, VU by KR at 7/26/2023 1554    Acceptance, E, VU by ER at 7/25/2023 1325    Comment: pt educated on d/c planning, purpose of PT, safety    Acceptance, E, VU,NR by LO at 7/23/2023 1040    Comment: PT POC                         Point: Home exercise program (Done)       Learning Progress Summary             Patient Acceptance, E, VU by KR at 7/31/2023 1416    Eager, E, VU,DU,NR by SS at 7/27/2023 1543    Comment: Reviewed safety/technique w/bed mobility, transfers, ambulation, HEP, PT POC    Acceptance, E, VU by KR at 7/26/2023 1554    Acceptance, E, VU by ER at 7/25/2023 1325    Comment: pt educated on d/c planning, purpose of PT, safety    Acceptance, E, VU,NR by  at 7/23/2023 1040    Comment: PT POC                         Point: Body mechanics (Done)       Learning Progress Summary             Patient Acceptance, E, VU by KR at 7/31/2023 1416    Eager, E, VU,DU,NR by SS at 7/27/2023 1543    Comment: Reviewed safety/technique w/bed mobility, transfers, ambulation, HEP, PT POC    Acceptance, E, VU by KR at 7/26/2023 1554    Acceptance, E, VU by ER at 7/25/2023 1325    Comment: pt educated on d/c planning, purpose of PT, safety    Acceptance, E, VU,NR by  at 7/23/2023 1040    Comment: PT POC                         Point: Precautions (Done)       Learning Progress Summary             Patient Acceptance, E, VU by KR at 7/31/2023 1416    Eager, E, VU,DU,NR by SS at 7/27/2023 1543    Comment: Reviewed safety/technique w/bed mobility, transfers, ambulation, HEP, PT POC    Acceptance,  E, VU by KR at 7/26/2023 1554    Acceptance, E, VU by ER at 7/25/2023 1325    Comment: pt educated on d/c planning, purpose of PT, safety    Acceptance, E, VU,NR by LO at 7/23/2023 1040    Comment: PT POC                                         User Key       Initials Effective Dates Name Provider Type Discipline    LO 06/16/21 -  Anamaria Ravi, PT Physical Therapist PT    SS 06/01/21 -  Milly Alfaro, PT Physical Therapist PT    ER 04/20/23 -  Caroline Stewart, PT Student PT Student PT    KR 12/30/22 -  Jaqueline Tran, PT Physical Therapist PT                    Recommendation and Plan  Anticipated Equipment Needs at Discharge (PT): front wheeled walker, other (see comments) (jackie)  Anticipated Discharge Disposition (PT): home with assist  Planned Therapy Interventions (PT): wound care  Therapy Frequency (PT): daily  Plan of Care Reviewed With: patient           Outcome Evaluation: BLE edema stable with pt having increased c/o discomfort with increased compression levels.  PT added doubled layers to all sizes tohelp increase compression without decreasing stocking size. PT was also able to lightly debride the hypertrophic crust from BLEs to help improve skin integrity with no open areas noted to LEs.  Plan of Care Reviewed With: patient            Time Calculation   PT Charges       Row Name 08/01/23 0800             Time Calculation    Start Time 0800  -MF      PT Goal Re-Cert Due Date 08/02/23  -MF         Untimed Charges    69360-Fthoeaxruq comp below knee 25  -MF      06148-Fnsszxkxp debridement 15  -MF         Total Minutes    Untimed Charges Total Minutes 40  -MF       Total Minutes 40  -MF                User Key  (r) = Recorded By, (t) = Taken By, (c) = Cosigned By      Initials Name Provider Type    MF Louis Adler, PT Physical Therapist                            PT G-Codes  Outcome Measure Options: AM-PAC 6 Clicks Basic Mobility (PT)  AM-PAC 6 Clicks Score (PT): 17  AM-PAC 6 Clicks Score (OT): 16        Louis Adler, PT  8/1/2023

## 2023-08-01 NOTE — PROGRESS NOTES
Cardiology Progress Note      Reason for visit:    Acute right-sided heart failure    IDENTIFICATION: 58-year-old female who is  and resides in [place], [place]    Active Hospital Problems    Diagnosis  POA    **Acute on chronic respiratory failure with hypoxia and hypercapnia [J96.21, J96.22]  Yes     Priority: High    Obesity hypoventilation syndrome [E66.2]  Yes     Priority: High    Right-sided congestive heart failure [I50.810]  Yes     Priority: High     Echo (7/6/2021): Test difficult due to body habitus.  LVEF 55%.  Mild AI.  No significant aortic stenosis with mean gradient 12 mmHg.  Mild dilation of ascending aorta 4 cm.  Echo (10/23/2022): LVEF 55%..  Mild AS/AR.  RVSP 45-55 mmHg  Echo (7/23/2023): LVEF 65%.  No significant valve abnormality.  RVSP 57 mmHg      Type 2 diabetes mellitus [E11.9]  Yes     Priority: High    Lower extremity cellulitis [L03.119]  Yes     Priority: Medium    Bilateral cellulitis of lower leg [L03.116, L03.115]  Yes     Priority: Medium    Essential hypertension [I10]  Yes     Priority: Medium    Lymphedema of both lower extremities [I89.0]  Yes     Priority: Medium    Morbid obesity with BMI of 60.0-69.9, adult [E66.01, Z68.44]  Not Applicable     Priority: Low            No events noted over the past few days.  Patient is now on p.o. Bumex.  Patient is sitting up in the chair breathing easy on O2 at 2 L by nasal cannula.  She reminds me she was on home O2 at 2 L prior to this visit.           Vital Sign Min/Max for last 24 hours  Temp  Min: 97.7 øF (36.5 øC)  Max: 98.4 øF (36.9 øC)   BP  Min: 125/71  Max: 151/80   Pulse  Min: 73  Max: 94   Resp  Min: 16  Max: 18   SpO2  Min: 84 %  Max: 96 %   Flow (L/min)  Min: 2  Max: 2      Intake/Output Summary (Last 24 hours) at 8/1/2023 0900  Last data filed at 8/1/2023 0328  Gross per 24 hour   Intake --   Output 350 ml   Net -350 ml           Physical Exam  Constitutional:       General: She is awake.   Cardiovascular:       Rate and Rhythm: Normal rate and regular rhythm.      Heart sounds: No murmur heard.  Pulmonary:      Effort: Pulmonary effort is normal.      Breath sounds: Decreased breath sounds present.   Musculoskeletal:      Right lower le+ Pitting Edema present.      Left lower le+ Pitting Edema present.   Skin:     General: Skin is warm and dry.   Neurological:      Mental Status: She is alert.   Psychiatric:         Behavior: Behavior is cooperative.         Tele: Normal sinus rhythm     Results Review (reviewed the patient's recent labs in the electronic medical record):      EKG (): Normal sinus rhythm    CXR (2023): Bronchiectasis is present with extensive peribronchial edema suggesting pulmonary edema.  Trace left pleural effusion.     Echo (2023): LVEF 60%.  Mild LVH.  No valvular abnormality but valves poorly visualized.  RVSP 57 mmHg     Results from last 7 days   Lab Units 23  0611 23  0358 23  0457 23  0436 23  0531 23  0413 23  0414   SODIUM mmol/L 138 139 138 136 136 138 140   POTASSIUM mmol/L 4.1 4.7 4.5 4.4 4.1 3.9 3.9   CHLORIDE mmol/L 96* 95* 93* 90* 87* 84* 87*   BUN mg/dL 27* 35* 41* 52* 58* 38* 34*   CREATININE mg/dL 0.63 0.82 0.83 1.08* 1.14* 0.85 0.85   MAGNESIUM mg/dL  --  2.2  --   --  2.5  --  2.3               Lab Results   Component Value Date    HGBA1C 7.00 (H) 2023       Lab Results   Component Value Date    CHOL 134 2023    TRIG 89 2023    HDL 33 (L) 2023    LDL 84 2023              Acute on chronic diastolic and right-sided heart failure  Intermittent IV Bumex drip and Diuril given during admission  17,445 mL diuresed since admission  Bumex 2 mg po daily  Jardiance 10 mg daily  Metoprolol succinate 100 mg daily  Spironolactone 50 mg daily  Losartan 50 mg daily     Hypertension/hypotension  No CCB due to edema  Losartan was decreased from 150 mg daily due to episodes of hypotension  Metoprolol  succinate 100 mg daily  Spironolactone 50 mg daily  Current /63     Lower extremity cellulitis/lymphedema  Improving with diuresis     Massive obesity  Discussed that her obesity is leading to much of her present illness i.e. right-sided heart failure, lymphedema/cellulitis  Semaglutide should be started after discharge for weight loss     Elevated D-dimer  VQ scan findings likely due to her pulmonary edema but low suspicion for PE  Venous duplex of lower extremities preliminary negative for PE                         Continue current medication regimen.  Nothing new to add from cardiology standpoint  Follow-up with heart failure clinic in 3 to 5 days after discharge  Patient can follow-up with cardiology in 4 weeks after discharge.  Please call with any further questions.    Electronically signed by RAD Baumann, 08/01/23, 9:00 AM EDT.

## 2023-08-01 NOTE — THERAPY TREATMENT NOTE
Patient Name: Kerry Leal  : 1965    MRN: [id number]                              Today's Date: 2023       Admit Date: 2023    Visit Dx:     ICD-10-CM ICD-9-CM   1. Acute respiratory failure with hypoxia and hypercapnia  J96.01 518.81    J96.02    2. Respiratory acidosis  E87.29 276.2   3. Cellulitis of lower extremity, unspecified laterality  L03.119 682.6   4. Obesity hypoventilation syndrome  E66.2 278.03   5. Lymphedema of both lower extremities  I89.0 457.1   6. Type 2 diabetes mellitus with hyperglycemia, without long-term current use of insulin  E11.65 250.00     790.29   7. Morbid obesity with BMI of 60.0-69.9, adult  E66.01 278.01    Z68.44 V85.44     Patient Active Problem List   Diagnosis    Essential hypertension    Lymphedema of both lower extremities    Elevated d-dimer    Acute on chronic respiratory failure with hypoxia and hypercapnia    Type 2 diabetes mellitus    Morbid obesity with BMI of 60.0-69.9, adult    Right-sided congestive heart failure    Obesity hypoventilation syndrome    Bilateral cellulitis of lower leg    Lower extremity cellulitis     Past Medical History:   Diagnosis Date    Anxiety     Asthma     Heart murmur     Hypertension      Past Surgical History:   Procedure Laterality Date    APPENDECTOMY       SECTION      HYSTERECTOMY      NECK SURGERY        General Information       Row Name 23 1304          OT Time and Intention    Document Type therapy note (daily note)  -JY     Mode of Treatment occupational therapy;individual therapy  -JY       Row Name 23 1306          General Information    Patient Profile Reviewed yes  -JY     Existing Precautions/Restrictions fall;other (see comments)  BLE edema, cellulitis  -JY     Barriers to Rehab medically complex;previous functional deficit  -JY       Row Name 23 1306          Cognition    Orientation Status (Cognition) oriented to;person;place;situation;verbal cues/prompts needed for  orientation;time;other (see comments)  stated July vs August on 1st day of August  -J       Row Name 08/01/23 1304          Safety Issues, Functional Mobility    Safety Issues Affecting Function (Mobility) insight into deficits/self-awareness;judgment;problem-solving  -J     Impairments Affecting Function (Mobility) balance;endurance/activity tolerance;range of motion (ROM);sensation/sensory awareness;shortness of breath;strength;postural/trunk control  -J     Comment, Safety Issues/Impairments (Mobility) pt alert and able to follow commands, reports/demonstrates increased anxiety with mobility; reports fatigue with more dynamic tasks yet anxiousness more limiting  -J               User Key  (r) = Recorded By, (t) = Taken By, (c) = Cosigned By      Initials Name Provider Type    Majo Abarca OT Occupational Therapist                   Lymphedema       Row Name 08/01/23 0800             Lymphedema Edema Assessment    Ptting Edema Category By severity  -      Pitting Edema Moderate;Severe  -MF      Recorded by [MF] Louis Adler, PT              Compression/Skin Care    Compression/Skin Care skin care;wrapping location;bandaging  -      Skin Care washed/dried;lotion applied;moisturizing lotion applied  -MF      Wrapping Location lower extremity  -MF      Wrapping Location LE bilateral:;foot to knee  -MF      Wrapping Comments xeroform to L post calf with cast padding to secure. size 6/8 to RLE with size 6/10 to LLE  -MF      Recorded by [MF] Louis Adler, PT                User Key  (r) = Recorded By, (t) = Taken By, (c) = Cosigned By      Initials Name Effective Dates    Louis Ibarra, PT 02/03/23 -                    Mobility/ADL's       Row Name 08/01/23 1307          Bed Mobility    Bed Mobility other (see comments)  received OOB  -J     Comment, (Bed Mobility) received OOB and preferred to return to recliner thus bed mobility not assessed this date  -JOCELINE       Row Name 08/01/23  1307          Transfers    Transfers sit-stand transfer;stand-sit transfer;toilet transfer  -RITA     Comment, (Transfers) skilled cues for optimal hand placement and seq for controlled ascend, descend; safaety cue to not weight bear through elbows/forearm at FWW while standing but rather indicated optimal hand placement and upright standing posture  -RITA       Row Name 08/01/23 1307          Sit-Stand Transfer    Sit-Stand [place] (Transfers) standby assist;verbal cues  -RITA     Assistive Device (Sit-Stand Transfers) walker, front-wheeled  -RITA       Row Name 08/01/23 1307          Stand-Sit Transfer    Stand-Sit [place] (Transfers) standby assist  -RITA     Assistive Device (Stand-Sit Transfers) walker, front-wheeled  -RITA       Row Name 08/01/23 1307          Toilet Transfer    Type (Toilet Transfer) sit-stand;stand-sit  -RITA     [place] Level (Toilet Transfer) standby assist;verbal cues  -RITA     Assistive Device (Toilet Transfer) commode;grab bars/safety frame;walker, front-wheeled  -RITA     Comment, (Toilet Transfer) cued to use grab bar to control self in descend given available and posterior reach doesn't lend well to other supports  -StreetcarJOCELINE       Row Name 08/01/23 1307          Functional Mobility    Functional Mobility- Ind. Level contact guard assist;verbal cues required  -     Functional Mobility- Device walker, front-wheeled  -RITA     Functional Mobility-Distance (Feet) --  in room ADL related distances  -     Functional Mobility- Comment defer to PT for specifics however during in room ADL related distances pt req'd gross CGA largely for safety and given pt more fatigued and reportedly anxious with mobility; intermittent cues for best mgmt of AD throughout; intermittent breaks between standing for ADLs and return mobility to room following ADL completion  -StreetcarJOCELINE       Row Name 08/01/23 1307          Activities of Daily Living    BADL Assessment/Intervention bathing;upper body dressing;lower body  dressing;grooming;toileting  -JY       Row Name 08/01/23 1307          Upper Body Dressing Assessment/Training    [place] Level (Upper Body Dressing) doff;don;pajama/robe;minimum assist (75% patient effort);verbal cues  -JY     Position (Upper Body Dressing) unsupported sitting  -JY     Comment, (Upper Body Dressing) initially sought cues for preferred seq specific to mgmt of tele box before/after threading sleeves, otherwise proximal and posterior mgmt of gown  -JY       Row Name 08/01/23 1307          Lower Body Dressing Assessment/Training    [place] Level (Lower Body Dressing) doff;don;socks;dependent (less than 25% patient effort)  -JY     Position (Lower Body Dressing) supported sitting  -JY     Comment, (Lower Body Dressing) reach to distal BLEs limited by body habitus, did not progress AE training this date as focus on progressive ADL retraining at sink, toileting etc  -JY       Row Name 08/01/23 1307          Grooming Assessment/Training    [place] Level (Grooming) hair care, combing/brushing;minimum assist (75% patient effort);wash face, hands;oral care regimen;supervision  -JY     Position (Grooming) supported sitting;supported standing;other (see comments)  stood for oral care and hair grooming, seated for face/hand washing for ECWS  -JY     Comment, (Grooming) min A for most posterior hair grooming after gross completion of anterior and lateral brushing  -JY       Row Name 08/01/23 1307          Toileting Assessment/Training    [place] Level (Toileting) adjust/manage clothing;minimum assist (75% patient effort);perform perineal hygiene;maximum assist (25% patient effort);verbal cues  -JY     Assistive Devices (Toileting) commode;grab bar/safety frame  -JY     Position (Toileting) supported sitting;supported standing  -JY     Comment, (Toileting) min A for posterior mgmt of gown pre and post toileting and for tying for closure, upward of max A for hygiene as pt requested A d/t body  habitus limitations, anticipate pt could complete more of anterior wilmer care, anticipate difficulty with posterior hygiene d/t difficult reach and skin integrity concerns at buttocks; reviewed with pt options for more I and easier hygiene while respecting safety given open area on buttocks  -JY       Row Name 08/01/23 1307          Bathing Assessment/Intervention    [place] Level (Bathing) upper body;upper extremities;chest/trunk;proximal lower extremities;minimum assist (75% patient effort);perineal area;lower body;distal lower extremities/feet;dependent (less than 25% patient effort);other (see comments)  -JY     Position (Bathing) unsupported sitting;supported standing  -JY     Comment, (Bathing) AE use not trialled this date thus potentially less A req'd for LBB  -JY               User Key  (r) = Recorded By, (t) = Taken By, (c) = Cosigned By      Initials Name Provider Type    Majo Abarca OT Occupational Therapist                   Obj/Interventions       Row Name 08/01/23 1330          Motor Skills    Motor Skills functional endurance  -JY     Functional Endurance continues to require intermittent seated rest breaks during graded ADLs in bathroom, sitting on toilet to complete others in sitting however grossly improved endurance noted with ability to complete components at sinkside and within bathroom vs at recliner or bed level; increased fatigue and anxiousness with more dynamic tasks  -JY       Row Name 08/01/23 1330          Balance    Balance Assessment sitting static balance;sitting dynamic balance;standing static balance;standing dynamic balance  -JY     Static Sitting Balance independent  -JY     Dynamic Sitting Balance standby assist  -JY     Position, Sitting Balance unsupported;other (see comments);supported;sitting in chair  sitting on toilet  -JY     Static Standing Balance standby assist  -JY     Dynamic Standing Balance contact guard  -JY     Position/Device Used, Standing Balance  supported;walker, front-wheeled  -JY     Balance Interventions sitting;standing;static;dynamic;sit to stand;supported;occupation based/functional task  -JY     Comment, Balance no overt LOB during seated or standing tasks, utilized FWW in standing  -JY               User Key  (r) = Recorded By, (t) = Taken By, (c) = Cosigned By      Initials Name Provider Type    Majo Abarca, OT Occupational Therapist                   Goals/Plan    No documentation.                  Clinical Impression       Row Name 08/01/23 1332          Pain Assessment    Pretreatment Pain Rating 0/10 - no pain  -JY     Posttreatment Pain Rating 0/10 - no pain  -JY     Pre/Posttreatment Pain Comment denies any pain and tolerated OT interventions  -JY     Pain Intervention(s) Repositioned;Ambulation/increased activity;Rest;Elevated  -JY       Row Name 08/01/23 2547          Plan of Care Review    Plan of Care Reviewed With patient  -JY     Progress improving  -JY     Outcome Evaluation Pt alert and participatory in OT interventions. Upon OT arrival, pt in bathroom completing initial hygiene tasks however able to tolerate gross ADL routine from graded positions with intermittent rest breaks throughout. Pt demonstrated improved ability to t/f to/from bathroom and tolerance toward standing at sink for oral hygiene, hair grooming. Pt able to tolerate sitting at toilet for toileting, UBD x 3 trials for alt gowns and UBB for ECWS and safety purposes. Pt continues to require A for distal aspects of bathing and LBD with pt likely able to complete more May when using AE, did not progress that specific training this date. Pt limited by fatigue and anxiousness with mobility requiring rest breaks and cued breathing for rest and relaxation. Pt grossly req'd SBA for STS t/fs and CGA for mobility for safety given said underlying impairments w/ cues for best AD mgmt. Recommend continued IPOT POC w/ progress as able. At this time recommend home with 24/7 A  at least initially in which pt reports is available with dtr and spouse.  -RITA       Row Name 08/01/23 1332          Therapy Assessment/Plan (OT)    Criteria for Skilled Therapeutic Interventions Met (OT) yes;meets criteria;skilled treatment is necessary  -JY     Therapy Frequency (OT) daily  -JJOCELINE       Row Name 08/01/23 1332          Therapy Plan Review/Discharge Plan (OT)    Anticipated Discharge Disposition (OT) home with assist;home with 24/7 care  -RITA       Row Name 08/01/23 1332          Vital Signs    Pre Systolic BP Rehab 120  -JY     Pre Treatment Diastolic BP 71  -JY     Pretreatment Heart Rate (beats/min) 71  -JY     Posttreatment Heart Rate (beats/min) 79  -JY     Pre SpO2 (%) 93  -JY     O2 Delivery Pre Treatment room air  -JY     O2 Delivery Intra Treatment room air  -JY     Post SpO2 (%) 92  -JY     O2 Delivery Post Treatment supplemental O2  -JY     Pre Patient Position Sitting  -JY     Intra Patient Position Standing  -JY     Post Patient Position Sitting  -JY       Row Name 08/01/23 1332          Positioning and Restraints    Pre-Treatment Position bathroom  -JY     Post Treatment Position chair  -JY     In Chair notified nsg;reclined;call light within reach;encouraged to call for assist;exit alarm on;waffle cushion;legs elevated;heels elevated  -JY               User Key  (r) = Recorded By, (t) = Taken By, (c) = Cosigned By      Initials Name Provider Type    Majo Abarca, OT Occupational Therapist                   Outcome Measures       Row Name 08/01/23 1341          How much help from another is currently needed...    Putting on and taking off regular lower body clothing? 2  -JY     Bathing (including washing, rinsing, and drying) 2  -JY     Toileting (which includes using toilet bed pan or urinal) 2  -JY     Putting on and taking off regular upper body clothing 3  -JY     Taking care of personal grooming (such as brushing teeth) 3  -JY     Eating meals 4  -JY     AM-PAC 6 Clicks Score (OT)  16  -JY       Row Name 08/01/23 1341          Functional Assessment    Outcome Measure Options AM-PAC 6 Clicks Daily Activity (OT)  -BROOKEY               User Key  (r) = Recorded By, (t) = Taken By, (c) = Cosigned By      Initials Name Provider Type    Majo Abarca OT Occupational Therapist                    Occupational Therapy Education       Title: PT OT SLP Therapies (In Progress)       Topic: Occupational Therapy (In Progress)       Point: ADL training (In Progress)       Description:   Instruct learner(s) on proper safety adaptation and remediation techniques during self care or transfers.   Instruct in proper use of assistive devices.                  Learning Progress Summary             Patient Acceptance, E,D, NR by RITA at 8/1/2023 1120    Acceptance, E, NR by CHRISTA at 7/27/2023 1104    Acceptance, E,D, DU,VU by ARIN at 7/24/2023 0852                         Point: Home exercise program (In Progress)       Description:   Instruct learner(s) on appropriate technique for monitoring, assisting and/or progressing therapeutic exercises/activities.                  Learning Progress Summary             Patient Acceptance, E,D, NR by RITA at 8/1/2023 1120    Acceptance, E,D, DU,VU by ARIN at 7/24/2023 0852                         Point: Precautions (In Progress)       Description:   Instruct learner(s) on prescribed precautions during self-care and functional transfers.                  Learning Progress Summary             Patient Acceptance, E,D, NR by RITA at 8/1/2023 1120    Acceptance, E, NR by CHRISTA at 7/27/2023 1104    Acceptance, E,D, DU,VU by ARIN at 7/24/2023 0852                         Point: Body mechanics (In Progress)       Description:   Instruct learner(s) on proper positioning and spine alignment during self-care, functional mobility activities and/or exercises.                  Learning Progress Summary             Patient Acceptance, E,D, NR by RITA at 8/1/2023 1120    Acceptance, E, NR by CHRISTA at 7/27/2023  1104    Acceptance, E,D, DU,VU by  at 7/24/2023 0852                                         User Key       Initials Effective Dates Name Provider Type Discipline     06/16/21 -  Alejandra Quintero, OT Occupational Therapist OT    JJOCELINE 06/16/21 -  Majo Sumner OT Occupational Therapist OT     06/16/21 -  Jersey Omer, OT Occupational Therapist OT                  OT Recommendation and Plan  Therapy Frequency (OT): daily  Plan of Care Review  Plan of Care Reviewed With: patient  Progress: improving  Outcome Evaluation: Pt alert and participatory in OT interventions. Upon OT arrival, pt in bathroom completing initial hygiene tasks however able to tolerate gross ADL routine from graded positions with intermittent rest breaks throughout. Pt demonstrated improved ability to t/f to/from bathroom and tolerance toward standing at sink for oral hygiene, hair grooming. Pt able to tolerate sitting at toilet for toileting, UBD x 3 trials for alt gowns and UBB for ECWS and safety purposes. Pt continues to require A for distal aspects of bathing and LBD with pt likely able to complete more May when using AE, did not progress that specific training this date. Pt limited by fatigue and anxiousness with mobility requiring rest breaks and cued breathing for rest and relaxation. Pt grossly req'd SBA for STS t/fs and CGA for mobility for safety given said underlying impairments w/ cues for best AD mgmt. Recommend continued IPOT POC w/ progress as able. At this time recommend home with 24/7 A at least initially in which pt reports is available with dtr and spouse.     Time Calculation:         Time Calculation- OT       Row Name 08/01/23 1343             Time Calculation- OT    OT Start Time 1120  -JY      OT Received On 08/01/23  -JY      OT Goal Re-Cert Due Date 08/03/23  -JY         Timed Charges    87095 - OT Therapeutic Activity Minutes 16  -JY      01910 - OT Self Care/Mgmt Minutes 29  -JY         Total Minutes    Timed  Charges Total Minutes 45  -JY       Total Minutes 45  -JY                User Key  (r) = Recorded By, (t) = Taken By, (c) = Cosigned By      Initials Name Provider Type    Majo Abarca OT Occupational Therapist                  Therapy Charges for Today       Code Description Service Date Service Provider Modifiers Qty    [id number]  OT THERAPEUTIC ACT EA 15 MIN 8/1/2023 Majo Sumner OT GO 1    [id number]  OT SELF CARE/MGMT/TRAIN EA 15 MIN 8/1/2023 Majo Sumner OT GO 2                 Majo Sumner OT  8/1/2023

## 2023-08-01 NOTE — PROGRESS NOTES
Patient Name: Kerry Leal  : 1965  MRN: [id number]    Admission Date: 2023    Requesting Provider: Dixie  Evaluating Physician: Shine Rodriguez MD    Chief Complaint: cellulitis    Reason for Consultation: cellulitis    Subjective   Patient is a 58 y.o. female with history of hypertension, asthma and chronic lymphedema.  Previous seen by Dr. Shine Rodriguez and previously treated with dalbavancin and then suppressive Keflex.  Last seen in the hospital 2023 and at that time treated with daptomycin and ceftriaxone and discharged to outpatient follow-up.  Seen in clinic in May with plans to continue chronic suppressive Keflex.  At some point after last office visit patient called [facility] saying that she had a rash under her chin and she has been off antibiotics since then.    Admitted to [facility] 2023 with complaints of worsening left lower extremity swelling and pain.  Denied fever.  Ongoing for CT emergency department CODE BLUE was called however per H&P patient did not lose a pulse and no CPR was performed.  No drainable fluid collection.  Started on daptomycin and ceftriaxone and admitted.  Feels like she is improving slightly compared to admission    23:  No fever.  Blood cultures pending this am.  Resting comfortably.  Feels like leg swelling, pain improving.  No new issues per nursing    23: The patient is still short of breath and was on OptiFlow when I saw her this afternoon.  She had significant anxiety overnight.  Having some bilateral lower extremity pain and swelling.  Reports recent worsening redness over her bilateral lower extremities.  No fevers    23: Patient has ongoing pain over her bilateral lower extremity but no worse today.  Breathing may be slightly better but she was still on OptiFlow during my exam (although subsequently improved to 5-6L NC).  No nausea, vomiting, or diarrhea reported.  No fevers.    23: Respiratory status has  improved and she is now breathing well on 3 L nasal cannula. She is not having any fevers.  No increased leg pain today.  No nausea or vomiting or diarrhea    7/27/23: The patient is somewhat tearful and anxious and states that she is anxious after her  brought her flowers today.  She is not having any fevers.  No increased leg pain or swelling. Breathing is stable on 3 L nasal cannula supplemental O2.    7/31/23: The patient is doing okay today.  She is feeling much better.  Breathing has improved.  No fevers.  She is tolerating the doxycycline well without any adverse side effects.  No rashes or itching.  No nausea, vomiting, or diarrhea reported.    ROS:  As above      Allergies   Allergen Reactions    Diclofenac Swelling    Benadryl [Diphenhydramine Hcl] Other (See Comments)     UNSURE    Clindamycin/Lincomycin Other (See Comments)     UNSURE    Doxycycline Other (See Comments)     UNSURE    Fluticasone Other (See Comments)     NOSE BLEEDS    Fluvoxamine Other (See Comments)     UNSURE    Montelukast Swelling    Symbicort [Budesonide-Formoterol Fumarate] Other (See Comments)     UNSURE    Lisinopril Dizziness    Penicillins Other (See Comments)     MOTHER TOLD HER SHE WAS ALLERGIC  *has tolerated ceftriaxone    Smz-Tmp Ds [Sulfamethoxazole-Trimethoprim] GI Intolerance    Sulfa Antibiotics GI Intolerance       Objective   Antibiotics:  Anti-Infectives (From admission, onward)      Ordered     Dose/Rate Route Frequency Start Stop    07/26/23 1904  doxycycline (MONODOX) capsule 100 mg        Ordering Provider: Shine Rodriguez MD    100 mg Oral Every 12 Hours Scheduled 07/27/23 0700 08/10/23 0859    07/22/23 0053  cefTRIAXone (ROCEPHIN) 2000 mg/100 mL 0.9% NS IVPB (MBP)        Ordering Provider: Darion Simental MD    2,000 mg  over 30 Minutes Intravenous Once 07/22/23 0109 07/22/23 0333            Other Medications:  Current Facility-Administered Medications   Medication Dose Route Frequency  Provider Last Rate Last Admin    acetaminophen (TYLENOL) tablet 650 mg  650 mg Oral Q4H PRN Raquel, Hansa, APRN   650 mg at 07/25/23 2223    Or    acetaminophen (TYLENOL) 160 MG/5ML solution 650 mg  650 mg Oral Q4H PRN Raquel, Hansa, APRN        Or    acetaminophen (TYLENOL) suppository 650 mg  650 mg Rectal Q4H PRN Raquel, Hansa, APRN        albuterol (PROVENTIL) nebulizer solution 0.083% 2.5 mg/3mL  2.5 mg Nebulization Q4H PRN Raquel, Hansa, APRN   2.5 mg at 07/24/23 0510    albuterol (PROVENTIL) nebulizer solution 0.083% 2.5 mg/3mL  2.5 mg Nebulization Q4H PRN Berry Wall DO        Biotene dry mouth liquid 15 mL  15 mL Swish & Spit 5x Daily PRN Berry Wall DO        sennosides-docusate (PERICOLACE) 8.6-50 MG per tablet 2 tablet  2 tablet Oral BID Raquel, Hansa, APRN   2 tablet at 07/31/23 0858    And    polyethylene glycol (MIRALAX) packet 17 g  17 g Oral Daily PRN Raquel, Hansa, APRN        And    bisacodyl (DULCOLAX) EC tablet 5 mg  5 mg Oral Daily PRN Raquel, Hansa, APRN        And    bisacodyl (DULCOLAX) suppository 10 mg  10 mg Rectal Daily PRN Raquel, Hansa, APRN        bumetanide (BUMEX) tablet 2 mg  2 mg Oral Daily Kailash Pleitez MD   2 mg at 07/31/23 0857    Calcium Replacement - Follow Nurse / BPA Driven Protocol   Does not apply PRN Jorge Puckett IV, MD        dextrose (D50W) (25 g/50 mL) IV injection 25 g  25 g Intravenous Q15 Min PRN Raquel, Hansa, APRN        dextrose (GLUTOSE) oral gel 15 g  15 g Oral Q15 Min PRN Raqule, Hansa, APRN        doxycycline (MONODOX) capsule 100 mg  100 mg Oral Q12H Shine Rodriguez MD   100 mg at 07/31/23 2031    empagliflozin (JARDIANCE) tablet 10 mg  10 mg Oral Daily Jorge Puckett IV, MD   10 mg at 07/31/23 0857    Enoxaparin Sodium (LOVENOX) syringe 60 mg  60 mg Subcutaneous Q12H Hansa García APRN   60 mg at 07/31/23 2031    glucagon (GLUCAGEN) injection 1 mg  1 mg Intramuscular Q15  Min PRN Raquel, Hansa, APRN        hydrOXYzine (ATARAX) tablet 25 mg  25 mg Oral TID PRN Berry Wall DO   25 mg at 07/30/23 2138    Insulin Lispro (humaLOG) injection 2-7 Units  2-7 Units Subcutaneous 4x Daily AC & at Bedtime Raquel, Hansa, APRN   5 Units at 07/23/23 1202    lactobacillus acidophilus (RISAQUAD) capsule 1 capsule  1 capsule Oral Daily With Lunch Thiago Colin IV, PharmD   1 capsule at 07/31/23 1147    losartan (COZAAR) tablet 50 mg  50 mg Oral Daily Jorge Puckett IV, MD   50 mg at 07/31/23 0858    Magnesium Cardiology Dose Replacement - Follow Nurse / BPA Driven Protocol   Does not apply Jorge Will IV, MD        metoprolol succinate XL (TOPROL-XL) 24 hr tablet 100 mg  100 mg Oral Daily Raquel, Hansa, APRN   100 mg at 07/31/23 0858    nitroglycerin (NITROSTAT) SL tablet 0.4 mg  0.4 mg Sublingual Q5 Min PRN Raquel, Hansa, APRN   0.4 mg at 07/24/23 0630    pantoprazole (PROTONIX) EC tablet 40 mg  40 mg Oral Q AM Berry Wall DO   40 mg at 07/31/23 0555    Phosphorus Replacement - Follow Nurse / BPA Driven Protocol   Does not apply Jorge Will IV, MD        Potassium Replacement - Follow Nurse / BPA Driven Protocol   Does not apply Jorge Will IV, MD        sodium chloride 0.9 % flush 10 mL  10 mL Intravenous PRN Darion Simental MD        sodium chloride 0.9 % flush 10 mL  10 mL Intravenous Q12H Raquel, Hansa, APRN   10 mL at 07/31/23 2031    sodium chloride 0.9 % flush 10 mL  10 mL Intravenous PRN Raquel, Hansa, APRN        sodium chloride 0.9 % infusion 40 mL  40 mL Intravenous PRN Raquel, Hansa, APRN        spironolactone (ALDACTONE) tablet 50 mg  50 mg Oral Daily Jorge Puckett IV, MD   50 mg at 07/31/23 0857       Physical Exam:   Vital Signs   /60 (BP Location: Right arm, Patient Position: Sitting)   Pulse 88   Temp 98.4 øF (36.9 øC) (Oral)   Resp 18   Ht 160  "cm (62.99\")   Wt (!) 150 kg (331 lb 3.2 oz)   SpO2 95%   BMI 58.68 kg/mý     GENERAL: Awake and alert.  No acute distress.  Sitting up in a bedside chair.  HEENT: Normocephalic, atraumatic.  Poor dentition.  No external oral lesions noted  NECK: Supple   HEART: RRR; No murmur.  LUNGS: Claude auscultation bilaterally.  Nonlabored breathing on 2 L nasal cannula  ABDOMEN: morbidly obese but soft  EXT: Bilateral lower extremities with wraps in place, no erythema extending outside of the wraps  : External urinary catheter in place  SKIN: No diffuse rash  NEURO: Awake alert and oriented x4.  Normal speech and cognition    Laboratory Data  Lab Results   Component Value Date    WBC 8.15 07/22/2023    HGB 11.7 (L) 07/22/2023    HCT 40.7 07/22/2023    MCV 91.3 07/22/2023     07/22/2023     Lab Results   Component Value Date    GLUCOSE 148 (H) 07/31/2023    CALCIUM 9.2 07/31/2023     07/31/2023    K 4.7 07/31/2023    CO2 36.0 (H) 07/31/2023    CL 95 (L) 07/31/2023    BUN 35 (H) 07/31/2023    CREATININE 0.82 07/31/2023     Estimated Creatinine Clearance: 110.5 mL/min (by C-G formula based on SCr of 0.82 mg/dL).  Lab Results   Component Value Date    ALT 19 07/30/2023    AST 25 07/30/2023    ALKPHOS 64 07/30/2023    BILITOT 0.6 07/30/2023     Lab Results   Component Value Date    CRP 7.00 (H) 07/22/2023     Lab Results   Component Value Date    SEDRATE 120 (H) 04/13/2023       The above labs were reviewed.     Microbiology:  Microbiology Results (last 10 days)       Procedure Component Value - Date/Time    Blood Culture - Blood, Arm, Left [[id number]]  (Normal) Collected: 07/22/23 0142    Lab Status: Final result Specimen: Blood from Arm, Left Updated: 07/27/23 0800     Blood Culture No growth at 5 days    Blood Culture - Blood, Arm, Right [[id number]]  (Normal) Collected: 07/22/23 0142    Lab Status: Final result Specimen: Blood from Arm, Right Updated: 07/27/23 0800     Blood Culture No growth at 5 days      "       Radiology:  NM Lung Scan Perfusion Particulate    Result Date: 7/24/2023  Indeterminate perfusion scan for pulmonary embolus. Extensive perfusion abnormalities, but with matching chest x-ray findings. These could all reflect the patient's extensive pulmonary edema. Electronically Signed: Tigre Phillips  7/24/2023 12:54 PM EDT  Workstation ID: [id number]    XR Chest 1 View    Result Date: 7/24/2023  Impression: Electronically Signed: Hilton Cohen  7/24/2023 12:25 PM EDT  Workstation ID: [id number]         Assessment   Acute on chronic bilateral lower extremity venous stasis swelling and dermatitis: Improving with diuresis and antibiotics  Left lower extremity swelling, erythema, tenderness, cellulitis-Improved back to baseline based on images on 7/26  Right lower extremity focal erythema, possible cellulitis-Ongoing and increased from baseline but may be improving somewhat.  Chronic bilateral lower extremity lymphedema  Acute on chronic hypoxic/Hypercapnic respiratory failure  Morbid obesity  Bilateral pulmonary edema on x-ray  Acute on chronic CHFpEF  HTN  Listed allergies to clindamycin, doxycycline, penicillin and sulfa  Patient reported rash with Keflex: about 2 months ago. Recently treated with ceftriaxone and had  another dose in ED with no side effects thus far.    PLAN:     - Continue doxycycline 100 mg by mouth twice a day  - Probiotic  - Continue wound care    I am okay with the patient's discharge whenever with the below plan. She is tolerating doxycycline well without any adverse side effects    UM/LY:  Doxycycline 100 mg by mouth twice a day.  Please give a 2 week supply at the time of discharge  Follow-up in my clinic in 2 weeks.  Continue to follow with wound care on an outpatient basis        Shine Rodriguez MD  7/31/2023    I

## 2023-08-02 ENCOUNTER — READMISSION MANAGEMENT (OUTPATIENT)
Dept: CALL CENTER | Facility: HOSPITAL | Age: 58
End: 2023-08-02
Payer: MEDICARE

## 2023-08-02 VITALS
DIASTOLIC BLOOD PRESSURE: 69 MMHG | SYSTOLIC BLOOD PRESSURE: 126 MMHG | BODY MASS INDEX: 51.91 KG/M2 | RESPIRATION RATE: 18 BRPM | HEART RATE: 90 BPM | OXYGEN SATURATION: 87 % | WEIGHT: 293 LBS | TEMPERATURE: 98.1 F | HEIGHT: 63 IN

## 2023-08-02 LAB
GLUCOSE BLDC GLUCOMTR-MCNC: 136 MG/DL (ref 70–130)
GLUCOSE BLDC GLUCOMTR-MCNC: 141 MG/DL (ref 70–130)

## 2023-08-02 PROCEDURE — 25010000002 ENOXAPARIN PER 10 MG: Performed by: NURSE PRACTITIONER

## 2023-08-02 PROCEDURE — 82948 REAGENT STRIP/BLOOD GLUCOSE: CPT

## 2023-08-02 PROCEDURE — 99239 HOSP IP/OBS DSCHRG MGMT >30: CPT | Performed by: INTERNAL MEDICINE

## 2023-08-02 RX ORDER — CHOLECALCIFEROL (VITAMIN D3) 125 MCG
5 CAPSULE ORAL NIGHTLY PRN
Status: DISCONTINUED | OUTPATIENT
Start: 2023-08-02 | End: 2023-08-02 | Stop reason: HOSPADM

## 2023-08-02 RX ORDER — HYDROXYZINE HYDROCHLORIDE 25 MG/1
25 TABLET, FILM COATED ORAL 3 TIMES DAILY PRN
Qty: 30 TABLET | Refills: 1 | Status: SHIPPED | OUTPATIENT
Start: 2023-08-02

## 2023-08-02 RX ORDER — L.ACID,PARA/B.BIFIDUM/S.THERM 8B CELL
1 CAPSULE ORAL
Qty: 10 CAPSULE | Refills: 0 | Status: SHIPPED | OUTPATIENT
Start: 2023-08-02 | End: 2023-08-12

## 2023-08-02 RX ORDER — DOXYCYCLINE 100 MG/1
100 CAPSULE ORAL EVERY 12 HOURS SCHEDULED
Qty: 16 CAPSULE | Refills: 0 | Status: SHIPPED | OUTPATIENT
Start: 2023-08-02 | End: 2023-08-10

## 2023-08-02 RX ORDER — FLUORIDE TOOTHPASTE
15 TOOTHPASTE DENTAL
Qty: 473 ML | Refills: 1 | Status: SHIPPED | OUTPATIENT
Start: 2023-08-02

## 2023-08-02 RX ADMIN — Medication 10 ML: at 08:24

## 2023-08-02 RX ADMIN — BUMETANIDE 2.5 MG: 2 TABLET ORAL at 08:22

## 2023-08-02 RX ADMIN — ENOXAPARIN SODIUM 60 MG: 60 INJECTION SUBCUTANEOUS at 08:23

## 2023-08-02 RX ADMIN — SENNOSIDES AND DOCUSATE SODIUM 2 TABLET: 50; 8.6 TABLET ORAL at 08:22

## 2023-08-02 RX ADMIN — LOSARTAN POTASSIUM 50 MG: 50 TABLET, FILM COATED ORAL at 08:23

## 2023-08-02 RX ADMIN — DOXYCYCLINE 100 MG: 100 CAPSULE ORAL at 08:22

## 2023-08-02 RX ADMIN — EMPAGLIFLOZIN 10 MG: 10 TABLET, FILM COATED ORAL at 08:23

## 2023-08-02 RX ADMIN — PANTOPRAZOLE SODIUM 40 MG: 40 TABLET, DELAYED RELEASE ORAL at 06:02

## 2023-08-02 RX ADMIN — METOPROLOL SUCCINATE 100 MG: 100 TABLET, EXTENDED RELEASE ORAL at 08:23

## 2023-08-02 RX ADMIN — SPIRONOLACTONE 50 MG: 25 TABLET ORAL at 08:22

## 2023-08-02 NOTE — DISCHARGE SUMMARY
[facility] Medicine Services  DISCHARGE SUMMARY    Patient Name: Kerry Leal  : 1965  MRN: [id number]    Date of Admission: 2023 12:01 AM  Date of Discharge:  2023  Primary Care Physician: Gopal Kong MD    Consults       Date and Time Order Name Status Description    2023 10:02 AM Inpatient Infectious Diseases Consult Completed     2023 10:02 AM Inpatient Cardiology Consult Completed             Hospital Course     Presenting Problem: Lower extremity edema    Active Hospital Problems    Diagnosis  POA    **Acute on chronic respiratory failure with hypoxia and hypercapnia [J96.21, J96.22]  Yes    Bilateral cellulitis of lower leg [L03.116, L03.115]  Yes    Obesity hypoventilation syndrome [E66.2]  Yes    Right-sided congestive heart failure [I50.810]  Yes    Morbid obesity with BMI of 60.0-69.9, adult [E66.01, Z68.44]  Not Applicable    Type 2 diabetes mellitus [E11.9]  Yes    Essential hypertension [I10]  Yes    Lymphedema of both lower extremities [I89.0]  Yes      Resolved Hospital Problems    Diagnosis Date Resolved POA    Accelerated hypertension [I10] 2023 Yes          Hospital Course:  Kerry Leal is a 58 y.o. female with morbid obesity, DM2, HTN, chronic LE edema, chronic hypoxia who presented with left leg pain and developed respiratory failure.  She was treated for acute on chronic CHF with diuresis and medications were titrated for goal-directed medical therapy with addition of bumex, increase in losartan, addition of spironolactone, jardiance, and continue metoprolol.  She will follow closely in the CHF clinic and then see her primary cardiologist in 1 month.  She was seen by ID for lower extremity cellulitis and will complete a course of doxycycline at discharge with home health for wound care/compression wraps and follow up with Dr. Rodriguez in 2 weeks.    Updated patient's daughter and son thoroughly via telephone and  answered all questions prior to discharge.      Discharge Follow Up Recommendations for outpatient labs/diagnostics:  F/U CHF clinic in 1 week  F/U ID clinic 2 weeks  F/U primary cardiologist in 1 month    Day of Discharge     HPI:   She is feeling better today and excited to go home.    Review of Systems  CV- No chest pain  Resp- No dyspnea    Vital Signs:   Temp:  [97.5 øF (36.4 øC)-98.1 øF (36.7 øC)] 98.1 øF (36.7 øC)  Heart Rate:  [67-90] 90  Resp:  [18] 18  BP: (103-129)/(59-71) 126/69  Flow (L/min):  [2] 2      Physical Exam:  Constitutional: No acute distress, awake, alert, sitting up in chair eating breakfast  HENT: NCAT, mucous membranes moist  Respiratory: Clear to auscultation bilaterally, respiratory effort normal   Musculoskeletal: + bilateral ankle edema, in compression wraps  Psychiatric: Appropriate affect, cooperative  Neurologic: Speech clear and fluent  Skin: No rashes on exposed surfaces    Pertinent  and/or Most Recent Results     LAB RESULTS:          Lab 08/01/23  0611 07/31/23  0358 07/30/23  0457 07/29/23  0436 07/28/23  0531   SODIUM 138 139 138 136 136   POTASSIUM 4.1 4.7 4.5 4.4 4.1   CHLORIDE 96* 95* 93* 90* 87*   CO2 34.0* 36.0* 39.0* 38.0* 40.0*   ANION GAP 8.0 8.0 6.0 8.0 9.0   BUN 27* 35* 41* 52* 58*   CREATININE 0.63 0.82 0.83 1.08* 1.14*   EGFR 104.3 84.1 82.9 60.4 56.6*   GLUCOSE 132* 148* 136* 146* 147*   CALCIUM 8.9 9.2 9.3 9.2 9.4   MAGNESIUM  --  2.2  --   --  2.5   PHOSPHORUS  --  3.6  --   --   --          Lab 07/30/23  0457   TOTAL PROTEIN 7.0   ALBUMIN 3.6   GLOBULIN 3.4   ALT (SGPT) 19   AST (SGOT) 25   BILIRUBIN 0.6   ALK PHOS 64         Lab 07/28/23  1403   PROBNP 102.5                 Brief Urine Lab Results  (Last result in the past 365 days)        Color   Clarity   Blood   Leuk Est   Nitrite   Protein   CREAT   Urine HCG        07/22/23 0426 Yellow   Clear   Negative   Negative   Negative   Negative                 Microbiology Results (last 10 days)       ** No  results found for the last 240 hours. **            Adult Transthoracic Echo Complete W/ Cont if Necessary Per Protocol    Result Date: 7/23/2023    Technically difficult study due to massive obesity   Left ventricular systolic function is normal. Estimated left ventricular EF = 60%   Left ventricular wall thickness is consistent with mild concentric hypertrophy.   Valves are poorly visualized.  However there is no gross valvular abnormality   Estimated right ventricular systolic pressure from tricuspid regurgitation is markedly elevated (57 mmHg).     NM Lung Scan Perfusion Particulate    Result Date: 7/24/2023  DATE OF EXAM: 7/24/2023 11:51 AM EDT PROCEDURE: NM LUNG SCAN PERFUSION PARTICULATE INDICATIONS: elevated d-dimer, hypoxia COMPARISON: Chest radiograph 7/24/2023 TECHNIQUE: 5.5 mCi of technetium 99m labeled MAA was administered intravenously for the perfusion portion of the pulmonary exam. Scintigraphic images of the lungs were obtained in multiple projections to assess perfusion. FINDINGS: Concurrent chest radiograph shows diffuse interstitial disease pattern suggesting extensive pulmonary edema, with areas of atelectasis in the lung bases. Perfusion scan mirrors this pattern more or less precisely, and accordingly is indeterminate. Areas of diminished perfusion are noted to be almost entirely perihilar, corresponding to the pulmonary edema pattern. Abnormalities are extensive, however, and could obscure/be confused with pulmonary embolic disease.     Indeterminate perfusion scan for pulmonary embolus. Extensive perfusion abnormalities, but with matching chest x-ray findings. These could all reflect the patient's extensive pulmonary edema. Electronically Signed: Tigre Phillips  7/24/2023 12:54 PM EDT  Workstation ID: [id number]    XR Chest 1 View    Result Date: 7/24/2023  XR CHEST 1 VW Date of Exam: 7/24/2023 11:48 AM EDT Indication: V/Q Comparison Comparison: 7/22/2023. Findings: Bronchiectasis is present and  there is also relatively extensive peribronchial edema noted the appearance suggesting pulmonary edema. There is no definite focal lobar consolidation. There is suspected trace left pleural effusion. No distinct pneumothorax. Unchanged cardiac enlargement.     Impression: Electronically Signed: Hilton Cohen  7/24/2023 12:25 PM EDT  Workstation ID: [id number]    Duplex Venous Lower Extremity - Bilateral CAR    Result Date: 7/23/2023    Technically difficult and limited exam.   The bilateral lower extremity venous duplex scan is negative for a DVT and SVT in the areas visualized.   The distal superficial femoral and tibial level veins were not visualized.      Results for orders placed during the hospital encounter of 07/22/23    Duplex Venous Lower Extremity - Bilateral CAR    Interpretation Summary    Technically difficult and limited exam.    The bilateral lower extremity venous duplex scan is negative for a DVT and SVT in the areas visualized.    The distal superficial femoral and tibial level veins were not visualized.      Results for orders placed during the hospital encounter of 07/22/23    Duplex Venous Lower Extremity - Bilateral CAR    Interpretation Summary    Technically difficult and limited exam.    The bilateral lower extremity venous duplex scan is negative for a DVT and SVT in the areas visualized.    The distal superficial femoral and tibial level veins were not visualized.      Results for orders placed during the hospital encounter of 07/22/23    Adult Transthoracic Echo Complete W/ Cont if Necessary Per Protocol    Interpretation Summary    Technically difficult study due to massive obesity    Left ventricular systolic function is normal. Estimated left ventricular EF = 60%    Left ventricular wall thickness is consistent with mild concentric hypertrophy.    Valves are poorly visualized.  However there is no gross valvular abnormality    Estimated right ventricular systolic pressure from tricuspid  regurgitation is markedly elevated (57 mmHg).      Plan for Follow-up of Pending Labs/Results: None pending    Discharge Details        Discharge Medications        New Medications        Instructions Start Date   Biotene dry mouth liquid liquid   Rinse 15 mL by mouth 5 (Five) Times a Day As Needed for Dry Mouth.      bumetanide 2 MG tablet  Commonly known as: BUMEX   2 mg, Oral, Daily      doxycycline 100 MG capsule  Commonly known as: MONODOX   100 mg, Oral, Every 12 Hours Scheduled      empagliflozin 10 MG tablet tablet  Commonly known as: JARDIANCE   10 mg, Oral, Daily      hydrOXYzine 25 MG tablet  Commonly known as: ATARAX   25 mg, Oral, 3 Times Daily PRN      lactobacillus acidophilus capsule capsule   1 capsule, Oral, Daily With Lunch      nitroglycerin 0.4 MG SL tablet  Commonly known as: NITROSTAT   0.4 mg, Sublingual, Every 5 Minutes PRN, Take no more than 3 doses in 15 minutes.      spironolactone 50 MG tablet  Commonly known as: ALDACTONE   50 mg, Oral, Daily             Changes to Medications        Instructions Start Date   losartan 50 MG tablet  Commonly known as: COZAAR  What changed: medication strength   100 mg, Oral, Daily             Continue These Medications        Instructions Start Date   acetaminophen 325 MG tablet  Commonly known as: TYLENOL   2 tablets, Oral, Every 6 Hours PRN, OTC      albuterol (2.5 MG/3ML) 0.083% nebulizer solution  Commonly known as: PROVENTIL   2.5 mg, Nebulization, Every 4 Hours PRN      EPINEPHrine 0.3 MG/0.3ML solution auto-injector injection  Commonly known as: EPIPEN   Inject 0.3 mg into the appropriate muscle as directed by prescriber As Needed.      famotidine 40 MG tablet  Commonly known as: PEPCID   Take 1 tablet by mouth Daily.      fenofibrate 160 MG tablet   Take 1 tablet by mouth Daily.      metoprolol succinate  MG 24 hr tablet  Commonly known as: TOPROL-XL   100 mg, Oral, Daily             Stop These Medications      busPIRone 10 MG  tablet  Commonly known as: BUSPAR     furosemide 20 MG tablet  Commonly known as: LASIX              Allergies   Allergen Reactions    Diclofenac Swelling    Benadryl [Diphenhydramine Hcl] Other (See Comments)     UNSURE    Clindamycin/Lincomycin Other (See Comments)     UNSURE    Doxycycline Other (See Comments)     UNSURE    Fluticasone Other (See Comments)     NOSE BLEEDS    Fluvoxamine Other (See Comments)     UNSURE    Montelukast Swelling    Symbicort [Budesonide-Formoterol Fumarate] Other (See Comments)     UNSURE    Lisinopril Dizziness    Penicillins Other (See Comments)     MOTHER TOLD HER SHE WAS ALLERGIC  *has tolerated ceftriaxone    Smz-Tmp Ds [Sulfamethoxazole-Trimethoprim] GI Intolerance    Sulfa Antibiotics GI Intolerance         Discharge Disposition:  Home-Health Care Svc    Diet:  Hospital:  Diet Order   Procedures    Diet: Cardiac Diets, Diabetic Diets; Healthy Heart (2-3 Na+); Consistent Carbohydrate; Texture: Regular Texture (IDDSI 7); Fluid Consistency: Thin (IDDSI 0)       Activity:  Activity Instructions    Please resume activity as tolerated.              CODE STATUS:    Code Status and Medical Interventions:   Ordered at: 07/22/23 0558     Level Of Support Discussed With:    Patient     Code Status (Patient has no pulse and is not breathing):    CPR (Attempt to Resuscitate)     Medical Interventions (Patient has pulse or is breathing):    Full Support     Release to patient:    Routine Release       Future Appointments   Date Time Provider Department Center   8/11/2023  2:00 PM Milly Castillo APRN [facility]   9/12/2023 10:00 AM Lisandra Benitez APRN [facility]       Additional Instructions for the Follow-ups that You Need to Schedule       Ambulatory Referral to Home Health   As directed      Face to Face Visit Date: 7/31/2023   Follow-up provider for Plan of Care?: I treated the patient in an acute care facility and will not continue treatment after discharge.    Follow-up provider: FLORENCIO KONG [[id number]]   Reason/Clinical Findings: Bilateral cellulitis of lower leg, Acute on chronic respiratory failure with hypoxia and hypercapnia   Describe mobility limitations that make leaving home difficult: Impaired gait, balance, mobility and endurance   Nursing/Therapeutic Services Requested: Skilled Nursing (HH aide) Physical Therapy Occupational Therapy Other   Skilled nursing orders: Wound care dressing/changes Cardiopulmonary assessments   PT orders: Therapeutic exercise Gait Training Transfer training Strengthening Home safety assessment   Weight Bearing Status: As Tolerated   Occupational orders: Activities of daily living Energy conservation Strengthening Home safety assessment   Frequency: 1 Week 1        Discharge Follow-up with PCP   As directed       Currently Documented PCP:    Florencio Kong MD    PCP Phone Number:    [phone]     Follow Up Details: 1 week        Discharge Follow-up with Specified Provider: CHF clinic   As directed      To: CHF clinic   Follow Up Details: Within 3-5 days        Discharge Follow-up with Specified Provider: Dr. Shine Rodriguez; 2 Weeks   As directed      To: Dr. Shine Rodriguez   Follow Up: 2 Weeks        Discharge Follow-up with Specified Provider: Dr. Puckett or Haleigh Benitez; 1 Month   As directed      To: Dr. Puckett or Haleigh Benitez   Follow Up: 1 Month                      Patti Butt MD  08/02/23      Time Spent on Discharge:  I spent  36  minutes on this discharge activity which included: face-to-face encounter with the patient, reviewing the data in the system, coordination of the care with the nursing staff as well as consultants, documentation, and entering orders.

## 2023-08-02 NOTE — CASE MANAGEMENT/SOCIAL WORK
Case Management Discharge Note      Final Note: Patient will be discharged today, family will transport.  [facility] notified of today's discharge, they will contact patient tomorrow to set up visits.  Patient was already current with home oxygen with [employer].  [facility] delivered FWW to patient's room.  Updated patient and son Marcus on discharge plan.  Advised patient that we are currently out of pulse oximeters and that she can ask HH nurse for one tomorrow.  13:21 EDT  Family unable to transport.  [facility] will transport via stretcher at 1600.       Selected Continued Care - Admitted Since 7/22/2023       Destination    No services have been selected for the patient.                Durable Medical Equipment Coordination complete.      Service Provider Selected Services Address Phone Fax Patient Preferred    [facility] [place] [facility] [address] , [place] [id number] [phone] --       Internal Comment last updated by Kandy Garcias RN 7/25/2023 0748    Current with 2L O2 at admission               [facility] [address], [place] [id number] [phone] --              Dialysis/Infusion    No services have been selected for the patient.                Home Medical Care Coordination complete.      Service Provider Selected Services Address Phone Fax Patient Preferred    [facility] [place] [facility] ,  Home Living [facility] ,  Home Rehabilitation [address], [address], [facility] [address] [phone] --              Therapy    No services have been selected for the patient.                Community Resources    No services have been selected for the patient.                [facility]    No services have been selected for the patient.                         Final Discharge Disposition Code: 06 - home with home health care

## 2023-08-10 ENCOUNTER — READMISSION MANAGEMENT (OUTPATIENT)
Dept: CALL CENTER | Facility: HOSPITAL | Age: 58
End: 2023-08-10
Payer: MEDICARE

## 2023-08-10 NOTE — OUTREACH NOTE
CHF Week 2 Survey      Flowsheet Row Responses   [facility] patient discharged from? [facility]   Does the patient have one of the following disease processes/diagnoses(primary or secondary)? CHF   Week 2 attempt successful? Yes   Call start time 0919   Call end time 0922   Discharge diagnosis Acute on chronic respiratory failure with hypoxia and hypercapnia   Meds reviewed with patient/caregiver? Yes   Is the patient having any side effects they believe may be caused by any medication additions or changes? No   Does the patient have all medications ordered at discharge? Yes   Is the patient taking all medications as directed (includes completed medication regime)? Yes   Comments regarding appointments 8/11/23 cards apt   Does the patient have a primary care provider?  Yes   Comments regarding PCP pt plans to schedule a FU apt with her PCP   Has the patient kept scheduled appointments due by today? N/A   What is the Home health agency?  [facility]   Home health comments  has visited pt   Pulse Ox monitoring None  [Pt does not have a pulse ox but plans to get one]   Psychosocial issues? No   Did the patient receive a copy of their discharge instructions? Yes   Nursing interventions Reviewed instructions with patient   What is the patient's perception of their health status since discharge? Improving   Nursing interventions Nurse provided patient education   Is the patient able to teach back signs and symptoms of worsening condition? (i.e. weight gain, shortness of air, etc.) Yes   If the patient is a current smoker, are they able to teach back resources for cessation? Not a smoker   Is the patient/caregiver able to teach back the hierarchy of who to call/visit for symptoms/problems? PCP, Specialist, Home health nurse, Urgent Care, ED, 911 Yes   Is the patient able to teach back Heart Failure Zones? Yes   CHF Nursing Interventions Education provided on various zones   CHF Zone this Call Green Zone   Green  Zone Patient reports doing well, No new or worsening shortness of breath, Physical activity level is normal for you, No new swelling -  feet, ankles and legs look normal for you, Weight check stable, No chest pain   Green Zone Interventions Daily weight check, Meds as directed, Low sodium diet, Follow up visits planned  [pt states she plans on getting a scale ]   CHF Week 2 call completed? Yes   Call end time 0922            Mesha LEYVA - Registered Nurse

## 2023-08-18 ENCOUNTER — READMISSION MANAGEMENT (OUTPATIENT)
Dept: CALL CENTER | Facility: HOSPITAL | Age: 58
End: 2023-08-18
Payer: MEDICARE

## 2023-08-18 NOTE — OUTREACH NOTE
CHF Week 3 Survey      Flowsheet Row Responses   [facility] patient discharged from? [facility]   Does the patient have one of the following disease processes/diagnoses(primary or secondary)? CHF   Week 3 attempt successful? Yes   Call start time 1552   Call end time 1600   Meds reviewed with patient/caregiver? Yes   Is the patient having any side effects they believe may be caused by any medication additions or changes? No   Does the patient have all medications ordered at discharge? Yes   Is the patient taking all medications as directed (includes completed medication regime)? Yes   Comments regarding appointments States her cardiology appt was rescheduled.   Does the patient have a primary care provider?  Yes   Does the patient have an appointment with their PCP within 7 days of discharge? Yes   Has the patient kept scheduled appointments due by today? Yes   Has home health visited the patient within 72 hours of discharge? Yes   Pulse Ox monitoring Intermittent   Pulse Ox device source Patient   O2 Sat comments O2 sats running at 92-95% on RA.   O2 Sat: education provided Sat levels   Psychosocial issues? No   Did the patient receive a copy of their discharge instructions? Yes   Nursing interventions Reviewed instructions with patient   What is the patient's perception of their health status since discharge? Improving   Nursing interventions Nurse provided patient education   Is the patient able to teach back signs and symptoms of worsening condition? (i.e. weight gain, shortness of air, etc.) Yes   If the patient is a current smoker, are they able to teach back resources for cessation? Not a smoker   Is the patient/caregiver able to teach back the hierarchy of who to call/visit for symptoms/problems? PCP, Specialist, Home health nurse, Urgent Care, ED, 911 Yes   Is the patient able to teach back Heart Failure Zones? No   CHF Nursing Interventions Education provided on various zones   CHF Zone this Call  Green Zone   Green Zone Patient reports doing well, No new or worsening shortness of breath, Physical activity level is normal for you, No new swelling -  feet, ankles and legs look normal for you, Weight check stable, No chest pain   Green Zone Interventions Daily weight check, Meds as directed, Low sodium diet, Follow up visits planned   CHF Week 3 call completed? Yes   Graduated Yes   Is the patient interested in additional calls from an ambulatory ? No   Would this patient benefit from a Referral to [facility] Social Work? No   Wrap up additional comments Patient states is improving. States edema is resolving, SOA is much better. States will start weighing daily, and reviewed weight parameters. Denies any needs/concerns today.   Call end time 1600            June DELGADO - Registered Nurse

## 2023-09-08 NOTE — PROGRESS NOTES
Chief Complaint  Congestive Heart Failure    Subjective      History of Present Illness {  Problem List  Visit  Diagnosis   Encounters  Notes  Medications  Labs  Result Review Imaging  Media :23}     Kerry Leal, 58 y.o. female with past medical history significant for morbid obesity, type 2 diabetes, hypertension, chronic lower extremity edema, chronic hypoxia, and acute on chronic CHF, who presents to [facility] for Congestive Heart Failure  Patient was recently admitted to [facility] from 7/22/2023 through 8/2/2023 with initial chief complaint of left leg pain and respiratory failure.  During hospital admission, patient was treated for her acute on chronic CHF with diuresis and GDMT.  He was followed by infectious disease for lower extremity cellulitis.  At time of discharge, patient was instructed to take Bumex, Jardiance, and spironolactone.     Since time of discharge, she denies chest pain/pressure, and shortness of air.  She has occasional palpitations which is improved with anxiety medications.She is walking more at attempting to stay active.  According to patient, swelling has improved since time of hospitalization. She is having good response to bumex.  She has been compliant with daily weights and has denied recent weight gain.  Does endorse some mild fatigue.     She is checking blood pressure at home but are not sure of current readings.  She did not bring her current blood pressure log to today's visit.    Has 2L home O2 if needed for QHS.       Echocardiogram 7/23/2023:    Technically difficult study due to massive obesity    Left ventricular systolic function is normal. Estimated left ventricular EF = 60%    Left ventricular wall thickness is consistent with mild concentric hypertrophy.    Valves are poorly visualized.  However there is no gross valvular abnormality    Estimated right ventricular systolic pressure from tricuspid regurgitation is  "markedly elevated (57 mmHg).      Objective     Vital Signs:   Vitals:    09/11/23 1334 09/11/23 1335 09/11/23 1336   BP: 130/68 134/74 135/66   BP Location: Right arm Left arm Left arm   Patient Position: Sitting Standing Sitting   Cuff Size: Large Adult Large Adult Large Adult   Pulse: 86 97 86   Resp:   18   Temp:  97 °F (36.1 °C) 97 °F (36.1 °C)   TempSrc:  Temporal Temporal   SpO2: 91% 92% 92%   Weight:   (!) 146 kg (322 lb 3.2 oz)   Height:   160 cm (63\")     Body mass index is 57.08 kg/m².  Physical Exam  Vitals and nursing note reviewed.   Constitutional:       Appearance: Normal appearance. She is obese.   HENT:      Head: Normocephalic.   Eyes:      Pupils: Pupils are equal, round, and reactive to light.   Cardiovascular:      Rate and Rhythm: Normal rate and regular rhythm.      Pulses: Normal pulses.      Heart sounds: Normal heart sounds. No murmur heard.  Pulmonary:      Effort: Pulmonary effort is normal.      Breath sounds: Normal breath sounds.   Abdominal:      General: Bowel sounds are normal.      Palpations: Abdomen is soft.   Musculoskeletal:         General: Normal range of motion.      Cervical back: Normal range of motion.      Right lower leg: 3+ Edema present.      Left lower leg: 3+ Edema present.   Skin:     General: Skin is warm and dry.      Capillary Refill: Capillary refill takes less than 2 seconds.   Neurological:      Mental Status: She is alert and oriented to person, place, and time.   Psychiatric:         Mood and Affect: Mood normal.         Thought Content: Thought content normal.              Data Reviewed:{ Labs  Result Review  Imaging  Med Tab  Media :23}   ECG 12 Lead Chest Pain (07/29/2023 13:52)  Adult Transthoracic Echo Complete W/ Cont if Necessary Per Protocol (07/23/2023 11:04)  Duplex Venous Lower Extremity - Bilateral CAR (07/22/2023 16:19)  ECG 12 Lead QT Measurement (07/22/2023 01:31)      Lab Results   Component Value Date    WBC 8.15 07/22/2023    HGB 11.7 " (L) 07/22/2023    HCT 40.7 07/22/2023    MCV 91.3 07/22/2023     07/22/2023      Lab Results   Component Value Date    GLUCOSE 132 (H) 08/01/2023    BUN 27 (H) 08/01/2023    CREATININE 0.63 08/01/2023    EGFR 104.3 08/01/2023    BCR 42.9 (H) 08/01/2023    K 4.1 08/01/2023    CO2 34.0 (H) 08/01/2023    CALCIUM 8.9 08/01/2023    ALBUMIN 3.6 07/30/2023    BILITOT 0.6 07/30/2023    AST 25 07/30/2023    ALT 19 07/30/2023      Lab Results   Component Value Date    CKTOTAL 94 07/22/2023    TROPONINT 18 (H) 07/24/2023        Assessment & Plan   Assessment and Plan {CC Problem List  Visit Diagnosis  ROS  Review (Popup)  Health Maintenance  Quality  BestPractice  Medications  SmartSets  SnapShot Encounters  Media :23}     1. Acute on chronic right-sided congestive heart failure  -Currently compliant with Bumex, Jardiance, losartan, metoprolol, and spironolactone.  We will continue these medications for now  -Patient to get updated blood work drawn today including BNP and BMP  -Patient was encouraged to keep scheduled follow-up with cardiology with Lisandra Benitez  -She was encouraged to continue to complete daily weights  -She was given information about when to call heart and valve Center including weight gain of 3 pounds in 1 day or 5 pounds in 1 week, worsening shortness of air, or worsening swelling    2. Essential hypertension  -Blood pressure currently controlled while in office today  -Patient states she is checking blood pressure at home but is unsure what the readings are.  I encouraged patient to bring blood pressure log to follow-up appointment with cardiology  - Basic Metabolic Panel; Future  - proBNP; Future    3. Dyspnea on exertion  Echocardiogram 7/23/2023:    Technically difficult study due to massive obesity    Left ventricular systolic function is normal. Estimated left ventricular EF = 60%    Left ventricular wall thickness is consistent with mild concentric hypertrophy.    Valves are  poorly visualized.  However there is no gross valvular abnormality    Estimated right ventricular systolic pressure from tricuspid regurgitation is markedly elevated (57 mmHg).  - proBNP; Future        Follow Up {Instructions Charge Capture  Follow-up Communications :23}     Return if symptoms worsen or fail to improve, for Heart Failure.      Patient was given instructions and counseling regarding her condition or for health maintenance advice. Please see specific information pulled into the AVS if appropriate.  Patient was instructed to call the [facility] with any questions, concerns, or worsening symptoms.    Dictated Utilizing Dragon Dictation   Please note that portions of this note were completed with a voice recognition program.   Part of this note may be an electronic transcription/translation of spoken language to printed text using the Dragon Dictation System.

## 2023-09-11 ENCOUNTER — LAB (OUTPATIENT)
Dept: LAB | Facility: HOSPITAL | Age: 58
End: 2023-09-11
Payer: MEDICARE

## 2023-09-11 ENCOUNTER — OFFICE VISIT (OUTPATIENT)
Dept: CARDIOLOGY | Facility: HOSPITAL | Age: 58
End: 2023-09-11
Payer: MEDICARE

## 2023-09-11 VITALS
HEIGHT: 63 IN | TEMPERATURE: 97 F | HEART RATE: 86 BPM | BODY MASS INDEX: 51.91 KG/M2 | OXYGEN SATURATION: 92 % | DIASTOLIC BLOOD PRESSURE: 66 MMHG | SYSTOLIC BLOOD PRESSURE: 135 MMHG | WEIGHT: 293 LBS | RESPIRATION RATE: 18 BRPM

## 2023-09-11 DIAGNOSIS — I10 ESSENTIAL HYPERTENSION: ICD-10-CM

## 2023-09-11 DIAGNOSIS — R06.09 DYSPNEA ON EXERTION: ICD-10-CM

## 2023-09-11 DIAGNOSIS — J96.22 ACUTE ON CHRONIC RESPIRATORY FAILURE WITH HYPOXIA AND HYPERCAPNIA: ICD-10-CM

## 2023-09-11 DIAGNOSIS — I50.813 ACUTE ON CHRONIC RIGHT-SIDED CONGESTIVE HEART FAILURE: ICD-10-CM

## 2023-09-11 DIAGNOSIS — I50.813 ACUTE ON CHRONIC RIGHT-SIDED CONGESTIVE HEART FAILURE: Primary | ICD-10-CM

## 2023-09-11 DIAGNOSIS — J96.21 ACUTE ON CHRONIC RESPIRATORY FAILURE WITH HYPOXIA AND HYPERCAPNIA: ICD-10-CM

## 2023-09-11 PROCEDURE — 80048 BASIC METABOLIC PNL TOTAL CA: CPT

## 2023-09-11 PROCEDURE — 36415 COLL VENOUS BLD VENIPUNCTURE: CPT

## 2023-09-11 PROCEDURE — 83880 ASSAY OF NATRIURETIC PEPTIDE: CPT

## 2023-09-11 PROCEDURE — 99213 OFFICE O/P EST LOW 20 MIN: CPT | Performed by: NURSE PRACTITIONER

## 2023-09-12 ENCOUNTER — TELEPHONE (OUTPATIENT)
Dept: CARDIOLOGY | Facility: CLINIC | Age: 58
End: 2023-09-12

## 2023-09-12 LAB
ANION GAP SERPL CALCULATED.3IONS-SCNC: 10.6 MMOL/L (ref 5–15)
BUN SERPL-MCNC: 18 MG/DL (ref 6–20)
BUN/CREAT SERPL: 22.5 (ref 7–25)
CALCIUM SPEC-SCNC: 9.5 MG/DL (ref 8.6–10.5)
CHLORIDE SERPL-SCNC: 101 MMOL/L (ref 98–107)
CO2 SERPL-SCNC: 27.4 MMOL/L (ref 22–29)
CREAT SERPL-MCNC: 0.8 MG/DL (ref 0.57–1)
EGFRCR SERPLBLD CKD-EPI 2021: 86.6 ML/MIN/1.73
GLUCOSE SERPL-MCNC: 119 MG/DL (ref 65–99)
NT-PROBNP SERPL-MCNC: 283 PG/ML (ref 0–900)
POTASSIUM SERPL-SCNC: 4.3 MMOL/L (ref 3.5–5.2)
SODIUM SERPL-SCNC: 139 MMOL/L (ref 136–145)

## 2023-09-12 NOTE — TELEPHONE ENCOUNTER
"Caller: Kerry Leal \"Eri\"    Relationship to patient: Self    Best call back number: [phone]    Type of visit: HOSPITAL FOLLOW UP    Requested date: ASAP     If rescheduling, when is the original appointment: 09/12/23     Additional notes: PATIENT CALLED IN TO RESCHEDULE HER APPOINTMENT FROM 09/12/23. PATIENT WOULD LIKE AN AFTERNOON APPOINTMENT IF POSSIBLE.     PATIENT IS NOT CURRENTLY HAVING ANY CARDIAC ISSUES.       " abdominal pain

## 2023-09-15 ENCOUNTER — TELEPHONE (OUTPATIENT)
Dept: CARDIOLOGY | Facility: HOSPITAL | Age: 58
End: 2023-09-15
Payer: MEDICARE

## 2023-09-15 NOTE — TELEPHONE ENCOUNTER
Called patient to discuss recent results of blood work.  Informed patient that her kidney function and potassium levels were within normal limits.  Her BNP was also within normal limits.  Encouraged patient to keep upcoming appointment with cardiology as scheduled on 9/19.  Patient verbalized an understanding.

## 2023-10-30 RX ORDER — LOSARTAN POTASSIUM 50 MG/1
100 TABLET ORAL DAILY
Qty: 60 TABLET | Refills: 0 | Status: SHIPPED | OUTPATIENT
Start: 2023-10-30

## 2023-10-30 NOTE — TELEPHONE ENCOUNTER
Lab Results   Component Value Date    GLUCOSE 119 (H) 09/11/2023    CALCIUM 9.5 09/11/2023     09/11/2023    K 4.3 09/11/2023    CO2 27.4 09/11/2023     09/11/2023    BUN 18 09/11/2023    CREATININE 0.80 09/11/2023    EGFR 86.6 09/11/2023    BCR 22.5 09/11/2023    ANIONGAP 10.6 09/11/2023      Needs appt for additional refills.

## 2024-04-19 RX ORDER — BUMETANIDE 2 MG/1
2 TABLET ORAL DAILY
Qty: 30 TABLET | Refills: 0 | Status: SHIPPED | OUTPATIENT
Start: 2024-04-19

## 2024-04-19 NOTE — TELEPHONE ENCOUNTER
Lab Results   Component Value Date    GLUCOSE 119 (H) 09/11/2023    CALCIUM 9.5 09/11/2023     09/11/2023    K 4.3 09/11/2023    CO2 27.4 09/11/2023     09/11/2023    BUN 18 09/11/2023    CREATININE 0.80 09/11/2023    EGFR 86.6 09/11/2023    BCR 22.5 09/11/2023    ANIONGAP 10.6 09/11/2023

## 2024-05-20 RX ORDER — BUMETANIDE 2 MG/1
TABLET ORAL
Qty: 30 TABLET | Refills: 0 | OUTPATIENT
Start: 2024-05-20